# Patient Record
Sex: FEMALE | Race: BLACK OR AFRICAN AMERICAN | Employment: OTHER | URBAN - METROPOLITAN AREA
[De-identification: names, ages, dates, MRNs, and addresses within clinical notes are randomized per-mention and may not be internally consistent; named-entity substitution may affect disease eponyms.]

---

## 2016-12-06 LAB
A/G RATIO: 1.4
ALBUMIN SERPL-MCNC: 4.2 G/DL
ALP BLD-CCNC: 61 U/L
ALT SERPL-CCNC: 22 U/L
AST SERPL-CCNC: 19 U/L
BILIRUB SERPL-MCNC: 0.6 MG/DL (ref 0.1–1.4)
BILIRUBIN DIRECT: 0.1 MG/DL
BILIRUBIN, INDIRECT: 0.5
GLOBULIN: 3.1
PROTEIN TOTAL: 7.3 G/DL

## 2017-01-10 ENCOUNTER — OFFICE VISIT (OUTPATIENT)
Dept: INTERNAL MEDICINE | Age: 67
End: 2017-01-10

## 2017-01-10 VITALS
RESPIRATION RATE: 16 BRPM | SYSTOLIC BLOOD PRESSURE: 140 MMHG | HEART RATE: 84 BPM | WEIGHT: 211 LBS | DIASTOLIC BLOOD PRESSURE: 84 MMHG | BODY MASS INDEX: 36.22 KG/M2

## 2017-01-10 DIAGNOSIS — E78.00 PURE HYPERCHOLESTEROLEMIA: ICD-10-CM

## 2017-01-10 DIAGNOSIS — K75.4 AUTOIMMUNE HEPATITIS (HCC): ICD-10-CM

## 2017-01-10 DIAGNOSIS — I10 ESSENTIAL HYPERTENSION, BENIGN: ICD-10-CM

## 2017-01-10 DIAGNOSIS — J45.20 MILD INTERMITTENT ASTHMA WITHOUT COMPLICATION: Primary | ICD-10-CM

## 2017-01-10 PROCEDURE — 99214 OFFICE O/P EST MOD 30 MIN: CPT | Performed by: INTERNAL MEDICINE

## 2017-01-10 RX ORDER — MONTELUKAST SODIUM 10 MG/1
10 TABLET ORAL NIGHTLY
Qty: 90 TABLET | Refills: 3 | Status: SHIPPED | OUTPATIENT
Start: 2017-01-10 | End: 2019-01-07 | Stop reason: SDUPTHER

## 2017-01-10 ASSESSMENT — ENCOUNTER SYMPTOMS
SORE THROAT: 0
SHORTNESS OF BREATH: 1
GASTROINTESTINAL NEGATIVE: 1
COUGH: 1
ALLERGIC/IMMUNOLOGIC NEGATIVE: 1

## 2017-01-10 ASSESSMENT — PATIENT HEALTH QUESTIONNAIRE - PHQ9
SUM OF ALL RESPONSES TO PHQ9 QUESTIONS 1 & 2: 0
SUM OF ALL RESPONSES TO PHQ QUESTIONS 1-9: 0
2. FEELING DOWN, DEPRESSED OR HOPELESS: 0
1. LITTLE INTEREST OR PLEASURE IN DOING THINGS: 0

## 2017-02-09 ENCOUNTER — TELEPHONE (OUTPATIENT)
Dept: INTERNAL MEDICINE | Age: 67
End: 2017-02-09

## 2017-02-09 RX ORDER — AMOXICILLIN 500 MG/1
500 TABLET, FILM COATED ORAL 3 TIMES DAILY
Qty: 30 TABLET | Refills: 0 | Status: SHIPPED | OUTPATIENT
Start: 2017-02-09 | End: 2017-02-19

## 2017-02-27 RX ORDER — BUDESONIDE AND FORMOTEROL FUMARATE DIHYDRATE 160; 4.5 UG/1; UG/1
AEROSOL RESPIRATORY (INHALATION)
Qty: 3 INHALER | Refills: 3 | Status: SHIPPED | OUTPATIENT
Start: 2017-02-27 | End: 2020-10-27 | Stop reason: SDUPTHER

## 2017-04-27 ENCOUNTER — OFFICE VISIT (OUTPATIENT)
Dept: INTERNAL MEDICINE | Age: 67
End: 2017-04-27

## 2017-04-27 VITALS
SYSTOLIC BLOOD PRESSURE: 158 MMHG | RESPIRATION RATE: 16 BRPM | BODY MASS INDEX: 36.9 KG/M2 | WEIGHT: 215 LBS | HEART RATE: 68 BPM | DIASTOLIC BLOOD PRESSURE: 88 MMHG

## 2017-04-27 DIAGNOSIS — I10 ESSENTIAL HYPERTENSION, BENIGN: Primary | ICD-10-CM

## 2017-04-27 DIAGNOSIS — J45.20 MILD INTERMITTENT ASTHMA WITHOUT COMPLICATION: ICD-10-CM

## 2017-04-27 DIAGNOSIS — K75.4 AUTOIMMUNE HEPATITIS (HCC): ICD-10-CM

## 2017-04-27 PROCEDURE — 99214 OFFICE O/P EST MOD 30 MIN: CPT | Performed by: INTERNAL MEDICINE

## 2017-04-27 RX ORDER — BENZONATATE 100 MG/1
CAPSULE ORAL
Qty: 270 CAPSULE | Refills: 0 | Status: SHIPPED | OUTPATIENT
Start: 2017-04-27 | End: 2018-12-03 | Stop reason: SDUPTHER

## 2017-04-27 RX ORDER — AMLODIPINE BESYLATE 5 MG/1
5 TABLET ORAL DAILY
Qty: 30 TABLET | Refills: 3 | Status: SHIPPED | OUTPATIENT
Start: 2017-04-27 | End: 2017-12-12 | Stop reason: SDUPTHER

## 2017-04-27 RX ORDER — OLMESARTAN MEDOXOMIL 20 MG/1
60 TABLET ORAL DAILY
Qty: 270 TABLET | Refills: 3 | Status: SHIPPED | OUTPATIENT
Start: 2017-04-27 | End: 2018-11-28 | Stop reason: SDUPTHER

## 2017-04-27 ASSESSMENT — ENCOUNTER SYMPTOMS
SHORTNESS OF BREATH: 1
GASTROINTESTINAL NEGATIVE: 1
SORE THROAT: 0
ALLERGIC/IMMUNOLOGIC NEGATIVE: 1
COUGH: 1

## 2017-06-21 ENCOUNTER — HOSPITAL ENCOUNTER (OUTPATIENT)
Dept: ULTRASOUND IMAGING | Age: 67
Discharge: HOME | End: 2017-06-21
Attending: INTERNAL MEDICINE | Admitting: INTERNAL MEDICINE

## 2017-06-21 DIAGNOSIS — K75.4 AUTOIMMUNE HEPATITIS (HCC): ICD-10-CM

## 2017-06-23 ENCOUNTER — TELEPHONE (OUTPATIENT)
Dept: INTERNAL MEDICINE | Age: 67
End: 2017-06-23

## 2017-06-23 VITALS — HEART RATE: 68 BPM | DIASTOLIC BLOOD PRESSURE: 82 MMHG | SYSTOLIC BLOOD PRESSURE: 132 MMHG | RESPIRATION RATE: 16 BRPM

## 2017-07-25 ENCOUNTER — OFFICE VISIT (OUTPATIENT)
Dept: INTERNAL MEDICINE | Age: 67
End: 2017-07-25

## 2017-07-25 VITALS
DIASTOLIC BLOOD PRESSURE: 72 MMHG | WEIGHT: 217 LBS | RESPIRATION RATE: 16 BRPM | SYSTOLIC BLOOD PRESSURE: 124 MMHG | HEART RATE: 68 BPM | BODY MASS INDEX: 37.25 KG/M2

## 2017-07-25 DIAGNOSIS — I10 ESSENTIAL HYPERTENSION, BENIGN: Primary | ICD-10-CM

## 2017-07-25 DIAGNOSIS — K21.9 GASTROESOPHAGEAL REFLUX DISEASE, ESOPHAGITIS PRESENCE NOT SPECIFIED: ICD-10-CM

## 2017-07-25 PROCEDURE — 99213 OFFICE O/P EST LOW 20 MIN: CPT | Performed by: INTERNAL MEDICINE

## 2017-07-25 ASSESSMENT — ENCOUNTER SYMPTOMS
GASTROINTESTINAL NEGATIVE: 1
SHORTNESS OF BREATH: 1
SORE THROAT: 0
ALLERGIC/IMMUNOLOGIC NEGATIVE: 1
COUGH: 1

## 2017-10-09 ENCOUNTER — OFFICE VISIT (OUTPATIENT)
Dept: INTERNAL MEDICINE | Age: 67
End: 2017-10-09

## 2017-10-09 VITALS
BODY MASS INDEX: 37.39 KG/M2 | HEIGHT: 64 IN | DIASTOLIC BLOOD PRESSURE: 90 MMHG | SYSTOLIC BLOOD PRESSURE: 150 MMHG | WEIGHT: 219 LBS | OXYGEN SATURATION: 98 % | HEART RATE: 83 BPM

## 2017-10-09 DIAGNOSIS — R73.01 IMPAIRED FASTING GLUCOSE: ICD-10-CM

## 2017-10-09 DIAGNOSIS — Z12.4 SCREENING FOR CERVICAL CANCER: ICD-10-CM

## 2017-10-09 DIAGNOSIS — K75.4 AUTOIMMUNE HEPATITIS (HCC): ICD-10-CM

## 2017-10-09 DIAGNOSIS — I10 ESSENTIAL HYPERTENSION, BENIGN: Chronic | ICD-10-CM

## 2017-10-09 DIAGNOSIS — Z23 NEED FOR PNEUMOCOCCAL VACCINATION: ICD-10-CM

## 2017-10-09 DIAGNOSIS — Z12.39 SCREENING FOR BREAST CANCER: ICD-10-CM

## 2017-10-09 DIAGNOSIS — Z23 NEED FOR INFLUENZA VACCINATION: ICD-10-CM

## 2017-10-09 DIAGNOSIS — E78.00 PURE HYPERCHOLESTEROLEMIA: ICD-10-CM

## 2017-10-09 DIAGNOSIS — Z00.00 MEDICARE ANNUAL WELLNESS VISIT, INITIAL: ICD-10-CM

## 2017-10-09 DIAGNOSIS — Z00.00 MEDICARE ANNUAL WELLNESS VISIT, INITIAL: Primary | ICD-10-CM

## 2017-10-09 DIAGNOSIS — J45.20 MILD INTERMITTENT ASTHMA WITHOUT COMPLICATION: ICD-10-CM

## 2017-10-09 PROCEDURE — 90662 IIV NO PRSV INCREASED AG IM: CPT | Performed by: INTERNAL MEDICINE

## 2017-10-09 PROCEDURE — G0009 ADMIN PNEUMOCOCCAL VACCINE: HCPCS | Performed by: INTERNAL MEDICINE

## 2017-10-09 PROCEDURE — G0101 CA SCREEN;PELVIC/BREAST EXAM: HCPCS | Performed by: INTERNAL MEDICINE

## 2017-10-09 PROCEDURE — G0008 ADMIN INFLUENZA VIRUS VAC: HCPCS | Performed by: INTERNAL MEDICINE

## 2017-10-09 PROCEDURE — G0402 INITIAL PREVENTIVE EXAM: HCPCS | Performed by: INTERNAL MEDICINE

## 2017-10-09 PROCEDURE — 90732 PPSV23 VACC 2 YRS+ SUBQ/IM: CPT | Performed by: INTERNAL MEDICINE

## 2017-10-09 RX ORDER — PREDNISONE 10 MG/1
TABLET ORAL
Qty: 90 TABLET | Refills: 3 | Status: SHIPPED | OUTPATIENT
Start: 2017-10-09 | End: 2018-11-28 | Stop reason: SDUPTHER

## 2017-10-09 RX ORDER — FUROSEMIDE 20 MG/1
TABLET ORAL
Qty: 90 TABLET | Refills: 3 | Status: SHIPPED | OUTPATIENT
Start: 2017-10-09 | End: 2018-09-28 | Stop reason: SDUPTHER

## 2017-10-09 RX ORDER — DESLORATADINE 5 MG/1
TABLET ORAL
Qty: 90 TABLET | Refills: 3 | Status: SHIPPED | OUTPATIENT
Start: 2017-10-09 | End: 2018-09-28 | Stop reason: SDUPTHER

## 2017-10-10 LAB
A/G RATIO: 1.3 (ref 1.1–2.2)
ALBUMIN SERPL-MCNC: 4.2 G/DL (ref 3.4–5)
ALP BLD-CCNC: 57 U/L (ref 40–129)
ALT SERPL-CCNC: 24 U/L (ref 10–40)
ANION GAP SERPL CALCULATED.3IONS-SCNC: 15 MMOL/L (ref 3–16)
AST SERPL-CCNC: 19 U/L (ref 15–37)
BASOPHILS ABSOLUTE: 0.1 K/UL (ref 0–0.2)
BASOPHILS RELATIVE PERCENT: 1 %
BILIRUB SERPL-MCNC: 0.8 MG/DL (ref 0–1)
BUN BLDV-MCNC: 16 MG/DL (ref 7–20)
CALCIUM SERPL-MCNC: 9.8 MG/DL (ref 8.3–10.6)
CHLORIDE BLD-SCNC: 101 MMOL/L (ref 99–110)
CHOLESTEROL, TOTAL: 252 MG/DL (ref 0–199)
CO2: 25 MMOL/L (ref 21–32)
CREAT SERPL-MCNC: 1 MG/DL (ref 0.6–1.2)
EOSINOPHILS ABSOLUTE: 0 K/UL (ref 0–0.6)
EOSINOPHILS RELATIVE PERCENT: 0 %
GFR AFRICAN AMERICAN: >60
GFR NON-AFRICAN AMERICAN: 56
GLOBULIN: 3.2 G/DL
GLUCOSE BLD-MCNC: 104 MG/DL (ref 70–99)
HCT VFR BLD CALC: 39.1 % (ref 36–48)
HDLC SERPL-MCNC: 84 MG/DL (ref 40–60)
HEMOGLOBIN: 12.8 G/DL (ref 12–16)
LDL CHOLESTEROL CALCULATED: 148 MG/DL
LYMPHOCYTES ABSOLUTE: 2.2 K/UL (ref 1–5.1)
LYMPHOCYTES RELATIVE PERCENT: 15 %
MCH RBC QN AUTO: 30.5 PG (ref 26–34)
MCHC RBC AUTO-ENTMCNC: 32.7 G/DL (ref 31–36)
MCV RBC AUTO: 93.5 FL (ref 80–100)
MONOCYTES ABSOLUTE: 0.9 K/UL (ref 0–1.3)
MONOCYTES RELATIVE PERCENT: 6 %
NEUTROPHILS ABSOLUTE: 11.2 K/UL (ref 1.7–7.7)
NEUTROPHILS RELATIVE PERCENT: 78 %
PDW BLD-RTO: 14 % (ref 12.4–15.4)
PLATELET # BLD: 354 K/UL (ref 135–450)
PLATELET SLIDE REVIEW: ADEQUATE
PMV BLD AUTO: 8.2 FL (ref 5–10.5)
POTASSIUM SERPL-SCNC: 4.3 MMOL/L (ref 3.5–5.1)
RBC # BLD: 4.18 M/UL (ref 4–5.2)
RBC # BLD: NORMAL 10*6/UL
SLIDE REVIEW: ABNORMAL
SODIUM BLD-SCNC: 141 MMOL/L (ref 136–145)
TOTAL PROTEIN: 7.4 G/DL (ref 6.4–8.2)
TRIGL SERPL-MCNC: 101 MG/DL (ref 0–150)
TSH REFLEX FT4: 1.41 UIU/ML (ref 0.27–4.2)
VLDLC SERPL CALC-MCNC: 20 MG/DL
WBC # BLD: 14.4 K/UL (ref 4–11)

## 2017-10-11 LAB
ESTIMATED AVERAGE GLUCOSE: 154.2 MG/DL
HBA1C MFR BLD: 7 %

## 2017-11-13 ENCOUNTER — OFFICE VISIT (OUTPATIENT)
Dept: GYNECOLOGY | Age: 67
End: 2017-11-13

## 2017-11-13 VITALS
SYSTOLIC BLOOD PRESSURE: 141 MMHG | DIASTOLIC BLOOD PRESSURE: 84 MMHG | RESPIRATION RATE: 16 BRPM | HEIGHT: 64 IN | BODY MASS INDEX: 37.56 KG/M2 | TEMPERATURE: 97.1 F | WEIGHT: 220 LBS | OXYGEN SATURATION: 97 % | HEART RATE: 68 BPM

## 2017-11-13 DIAGNOSIS — Z01.419 WELL WOMAN EXAM WITH ROUTINE GYNECOLOGICAL EXAM: Primary | ICD-10-CM

## 2017-11-13 PROCEDURE — G8427 DOCREV CUR MEDS BY ELIG CLIN: HCPCS | Performed by: OBSTETRICS & GYNECOLOGY

## 2017-11-13 PROCEDURE — G0101 CA SCREEN;PELVIC/BREAST EXAM: HCPCS | Performed by: OBSTETRICS & GYNECOLOGY

## 2017-11-13 PROCEDURE — G8417 CALC BMI ABV UP PARAM F/U: HCPCS | Performed by: OBSTETRICS & GYNECOLOGY

## 2017-11-13 PROCEDURE — 99999 PR OFFICE/OUTPT VISIT,PROCEDURE ONLY: CPT | Performed by: OBSTETRICS & GYNECOLOGY

## 2017-11-13 NOTE — PROGRESS NOTES
Subjective:      Patient ID: Janny Salazar is a 74 y.o. female. HPI  pts here for annual gyn exam.  She denies complaints. Scheduled/up to date for mammogram and colonoscopy. Denies vag bleeding. Review of Systems Pertinent review of systems items discussed above. All others systems items not discussed above were negative. Objective:   Physical Exam   Constitutional: She is oriented to person, place, and time. She appears well-developed and well-nourished. HENT:   Head: Normocephalic and atraumatic. Neck: No tracheal deviation present. No thyromegaly present. Cardiovascular: Normal rate, regular rhythm and normal heart sounds. No murmur heard. Pulmonary/Chest: Effort normal and breath sounds normal. No respiratory distress. She has no wheezes. She has no rales. Right breast exhibits no mass, no nipple discharge and no skin change. Left breast exhibits no mass, no nipple discharge and no skin change. Abdominal: Soft. She exhibits no distension and no mass. There is no tenderness. There is no rebound. Genitourinary: Rectum normal, vagina normal and uterus normal. Rectal exam shows no external hemorrhoid, no mass and guaiac negative stool. No breast tenderness (no masses), discharge or bleeding. There is no lesion on the right labia. There is no lesion on the left labia. Uterus is not deviated, not enlarged, not fixed and not tender. Cervix exhibits no motion tenderness, no discharge and no friability. Right adnexum displays no mass and no tenderness. Left adnexum displays no mass and no tenderness. No foreign body in the vagina. No vaginal discharge found. Genitourinary Comments: Pap performed. Musculoskeletal: Normal range of motion. Lymphadenopathy:     She has no cervical adenopathy. Neurological: She is alert and oriented to person, place, and time.        Assessment:      Normal gyn exam      Plan:      F/u annual gyn exam.

## 2017-11-15 LAB
HPV COMMENT: NORMAL
HPV TYPE 16: NOT DETECTED
HPV TYPE 18: NOT DETECTED
HPVOH (OTHER TYPES): NOT DETECTED

## 2017-12-13 RX ORDER — AMLODIPINE BESYLATE 5 MG/1
TABLET ORAL
Qty: 90 TABLET | Refills: 3 | Status: SHIPPED | OUTPATIENT
Start: 2017-12-13 | End: 2018-07-18 | Stop reason: SDUPTHER

## 2018-01-08 ENCOUNTER — TELEPHONE (OUTPATIENT)
Dept: INTERNAL MEDICINE | Age: 68
End: 2018-01-08

## 2018-01-08 RX ORDER — AZITHROMYCIN 250 MG/1
TABLET, FILM COATED ORAL
Qty: 1 PACKET | Refills: 0 | Status: SHIPPED | OUTPATIENT
Start: 2018-01-08 | End: 2018-01-18

## 2018-01-08 NOTE — TELEPHONE ENCOUNTER
Pt stated has a bad cough, no fever, chills, runny nose, very Tired for 1 week. Pt stated taking OTC medication Advil Sinus cold medication not working. Pt stated that needs something for these symptoms.   Pharmacy on file verified

## 2018-01-16 ENCOUNTER — OFFICE VISIT (OUTPATIENT)
Dept: INTERNAL MEDICINE | Age: 68
End: 2018-01-16

## 2018-01-16 VITALS
WEIGHT: 215 LBS | DIASTOLIC BLOOD PRESSURE: 90 MMHG | HEART RATE: 78 BPM | BODY MASS INDEX: 36.9 KG/M2 | SYSTOLIC BLOOD PRESSURE: 148 MMHG

## 2018-01-16 DIAGNOSIS — I10 ESSENTIAL HYPERTENSION, BENIGN: Chronic | ICD-10-CM

## 2018-01-16 DIAGNOSIS — E78.00 PURE HYPERCHOLESTEROLEMIA: ICD-10-CM

## 2018-01-16 DIAGNOSIS — I10 ESSENTIAL HYPERTENSION, BENIGN: Primary | Chronic | ICD-10-CM

## 2018-01-16 LAB
A/G RATIO: 1.6 (ref 1.1–2.2)
ALBUMIN SERPL-MCNC: 4.6 G/DL (ref 3.4–5)
ALP BLD-CCNC: 57 U/L (ref 40–129)
ALT SERPL-CCNC: 22 U/L (ref 10–40)
ANION GAP SERPL CALCULATED.3IONS-SCNC: 14 MMOL/L (ref 3–16)
AST SERPL-CCNC: 18 U/L (ref 15–37)
BASOPHILS ABSOLUTE: 0 K/UL (ref 0–0.2)
BASOPHILS RELATIVE PERCENT: 0.2 %
BILIRUB SERPL-MCNC: 0.6 MG/DL (ref 0–1)
BUN BLDV-MCNC: 16 MG/DL (ref 7–20)
CALCIUM SERPL-MCNC: 9.8 MG/DL (ref 8.3–10.6)
CHLORIDE BLD-SCNC: 101 MMOL/L (ref 99–110)
CHOLESTEROL, TOTAL: 253 MG/DL (ref 0–199)
CO2: 27 MMOL/L (ref 21–32)
CREAT SERPL-MCNC: 1.1 MG/DL (ref 0.6–1.2)
EOSINOPHILS ABSOLUTE: 0 K/UL (ref 0–0.6)
EOSINOPHILS RELATIVE PERCENT: 0.1 %
GFR AFRICAN AMERICAN: >60
GFR NON-AFRICAN AMERICAN: 50
GLOBULIN: 2.8 G/DL
GLUCOSE BLD-MCNC: 120 MG/DL (ref 70–99)
HCT VFR BLD CALC: 40.7 % (ref 36–48)
HDLC SERPL-MCNC: 76 MG/DL (ref 40–60)
HEMOGLOBIN: 13.1 G/DL (ref 12–16)
LDL CHOLESTEROL CALCULATED: 152 MG/DL
LYMPHOCYTES ABSOLUTE: 1.7 K/UL (ref 1–5.1)
LYMPHOCYTES RELATIVE PERCENT: 10.5 %
MCH RBC QN AUTO: 29.7 PG (ref 26–34)
MCHC RBC AUTO-ENTMCNC: 32.2 G/DL (ref 31–36)
MCV RBC AUTO: 92.4 FL (ref 80–100)
MONOCYTES ABSOLUTE: 0.6 K/UL (ref 0–1.3)
MONOCYTES RELATIVE PERCENT: 3.4 %
NEUTROPHILS ABSOLUTE: 14.2 K/UL (ref 1.7–7.7)
NEUTROPHILS RELATIVE PERCENT: 85.8 %
PDW BLD-RTO: 14.1 % (ref 12.4–15.4)
PLATELET # BLD: 391 K/UL (ref 135–450)
PMV BLD AUTO: 7.7 FL (ref 5–10.5)
POTASSIUM SERPL-SCNC: 4.2 MMOL/L (ref 3.5–5.1)
RBC # BLD: 4.41 M/UL (ref 4–5.2)
SODIUM BLD-SCNC: 142 MMOL/L (ref 136–145)
TOTAL PROTEIN: 7.4 G/DL (ref 6.4–8.2)
TRIGL SERPL-MCNC: 126 MG/DL (ref 0–150)
VLDLC SERPL CALC-MCNC: 25 MG/DL
WBC # BLD: 16.6 K/UL (ref 4–11)

## 2018-01-16 PROCEDURE — 3014F SCREEN MAMMO DOC REV: CPT | Performed by: INTERNAL MEDICINE

## 2018-01-16 PROCEDURE — G8427 DOCREV CUR MEDS BY ELIG CLIN: HCPCS | Performed by: INTERNAL MEDICINE

## 2018-01-16 PROCEDURE — G8417 CALC BMI ABV UP PARAM F/U: HCPCS | Performed by: INTERNAL MEDICINE

## 2018-01-16 PROCEDURE — 99213 OFFICE O/P EST LOW 20 MIN: CPT | Performed by: INTERNAL MEDICINE

## 2018-01-16 PROCEDURE — G8399 PT W/DXA RESULTS DOCUMENT: HCPCS | Performed by: INTERNAL MEDICINE

## 2018-01-16 PROCEDURE — 1036F TOBACCO NON-USER: CPT | Performed by: INTERNAL MEDICINE

## 2018-01-16 PROCEDURE — 1090F PRES/ABSN URINE INCON ASSESS: CPT | Performed by: INTERNAL MEDICINE

## 2018-01-16 PROCEDURE — G8484 FLU IMMUNIZE NO ADMIN: HCPCS | Performed by: INTERNAL MEDICINE

## 2018-01-16 PROCEDURE — 4040F PNEUMOC VAC/ADMIN/RCVD: CPT | Performed by: INTERNAL MEDICINE

## 2018-01-16 PROCEDURE — 3017F COLORECTAL CA SCREEN DOC REV: CPT | Performed by: INTERNAL MEDICINE

## 2018-01-16 PROCEDURE — 1123F ACP DISCUSS/DSCN MKR DOCD: CPT | Performed by: INTERNAL MEDICINE

## 2018-01-16 ASSESSMENT — ENCOUNTER SYMPTOMS
ABDOMINAL PAIN: 0
VOMITING: 0
SHORTNESS OF BREATH: 0
CHEST TIGHTNESS: 0
NAUSEA: 0

## 2018-01-16 ASSESSMENT — PATIENT HEALTH QUESTIONNAIRE - PHQ9
SUM OF ALL RESPONSES TO PHQ QUESTIONS 1-9: 0
SUM OF ALL RESPONSES TO PHQ9 QUESTIONS 1 & 2: 0
2. FEELING DOWN, DEPRESSED OR HOPELESS: 0
1. LITTLE INTEREST OR PLEASURE IN DOING THINGS: 0

## 2018-01-16 NOTE — PROGRESS NOTES
Stuart Méndez MD   mometasone (NASONEX) 50 MCG/ACT nasal spray 2 sprays by Nasal route daily as needed (as needed for allergies) 9/6/16  Yes Stuart Méndez MD   PROVENTIL  (90 BASE) MCG/ACT inhaler INHALE 2 PUFFS INTO THE LUNGS EVERY 6 HOURS AS NEEDED. 9/5/14  Yes Stuart Méndez MD   calcium carbonate (OSCAL) 500 MG TABS tablet Take 500 mg by mouth daily. Yes Historical Provider, MD   Spacer/Aero Chamber Mouthpiece MISC by Does not apply route. 1/24/12  Yes Stuart Méndez MD   Multiple Vitamin (MULTIVITAMIN PO) Take  by mouth daily. 8/6/10  Yes Historical Provider, MD   zoledronic acid (RECLAST) 5 MG/100ML SOLN Infuse 100 mLs intravenously once for 1 dose. 8/8/14 10/9/17  Stuart Méndez MD       REVIEW OF SYSTEMS:  Review of Systems   Constitutional: Negative for chills and fever. Eyes: Negative for visual disturbance. Respiratory: Negative for chest tightness and shortness of breath. Cardiovascular: Negative for chest pain. Gastrointestinal: Negative for abdominal pain, nausea and vomiting. Neurological: Negative for weakness, numbness and headaches. Physical Exam:      Vitals: BP (!) 148/90   Pulse 78   Wt 215 lb (97.5 kg)   LMP 12/07/2005   BMI 36.90 kg/m²     Body mass index is 36.9 kg/m². Wt Readings from Last 3 Encounters:   01/16/18 215 lb (97.5 kg)   11/13/17 220 lb (99.8 kg)   10/09/17 219 lb (99.3 kg)     Physical Exam   Constitutional: She is oriented to person, place, and time. She appears well-developed and well-nourished. No distress. HENT:   Head: Normocephalic and atraumatic. Right Ear: External ear normal.   Left Ear: External ear normal.   Mouth/Throat: Oropharynx is clear and moist. No oropharyngeal exudate. Eyes: Conjunctivae and EOM are normal. Pupils are equal, round, and reactive to light. Right eye exhibits no discharge. Left eye exhibits no discharge. No scleral icterus. Right eye exhibits normal extraocular motion and no nystagmus.  Left eye exhibits is the diastolic pressure. Why is this test done? You may do this test at home to:  · Find out if you have high blood pressure. · Track your blood pressure if you have high blood pressure. · Track how well medicine is working to reduce high blood pressure. · Check how lifestyle changes, such as weight loss and exercise, are affecting blood pressure. How can you prepare for the test?  · Do not use caffeine, tobacco, or medicines known to raise blood pressure (such as nasal decongestant sprays) for at least 30 minutes before taking your blood pressure. · Do not exercise for at least 30 minutes before taking your blood pressure. What happens before the test?  Take your blood pressure while you feel comfortable and relaxed. Sit quietly with both feet on the floor for at least 5 minutes before the test.  What happens during the test?  · Sit with your arm slightly bent and resting on a table so that your upper arm is at the same level as your heart. · Roll up your sleeve or take off your shirt to expose your upper arm. · Wrap the blood pressure cuff around your upper arm so that the lower edge of the cuff is about 1 inch above the bend of your elbow. Proceed with the following steps depending on if you are using an automatic or manual pressure monitor. Automatic blood pressure monitors  · Press the on/off button on the automatic monitor and wait until the ready-to-measure \"heart\" symbol appears next to zero in the display window. · Press the start button. The cuff will inflate and deflate by itself. · Your blood pressure numbers will appear on the screen. · Write your numbers in your log book, along with the date and time. Manual blood pressure monitors  · Place the earpieces of a stethoscope in your ears, and place the bell of the stethoscope over the artery, just below the cuff. · Close the valve on the rubber inflating bulb.   · Squeeze the bulb rapidly with your opposite hand to inflate the cuff until

## 2018-01-16 NOTE — PATIENT INSTRUCTIONS
Patient Education        Home Blood Pressure Test: About This Test  What is it? A home blood pressure test allows you to keep track of your blood pressure at home. Blood pressure is a measure of the force of blood against the walls of your arteries. Blood pressure readings include two numbers, such as 130/80 (say \"130 over 80\"). The first number is the systolic pressure. The second number is the diastolic pressure. Why is this test done? You may do this test at home to:  · Find out if you have high blood pressure. · Track your blood pressure if you have high blood pressure. · Track how well medicine is working to reduce high blood pressure. · Check how lifestyle changes, such as weight loss and exercise, are affecting blood pressure. How can you prepare for the test?  · Do not use caffeine, tobacco, or medicines known to raise blood pressure (such as nasal decongestant sprays) for at least 30 minutes before taking your blood pressure. · Do not exercise for at least 30 minutes before taking your blood pressure. What happens before the test?  Take your blood pressure while you feel comfortable and relaxed. Sit quietly with both feet on the floor for at least 5 minutes before the test.  What happens during the test?  · Sit with your arm slightly bent and resting on a table so that your upper arm is at the same level as your heart. · Roll up your sleeve or take off your shirt to expose your upper arm. · Wrap the blood pressure cuff around your upper arm so that the lower edge of the cuff is about 1 inch above the bend of your elbow. Proceed with the following steps depending on if you are using an automatic or manual pressure monitor. Automatic blood pressure monitors  · Press the on/off button on the automatic monitor and wait until the ready-to-measure \"heart\" symbol appears next to zero in the display window. · Press the start button. The cuff will inflate and deflate by itself.   · Your blood pressure numbers will appear on the screen. · Write your numbers in your log book, along with the date and time. Manual blood pressure monitors  · Place the earpieces of a stethoscope in your ears, and place the bell of the stethoscope over the artery, just below the cuff. · Close the valve on the rubber inflating bulb. · Squeeze the bulb rapidly with your opposite hand to inflate the cuff until the dial or column of mercury reads about 30 mm Hg higher than your usual systolic pressure. If you do not know your usual pressure, inflate the cuff to 210 mm Hg or until the pulse at your wrist disappears. · Open the pressure valve just slightly by twisting or pressing the valve on the bulb. · As you watch the pressure slowly fall, note the level on the dial at which you first start to hear a pulsing or tapping sound through the stethoscope. This is your systolic blood pressure. · Continue letting the air out slowly. The sounds will become muffled and will finally disappear. Note the pressure when the sounds completely disappear. This is your diastolic blood pressure. Let out all the remaining air. · Write your numbers in your log book, along with the date and time. What else should you know about the test?  Results for adults ages 25 and older (mm Hg):  · Normal (ideal): Systolic 645 or below. Diastolic 79 or below. · Prehypertension: Systolic 648 to 667. Diastolic 80 to 89. · Hypertension: Systolic 384 or above. Diastolic 90 or above. Follow-up care is a key part of your treatment and safety. Be sure to make and go to all appointments, and call your doctor if you are having problems. It's also a good idea to keep a list of the medicines you take. Where can you learn more? Go to [email or web address] org and sign in to your [employer] account.  Enter C427 in the [employer] box to learn more about \"Home Blood Pressure Test: About This Test.\"     If you do not have an account, please

## 2018-01-21 DIAGNOSIS — E78.00 PURE HYPERCHOLESTEROLEMIA: ICD-10-CM

## 2018-01-21 DIAGNOSIS — D72.829 LEUKOCYTOSIS, UNSPECIFIED TYPE: Primary | ICD-10-CM

## 2018-01-21 RX ORDER — ATORVASTATIN CALCIUM 20 MG/1
20 TABLET, FILM COATED ORAL DAILY
Qty: 30 TABLET | Refills: 3 | Status: SHIPPED | OUTPATIENT
Start: 2018-01-21 | End: 2018-04-02 | Stop reason: SDUPTHER

## 2018-04-02 ENCOUNTER — TELEPHONE (OUTPATIENT)
Dept: INTERNAL MEDICINE | Age: 68
End: 2018-04-02

## 2018-04-02 DIAGNOSIS — E78.00 PURE HYPERCHOLESTEROLEMIA: ICD-10-CM

## 2018-04-02 RX ORDER — ATORVASTATIN CALCIUM 20 MG/1
20 TABLET, FILM COATED ORAL DAILY
Qty: 30 TABLET | Refills: 3 | Status: SHIPPED | OUTPATIENT
Start: 2018-04-02 | End: 2018-07-17 | Stop reason: SDUPTHER

## 2018-06-27 ENCOUNTER — HOSPITAL ENCOUNTER (OUTPATIENT)
Dept: MAMMOGRAPHY | Age: 68
Discharge: HOME | End: 2018-06-27
Attending: INTERNAL MEDICINE | Admitting: INTERNAL MEDICINE

## 2018-06-27 DIAGNOSIS — Z12.31 VISIT FOR SCREENING MAMMOGRAM: ICD-10-CM

## 2018-07-09 ENCOUNTER — HOSPITAL ENCOUNTER (OUTPATIENT)
Dept: ULTRASOUND IMAGING | Age: 68
Discharge: HOME | End: 2018-07-09
Attending: INTERNAL MEDICINE | Admitting: INTERNAL MEDICINE

## 2018-07-09 DIAGNOSIS — K75.4 AUTOIMMUNE HEPATITIS (HCC): ICD-10-CM

## 2018-07-17 ENCOUNTER — OFFICE VISIT (OUTPATIENT)
Dept: INTERNAL MEDICINE | Age: 68
End: 2018-07-17

## 2018-07-17 VITALS
DIASTOLIC BLOOD PRESSURE: 82 MMHG | HEART RATE: 79 BPM | BODY MASS INDEX: 34.33 KG/M2 | WEIGHT: 200 LBS | OXYGEN SATURATION: 98 % | SYSTOLIC BLOOD PRESSURE: 136 MMHG

## 2018-07-17 DIAGNOSIS — K75.4 AUTOIMMUNE HEPATITIS (HCC): ICD-10-CM

## 2018-07-17 DIAGNOSIS — N18.30 TYPE 2 DIABETES MELLITUS WITH STAGE 3 CHRONIC KIDNEY DISEASE, WITHOUT LONG-TERM CURRENT USE OF INSULIN (HCC): ICD-10-CM

## 2018-07-17 DIAGNOSIS — I10 ESSENTIAL HYPERTENSION, BENIGN: Primary | Chronic | ICD-10-CM

## 2018-07-17 DIAGNOSIS — E78.00 PURE HYPERCHOLESTEROLEMIA: ICD-10-CM

## 2018-07-17 DIAGNOSIS — J45.20 MILD INTERMITTENT ASTHMA WITHOUT COMPLICATION: ICD-10-CM

## 2018-07-17 DIAGNOSIS — I10 ESSENTIAL HYPERTENSION, BENIGN: Chronic | ICD-10-CM

## 2018-07-17 DIAGNOSIS — E11.22 TYPE 2 DIABETES MELLITUS WITH STAGE 3 CHRONIC KIDNEY DISEASE, WITHOUT LONG-TERM CURRENT USE OF INSULIN (HCC): ICD-10-CM

## 2018-07-17 LAB
A/G RATIO: 1.7 (ref 1.1–2.2)
ALBUMIN SERPL-MCNC: 4.5 G/DL (ref 3.4–5)
ALP BLD-CCNC: 63 U/L (ref 40–129)
ALT SERPL-CCNC: 17 U/L (ref 10–40)
ANION GAP SERPL CALCULATED.3IONS-SCNC: 14 MMOL/L (ref 3–16)
AST SERPL-CCNC: 13 U/L (ref 15–37)
BASOPHILS ABSOLUTE: 0.1 K/UL (ref 0–0.2)
BASOPHILS RELATIVE PERCENT: 0.8 %
BILIRUB SERPL-MCNC: 0.8 MG/DL (ref 0–1)
BUN BLDV-MCNC: 18 MG/DL (ref 7–20)
CALCIUM SERPL-MCNC: 9.9 MG/DL (ref 8.3–10.6)
CHLORIDE BLD-SCNC: 101 MMOL/L (ref 99–110)
CHOLESTEROL, TOTAL: 178 MG/DL (ref 0–199)
CO2: 28 MMOL/L (ref 21–32)
CREAT SERPL-MCNC: 1.2 MG/DL (ref 0.6–1.2)
EOSINOPHILS ABSOLUTE: 0.3 K/UL (ref 0–0.6)
EOSINOPHILS RELATIVE PERCENT: 2.5 %
GFR AFRICAN AMERICAN: 54
GFR NON-AFRICAN AMERICAN: 45
GLOBULIN: 2.7 G/DL
GLUCOSE BLD-MCNC: 91 MG/DL (ref 70–99)
HCT VFR BLD CALC: 39.5 % (ref 36–48)
HDLC SERPL-MCNC: 74 MG/DL (ref 40–60)
HEMOGLOBIN: 13.2 G/DL (ref 12–16)
LDL CHOLESTEROL CALCULATED: 74 MG/DL
LYMPHOCYTES ABSOLUTE: 2.8 K/UL (ref 1–5.1)
LYMPHOCYTES RELATIVE PERCENT: 21.1 %
MCH RBC QN AUTO: 30.7 PG (ref 26–34)
MCHC RBC AUTO-ENTMCNC: 33.4 G/DL (ref 31–36)
MCV RBC AUTO: 91.9 FL (ref 80–100)
MONOCYTES ABSOLUTE: 1.2 K/UL (ref 0–1.3)
MONOCYTES RELATIVE PERCENT: 9 %
NEUTROPHILS ABSOLUTE: 9 K/UL (ref 1.7–7.7)
NEUTROPHILS RELATIVE PERCENT: 66.6 %
PDW BLD-RTO: 13.9 % (ref 12.4–15.4)
PLATELET # BLD: 376 K/UL (ref 135–450)
PMV BLD AUTO: 8.1 FL (ref 5–10.5)
POTASSIUM SERPL-SCNC: 3.8 MMOL/L (ref 3.5–5.1)
RBC # BLD: 4.3 M/UL (ref 4–5.2)
SODIUM BLD-SCNC: 143 MMOL/L (ref 136–145)
TOTAL PROTEIN: 7.2 G/DL (ref 6.4–8.2)
TRIGL SERPL-MCNC: 150 MG/DL (ref 0–150)
VLDLC SERPL CALC-MCNC: 30 MG/DL
WBC # BLD: 13.5 K/UL (ref 4–11)

## 2018-07-17 PROCEDURE — G8427 DOCREV CUR MEDS BY ELIG CLIN: HCPCS | Performed by: INTERNAL MEDICINE

## 2018-07-17 PROCEDURE — G8417 CALC BMI ABV UP PARAM F/U: HCPCS | Performed by: INTERNAL MEDICINE

## 2018-07-17 PROCEDURE — 1101F PT FALLS ASSESS-DOCD LE1/YR: CPT | Performed by: INTERNAL MEDICINE

## 2018-07-17 PROCEDURE — 1090F PRES/ABSN URINE INCON ASSESS: CPT | Performed by: INTERNAL MEDICINE

## 2018-07-17 PROCEDURE — 99214 OFFICE O/P EST MOD 30 MIN: CPT | Performed by: INTERNAL MEDICINE

## 2018-07-17 PROCEDURE — 3017F COLORECTAL CA SCREEN DOC REV: CPT | Performed by: INTERNAL MEDICINE

## 2018-07-17 PROCEDURE — 2022F DILAT RTA XM EVC RTNOPTHY: CPT | Performed by: INTERNAL MEDICINE

## 2018-07-17 PROCEDURE — 3046F HEMOGLOBIN A1C LEVEL >9.0%: CPT | Performed by: INTERNAL MEDICINE

## 2018-07-17 PROCEDURE — G8399 PT W/DXA RESULTS DOCUMENT: HCPCS | Performed by: INTERNAL MEDICINE

## 2018-07-17 PROCEDURE — 1123F ACP DISCUSS/DSCN MKR DOCD: CPT | Performed by: INTERNAL MEDICINE

## 2018-07-17 PROCEDURE — 4040F PNEUMOC VAC/ADMIN/RCVD: CPT | Performed by: INTERNAL MEDICINE

## 2018-07-17 PROCEDURE — 1036F TOBACCO NON-USER: CPT | Performed by: INTERNAL MEDICINE

## 2018-07-17 RX ORDER — ATORVASTATIN CALCIUM 40 MG/1
40 TABLET, FILM COATED ORAL DAILY
Qty: 90 TABLET | Refills: 1 | Status: SHIPPED | OUTPATIENT
Start: 2018-07-17 | End: 2019-09-27 | Stop reason: SDUPTHER

## 2018-07-17 ASSESSMENT — ENCOUNTER SYMPTOMS
ABDOMINAL PAIN: 0
NAUSEA: 0
VOMITING: 0
CHEST TIGHTNESS: 0
SHORTNESS OF BREATH: 0

## 2018-07-17 NOTE — PROGRESS NOTES
Outpatient Note for established Patient - regular Visit    History Obtained From:  patient, electronic medical record  Is the patient new to me ? - No    HISTORY OF PRESENT ILLNESS:   The patient is a 74 y.o. female who is here today for :  1) Hypertension:today 136/82   BP readings at home: mornins/80s. evening 120s/70s   Pt is compliant with medications- Yes:Amlodipine, Furosemide   Medications side effects? No   Associated Sx (headache/ vision changes etc)- No   Is blood pressure controlled so far? No    2) Hypercholesterolemia  No components found for: CHLPL  Lab Results   Component Value Date    TRIG 126 2018     Lab Results   Component Value Date    HDL 76 (H) 2018     Lab Results   Component Value Date    LDLCALC 152 (H) 2018     Lab Results   Component Value Date    LABVLDL 25 2018     Patient is on lipid lowering medications? Yes  Patient is complaint with his medications? No  Medications side effects? No    3) Asthma   She uses her rescue inhaler infrequntly (less 1/week)  Mainly in the outside work    4) Autoimmune hepatitis  She is on Prednisone  US liver:  Redemonstrated coarsened liver architecture, compatible with   hepatocellular disease. Unchanged region of increased   echogenicity, suggesting fibrosis. Per pt Dr Lena Flores is following and did not want to do changes in management       Past Medical History:        Diagnosis Date    Allergic rhinitis     Asthma     COPD (chronic obstructive pulmonary disease) ([facility] [address]     Hepatitis     Hyperlipidemia     Hypertension     Influenza A 1/10/2015    Osteoporosis        Social History:   TOBACCO:   reports that she has never smoked. She has never used smokeless tobacco.  ETOH:   reports that she does not drink alcohol. Current Medications:    Prior to Admission medications    Medication Sig Start Date End Date Taking?  Authorizing Provider   atorvastatin (LIPITOR) 20 MG tablet Take 1 tablet by mouth daily 18 Yes Екатерина Vance MD   amLODIPine (NORVASC) 5 MG tablet TAKE 1 TABLET EVERY DAY 12/13/17  Yes Екатерина Vance MD   furosemide (LASIX) 20 MG tablet TAKE 1 TABLET BY MOUTH EVERY DAY 10/9/17  Yes Екатерина Vance MD   predniSONE (DELTASONE) 10 MG tablet TAKE 1 TABLET EVERY DAY 10/9/17  Yes Екатерина Vance MD   desloratadine (CLARINEX) 5 MG tablet TAKE 1 TABLET BY MOUTH EVERY DAY 10/9/17  Yes Екатерина Vance MD   olmesartan (BENICAR) 20 MG tablet Take 3 tablets by mouth daily 4/27/17  Yes Severo Stack, MD   benzonatate (TESSALON) 100 MG capsule TAKE 1 CAPSULE BY MOUTH 3 TIMES DAILY AS NEEDED. EVERY 6 HRS AS NEEDED FOR COUGH 4/27/17  Yes Severo Stack, MD   SYMBICORT 160-4.5 MCG/ACT AERO INHALE 2 PUFFS TWICE A DAY 2/27/17  Yes Severo Stack, MD   montelukast (SINGULAIR) 10 MG tablet Take 1 tablet by mouth nightly 1/10/17  Yes Severo Stack, MD   mometasone (NASONEX) 50 MCG/ACT nasal spray 2 sprays by Nasal route daily as needed (as needed for allergies) 9/6/16  Yes Severo Stack, MD   PROVENTIL  (90 BASE) MCG/ACT inhaler INHALE 2 PUFFS INTO THE LUNGS EVERY 6 HOURS AS NEEDED. 9/5/14  Yes Severo Stack, MD   calcium carbonate (OSCAL) 500 MG TABS tablet Take 500 mg by mouth daily. Yes Historical Provider, MD   Spacer/Aero Chamber Mouthpiece MISC by Does not apply route. 1/24/12  Yes Severo Stack, MD   Multiple Vitamin (MULTIVITAMIN PO) Take  by mouth daily. 8/6/10  Yes Historical Provider, MD   zoledronic acid (RECLAST) 5 MG/100ML SOLN Infuse 100 mLs intravenously once for 1 dose. 8/8/14 10/9/17  Severo Stack, MD       REVIEW OF SYSTEMS:  Review of Systems   Constitutional: Negative for activity change, appetite change, chills, fever and unexpected weight change. HENT: Negative for hearing loss. Eyes: Negative for visual disturbance. Respiratory: Negative for chest tightness and shortness of breath. Cardiovascular: Negative for chest pain.    Gastrointestinal: Negative for abdominal pain, nausea and vomiting. Genitourinary: Negative for hematuria. Skin: Negative for rash. Allergic/Immunologic: Negative for immunocompromised state. Neurological: Negative for dizziness and headaches. Psychiatric/Behavioral: Negative for dysphoric mood and suicidal ideas. Physical Exam:      Vitals: /82 (Site: Right Arm)   Pulse 79   Wt 200 lb (90.7 kg)   LMP 12/07/2005   SpO2 98%   BMI 34.33 kg/m²     Body mass index is 34.33 kg/m². Wt Readings from Last 3 Encounters:   07/17/18 200 lb (90.7 kg)   01/16/18 215 lb (97.5 kg)   11/13/17 220 lb (99.8 kg)     Physical Exam   Constitutional: She is oriented to person, place, and time. She appears well-developed and well-nourished. No distress. HENT:   Head: Normocephalic and atraumatic. Mouth/Throat: Oropharynx is clear and moist. No oropharyngeal exudate. Eyes: Conjunctivae and EOM are normal. Right eye exhibits no discharge. Left eye exhibits no discharge. No scleral icterus. Neck: Normal range of motion. Neck supple. Cardiovascular: Normal rate and normal heart sounds. No murmur heard. Pulmonary/Chest: Effort normal and breath sounds normal. No respiratory distress. She has no wheezes. She has no rales. Abdominal: Soft. She exhibits no distension. There is no tenderness. There is no rebound. Musculoskeletal: She exhibits no deformity. Lymphadenopathy:        Head (right side): No submental and no submandibular adenopathy present. Head (left side): No submental and no submandibular adenopathy present. She has no cervical adenopathy. Right cervical: No superficial cervical and no deep cervical adenopathy present. Left cervical: No superficial cervical and no deep cervical adenopathy present. Neurological: She is alert and oriented to person, place, and time. She has normal reflexes. She exhibits normal muscle tone. GCS eye subscore is 4. GCS verbal subscore is 5.  GCS motor subscore instructions of proper blood pressure measurement. Patient Education        Home Blood Pressure Test: About This Test  What is it? A home blood pressure test allows you to keep track of your blood pressure at home. Blood pressure is a measure of the force of blood against the walls of your arteries. Blood pressure readings include two numbers, such as 130/80 (say \"130 over 80\"). The first number is the systolic pressure. The second number is the diastolic pressure. Why is this test done? You may do this test at home to:  · Find out if you have high blood pressure. · Track your blood pressure if you have high blood pressure. · Track how well medicine is working to reduce high blood pressure. · Check how lifestyle changes, such as weight loss and exercise, are affecting blood pressure. How can you prepare for the test?  · Do not use caffeine, tobacco, or medicines known to raise blood pressure (such as nasal decongestant sprays) for at least 30 minutes before taking your blood pressure. · Do not exercise for at least 30 minutes before taking your blood pressure. What happens before the test?  Take your blood pressure while you feel comfortable and relaxed. Sit quietly with both feet on the floor for at least 5 minutes before the test.  What happens during the test?  · Sit with your arm slightly bent and resting on a table so that your upper arm is at the same level as your heart. · Roll up your sleeve or take off your shirt to expose your upper arm. · Wrap the blood pressure cuff around your upper arm so that the lower edge of the cuff is about 1 inch above the bend of your elbow. Proceed with the following steps depending on if you are using an automatic or manual pressure monitor. Automatic blood pressure monitors  · Press the on/off button on the automatic monitor and wait until the ready-to-measure \"heart\" symbol appears next to zero in the display window. · Press the start button.  The cuff will inflate and deflate by itself. · Your blood pressure numbers will appear on the screen. · Write your numbers in your log book, along with the date and time. Manual blood pressure monitors  · Place the earpieces of a stethoscope in your ears, and place the bell of the stethoscope over the artery, just below the cuff. · Close the valve on the rubber inflating bulb. · Squeeze the bulb rapidly with your opposite hand to inflate the cuff until the dial or column of mercury reads about 30 mm Hg higher than your usual systolic pressure. If you do not know your usual pressure, inflate the cuff to 210 mm Hg or until the pulse at your wrist disappears. · Open the pressure valve just slightly by twisting or pressing the valve on the bulb. · As you watch the pressure slowly fall, note the level on the dial at which you first start to hear a pulsing or tapping sound through the stethoscope. This is your systolic blood pressure. · Continue letting the air out slowly. The sounds will become muffled and will finally disappear. Note the pressure when the sounds completely disappear. This is your diastolic blood pressure. Let out all the remaining air. · Write your numbers in your log book, along with the date and time. What else should you know about the test?  Here are the categories of blood pressure for adults:  Ideal blood pressure. Systolic is less than 934, and diastolic is less than 80. Elevated blood pressure. Systolic is 566 to 626, and diastolic is less than 80. High blood pressure (hypertension). Systolic is 412 or above. Diastolic is 80 or above. One or both numbers may be high. It is more accurate to take the average of several readings made throughout the day than to rely on a single reading. Follow-up care is a key part of your treatment and safety. Be sure to make and go to all appointments, and call your doctor if you are having problems.  It's also a good idea to keep a list of the medicines

## 2018-07-18 ENCOUNTER — TELEPHONE (OUTPATIENT)
Dept: INTERNAL MEDICINE | Age: 68
End: 2018-07-18

## 2018-07-18 DIAGNOSIS — I10 ESSENTIAL HYPERTENSION, BENIGN: Primary | Chronic | ICD-10-CM

## 2018-07-18 LAB
ESTIMATED AVERAGE GLUCOSE: 142.7 MG/DL
HBA1C MFR BLD: 6.6 %

## 2018-07-18 RX ORDER — AMLODIPINE BESYLATE 5 MG/1
5 TABLET ORAL DAILY
Qty: 90 TABLET | Refills: 3 | Status: SHIPPED | OUTPATIENT
Start: 2018-07-18 | End: 2019-07-14 | Stop reason: SDUPTHER

## 2018-10-02 RX ORDER — FUROSEMIDE 20 MG/1
TABLET ORAL
Qty: 90 TABLET | Refills: 3 | Status: SHIPPED | OUTPATIENT
Start: 2018-10-02 | End: 2019-09-22 | Stop reason: SDUPTHER

## 2018-10-02 RX ORDER — DESLORATADINE 5 MG/1
TABLET ORAL
Qty: 90 TABLET | Refills: 3 | Status: SHIPPED | OUTPATIENT
Start: 2018-10-02 | End: 2019-09-25 | Stop reason: SDUPTHER

## 2018-11-28 DIAGNOSIS — I10 ESSENTIAL HYPERTENSION, BENIGN: Primary | Chronic | ICD-10-CM

## 2018-11-28 RX ORDER — PREDNISONE 10 MG/1
TABLET ORAL
Qty: 90 TABLET | Refills: 3 | Status: SHIPPED | OUTPATIENT
Start: 2018-11-28 | End: 2019-11-27 | Stop reason: SDUPTHER

## 2018-11-28 RX ORDER — OLMESARTAN MEDOXOMIL 20 MG/1
60 TABLET ORAL DAILY
Qty: 270 TABLET | Refills: 3 | Status: SHIPPED | OUTPATIENT
Start: 2018-11-28 | End: 2019-12-05 | Stop reason: SDUPTHER

## 2018-12-03 DIAGNOSIS — R05.9 COUGH: Primary | ICD-10-CM

## 2018-12-03 RX ORDER — BENZONATATE 100 MG/1
CAPSULE ORAL
Qty: 270 CAPSULE | Refills: 0 | Status: SHIPPED | OUTPATIENT
Start: 2018-12-03 | End: 2019-04-08 | Stop reason: SDUPTHER

## 2019-01-03 ENCOUNTER — HOSPITAL ENCOUNTER (EMERGENCY)
Age: 69
Discharge: HOME | End: 2019-01-03
Payer: MEDICARE

## 2019-01-03 ENCOUNTER — APPOINTMENT (OUTPATIENT)
Dept: GENERAL RADIOLOGY | Age: 69
End: 2019-01-03
Payer: MEDICARE

## 2019-01-03 VITALS
WEIGHT: 222.44 LBS | RESPIRATION RATE: 18 BRPM | TEMPERATURE: 98 F | SYSTOLIC BLOOD PRESSURE: 126 MMHG | BODY MASS INDEX: 37.98 KG/M2 | DIASTOLIC BLOOD PRESSURE: 88 MMHG | HEART RATE: 80 BPM | HEIGHT: 64 IN | OXYGEN SATURATION: 100 %

## 2019-01-03 DIAGNOSIS — J44.1 COPD EXACERBATION (HCC): Primary | ICD-10-CM

## 2019-01-03 PROCEDURE — 71046 X-RAY EXAM CHEST 2 VIEWS: CPT

## 2019-01-03 PROCEDURE — 99285 EMERGENCY DEPT VISIT HI MDM: CPT

## 2019-01-03 PROCEDURE — 6370000000 HC RX 637 (ALT 250 FOR IP): Performed by: PHYSICIAN ASSISTANT

## 2019-01-03 PROCEDURE — 6360000002 HC RX W HCPCS: Performed by: PHYSICIAN ASSISTANT

## 2019-01-03 PROCEDURE — 94640 AIRWAY INHALATION TREATMENT: CPT

## 2019-01-03 RX ORDER — PREDNISONE 20 MG/1
60 TABLET ORAL ONCE
Status: COMPLETED | OUTPATIENT
Start: 2019-01-03 | End: 2019-01-03

## 2019-01-03 RX ORDER — DOXYCYCLINE HYCLATE 100 MG
100 TABLET ORAL 2 TIMES DAILY
Qty: 14 TABLET | Refills: 0 | Status: SHIPPED | OUTPATIENT
Start: 2019-01-03 | End: 2019-01-10

## 2019-01-03 RX ORDER — IPRATROPIUM BROMIDE AND ALBUTEROL SULFATE 2.5; .5 MG/3ML; MG/3ML
1 SOLUTION RESPIRATORY (INHALATION) ONCE
Status: COMPLETED | OUTPATIENT
Start: 2019-01-03 | End: 2019-01-03

## 2019-01-03 RX ORDER — ALBUTEROL SULFATE 2.5 MG/3ML
2.5 SOLUTION RESPIRATORY (INHALATION) ONCE
Status: COMPLETED | OUTPATIENT
Start: 2019-01-03 | End: 2019-01-03

## 2019-01-03 RX ORDER — PREDNISONE 10 MG/1
40 TABLET ORAL DAILY
Qty: 20 TABLET | Refills: 0 | Status: SHIPPED | OUTPATIENT
Start: 2019-01-03 | End: 2019-01-08

## 2019-01-03 RX ORDER — OXYMETAZOLINE HYDROCHLORIDE 0.05 G/100ML
2 SPRAY NASAL 2 TIMES DAILY
Qty: 1 BOTTLE | Refills: 0 | Status: SHIPPED | OUTPATIENT
Start: 2019-01-03 | End: 2019-01-06

## 2019-01-03 RX ADMIN — IPRATROPIUM BROMIDE AND ALBUTEROL SULFATE 1 AMPULE: .5; 3 SOLUTION RESPIRATORY (INHALATION) at 06:31

## 2019-01-03 RX ADMIN — ALBUTEROL SULFATE 2.5 MG: 2.5 SOLUTION RESPIRATORY (INHALATION) at 06:31

## 2019-01-03 RX ADMIN — PREDNISONE 60 MG: 20 TABLET ORAL at 06:18

## 2019-01-03 ASSESSMENT — ENCOUNTER SYMPTOMS
SORE THROAT: 0
BACK PAIN: 0
COUGH: 1
SHORTNESS OF BREATH: 1
ABDOMINAL PAIN: 0
NAUSEA: 0
WHEEZING: 1
VOMITING: 0

## 2019-01-07 ENCOUNTER — OFFICE VISIT (OUTPATIENT)
Dept: INTERNAL MEDICINE CLINIC | Age: 69
End: 2019-01-07
Payer: MEDICARE

## 2019-01-07 VITALS
WEIGHT: 212 LBS | HEART RATE: 79 BPM | SYSTOLIC BLOOD PRESSURE: 144 MMHG | OXYGEN SATURATION: 97 % | BODY MASS INDEX: 36.39 KG/M2 | DIASTOLIC BLOOD PRESSURE: 98 MMHG

## 2019-01-07 DIAGNOSIS — K75.4 AUTOIMMUNE HEPATITIS (HCC): ICD-10-CM

## 2019-01-07 DIAGNOSIS — I10 ESSENTIAL HYPERTENSION, BENIGN: Chronic | ICD-10-CM

## 2019-01-07 DIAGNOSIS — E78.00 PURE HYPERCHOLESTEROLEMIA: ICD-10-CM

## 2019-01-07 DIAGNOSIS — J45.20 MILD INTERMITTENT ASTHMA WITHOUT COMPLICATION: Primary | ICD-10-CM

## 2019-01-07 DIAGNOSIS — R68.89 FLU-LIKE SYMPTOMS: ICD-10-CM

## 2019-01-07 LAB
A/G RATIO: 1.5 (ref 1.1–2.2)
ALBUMIN SERPL-MCNC: 4.5 G/DL (ref 3.4–5)
ALP BLD-CCNC: 78 U/L (ref 40–129)
ALT SERPL-CCNC: 67 U/L (ref 10–40)
ANION GAP SERPL CALCULATED.3IONS-SCNC: 16 MMOL/L (ref 3–16)
AST SERPL-CCNC: 27 U/L (ref 15–37)
BASOPHILS ABSOLUTE: 0 K/UL (ref 0–0.2)
BASOPHILS RELATIVE PERCENT: 0.2 %
BILIRUB SERPL-MCNC: 0.9 MG/DL (ref 0–1)
BUN BLDV-MCNC: 19 MG/DL (ref 7–20)
CALCIUM SERPL-MCNC: 9.8 MG/DL (ref 8.3–10.6)
CHLORIDE BLD-SCNC: 103 MMOL/L (ref 99–110)
CHOLESTEROL, TOTAL: 196 MG/DL (ref 0–199)
CO2: 25 MMOL/L (ref 21–32)
CREAT SERPL-MCNC: 1.2 MG/DL (ref 0.6–1.2)
EOSINOPHILS ABSOLUTE: 0 K/UL (ref 0–0.6)
EOSINOPHILS RELATIVE PERCENT: 0.2 %
GFR AFRICAN AMERICAN: 54
GFR NON-AFRICAN AMERICAN: 45
GLOBULIN: 3.1 G/DL
GLUCOSE BLD-MCNC: 148 MG/DL (ref 70–99)
HCT VFR BLD CALC: 41.8 % (ref 36–48)
HDLC SERPL-MCNC: 72 MG/DL (ref 40–60)
HEMOGLOBIN: 13.5 G/DL (ref 12–16)
LDL CHOLESTEROL CALCULATED: 101 MG/DL
LYMPHOCYTES ABSOLUTE: 2.5 K/UL (ref 1–5.1)
LYMPHOCYTES RELATIVE PERCENT: 15.4 %
MCH RBC QN AUTO: 29.9 PG (ref 26–34)
MCHC RBC AUTO-ENTMCNC: 32.4 G/DL (ref 31–36)
MCV RBC AUTO: 92.3 FL (ref 80–100)
MONOCYTES ABSOLUTE: 0.8 K/UL (ref 0–1.3)
MONOCYTES RELATIVE PERCENT: 4.7 %
NEUTROPHILS ABSOLUTE: 13 K/UL (ref 1.7–7.7)
NEUTROPHILS RELATIVE PERCENT: 79.5 %
PDW BLD-RTO: 13.9 % (ref 12.4–15.4)
PLATELET # BLD: 352 K/UL (ref 135–450)
PMV BLD AUTO: 7.7 FL (ref 5–10.5)
POTASSIUM SERPL-SCNC: 3.7 MMOL/L (ref 3.5–5.1)
RBC # BLD: 4.53 M/UL (ref 4–5.2)
SODIUM BLD-SCNC: 144 MMOL/L (ref 136–145)
TOTAL PROTEIN: 7.6 G/DL (ref 6.4–8.2)
TRIGL SERPL-MCNC: 117 MG/DL (ref 0–150)
VLDLC SERPL CALC-MCNC: 23 MG/DL
WBC # BLD: 16.3 K/UL (ref 4–11)

## 2019-01-07 PROCEDURE — G8482 FLU IMMUNIZE ORDER/ADMIN: HCPCS | Performed by: INTERNAL MEDICINE

## 2019-01-07 PROCEDURE — 87804 INFLUENZA ASSAY W/OPTIC: CPT | Performed by: INTERNAL MEDICINE

## 2019-01-07 PROCEDURE — 1101F PT FALLS ASSESS-DOCD LE1/YR: CPT | Performed by: INTERNAL MEDICINE

## 2019-01-07 PROCEDURE — G8427 DOCREV CUR MEDS BY ELIG CLIN: HCPCS | Performed by: INTERNAL MEDICINE

## 2019-01-07 PROCEDURE — 99214 OFFICE O/P EST MOD 30 MIN: CPT | Performed by: INTERNAL MEDICINE

## 2019-01-07 PROCEDURE — 1036F TOBACCO NON-USER: CPT | Performed by: INTERNAL MEDICINE

## 2019-01-07 PROCEDURE — 1090F PRES/ABSN URINE INCON ASSESS: CPT | Performed by: INTERNAL MEDICINE

## 2019-01-07 PROCEDURE — 4040F PNEUMOC VAC/ADMIN/RCVD: CPT | Performed by: INTERNAL MEDICINE

## 2019-01-07 PROCEDURE — G8399 PT W/DXA RESULTS DOCUMENT: HCPCS | Performed by: INTERNAL MEDICINE

## 2019-01-07 PROCEDURE — G8417 CALC BMI ABV UP PARAM F/U: HCPCS | Performed by: INTERNAL MEDICINE

## 2019-01-07 PROCEDURE — 1123F ACP DISCUSS/DSCN MKR DOCD: CPT | Performed by: INTERNAL MEDICINE

## 2019-01-07 PROCEDURE — 3017F COLORECTAL CA SCREEN DOC REV: CPT | Performed by: INTERNAL MEDICINE

## 2019-01-07 RX ORDER — PREDNISONE 10 MG/1
TABLET ORAL
Qty: 32 TABLET | Refills: 0 | Status: SHIPPED | OUTPATIENT
Start: 2019-01-07 | End: 2019-05-09 | Stop reason: ALTCHOICE

## 2019-01-07 RX ORDER — IPRATROPIUM BROMIDE AND ALBUTEROL SULFATE 2.5; .5 MG/3ML; MG/3ML
1 SOLUTION RESPIRATORY (INHALATION) ONCE
Status: DISCONTINUED | OUTPATIENT
Start: 2019-01-07 | End: 2021-06-21

## 2019-01-07 RX ORDER — MONTELUKAST SODIUM 10 MG/1
10 TABLET ORAL NIGHTLY
Qty: 90 TABLET | Refills: 3 | Status: SHIPPED | OUTPATIENT
Start: 2019-01-07 | End: 2019-12-20

## 2019-01-07 ASSESSMENT — PATIENT HEALTH QUESTIONNAIRE - PHQ9
SUM OF ALL RESPONSES TO PHQ QUESTIONS 1-9: 0
1. LITTLE INTEREST OR PLEASURE IN DOING THINGS: 0
SUM OF ALL RESPONSES TO PHQ9 QUESTIONS 1 & 2: 0
SUM OF ALL RESPONSES TO PHQ QUESTIONS 1-9: 0
2. FEELING DOWN, DEPRESSED OR HOPELESS: 0

## 2019-01-07 ASSESSMENT — ENCOUNTER SYMPTOMS
ABDOMINAL PAIN: 0
CHEST TIGHTNESS: 0
SHORTNESS OF BREATH: 1
NAUSEA: 0
COUGH: 1
VOMITING: 0

## 2019-01-13 DIAGNOSIS — K75.4 AUTOIMMUNE HEPATITIS (HCC): Primary | ICD-10-CM

## 2019-04-08 ENCOUNTER — OFFICE VISIT (OUTPATIENT)
Dept: INTERNAL MEDICINE CLINIC | Age: 69
End: 2019-04-08
Payer: MEDICARE

## 2019-04-08 VITALS
OXYGEN SATURATION: 95 % | SYSTOLIC BLOOD PRESSURE: 128 MMHG | HEART RATE: 82 BPM | BODY MASS INDEX: 36.56 KG/M2 | WEIGHT: 213 LBS | DIASTOLIC BLOOD PRESSURE: 84 MMHG

## 2019-04-08 DIAGNOSIS — K75.4 AUTOIMMUNE HEPATITIS (HCC): ICD-10-CM

## 2019-04-08 DIAGNOSIS — I10 ESSENTIAL HYPERTENSION, BENIGN: Chronic | ICD-10-CM

## 2019-04-08 DIAGNOSIS — M85.9 DISORDER OF BONE DENSITY AND STRUCTURE, UNSPECIFIED: ICD-10-CM

## 2019-04-08 DIAGNOSIS — Z13.820 SCREENING FOR OSTEOPOROSIS: ICD-10-CM

## 2019-04-08 DIAGNOSIS — E11.9 TYPE 2 DIABETES MELLITUS WITHOUT COMPLICATION, WITHOUT LONG-TERM CURRENT USE OF INSULIN (HCC): ICD-10-CM

## 2019-04-08 DIAGNOSIS — J45.20 MILD INTERMITTENT ASTHMA WITHOUT COMPLICATION: Primary | ICD-10-CM

## 2019-04-08 DIAGNOSIS — E78.00 PURE HYPERCHOLESTEROLEMIA: ICD-10-CM

## 2019-04-08 LAB
A/G RATIO: 1.4 (ref 1.1–2.2)
ALBUMIN SERPL-MCNC: 4.2 G/DL (ref 3.4–5)
ALP BLD-CCNC: 62 U/L (ref 40–129)
ALT SERPL-CCNC: 19 U/L (ref 10–40)
ANION GAP SERPL CALCULATED.3IONS-SCNC: 14 MMOL/L (ref 3–16)
AST SERPL-CCNC: 19 U/L (ref 15–37)
BASOPHILS ABSOLUTE: 0.1 K/UL (ref 0–0.2)
BASOPHILS RELATIVE PERCENT: 0.6 %
BILIRUB SERPL-MCNC: 0.7 MG/DL (ref 0–1)
BUN BLDV-MCNC: 26 MG/DL (ref 7–20)
CALCIUM SERPL-MCNC: 9.9 MG/DL (ref 8.3–10.6)
CHLORIDE BLD-SCNC: 103 MMOL/L (ref 99–110)
CO2: 26 MMOL/L (ref 21–32)
CREAT SERPL-MCNC: 1.3 MG/DL (ref 0.6–1.2)
EOSINOPHILS ABSOLUTE: 0.2 K/UL (ref 0–0.6)
EOSINOPHILS RELATIVE PERCENT: 1.4 %
GFR AFRICAN AMERICAN: 50
GFR NON-AFRICAN AMERICAN: 41
GLOBULIN: 3.1 G/DL
GLUCOSE BLD-MCNC: 122 MG/DL (ref 70–99)
HCT VFR BLD CALC: 41.9 % (ref 36–48)
HEMOGLOBIN: 13.7 G/DL (ref 12–16)
LYMPHOCYTES ABSOLUTE: 2.5 K/UL (ref 1–5.1)
LYMPHOCYTES RELATIVE PERCENT: 18.4 %
MCH RBC QN AUTO: 30.5 PG (ref 26–34)
MCHC RBC AUTO-ENTMCNC: 32.8 G/DL (ref 31–36)
MCV RBC AUTO: 93.2 FL (ref 80–100)
MONOCYTES ABSOLUTE: 1.2 K/UL (ref 0–1.3)
MONOCYTES RELATIVE PERCENT: 8.9 %
NEUTROPHILS ABSOLUTE: 9.8 K/UL (ref 1.7–7.7)
NEUTROPHILS RELATIVE PERCENT: 70.7 %
PDW BLD-RTO: 14.2 % (ref 12.4–15.4)
PLATELET # BLD: 372 K/UL (ref 135–450)
PMV BLD AUTO: 7.9 FL (ref 5–10.5)
POTASSIUM SERPL-SCNC: 3.9 MMOL/L (ref 3.5–5.1)
RBC # BLD: 4.5 M/UL (ref 4–5.2)
SODIUM BLD-SCNC: 143 MMOL/L (ref 136–145)
TOTAL PROTEIN: 7.3 G/DL (ref 6.4–8.2)
WBC # BLD: 13.8 K/UL (ref 4–11)

## 2019-04-08 PROCEDURE — 3046F HEMOGLOBIN A1C LEVEL >9.0%: CPT | Performed by: INTERNAL MEDICINE

## 2019-04-08 PROCEDURE — G8417 CALC BMI ABV UP PARAM F/U: HCPCS | Performed by: INTERNAL MEDICINE

## 2019-04-08 PROCEDURE — G8427 DOCREV CUR MEDS BY ELIG CLIN: HCPCS | Performed by: INTERNAL MEDICINE

## 2019-04-08 PROCEDURE — 1123F ACP DISCUSS/DSCN MKR DOCD: CPT | Performed by: INTERNAL MEDICINE

## 2019-04-08 PROCEDURE — G8399 PT W/DXA RESULTS DOCUMENT: HCPCS | Performed by: INTERNAL MEDICINE

## 2019-04-08 PROCEDURE — 1090F PRES/ABSN URINE INCON ASSESS: CPT | Performed by: INTERNAL MEDICINE

## 2019-04-08 PROCEDURE — 2022F DILAT RTA XM EVC RTNOPTHY: CPT | Performed by: INTERNAL MEDICINE

## 2019-04-08 PROCEDURE — 3017F COLORECTAL CA SCREEN DOC REV: CPT | Performed by: INTERNAL MEDICINE

## 2019-04-08 PROCEDURE — 1036F TOBACCO NON-USER: CPT | Performed by: INTERNAL MEDICINE

## 2019-04-08 PROCEDURE — 99214 OFFICE O/P EST MOD 30 MIN: CPT | Performed by: INTERNAL MEDICINE

## 2019-04-08 PROCEDURE — 4040F PNEUMOC VAC/ADMIN/RCVD: CPT | Performed by: INTERNAL MEDICINE

## 2019-04-08 RX ORDER — BENZONATATE 100 MG/1
CAPSULE ORAL
Qty: 270 CAPSULE | Refills: 0 | Status: SHIPPED | OUTPATIENT
Start: 2019-04-08 | End: 2019-09-27 | Stop reason: SDUPTHER

## 2019-04-08 ASSESSMENT — ENCOUNTER SYMPTOMS
SHORTNESS OF BREATH: 0
CHEST TIGHTNESS: 0
BLOOD IN STOOL: 0
VOMITING: 0
NAUSEA: 0
ABDOMINAL PAIN: 0

## 2019-04-08 NOTE — PROGRESS NOTES
Outpatient Note for established Patient - regular Visit    History Obtained From:  patient, electronic medical record  Is the patient new to me ? - No    HISTORY OF PRESENT ILLNESS:   The patient is a 79 y.o. female who is here today for :  Vini Mills was seen today for asthma and hypertension. Diagnoses and all orders for this visit:    Mild intermittent asthma without complication  She had prolonged course of SOB that lasted 2 months   She is was treated with Symbicort, Spiriva (until she finished the sample)   She is on Prednisone (for her autoimmune hepatitis) , Sigulair  She now feels better. She denies cough. No phlegm  She does have SOB with exertion. No fever/ chills. She used her albuterol once a week     Essential hypertension, benign  BP is controlled. Pt denies CP/palpitations/abdominal pain/N/V.    She is on Benicar, Amlodipine, Furosemide     Pure hypercholesterolemia  Lab Results   Component Value Date    CHOL 196 01/07/2019    CHOL 178 07/17/2018    CHOL 253 (H) 01/16/2018     Lab Results   Component Value Date    TRIG 117 01/07/2019    TRIG 150 07/17/2018    TRIG 126 01/16/2018     Lab Results   Component Value Date    HDL 72 (H) 01/07/2019    HDL 74 (H) 07/17/2018    HDL 76 (H) 01/16/2018     Lab Results   Component Value Date    LDLCALC 101 (H) 01/07/2019    LDLCALC 74 07/17/2018    LDLCALC 152 (H) 01/16/2018     Lab Results   Component Value Date    LABVLDL 23 01/07/2019    LABVLDL 30 07/17/2018    LABVLDL 25 01/16/2018     Lab Results   Component Value Date    CHOLHDLRATIO 3.0 05/08/2015   she is on Statin   No reported side effects     She will see her GI at June for her autoimmune disease  No abdominal pain       Past Medical History:        Diagnosis Date    Allergic rhinitis     Asthma     COPD [facility]chronic obstructive pulmonary disease) ([facility] [address]     Hepatitis     Hyperlipidemia     Hypertension     Influenza A 1/10/2015    Osteoporosis        Social History:   TOBACCO:   reports that she has never smoked. She has never used smokeless tobacco.    ETOH:   reports that she does not drink alcohol. Current Medications:    Prior to Admission medications    Medication Sig Start Date End Date Taking? Authorizing Provider   benzonatate (TESSALON) 100 MG capsule TAKE 1 CAPSULE BY MOUTH 3 TIMES DAILY AS NEEDED.  EVERY 6 HRS AS NEEDED FOR COUGH 4/8/19  Yes Consuelo Muniz MD   tiotropium (SPIRIVA RESPIMAT) 2.5 MCG/ACT AERS inhaler Inhale 2 puffs into the lungs daily 4/8/19  Yes Consuelo Muniz MD   montelukast (SINGULAIR) 10 MG tablet Take 1 tablet by mouth nightly 1/7/19   Consuelo Muniz MD   predniSONE (DELTASONE) 10 MG tablet Take 4 tablets (40 mg) for 4 days then take 3 tablets (30 mg) for 3 days, then take 2 tablets (20 mg) for 3 days then resume 10 mg days 1/7/19   Consuelo Muniz MD   tiotropium (SPIRIVA RESPIMAT) 2.5 MCG/ACT AERS inhaler Inhale 2 puffs into the lungs daily 1/7/19   Consuelo Muniz MD   predniSONE (DELTASONE) 10 MG tablet TAKE 1 TABLET EVERY DAY 11/28/18   Consuelo Muniz MD   olmesartan (BENICAR) 20 MG tablet Take 3 tablets by mouth daily 11/28/18   Consuelo Muniz MD   desloratadine (CLARINEX) 5 MG tablet TAKE 1 TABLET BY MOUTH EVERY DAY 10/2/18   Consuelo Muniz MD   furosemide (LASIX) 20 MG tablet TAKE 1 TABLET BY MOUTH EVERY DAY 10/2/18   Consuelo Muniz MD   amLODIPine (NORVASC) 5 MG tablet Take 1 tablet by mouth daily 7/18/18   Consuelo Muniz MD   atorvastatin (LIPITOR) 40 MG tablet Take 1 tablet by mouth daily 7/17/18   Consuelo Muniz MD   SYMBICORT 160-4.5 MCG/ACT AERO INHALE 2 PUFFS TWICE A DAY 2/27/17   Vanesa Verdin MD   mometasone (NASONEX) 50 MCG/ACT nasal spray 2 sprays by Nasal route daily as needed (as needed for allergies) 9/6/16   Vanesa Verdin MD   PROVENTIL  (90 BASE) MCG/ACT inhaler INHALE 2 PUFFS INTO THE LUNGS EVERY 6 HOURS AS NEEDED. 9/5/14   Vanesa Verdin MD   zoledronic acid normal. No respiratory distress. She has no wheezes. She has no rales. prolonged expirium    Abdominal: Soft. She exhibits no distension. There is no tenderness. There is no guarding. Musculoskeletal: She exhibits no deformity. Lymphadenopathy:        Head (right side): No submental and no submandibular adenopathy present. Head (left side): No submental and no submandibular adenopathy present. She has no cervical adenopathy. Right cervical: No superficial cervical and no deep cervical adenopathy present. Left cervical: No superficial cervical and no deep cervical adenopathy present. Neurological: She is alert and oriented to person, place, and time. She has normal reflexes. She exhibits normal muscle tone. GCS eye subscore is 4. GCS verbal subscore is 5. GCS motor subscore is 6. Skin: No rash noted. She is not diaphoretic. Psychiatric: She has a normal mood and affect. Her behavior is normal. Thought content normal.      Assessment/Plan:   Ashley Ramirez was seen today for asthma and hypertension. Diagnoses and all orders for this visit:    Mild intermittent asthma without complication  Asthma is better controlled now  I will reniew Spiriva (smaples ordered)  She will call for any worsenign  -     tiotropium (SPIRIVA RESPIMAT) 2.5 MCG/ACT AERS inhaler; Inhale 2 puffs into the lungs daily    Essential hypertension, benign  Well controlled- no changes in medication today. Monitor BP    -     Comprehensive Metabolic Panel; Future  -     CBC Auto Differential; Future    Pure hypercholesterolemia  Well controlled- no changes in medication today. Monitor lipids    Autoimmune hepatitis ([facility] [address]  Well controlled- no changes in medication today. Keep f/u with GI  Keep steroids which are helping asthma   -     Comprehensive Metabolic Panel; Future    Screening for osteoporosis  -     DEXA BONE DENSITY 2 SITES;  Future    Type 2 diabetes mellitus without complication, without long-term current

## 2019-04-09 LAB
ESTIMATED AVERAGE GLUCOSE: 223.1 MG/DL
HBA1C MFR BLD: 9.4 %

## 2019-05-06 DIAGNOSIS — E11.22 TYPE 2 DIABETES MELLITUS WITH STAGE 3 CHRONIC KIDNEY DISEASE, WITHOUT LONG-TERM CURRENT USE OF INSULIN (HCC): Primary | ICD-10-CM

## 2019-05-06 DIAGNOSIS — N18.30 TYPE 2 DIABETES MELLITUS WITH STAGE 3 CHRONIC KIDNEY DISEASE, WITHOUT LONG-TERM CURRENT USE OF INSULIN (HCC): Primary | ICD-10-CM

## 2019-05-09 ENCOUNTER — OFFICE VISIT (OUTPATIENT)
Dept: FAMILY MEDICINE CLINIC | Age: 69
End: 2019-05-09
Payer: MEDICARE

## 2019-05-09 ENCOUNTER — OFFICE VISIT (OUTPATIENT)
Dept: INTERNAL MEDICINE CLINIC | Age: 69
End: 2019-05-09

## 2019-05-09 VITALS
WEIGHT: 211 LBS | OXYGEN SATURATION: 98 % | HEART RATE: 91 BPM | BODY MASS INDEX: 36.22 KG/M2 | DIASTOLIC BLOOD PRESSURE: 88 MMHG | SYSTOLIC BLOOD PRESSURE: 130 MMHG

## 2019-05-09 DIAGNOSIS — R73.01 IMPAIRED FASTING GLUCOSE: Primary | ICD-10-CM

## 2019-05-09 DIAGNOSIS — R55 SYNCOPE, UNSPECIFIED SYNCOPE TYPE: Primary | ICD-10-CM

## 2019-05-09 PROCEDURE — 1036F TOBACCO NON-USER: CPT | Performed by: NURSE PRACTITIONER

## 2019-05-09 PROCEDURE — G8417 CALC BMI ABV UP PARAM F/U: HCPCS | Performed by: NURSE PRACTITIONER

## 2019-05-09 PROCEDURE — 1090F PRES/ABSN URINE INCON ASSESS: CPT | Performed by: NURSE PRACTITIONER

## 2019-05-09 PROCEDURE — 99999 PR OFFICE/OUTPT VISIT,PROCEDURE ONLY: CPT | Performed by: DIETITIAN, REGISTERED

## 2019-05-09 PROCEDURE — 1123F ACP DISCUSS/DSCN MKR DOCD: CPT | Performed by: NURSE PRACTITIONER

## 2019-05-09 PROCEDURE — G8399 PT W/DXA RESULTS DOCUMENT: HCPCS | Performed by: NURSE PRACTITIONER

## 2019-05-09 PROCEDURE — G8427 DOCREV CUR MEDS BY ELIG CLIN: HCPCS | Performed by: NURSE PRACTITIONER

## 2019-05-09 PROCEDURE — 93000 ELECTROCARDIOGRAM COMPLETE: CPT | Performed by: NURSE PRACTITIONER

## 2019-05-09 PROCEDURE — 99214 OFFICE O/P EST MOD 30 MIN: CPT | Performed by: NURSE PRACTITIONER

## 2019-05-09 PROCEDURE — 4040F PNEUMOC VAC/ADMIN/RCVD: CPT | Performed by: NURSE PRACTITIONER

## 2019-05-09 PROCEDURE — 3017F COLORECTAL CA SCREEN DOC REV: CPT | Performed by: NURSE PRACTITIONER

## 2019-05-09 ASSESSMENT — ENCOUNTER SYMPTOMS
EYES NEGATIVE: 1
SINUS PRESSURE: 1
GASTROINTESTINAL NEGATIVE: 1
SINUS PAIN: 1
RESPIRATORY NEGATIVE: 1
RHINORRHEA: 1

## 2019-05-09 NOTE — PATIENT INSTRUCTIONS
BEHAVIOR GOALS FOR DIABETES CARE    When to eat: Every 5 hours while awake      What and how much to eat:    --Plan meals to follow the Plate Guide   --Aim for 2 servings Carbohydrate (30 g carb) per meal; 1 carb serving (15 g) per snack      Physical Activity: Mall Walking 5 days per week      Keeping Records: Track exercise and healthy eating    Other: try to completely stop the regular coke

## 2019-05-09 NOTE — PROGRESS NOTES
Medical Nutrition Therapy for Diabetes    Fabio Nunez  May 9, 2019      Patient Care Team:  Silvia Kramer MD as PCP - Deanna Garza MD as PCP - [facility] Attributed Provider  Fer Boone MD (Obstetrics & Gynecology)    Reason for visit: \"Dr. Chely Nesbitt wants me to improve my diet\"    ASSESSMENT/PLAN:   NUTRITION DIAGNOSIS    #1 Problem: Altered Nutrition-Related Laboratory Values (NC-2.2)  Related to: Endocrine/Diabetes   As Evidenced by: Elevated Plasma glucose and/or HgbA1c levels         #2 Problem: Overweight/Obesity (NC-3.3)  Related to: Excessive energy intake or physical inactivity  As Evidenced by: BMI more than normative standard for age and sex (BMI=36.56)      NUTRITION INTERVENTION  Nutrition Prescription: 30 g carb per meal      Diabetes Education/Counseling included:  Carbohydrate Control    Interventions:  Control Carbohydrate Intake using Plate Guide      NUTRITION MONITORING AND EVALUATION  Indicator/Goal   #1  Meal frequency    #2  Fruit/veg/whole grain intake   #3  Physical activity     Patient Instructions   BEHAVIOR GOALS FOR DIABETES CARE    When to eat: Every 5 hours while awake      What and how much to eat:    --Plan meals to follow the Plate Guide   --Aim for 2 servings Carbohydrate (30 g carb) per meal; 1 carb serving (15 g) per snack      Physical Activity: Mall Walking 5 days per week      Keeping Records: Track exercise and healthy eating    Other: try to completely stop the regular coke                     Patient Active Problem List   Diagnosis    Asthma    Shortness of breath    Autoimmune hepatitis ([facility] [address]    Essential hypertension, benign    Pure hypercholesterolemia    Impaired fasting glucose    Cataract    Murmur, cardiac       Current Outpatient Medications   Medication Sig Dispense Refill    benzonatate (TESSALON) 100 MG capsule TAKE 1 CAPSULE BY MOUTH 3 TIMES DAILY AS NEEDED.  EVERY 6 HRS AS NEEDED FOR COUGH 270 capsule 0    tiotropium (SPIRIVA RESPIMAT) 2.5 MCG/ACT AERS inhaler Inhale 2 puffs into the lungs daily 4 Inhaler 0    montelukast (SINGULAIR) 10 MG tablet Take 1 tablet by mouth nightly 90 tablet 3    predniSONE (DELTASONE) 10 MG tablet Take 4 tablets (40 mg) for 4 days then take 3 tablets (30 mg) for 3 days, then take 2 tablets (20 mg) for 3 days then resume 10 mg days 32 tablet 0    tiotropium (SPIRIVA RESPIMAT) 2.5 MCG/ACT AERS inhaler Inhale 2 puffs into the lungs daily 1 Inhaler 0    predniSONE (DELTASONE) 10 MG tablet TAKE 1 TABLET EVERY DAY 90 tablet 3    olmesartan (BENICAR) 20 MG tablet Take 3 tablets by mouth daily 270 tablet 3    desloratadine (CLARINEX) 5 MG tablet TAKE 1 TABLET BY MOUTH EVERY DAY 90 tablet 3    furosemide (LASIX) 20 MG tablet TAKE 1 TABLET BY MOUTH EVERY DAY 90 tablet 3    amLODIPine (NORVASC) 5 MG tablet Take 1 tablet by mouth daily 90 tablet 3    atorvastatin (LIPITOR) 40 MG tablet Take 1 tablet by mouth daily 90 tablet 1    SYMBICORT 160-4.5 MCG/ACT AERO INHALE 2 PUFFS TWICE A DAY 3 Inhaler 3    mometasone (NASONEX) 50 MCG/ACT nasal spray 2 sprays by Nasal route daily as needed (as needed for allergies) 3 Inhaler 3    PROVENTIL  (90 BASE) MCG/ACT inhaler INHALE 2 PUFFS INTO THE LUNGS EVERY 6 HOURS AS NEEDED. 1 Inhaler 3    zoledronic acid (RECLAST) 5 MG/100ML SOLN Infuse 100 mLs intravenously once for 1 dose. 100 mL 0    calcium carbonate (OSCAL) 500 MG TABS tablet Take 500 mg by mouth daily.  Spacer/Aero Chamber Mouthpiece MISC by Does not apply route. 1 each 1    Multiple Vitamin (MULTIVITAMIN PO) Take  by mouth daily.        Current Facility-Administered Medications   Medication Dose Route Frequency Provider Last Rate Last Dose    ipratropium-albuterol (DUONEB) nebulizer solution 1 ampule  1 ampule Inhalation Once Bailee Odom MD             NUTRITION ASSESSMENT    Biochemical Data:    Lab Results   Component Value Date    LABA1C 9.4 04/08/2019     Lab Results   Component Value Date    .1 04/08/2019       Lab Results   Component Value Date    CHOL 196 01/07/2019    CHOL 178 07/17/2018    CHOL 253 (H) 01/16/2018     Lab Results   Component Value Date    TRIG 117 01/07/2019    TRIG 150 07/17/2018    TRIG 126 01/16/2018     Lab Results   Component Value Date    HDL 72 (H) 01/07/2019    HDL 74 (H) 07/17/2018    HDL 76 (H) 01/16/2018     Lab Results   Component Value Date    LDLCALC 101 (H) 01/07/2019    LDLCALC 74 07/17/2018    LDLCALC 152 (H) 01/16/2018     Lab Results   Component Value Date    LABVLDL 23 01/07/2019    LABVLDL 30 07/17/2018    LABVLDL 25 01/16/2018     Lab Results   Component Value Date    CHOLHDLRATIO 3.0 05/08/2015       Lab Results   Component Value Date    WBC 13.8 (H) 04/08/2019    HGB 13.7 04/08/2019    HCT 41.9 04/08/2019    MCV 93.2 04/08/2019     04/08/2019       Lab Results   Component Value Date    CREATININE 1.3 (H) 04/08/2019    BUN 26 (H) 04/08/2019     04/08/2019    K 3.9 04/08/2019     04/08/2019    CO2 26 04/08/2019         Anthropometric Measurements:   Wt:   Wt Readings from Last 3 Encounters:   04/08/19 213 lb (96.6 kg)   01/07/19 212 lb (96.2 kg)   01/03/19 222 lb 7.1 oz (100.9 kg)      BMI:   BMI Readings from Last 3 Encounters:   04/08/19 36.56 kg/m²   01/07/19 36.39 kg/m²   01/03/19 38.18 kg/m²     Usual weight 215 lb  Patient's stated goal weight: under 200 lb  7% Weight loss goal weight: 198 lb    Nutrition-Focused Physical Findings:  General Appearance: Healthy, well-nourished  Affect: Pleasant, engaged      Food and Nutrition History:   Nutrition Awareness/Previous DSMES: none  Beverage consumption: water/coffee/tea; Regular coke 1 can daily  Frequency of Meals Eaten away from home: 1/week  Alcohol consumption: wine rarely  Food Availability Problems: not a problem  Usual Food consumption: FOOD RECALL    Walks at 8 am--maybe eats egg and applesauce before walking    First meal--Usual time: 10am  Today: skipped  Different Day: egg and apple slices; occasionally toast    Snack  Regular coke/chips    Second meal--Usual time: 2pm  Recent: nothing  Different Day: salad with turkey OR sandwich OR leftover    Snack  Occasionally snack    Third meal--Usual time: 5pm - 6pm  Recent: Carry out: salad with taco meat  Different Day: grilled chicken or fish/vegetables/rice or baked potato occasionally    Snack  Occasionally snack on sweets       Physical Activity History:   Physical activity: mall walking  Frequency of activity: hasn't done in past 2 weeks  Duration of activity: 30 minutes  Obstacles to activity: out of town for     Diabetes Medications:   Comments: not on any medications    Monitoring:   Has BG meter: No    Barriers:   -none noted          Follow Up Plan: 2 weeks    Referring Provider: Sriram Wallace MD    Time spent with patient: 60 minutes

## 2019-05-09 NOTE — PROGRESS NOTES
Subjective:      Patient ID: Darien Ramirez is a 79 y.o. female who presents for:    Chief Complaint   Patient presents with    Dizziness     dizzy, blacked out on 5/3/19 (at a game), tunnel vision. today feels ok, mouth feels dry. 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 She recalls leaning against a gate to steady herself and next recalls sitting in a chair. She was told she had passed out and was assisted to a chair. She is unsure of time of LOC./ She was taken to an air conditioned space and given PO fluids. Although she felt improved within 30 minutes, she remained in the first aid area until the end of her shift and was driven home. Has felt fine since that time. She admits to feeling poorly with sinuses on Saturday and had taken a dose of antihistamines, eaten only breakfast but no lunch or dinner prior to the game. She recalls a similar event happening about a month ago while in a parking lot and laid down and was given water before she felt improvement. It was a hot day as well. At no time does she recall headache, weakness, CP, SOB, palpitations. No falls or injury with the syncope, no incont. Loss of Consciousness   This is a new problem. The current episode started in the past 7 days. The problem has been resolved. She lost consciousness for a period of less than 1 minute. The symptoms are aggravated by exertion and standing. Associated symptoms include dizziness. She has tried nothing for the symptoms. Her past medical history is significant for HTN. Review of Systems   Constitutional: Negative. HENT: Positive for postnasal drip, rhinorrhea, sinus pressure, sinus pain and sneezing. Eyes: Negative. Respiratory: Negative. Cardiovascular: Positive for syncope. Gastrointestinal: Negative. Endocrine: Negative. Genitourinary: Negative. Musculoskeletal: Negative. Skin: Negative.     Neurological: Positive for dizziness and syncope. Hematological: Negative. Psychiatric/Behavioral: Negative. Past Medical History:   Diagnosis Date    Allergic rhinitis     Asthma     COPD (chronic obstructive pulmonary disease) ([facility] [address]     Hepatitis     Hyperlipidemia     Hypertension     Influenza A 1/10/2015    Osteoporosis        Past Surgical History:   Procedure Laterality Date    BREAST BIOPSY  10/2005    Dr. Leyva Heart - right , benign    [facility]      Dr. Mark Leal 10 yrs     COLONOSCOPY  07/08/13    Dr. Jay Bedoya- internal hemorrhoids, adenomatous and hyperplastic polyps. Repeat in 3 yrs    COLONOSCOPY  07/17/2016    Dr. Jay Bedoya, No evidence of any colorectal neoplasia ~5 years    LIVER BIOPSY  8/2006     autoimmune hepatitis        Family History   Problem Relation Age of Onset    Heart Disease Mother     Cancer Mother         breast, ovarian     Cancer Father         prostate     Kidney Disease Brother        Allergies   Allergen Reactions    Iodine Shortness Of Breath    Other      Steroid (injection) taken for hepatitis elevated liver enzymes, pt. Uncertain of name    Shellfish-Derived Products Rash     Shortness of breath       Current Outpatient Medications   Medication Sig Dispense Refill    benzonatate (TESSALON) 100 MG capsule TAKE 1 CAPSULE BY MOUTH 3 TIMES DAILY AS NEEDED.  EVERY 6 HRS AS NEEDED FOR COUGH 270 capsule 0    tiotropium (SPIRIVA RESPIMAT) 2.5 MCG/ACT AERS inhaler Inhale 2 puffs into the lungs daily 4 Inhaler 0    montelukast (SINGULAIR) 10 MG tablet Take 1 tablet by mouth nightly 90 tablet 3    tiotropium (SPIRIVA RESPIMAT) 2.5 MCG/ACT AERS inhaler Inhale 2 puffs into the lungs daily 1 Inhaler 0    predniSONE (DELTASONE) 10 MG tablet TAKE 1 TABLET EVERY DAY 90 tablet 3    olmesartan (BENICAR) 20 MG tablet Take 3 tablets by mouth daily 270 tablet 3    desloratadine (CLARINEX) 5 MG tablet TAKE 1 TABLET BY MOUTH EVERY DAY 90 tablet 3    furosemide (LASIX) 20 MG tablet TAKE 1 TABLET BY MOUTH EVERY DAY 90 tablet 3    amLODIPine (NORVASC) 5 MG tablet Take 1 tablet by mouth daily 90 tablet 3    atorvastatin (LIPITOR) 40 MG tablet Take 1 tablet by mouth daily 90 tablet 1    SYMBICORT 160-4.5 MCG/ACT AERO INHALE 2 PUFFS TWICE A DAY 3 Inhaler 3    mometasone (NASONEX) 50 MCG/ACT nasal spray 2 sprays by Nasal route daily as needed (as needed for allergies) 3 Inhaler 3    PROVENTIL  (90 BASE) MCG/ACT inhaler INHALE 2 PUFFS INTO THE LUNGS EVERY 6 HOURS AS NEEDED. 1 Inhaler 3    calcium carbonate (OSCAL) 500 MG TABS tablet Take 500 mg by mouth daily.  Spacer/Aero Chamber Mouthpiece MISC by Does not apply route. 1 each 1    Multiple Vitamin (MULTIVITAMIN PO) Take  by mouth daily.  zoledronic acid (RECLAST) 5 MG/100ML SOLN Infuse 100 mLs intravenously once for 1 dose. 100 mL 0     Current Facility-Administered Medications   Medication Dose Route Frequency Provider Last Rate Last Dose    ipratropium-albuterol (DUONEB) nebulizer solution 1 ampule  1 ampule Inhalation Once Lyn Cho MD           Objective:     Vitals:    05/09/19 1452   BP: 130/88   Pulse: 91   SpO2: 98%       Physical Exam   Constitutional: She is oriented to person, place, and time. She appears well-developed and well-nourished. HENT:   Head: Normocephalic. Eyes: Pupils are equal, round, and reactive to light. Neck: Normal range of motion. Cardiovascular: Normal rate, regular rhythm and normal heart sounds. Pulmonary/Chest: Effort normal and breath sounds normal.   Musculoskeletal: Normal range of motion. Neurological: She is alert and oriented to person, place, and time. Skin: Skin is warm and dry. Psychiatric: She has a normal mood and affect. Assessment & Plan:      Diagnosis Orders   1.  Syncope, unspecified syncope type  Holter Monitor 48 Hour    EKG 12 lead    EKG 12 lead     EKG- SR, no acute changes  Labs 30 days ago wnl except increased A1C likely 2/2 prednisone usage. Although I suspect this syncope, as with her last month near syncope to be 2/2 heat, dehydration, hypoglycemia, and antihistamine- I can not rule out cardiac cause. Will order Holter monitor and have her f/u with Dr. Shaan Franco for further evaluation.   Discouraged use of any further antihistamines,  Increase fluids and eat 3-4x daily smaller meals  Until pollen counts decrease limit time outdoors  We discussed dietary management of the elevated sugars- regardless of root cause, the end organ effect of elevated glucose remains the same and she will need closer dietary management  To go to ED for any further syncope      Call office for for follow up for any worsening of condition or failure to improve    Health Maintenance   Topic Date Due    Hepatitis C screen  1950    Diabetic foot exam  09/18/1962    Diabetic retinal exam  09/18/1962    Diabetic microalbuminuria test  09/18/1970    Shingles Vaccine (1 of 2) 09/18/2002    A1C test (Diabetic or Prediabetic)  07/08/2019    Lipid screen  01/07/2020    Potassium monitoring  04/08/2020    Creatinine monitoring  04/08/2020    Breast cancer screen  06/27/2020    DTaP/Tdap/Td vaccine (2 - Td) 01/24/2022    Colon cancer screen colonoscopy  07/17/2026    DEXA (modify frequency per FRAX score)  Completed    Flu vaccine  Completed    Pneumococcal 65+ years Vaccine  Completed       ROSINA Bradley - CNP

## 2019-05-14 ENCOUNTER — HOSPITAL ENCOUNTER (OUTPATIENT)
Dept: NON INVASIVE DIAGNOSTICS | Age: 69
Discharge: HOME | End: 2019-05-14
Payer: MEDICARE

## 2019-05-14 PROCEDURE — 93226 XTRNL ECG REC<48 HR SCAN A/R: CPT

## 2019-05-14 PROCEDURE — 93225 XTRNL ECG REC<48 HRS REC: CPT

## 2019-05-19 DIAGNOSIS — E11.9 TYPE 2 DIABETES MELLITUS WITHOUT COMPLICATION, WITHOUT LONG-TERM CURRENT USE OF INSULIN (HCC): Primary | ICD-10-CM

## 2019-05-28 ENCOUNTER — OFFICE VISIT (OUTPATIENT)
Dept: INTERNAL MEDICINE CLINIC | Age: 69
End: 2019-05-28

## 2019-05-28 DIAGNOSIS — R73.01 IMPAIRED FASTING GLUCOSE: Primary | ICD-10-CM

## 2019-05-28 NOTE — PROGRESS NOTES
Medical Nutrition Therapy for Diabetes Follow Up    Jo Solorio  May 28, 2019      Patient Care Team:  Emelyn Shaver MD as PCP - Carrie Gonzalez MD as PCP - [facility] Attributed Provider  Daily Pulido MD (Obstetrics & Gynecology)    Reason for visit: follow up for BG control  Patient self-assessment of Progress: didn't go as well as planned    ASSESSMENT/PLAN:   NUTRITION DIAGNOSIS    #1 Problem: Altered Nutrition-Related Laboratory Values (NC-2.2)  Related to: Endocrine/Diabetes   As Evidenced by: Elevated Plasma glucose and/or HgbA1c levels           #2 Problem: Overweight/Obesity (NC-3.3)  Related to: Excessive energy intake or physical inactivity  As Evidenced by: BMI more than normative standard for age and sex (BMI=36.22)      NUTRITION INTERVENTION  Nutrition Prescription: Plan meals to follow Plate Guide, aiming for three servings of vegetables, fruits, and whole grains daily. Diabetes Education/Counseling included:  Carbohydrate Control    Interventions:  Control Carbohydrate Intake using Carb Counting      NUTRITION MONITORING AND EVALUATION  Indicator/Goal Progress Rating   #1  Meal Frequency  #1 3/5   #2  Fruit/Veg/Whole Grain intake #2 3/5   #3  Physical Activity #3 unable to do--company in town          Patient Active Problem List   Diagnosis    Asthma    Shortness of breath    Autoimmune hepatitis ([facility] [address]    Essential hypertension, benign    Pure hypercholesterolemia    Impaired fasting glucose    Cataract    Murmur, cardiac       Current Outpatient Medications   Medication Sig Dispense Refill    metFORMIN (GLUCOPHAGE) 500 MG tablet Take 1 tablet by mouth 2 times daily (with meals) 180 tablet 1    benzonatate (TESSALON) 100 MG capsule TAKE 1 CAPSULE BY MOUTH 3 TIMES DAILY AS NEEDED.  EVERY 6 HRS AS NEEDED FOR COUGH 270 capsule 0    tiotropium (SPIRIVA RESPIMAT) 2.5 MCG/ACT AERS inhaler Inhale 2 puffs into the lungs daily 4 Inhaler 0    montelukast (SINGULAIR) 10 MG tablet Take 1 tablet by mouth nightly 90 tablet 3    tiotropium (SPIRIVA RESPIMAT) 2.5 MCG/ACT AERS inhaler Inhale 2 puffs into the lungs daily 1 Inhaler 0    predniSONE (DELTASONE) 10 MG tablet TAKE 1 TABLET EVERY DAY 90 tablet 3    olmesartan (BENICAR) 20 MG tablet Take 3 tablets by mouth daily 270 tablet 3    desloratadine (CLARINEX) 5 MG tablet TAKE 1 TABLET BY MOUTH EVERY DAY 90 tablet 3    furosemide (LASIX) 20 MG tablet TAKE 1 TABLET BY MOUTH EVERY DAY 90 tablet 3    amLODIPine (NORVASC) 5 MG tablet Take 1 tablet by mouth daily 90 tablet 3    atorvastatin (LIPITOR) 40 MG tablet Take 1 tablet by mouth daily 90 tablet 1    SYMBICORT 160-4.5 MCG/ACT AERO INHALE 2 PUFFS TWICE A DAY 3 Inhaler 3    mometasone (NASONEX) 50 MCG/ACT nasal spray 2 sprays by Nasal route daily as needed (as needed for allergies) 3 Inhaler 3    PROVENTIL  (90 BASE) MCG/ACT inhaler INHALE 2 PUFFS INTO THE LUNGS EVERY 6 HOURS AS NEEDED. 1 Inhaler 3    zoledronic acid (RECLAST) 5 MG/100ML SOLN Infuse 100 mLs intravenously once for 1 dose. 100 mL 0    calcium carbonate (OSCAL) 500 MG TABS tablet Take 500 mg by mouth daily.  Spacer/Aero Chamber Mouthpiece MISC by Does not apply route. 1 each 1    Multiple Vitamin (MULTIVITAMIN PO) Take  by mouth daily.        Current Facility-Administered Medications   Medication Dose Route Frequency Provider Last Rate Last Dose    ipratropium-albuterol (DUONEB) nebulizer solution 1 ampule  1 ampule Inhalation Once Suzanne Ortiz MD             NUTRITION ASSESSMENT    Biochemical Data:    Lab Results   Component Value Date    LABA1C 9.4 04/08/2019     Lab Results   Component Value Date    .1 04/08/2019       Lab Results   Component Value Date    CHOL 196 01/07/2019    CHOL 178 07/17/2018    CHOL 253 (H) 01/16/2018     Lab Results   Component Value Date    TRIG 117 01/07/2019    TRIG 150 07/17/2018    TRIG 126 01/16/2018     Lab Results   Component Value Date    HDL 72 (H) 01/07/2019    HDL 74 (H) 07/17/2018    HDL 76 (H) 01/16/2018     Lab Results   Component Value Date    LDLCALC 101 (H) 01/07/2019    LDLCALC 74 07/17/2018    LDLCALC 152 (H) 01/16/2018     Lab Results   Component Value Date    LABVLDL 23 01/07/2019    LABVLDL 30 07/17/2018    LABVLDL 25 01/16/2018     Lab Results   Component Value Date    CHOLHDLRATIO 3.0 05/08/2015       Lab Results   Component Value Date    WBC 13.8 (H) 04/08/2019    HGB 13.7 04/08/2019    HCT 41.9 04/08/2019    MCV 93.2 04/08/2019     04/08/2019       Lab Results   Component Value Date    CREATININE 1.3 (H) 04/08/2019    BUN 26 (H) 04/08/2019     04/08/2019    K 3.9 04/08/2019     04/08/2019    CO2 26 04/08/2019         Anthropometric Measurements: Wt Change since last visit: none noted   Comments: 212.8 lbs    Food and Nutrition Changes:   Beverage consumption:changes: Yes--omitted regula soda  Patient reported changes:  Yes   breakfast: 1/2 cup cottage cheese;mandarin breakfast  lunch    Physical Activity Changes:   Increased frequency, intensity or length of time? No  Obstacles to activity: has had family visiting from out of town. Diabetes Medications:   Recent change in medication type/dosage: Yes  Comments: started on Metformin    Monitoring:   Changes to testing regimen?  No  Recent results: not testing    Barriers:   -family celebrations        Follow Up Plan: one month    Referring Provider: Daphne Ahumada, MD    Time spent with patient: 30 minutes

## 2019-05-28 NOTE — PATIENT INSTRUCTIONS
BEHAVIOR GOALS     When to eat: Every 5 hours while awake    What and how much to eat: Plan meals to follow Plate Guide, aiming for three servings of vegetables, fruits, and whole grains daily. Physical Activity:  To something every day (working games or mall-walking)       Keeping Records: Keep log boods    Other: Pay attention to Hunger/fullness

## 2019-06-25 ENCOUNTER — OFFICE VISIT (OUTPATIENT)
Dept: INTERNAL MEDICINE CLINIC | Age: 69
End: 2019-06-25

## 2019-06-25 DIAGNOSIS — E11.9 TYPE 2 DIABETES MELLITUS WITHOUT COMPLICATION, WITHOUT LONG-TERM CURRENT USE OF INSULIN (HCC): Primary | ICD-10-CM

## 2019-06-25 DIAGNOSIS — E11.9 TYPE 2 DIABETES MELLITUS WITHOUT COMPLICATION, WITHOUT LONG-TERM CURRENT USE OF INSULIN (HCC): ICD-10-CM

## 2019-06-25 LAB — LIPASE: 32 U/L (ref 13–60)

## 2019-07-08 ENCOUNTER — HOSPITAL ENCOUNTER (OUTPATIENT)
Dept: NON INVASIVE DIAGNOSTICS | Age: 69
Discharge: HOME | End: 2019-07-08
Payer: MEDICARE

## 2019-07-08 PROCEDURE — 93226 XTRNL ECG REC<48 HR SCAN A/R: CPT

## 2019-07-08 PROCEDURE — 93225 XTRNL ECG REC<48 HRS REC: CPT

## 2019-07-11 LAB
ACQUISITION DURATION: NORMAL S
AVERAGE HEART RATE: 88 BPM
EKG DIAGNOSIS: NORMAL
HOLTER MAX HEART RATE: 132 BPM
HOOKUP DATE: NORMAL
HOOKUP TIME: NORMAL
MAX HEART RATE TIME/DATE: NORMAL
MIN HEART RATE TIME/DATE: NORMAL
MIN HEART RATE: 62 BPM
NUMBER OF QRS COMPLEXES: NORMAL
NUMBER OF SUPRAVENTRICULAR BEATS IN RUNS: 0
NUMBER OF SUPRAVENTRICULAR COUPLETS: 0
NUMBER OF SUPRAVENTRICULAR ECTOPICS: 711
NUMBER OF SUPRAVENTRICULAR ISOLATED BEATS: 711
NUMBER OF SUPRAVENTRICULAR RUNS: 0
NUMBER OF VENTRICULAR BEATS IN RUNS: 0
NUMBER OF VENTRICULAR BIGEMINAL CYCLES: 0
NUMBER OF VENTRICULAR COUPLETS: 1
NUMBER OF VENTRICULAR ECTOPICS: 197
NUMBER OF VENTRICULAR ISOLATED BEATS: 193
NUMBER OF VENTRICULAR RUNS: 0

## 2019-07-14 DIAGNOSIS — I10 ESSENTIAL HYPERTENSION, BENIGN: Chronic | ICD-10-CM

## 2019-07-15 RX ORDER — AMLODIPINE BESYLATE 5 MG/1
TABLET ORAL
Qty: 90 TABLET | Refills: 3 | Status: SHIPPED | OUTPATIENT
Start: 2019-07-15 | End: 2020-03-03 | Stop reason: SDUPTHER

## 2019-08-27 ENCOUNTER — OFFICE VISIT (OUTPATIENT)
Dept: INTERNAL MEDICINE CLINIC | Age: 69
End: 2019-08-27

## 2019-08-27 VITALS — BODY MASS INDEX: 35.39 KG/M2 | WEIGHT: 206.2 LBS

## 2019-08-27 DIAGNOSIS — E11.9 TYPE 2 DIABETES MELLITUS WITHOUT COMPLICATION, WITHOUT LONG-TERM CURRENT USE OF INSULIN (HCC): Primary | ICD-10-CM

## 2019-08-27 NOTE — PROGRESS NOTES
07/17/2018    HDL 76 (H) 01/16/2018     Lab Results   Component Value Date    LDLCALC 101 (H) 01/07/2019    LDLCALC 74 07/17/2018    LDLCALC 152 (H) 01/16/2018     Lab Results   Component Value Date    LABVLDL 23 01/07/2019    LABVLDL 30 07/17/2018    LABVLDL 25 01/16/2018     Lab Results   Component Value Date    CHOLHDLRATIO 3.0 05/08/2015       Lab Results   Component Value Date    WBC 13.8 (H) 04/08/2019    HGB 13.7 04/08/2019    HCT 41.9 04/08/2019    MCV 93.2 04/08/2019     04/08/2019       Lab Results   Component Value Date    CREATININE 1.3 (H) 04/08/2019    BUN 26 (H) 04/08/2019     04/08/2019    K 3.9 04/08/2019     04/08/2019    CO2 26 04/08/2019       Anthropometric Measurements: Wt Change since last visit:  Yes  Comments: 206.2 lb     Food and Nutrition Changes:   Beverage consumption:changes: Yes  Patient reported changes:  Yes--regular soda just once or twice     Physical Activity Changes:   Increased frequency, intensity or length of time? Yes--mall walking Monday-friday  Obstacles to activity: none noted    Diabetes Medications:   Recent change in medication type/dosage: No      Monitoring:   Changes to testing regimen?  No  Recent results: not willing to stick herself    Barriers:   -needs to get back in routine after returning from cruise last week        Follow Up Plan: 3 months    Referring Provider: Eulalia Whitfield MD    Time spent with patient: 30 minutes

## 2019-09-04 ENCOUNTER — TELEPHONE (OUTPATIENT)
Dept: INTERNAL MEDICINE CLINIC | Age: 69
End: 2019-09-04

## 2019-09-04 DIAGNOSIS — J32.9 SINUSITIS, UNSPECIFIED CHRONICITY, UNSPECIFIED LOCATION: Primary | ICD-10-CM

## 2019-09-04 RX ORDER — LORATADINE 10 MG/1
10 TABLET ORAL DAILY
Qty: 30 TABLET | Refills: 0 | COMMUNITY
Start: 2019-09-04 | End: 2019-10-04

## 2019-09-04 RX ORDER — FLUTICASONE PROPIONATE 50 MCG
1 SPRAY, SUSPENSION (ML) NASAL DAILY
Qty: 1 BOTTLE | Refills: 2 | Status: SHIPPED | OUTPATIENT
Start: 2019-09-04 | End: 2019-10-28 | Stop reason: SDUPTHER

## 2019-09-04 NOTE — TELEPHONE ENCOUNTER
Dr. Octavio Goldmann asked me to call pt to see how she is feeling. Her  is in the office now and indicated that she is not feeling well. I spoke with her, she said she thinks it is a sinus infection. She states she has no fever, body aches or chills. She is sweating a little bit. Dr. Octavio Goldmann advised she use claritin and Flonase and call us on Friday to give an update. Pt informed and understands MD orders.

## 2019-09-23 RX ORDER — FUROSEMIDE 20 MG/1
TABLET ORAL
Qty: 90 TABLET | Refills: 3 | Status: SHIPPED | OUTPATIENT
Start: 2019-09-23 | End: 2020-09-29

## 2019-09-25 RX ORDER — DESLORATADINE 5 MG/1
TABLET ORAL
Qty: 90 TABLET | Refills: 0 | Status: SHIPPED | OUTPATIENT
Start: 2019-09-25 | End: 2019-12-19

## 2019-09-27 ENCOUNTER — HOSPITAL ENCOUNTER (OUTPATIENT)
Age: 69
Discharge: HOME | End: 2019-09-27
Payer: MEDICARE

## 2019-09-27 ENCOUNTER — OFFICE VISIT (OUTPATIENT)
Dept: INTERNAL MEDICINE CLINIC | Age: 69
End: 2019-09-27
Payer: MEDICARE

## 2019-09-27 ENCOUNTER — HOSPITAL ENCOUNTER (OUTPATIENT)
Dept: GENERAL RADIOLOGY | Age: 69
Discharge: HOME | End: 2019-09-27
Payer: MEDICARE

## 2019-09-27 VITALS
BODY MASS INDEX: 34.49 KG/M2 | HEART RATE: 82 BPM | WEIGHT: 202 LBS | OXYGEN SATURATION: 97 % | HEIGHT: 64 IN | SYSTOLIC BLOOD PRESSURE: 128 MMHG | DIASTOLIC BLOOD PRESSURE: 78 MMHG

## 2019-09-27 DIAGNOSIS — N18.30 STAGE 3 CHRONIC KIDNEY DISEASE (HCC): ICD-10-CM

## 2019-09-27 DIAGNOSIS — N18.30 CHRONIC KIDNEY DISEASE, STAGE III (MODERATE) (HCC): ICD-10-CM

## 2019-09-27 DIAGNOSIS — J06.9 UPPER RESPIRATORY TRACT INFECTION, UNSPECIFIED TYPE: ICD-10-CM

## 2019-09-27 DIAGNOSIS — E78.00 PURE HYPERCHOLESTEROLEMIA: ICD-10-CM

## 2019-09-27 DIAGNOSIS — R63.4 LOSS OF WEIGHT: ICD-10-CM

## 2019-09-27 DIAGNOSIS — R05.9 COUGH: ICD-10-CM

## 2019-09-27 DIAGNOSIS — I10 ESSENTIAL HYPERTENSION, BENIGN: Chronic | ICD-10-CM

## 2019-09-27 DIAGNOSIS — E11.9 TYPE 2 DIABETES MELLITUS WITHOUT COMPLICATION, WITHOUT LONG-TERM CURRENT USE OF INSULIN (HCC): Primary | ICD-10-CM

## 2019-09-27 DIAGNOSIS — E11.9 TYPE 2 DIABETES MELLITUS WITHOUT COMPLICATION, WITHOUT LONG-TERM CURRENT USE OF INSULIN (HCC): ICD-10-CM

## 2019-09-27 DIAGNOSIS — J45.20 MILD INTERMITTENT ASTHMA WITHOUT COMPLICATION: ICD-10-CM

## 2019-09-27 LAB
A/G RATIO: 1.7 (ref 1.1–2.2)
ALBUMIN SERPL-MCNC: 4.6 G/DL (ref 3.4–5)
ALP BLD-CCNC: 72 U/L (ref 40–129)
ALT SERPL-CCNC: 25 U/L (ref 10–40)
ANION GAP SERPL CALCULATED.3IONS-SCNC: 15 MMOL/L (ref 3–16)
AST SERPL-CCNC: 24 U/L (ref 15–37)
BASOPHILS ABSOLUTE: 0 K/UL (ref 0–0.2)
BASOPHILS RELATIVE PERCENT: 0.4 %
BILIRUB SERPL-MCNC: 0.7 MG/DL (ref 0–1)
BUN BLDV-MCNC: 16 MG/DL (ref 7–20)
CALCIUM SERPL-MCNC: 9.5 MG/DL (ref 8.3–10.6)
CHLORIDE BLD-SCNC: 102 MMOL/L (ref 99–110)
CHOLESTEROL, TOTAL: 183 MG/DL (ref 0–199)
CO2: 25 MMOL/L (ref 21–32)
CREAT SERPL-MCNC: 1.3 MG/DL (ref 0.6–1.2)
EOSINOPHILS ABSOLUTE: 0 K/UL (ref 0–0.6)
EOSINOPHILS RELATIVE PERCENT: 0.3 %
GFR AFRICAN AMERICAN: 49
GFR NON-AFRICAN AMERICAN: 41
GLOBULIN: 2.7 G/DL
GLUCOSE BLD-MCNC: 140 MG/DL (ref 70–99)
HCT VFR BLD CALC: 39.1 % (ref 36–48)
HDLC SERPL-MCNC: 95 MG/DL (ref 40–60)
HEMOGLOBIN: 12.8 G/DL (ref 12–16)
LDL CHOLESTEROL CALCULATED: 69 MG/DL
LIPASE: 24 U/L (ref 13–60)
LYMPHOCYTES ABSOLUTE: 1.6 K/UL (ref 1–5.1)
LYMPHOCYTES RELATIVE PERCENT: 12.4 %
MCH RBC QN AUTO: 29.9 PG (ref 26–34)
MCHC RBC AUTO-ENTMCNC: 32.8 G/DL (ref 31–36)
MCV RBC AUTO: 91.2 FL (ref 80–100)
MONOCYTES ABSOLUTE: 0.7 K/UL (ref 0–1.3)
MONOCYTES RELATIVE PERCENT: 5.1 %
NEUTROPHILS ABSOLUTE: 10.6 K/UL (ref 1.7–7.7)
NEUTROPHILS RELATIVE PERCENT: 81.8 %
PDW BLD-RTO: 14.5 % (ref 12.4–15.4)
PLATELET # BLD: 391 K/UL (ref 135–450)
PMV BLD AUTO: 7.6 FL (ref 5–10.5)
POTASSIUM SERPL-SCNC: 3.9 MMOL/L (ref 3.5–5.1)
RBC # BLD: 4.29 M/UL (ref 4–5.2)
SODIUM BLD-SCNC: 142 MMOL/L (ref 136–145)
TOTAL PROTEIN: 7.3 G/DL (ref 6.4–8.2)
TRIGL SERPL-MCNC: 93 MG/DL (ref 0–150)
VLDLC SERPL CALC-MCNC: 19 MG/DL
WBC # BLD: 12.9 K/UL (ref 4–11)

## 2019-09-27 PROCEDURE — 3046F HEMOGLOBIN A1C LEVEL >9.0%: CPT | Performed by: INTERNAL MEDICINE

## 2019-09-27 PROCEDURE — 71046 X-RAY EXAM CHEST 2 VIEWS: CPT

## 2019-09-27 PROCEDURE — 1123F ACP DISCUSS/DSCN MKR DOCD: CPT | Performed by: INTERNAL MEDICINE

## 2019-09-27 PROCEDURE — 2022F DILAT RTA XM EVC RTNOPTHY: CPT | Performed by: INTERNAL MEDICINE

## 2019-09-27 PROCEDURE — 99214 OFFICE O/P EST MOD 30 MIN: CPT | Performed by: INTERNAL MEDICINE

## 2019-09-27 PROCEDURE — 4040F PNEUMOC VAC/ADMIN/RCVD: CPT | Performed by: INTERNAL MEDICINE

## 2019-09-27 PROCEDURE — G8399 PT W/DXA RESULTS DOCUMENT: HCPCS | Performed by: INTERNAL MEDICINE

## 2019-09-27 PROCEDURE — G8417 CALC BMI ABV UP PARAM F/U: HCPCS | Performed by: INTERNAL MEDICINE

## 2019-09-27 PROCEDURE — 1090F PRES/ABSN URINE INCON ASSESS: CPT | Performed by: INTERNAL MEDICINE

## 2019-09-27 PROCEDURE — 3017F COLORECTAL CA SCREEN DOC REV: CPT | Performed by: INTERNAL MEDICINE

## 2019-09-27 PROCEDURE — G8427 DOCREV CUR MEDS BY ELIG CLIN: HCPCS | Performed by: INTERNAL MEDICINE

## 2019-09-27 PROCEDURE — 1036F TOBACCO NON-USER: CPT | Performed by: INTERNAL MEDICINE

## 2019-09-27 RX ORDER — BENZONATATE 100 MG/1
CAPSULE ORAL
Qty: 270 CAPSULE | Refills: 0 | Status: SHIPPED | OUTPATIENT
Start: 2019-09-27 | End: 2019-12-16

## 2019-09-27 RX ORDER — ATORVASTATIN CALCIUM 40 MG/1
40 TABLET, FILM COATED ORAL DAILY
Qty: 90 TABLET | Refills: 1 | Status: SHIPPED | OUTPATIENT
Start: 2019-09-27 | End: 2020-03-26

## 2019-09-27 RX ORDER — AZITHROMYCIN 250 MG/1
250 TABLET, FILM COATED ORAL SEE ADMIN INSTRUCTIONS
Qty: 6 TABLET | Refills: 0 | Status: SHIPPED | OUTPATIENT
Start: 2019-09-27 | End: 2019-10-02

## 2019-09-27 ASSESSMENT — ENCOUNTER SYMPTOMS
CHEST TIGHTNESS: 0
CONSTIPATION: 0
VOMITING: 0
NAUSEA: 0
DIARRHEA: 0
BLOOD IN STOOL: 0
SHORTNESS OF BREATH: 0
ABDOMINAL PAIN: 0
COUGH: 1

## 2019-09-27 NOTE — PROGRESS NOTES
montelukast (SINGULAIR) 10 MG tablet Take 1 tablet by mouth nightly 1/7/19  Yes Eulalia Whitfield MD   tiotropium (SPIRIVA RESPIMAT) 2.5 MCG/ACT AERS inhaler Inhale 2 puffs into the lungs daily 1/7/19  Yes Eulalia Whitfield MD   predniSONE (DELTASONE) 10 MG tablet TAKE 1 TABLET EVERY DAY 11/28/18  Yes Eulalia Whitfield MD   olmesartan (BENICAR) 20 MG tablet Take 3 tablets by mouth daily 11/28/18  Yes Eulalia Whitfield MD   SYMBICORT 160-4.5 MCG/ACT AERO INHALE 2 PUFFS TWICE A DAY 2/27/17  Yes Radha Naylor MD   mometasone (NASONEX) 50 MCG/ACT nasal spray 2 sprays by Nasal route daily as needed (as needed for allergies) 9/6/16  Yes Radha Naylor MD   PROVENTIL  (90 BASE) MCG/ACT inhaler INHALE 2 PUFFS INTO THE LUNGS EVERY 6 HOURS AS NEEDED. 9/5/14  Yes Radha Naylor MD   calcium carbonate (OSCAL) 500 MG TABS tablet Take 500 mg by mouth daily. Yes Historical Provider, MD   Spacer/Aero Chamber Mouthpiece MISC by Does not apply route. 1/24/12  Yes Radha Naylor MD   Multiple Vitamin (MULTIVITAMIN PO) Take  by mouth daily. 8/6/10  Yes Historical Provider, MD   zoledronic acid (RECLAST) 5 MG/100ML SOLN Infuse 100 mLs intravenously once for 1 dose. 8/8/14 10/9/17  Radha Naylor MD       REVIEW OF SYSTEMS:  Review of Systems   Constitutional: Negative for activity change, appetite change, chills, fever and unexpected weight change. HENT: Negative for hearing loss. Eyes: Negative for visual disturbance. Respiratory: Positive for cough. Negative for chest tightness and shortness of breath. Cardiovascular: Negative for chest pain. Gastrointestinal: Negative for abdominal pain, blood in stool, constipation, diarrhea, nausea and vomiting. Genitourinary: Negative for difficulty urinating and hematuria. Skin: Negative for rash. Allergic/Immunologic: Negative for immunocompromised state. Neurological: Negative for dizziness, weakness, numbness and headaches. Psychiatric/Behavioral: Negative for dysphoric mood and suicidal ideas. The patient is not nervous/anxious. Physical Exam:      Vitals: /78   Pulse 82   Ht 5' 4\" (1.626 m)   Wt 202 lb (91.6 kg)   LMP 12/07/2005   SpO2 97%   BMI 34.67 kg/m²     Body mass index is 34.67 kg/m². Wt Readings from Last 3 Encounters:   09/27/19 202 lb (91.6 kg)   08/27/19 206 lb 3.2 oz (93.5 kg)   05/09/19 211 lb (95.7 kg)     Physical Exam   Constitutional: She is oriented to person, place, and time. She appears well-developed and well-nourished. No distress. HENT:   Head: Normocephalic and atraumatic. Mouth/Throat: Oropharynx is clear and moist. No oropharyngeal exudate. Eyes: Conjunctivae and EOM are normal. Right eye exhibits no discharge. Left eye exhibits no discharge. No scleral icterus. Neck: Normal range of motion. Neck supple. No thyromegaly present. Cardiovascular: Normal rate and normal heart sounds. No murmur heard. Pulmonary/Chest: Effort normal. No respiratory distress. She has no wheezes. She has no rales. Mild b/l prolonged expirium    Abdominal: Soft. She exhibits no distension. There is no tenderness. There is no guarding. Musculoskeletal: She exhibits no edema or deformity. Lymphadenopathy:        Head (right side): No submental and no submandibular adenopathy present. Head (left side): No submental and no submandibular adenopathy present. She has no cervical adenopathy. Right cervical: No superficial cervical and no deep cervical adenopathy present. Left cervical: No superficial cervical and no deep cervical adenopathy present. Neurological: She is alert and oriented to person, place, and time. She has normal reflexes. She exhibits normal muscle tone. GCS eye subscore is 4. GCS verbal subscore is 5. GCS motor subscore is 6. Skin: No rash noted. She is not diaphoretic. Psychiatric: She has a normal mood and affect.  Her behavior is normal. Thought

## 2019-09-28 LAB
ESTIMATED AVERAGE GLUCOSE: 148.5 MG/DL
HBA1C MFR BLD: 6.8 %

## 2019-10-22 DIAGNOSIS — E11.9 TYPE 2 DIABETES MELLITUS WITHOUT COMPLICATION, WITHOUT LONG-TERM CURRENT USE OF INSULIN (HCC): ICD-10-CM

## 2019-10-22 LAB
CREATININE URINE: 61.2 MG/DL (ref 28–259)
MICROALBUMIN UR-MCNC: <1.2 MG/DL
MICROALBUMIN/CREAT UR-RTO: NORMAL MG/G (ref 0–30)

## 2019-10-28 DIAGNOSIS — J32.9 SINUSITIS, UNSPECIFIED CHRONICITY, UNSPECIFIED LOCATION: ICD-10-CM

## 2019-10-28 RX ORDER — FLUTICASONE PROPIONATE 50 MCG
SPRAY, SUSPENSION (ML) NASAL
Qty: 1 BOTTLE | Refills: 2 | Status: SHIPPED | OUTPATIENT
Start: 2019-10-28 | End: 2020-04-24

## 2019-11-11 DIAGNOSIS — J45.20 MILD INTERMITTENT ASTHMA WITHOUT COMPLICATION: Primary | ICD-10-CM

## 2019-11-11 RX ORDER — BUDESONIDE AND FORMOTEROL FUMARATE DIHYDRATE 160; 4.5 UG/1; UG/1
2 AEROSOL RESPIRATORY (INHALATION) 2 TIMES DAILY
Qty: 3 INHALER | Refills: 1 | Status: SHIPPED | OUTPATIENT
Start: 2019-11-11 | End: 2020-05-05

## 2019-11-17 DIAGNOSIS — E11.9 TYPE 2 DIABETES MELLITUS WITHOUT COMPLICATION, WITHOUT LONG-TERM CURRENT USE OF INSULIN (HCC): ICD-10-CM

## 2019-11-26 ENCOUNTER — OFFICE VISIT (OUTPATIENT)
Dept: INTERNAL MEDICINE CLINIC | Age: 69
End: 2019-11-26

## 2019-11-26 DIAGNOSIS — E11.9 TYPE 2 DIABETES MELLITUS WITHOUT COMPLICATION, WITHOUT LONG-TERM CURRENT USE OF INSULIN (HCC): Primary | ICD-10-CM

## 2019-11-27 RX ORDER — PREDNISONE 10 MG/1
TABLET ORAL
Qty: 90 TABLET | Refills: 3 | Status: SHIPPED | OUTPATIENT
Start: 2019-11-27 | End: 2021-03-02 | Stop reason: SDUPTHER

## 2019-12-05 DIAGNOSIS — I10 ESSENTIAL HYPERTENSION, BENIGN: Chronic | ICD-10-CM

## 2019-12-07 RX ORDER — OLMESARTAN MEDOXOMIL 20 MG/1
TABLET ORAL
Qty: 270 TABLET | Refills: 3 | Status: SHIPPED | OUTPATIENT
Start: 2019-12-07 | End: 2020-03-03 | Stop reason: ALTCHOICE

## 2019-12-16 ENCOUNTER — OFFICE VISIT (OUTPATIENT)
Dept: INTERNAL MEDICINE CLINIC | Age: 69
End: 2019-12-16
Payer: MEDICARE

## 2019-12-16 VITALS
BODY MASS INDEX: 35.17 KG/M2 | WEIGHT: 206 LBS | HEART RATE: 80 BPM | SYSTOLIC BLOOD PRESSURE: 140 MMHG | HEIGHT: 64 IN | DIASTOLIC BLOOD PRESSURE: 84 MMHG

## 2019-12-16 DIAGNOSIS — I10 ESSENTIAL HYPERTENSION, BENIGN: Chronic | ICD-10-CM

## 2019-12-16 DIAGNOSIS — N18.30 STAGE 3 CHRONIC KIDNEY DISEASE (HCC): Primary | ICD-10-CM

## 2019-12-16 DIAGNOSIS — E78.00 PURE HYPERCHOLESTEROLEMIA: ICD-10-CM

## 2019-12-16 DIAGNOSIS — E11.9 TYPE 2 DIABETES MELLITUS WITHOUT COMPLICATION, WITHOUT LONG-TERM CURRENT USE OF INSULIN (HCC): ICD-10-CM

## 2019-12-16 DIAGNOSIS — Z12.39 SCREENING FOR BREAST CANCER: ICD-10-CM

## 2019-12-16 DIAGNOSIS — J45.20 MILD INTERMITTENT ASTHMA WITHOUT COMPLICATION: ICD-10-CM

## 2019-12-16 DIAGNOSIS — Z12.31 ENCOUNTER FOR SCREENING MAMMOGRAM FOR MALIGNANT NEOPLASM OF BREAST: ICD-10-CM

## 2019-12-16 PROCEDURE — 1123F ACP DISCUSS/DSCN MKR DOCD: CPT | Performed by: INTERNAL MEDICINE

## 2019-12-16 PROCEDURE — 1090F PRES/ABSN URINE INCON ASSESS: CPT | Performed by: INTERNAL MEDICINE

## 2019-12-16 PROCEDURE — 3044F HG A1C LEVEL LT 7.0%: CPT | Performed by: INTERNAL MEDICINE

## 2019-12-16 PROCEDURE — G8399 PT W/DXA RESULTS DOCUMENT: HCPCS | Performed by: INTERNAL MEDICINE

## 2019-12-16 PROCEDURE — G8427 DOCREV CUR MEDS BY ELIG CLIN: HCPCS | Performed by: INTERNAL MEDICINE

## 2019-12-16 PROCEDURE — G8484 FLU IMMUNIZE NO ADMIN: HCPCS | Performed by: INTERNAL MEDICINE

## 2019-12-16 PROCEDURE — 4040F PNEUMOC VAC/ADMIN/RCVD: CPT | Performed by: INTERNAL MEDICINE

## 2019-12-16 PROCEDURE — 99214 OFFICE O/P EST MOD 30 MIN: CPT | Performed by: INTERNAL MEDICINE

## 2019-12-16 PROCEDURE — 2022F DILAT RTA XM EVC RTNOPTHY: CPT | Performed by: INTERNAL MEDICINE

## 2019-12-16 PROCEDURE — 3017F COLORECTAL CA SCREEN DOC REV: CPT | Performed by: INTERNAL MEDICINE

## 2019-12-16 PROCEDURE — G8417 CALC BMI ABV UP PARAM F/U: HCPCS | Performed by: INTERNAL MEDICINE

## 2019-12-16 PROCEDURE — 1036F TOBACCO NON-USER: CPT | Performed by: INTERNAL MEDICINE

## 2019-12-16 ASSESSMENT — ENCOUNTER SYMPTOMS
BLOOD IN STOOL: 0
SHORTNESS OF BREATH: 0
NAUSEA: 0
DIARRHEA: 0
CHEST TIGHTNESS: 0
ABDOMINAL PAIN: 0
VOMITING: 0
CONSTIPATION: 0

## 2019-12-16 ASSESSMENT — PATIENT HEALTH QUESTIONNAIRE - PHQ9
2. FEELING DOWN, DEPRESSED OR HOPELESS: 0
1. LITTLE INTEREST OR PLEASURE IN DOING THINGS: 0
SUM OF ALL RESPONSES TO PHQ QUESTIONS 1-9: 0
SUM OF ALL RESPONSES TO PHQ9 QUESTIONS 1 & 2: 0
SUM OF ALL RESPONSES TO PHQ QUESTIONS 1-9: 0

## 2019-12-19 RX ORDER — DESLORATADINE 5 MG/1
TABLET ORAL
Qty: 90 TABLET | Refills: 0 | Status: SHIPPED | OUTPATIENT
Start: 2019-12-19 | End: 2020-03-25

## 2019-12-20 RX ORDER — MONTELUKAST SODIUM 10 MG/1
TABLET ORAL
Qty: 90 TABLET | Refills: 3 | Status: SHIPPED | OUTPATIENT
Start: 2019-12-20 | End: 2020-12-17

## 2020-02-03 ENCOUNTER — OFFICE VISIT (OUTPATIENT)
Dept: INTERNAL MEDICINE CLINIC | Age: 70
End: 2020-02-03

## 2020-02-03 NOTE — PROGRESS NOTES
Medical Nutrition Therapy for Diabetes Follow Up    Hoa Fuentes  February 3, 2020      Patient Care Team:  Torsten Denise MD as PCP - Franklyn Tony MD as PCP - [facility]an[place]  Chava Felder MD (Obstetrics & Gynecology)  Sue Mars RD, LD as Dietitian (Dietitian)    Reason for visit: f/u for DM--6th visit  Patient self-assessment of Progress: Not doing well recently-- hospitalized/ after fall at home. ASSESSMENT/PLAN:   NUTRITION DIAGNOSIS    #1 Problem: Altered Nutrition-Related Laboratory Values (NC-2.2)  Related to: Endocrine/Diabetes   As Evidenced by: Elevated Plasma glucose and/or HgbA1c levels           #2 Problem: Overweight/Obesity (NC-3.3)  Related to: Excessive energy intake or physical inactivity  As Evidenced by: BMI more than normative standard for age and sex (BMI=35.36)    NUTRITION INTERVENTION  Nutrition Prescription: Aim for 30 - 45 g Carb per meal; 15 - 20 g Carb per snack      Diabetes Education/Counseling included:  Problem-Solving    Interventions:  Menu planning    NUTRITION MONITORING AND EVALUATION  Patient Instructions   BEHAVIOR GOALS    Plan Menus in advance to make healthy eating easier    Use recumbent bike daily for even a few mintues.            Patient Active Problem List   Diagnosis    Asthma    Shortness of breath    Autoimmune hepatitis ([facility] [address]    Essential hypertension, benign    Pure hypercholesterolemia    Impaired fasting glucose    Cataract    Murmur, cardiac    Stage 3 chronic kidney disease (HCC)    Type 2 diabetes mellitus without complication, without long-term current use of insulin (HCC)       Current Outpatient Medications   Medication Sig Dispense Refill    montelukast (SINGULAIR) 10 MG tablet TAKE 1 TABLET BY MOUTH EVERY DAY AT NIGHT 90 tablet 3    desloratadine (CLARINEX) 5 MG tablet TAKE 1 TABLET BY MOUTH EVERY DAY 90 tablet 0    olmesartan (BENICAR) 20 MG tablet TAKE 3 TABLETS BY MOUTH EVERY DAY 270 tablet 3    predniSONE (DELTASONE) 10 MG tablet TAKE 1 TABLET BY MOUTH EVERY DAY 90 tablet 3    metFORMIN (GLUCOPHAGE) 500 MG tablet TAKE 1 TABLET BY MOUTH TWICE A DAY WITH MEALS 180 tablet 1    budesonide-formoterol (SYMBICORT) 160-4.5 MCG/ACT AERO Inhale 2 puffs into the lungs 2 times daily 3 Inhaler 1    fluticasone (FLONASE) 50 MCG/ACT nasal spray SPRAY 1 SPRAY INTO EACH NOSTRIL EVERY DAY 1 Bottle 2    atorvastatin (LIPITOR) 40 MG tablet Take 1 tablet by mouth daily 90 tablet 1    furosemide (LASIX) 20 MG tablet TAKE 1 TABLET BY MOUTH EVERY DAY 90 tablet 3    amLODIPine (NORVASC) 5 MG tablet TAKE 1 TABLET BY MOUTH EVERY DAY 90 tablet 3    tiotropium (SPIRIVA RESPIMAT) 2.5 MCG/ACT AERS inhaler Inhale 2 puffs into the lungs daily 4 Inhaler 0    tiotropium (SPIRIVA RESPIMAT) 2.5 MCG/ACT AERS inhaler Inhale 2 puffs into the lungs daily 1 Inhaler 0    SYMBICORT 160-4.5 MCG/ACT AERO INHALE 2 PUFFS TWICE A DAY 3 Inhaler 3    mometasone (NASONEX) 50 MCG/ACT nasal spray 2 sprays by Nasal route daily as needed (as needed for allergies) 3 Inhaler 3    PROVENTIL  (90 BASE) MCG/ACT inhaler INHALE 2 PUFFS INTO THE LUNGS EVERY 6 HOURS AS NEEDED. 1 Inhaler 3    zoledronic acid (RECLAST) 5 MG/100ML SOLN Infuse 100 mLs intravenously once for 1 dose. 100 mL 0    calcium carbonate (OSCAL) 500 MG TABS tablet Take 500 mg by mouth daily.  Spacer/Aero Chamber Mouthpiece MISC by Does not apply route. 1 each 1    Multiple Vitamin (MULTIVITAMIN PO) Take  by mouth daily.        Current Facility-Administered Medications   Medication Dose Route Frequency Provider Last Rate Last Dose    ipratropium-albuterol (DUONEB) nebulizer solution 1 ampule  1 ampule Inhalation Once Maicol Field MD             NUTRITION ASSESSMENT    Biochemical Data:    Lab Results   Component Value Date    LABA1C 6.8 09/27/2019     Lab Results   Component Value Date    .5 09/27/2019       Lab Results   Component Value Date    CHOL 183 09/27/2019    CHOL 196 01/07/2019    CHOL 178 07/17/2018     Lab Results   Component Value Date    TRIG 93 09/27/2019    TRIG 117 01/07/2019    TRIG 150 07/17/2018     Lab Results   Component Value Date    HDL 95 (H) 09/27/2019    HDL 72 (H) 01/07/2019    HDL 74 (H) 07/17/2018     Lab Results   Component Value Date    LDLCALC 69 09/27/2019    LDLCALC 101 (H) 01/07/2019    LDLCALC 74 07/17/2018     Lab Results   Component Value Date    LABVLDL 19 09/27/2019    LABVLDL 23 01/07/2019    LABVLDL 30 07/17/2018     Lab Results   Component Value Date    CHOLHDLRATIO 3.0 05/08/2015       Lab Results   Component Value Date    WBC 12.9 (H) 09/27/2019    HGB 12.8 09/27/2019    HCT 39.1 09/27/2019    MCV 91.2 09/27/2019     09/27/2019       Lab Results   Component Value Date    CREATININE 1.3 (H) 09/27/2019    BUN 16 09/27/2019     09/27/2019    K 3.9 09/27/2019     09/27/2019    CO2 25 09/27/2019       Anthropometric Measurements: Wt Change since last visit:  Yes--down 202.8 lb  Comments: Wt Readings from Last 3 Encounters:   12/16/19 206 lb (93.4 kg)   09/27/19 202 lb (91.6 kg)   08/27/19 206 lb 3.2 oz (93.5 kg)         Food and Nutrition Changes:   Beverage consumption:changes: No  Patient reported changes:  No     Physical Activity Changes:   Increased frequency, intensity or length of time? No    Diabetes Medications:   Recent change in medication type/dosage: No      Monitoring:   Changes to testing regimen?  No      Barriers:   -grieving death of spouse        Follow Up Plan: one month    Referring Provider: Arley Reyes MD    Time spent with patient: 20 minutes

## 2020-02-24 DIAGNOSIS — N18.30 TYPE 2 DIABETES MELLITUS WITH STAGE 3 CHRONIC KIDNEY DISEASE, WITHOUT LONG-TERM CURRENT USE OF INSULIN (HCC): ICD-10-CM

## 2020-02-24 DIAGNOSIS — E11.22 TYPE 2 DIABETES MELLITUS WITH STAGE 3 CHRONIC KIDNEY DISEASE, WITHOUT LONG-TERM CURRENT USE OF INSULIN (HCC): ICD-10-CM

## 2020-02-24 DIAGNOSIS — E11.9 TYPE 2 DIABETES MELLITUS WITHOUT COMPLICATION, WITHOUT LONG-TERM CURRENT USE OF INSULIN (HCC): ICD-10-CM

## 2020-02-24 DIAGNOSIS — N18.30 STAGE 3 CHRONIC KIDNEY DISEASE (HCC): ICD-10-CM

## 2020-02-24 DIAGNOSIS — E78.00 PURE HYPERCHOLESTEROLEMIA: ICD-10-CM

## 2020-02-24 DIAGNOSIS — I10 ESSENTIAL HYPERTENSION, BENIGN: Chronic | ICD-10-CM

## 2020-02-24 LAB
A/G RATIO: 1.8 (ref 1.1–2.2)
ALBUMIN SERPL-MCNC: 4.2 G/DL (ref 3.4–5)
ALP BLD-CCNC: 54 U/L (ref 40–129)
ALT SERPL-CCNC: 16 U/L (ref 10–40)
ANION GAP SERPL CALCULATED.3IONS-SCNC: 13 MMOL/L (ref 3–16)
AST SERPL-CCNC: 15 U/L (ref 15–37)
BASOPHILS ABSOLUTE: 0.1 K/UL (ref 0–0.2)
BASOPHILS RELATIVE PERCENT: 0.6 %
BILIRUB SERPL-MCNC: 0.5 MG/DL (ref 0–1)
BUN BLDV-MCNC: 23 MG/DL (ref 7–20)
CALCIUM SERPL-MCNC: 10.2 MG/DL (ref 8.3–10.6)
CHLORIDE BLD-SCNC: 103 MMOL/L (ref 99–110)
CHOLESTEROL, TOTAL: 156 MG/DL (ref 0–199)
CO2: 28 MMOL/L (ref 21–32)
CREAT SERPL-MCNC: 1.5 MG/DL (ref 0.6–1.2)
EOSINOPHILS ABSOLUTE: 0.5 K/UL (ref 0–0.6)
EOSINOPHILS RELATIVE PERCENT: 3.4 %
ESTIMATED AVERAGE GLUCOSE: 151.3 MG/DL
GFR AFRICAN AMERICAN: 42
GFR NON-AFRICAN AMERICAN: 35
GLOBULIN: 2.4 G/DL
GLUCOSE BLD-MCNC: 103 MG/DL (ref 70–99)
HBA1C MFR BLD: 6.9 %
HCT VFR BLD CALC: 39.5 % (ref 36–48)
HDLC SERPL-MCNC: 71 MG/DL (ref 40–60)
HEMOGLOBIN: 12.6 G/DL (ref 12–16)
LDL CHOLESTEROL CALCULATED: 68 MG/DL
LIPASE: 34 U/L (ref 13–60)
LYMPHOCYTES ABSOLUTE: 2.6 K/UL (ref 1–5.1)
LYMPHOCYTES RELATIVE PERCENT: 17.5 %
MCH RBC QN AUTO: 29.7 PG (ref 26–34)
MCHC RBC AUTO-ENTMCNC: 32 G/DL (ref 31–36)
MCV RBC AUTO: 92.9 FL (ref 80–100)
MONOCYTES ABSOLUTE: 1.2 K/UL (ref 0–1.3)
MONOCYTES RELATIVE PERCENT: 7.8 %
NEUTROPHILS ABSOLUTE: 10.5 K/UL (ref 1.7–7.7)
NEUTROPHILS RELATIVE PERCENT: 70.7 %
PDW BLD-RTO: 14.2 % (ref 12.4–15.4)
PLATELET # BLD: 347 K/UL (ref 135–450)
PMV BLD AUTO: 7.8 FL (ref 5–10.5)
POTASSIUM SERPL-SCNC: 4 MMOL/L (ref 3.5–5.1)
RBC # BLD: 4.25 M/UL (ref 4–5.2)
SODIUM BLD-SCNC: 144 MMOL/L (ref 136–145)
TOTAL PROTEIN: 6.6 G/DL (ref 6.4–8.2)
TRIGL SERPL-MCNC: 86 MG/DL (ref 0–150)
VLDLC SERPL CALC-MCNC: 17 MG/DL
WBC # BLD: 14.9 K/UL (ref 4–11)

## 2020-03-03 RX ORDER — AMLODIPINE BESYLATE 5 MG/1
TABLET ORAL
Qty: 90 TABLET | Refills: 3
Start: 2020-03-03 | End: 2020-07-06

## 2020-03-09 ENCOUNTER — OFFICE VISIT (OUTPATIENT)
Dept: INTERNAL MEDICINE CLINIC | Age: 70
End: 2020-03-09

## 2020-03-09 NOTE — PROGRESS NOTES
Cataract    Murmur, cardiac    Stage 3 chronic kidney disease (HCC)    Type 2 diabetes mellitus without complication, without long-term current use of insulin ([facility])       Current Outpatient Medications   Medication Sig Dispense Refill    amLODIPine (NORVASC) 5 MG tablet Take 7.5mg Daily 90 tablet 3    montelukast (SINGULAIR) 10 MG tablet TAKE 1 TABLET BY MOUTH EVERY DAY AT NIGHT 90 tablet 3    desloratadine (CLARINEX) 5 MG tablet TAKE 1 TABLET BY MOUTH EVERY DAY 90 tablet 0    predniSONE (DELTASONE) 10 MG tablet TAKE 1 TABLET BY MOUTH EVERY DAY 90 tablet 3    metFORMIN (GLUCOPHAGE) 500 MG tablet TAKE 1 TABLET BY MOUTH TWICE A DAY WITH MEALS 180 tablet 1    budesonide-formoterol (SYMBICORT) 160-4.5 MCG/ACT AERO Inhale 2 puffs into the lungs 2 times daily 3 Inhaler 1    fluticasone (FLONASE) 50 MCG/ACT nasal spray SPRAY 1 SPRAY INTO EACH NOSTRIL EVERY DAY 1 Bottle 2    atorvastatin (LIPITOR) 40 MG tablet Take 1 tablet by mouth daily 90 tablet 1    furosemide (LASIX) 20 MG tablet TAKE 1 TABLET BY MOUTH EVERY DAY 90 tablet 3    tiotropium (SPIRIVA RESPIMAT) 2.5 MCG/ACT AERS inhaler Inhale 2 puffs into the lungs daily 4 Inhaler 0    tiotropium (SPIRIVA RESPIMAT) 2.5 MCG/ACT AERS inhaler Inhale 2 puffs into the lungs daily 1 Inhaler 0    SYMBICORT 160-4.5 MCG/ACT AERO INHALE 2 PUFFS TWICE A DAY 3 Inhaler 3    mometasone (NASONEX) 50 MCG/ACT nasal spray 2 sprays by Nasal route daily as needed (as needed for allergies) 3 Inhaler 3    PROVENTIL  (90 BASE) MCG/ACT inhaler INHALE 2 PUFFS INTO THE LUNGS EVERY 6 HOURS AS NEEDED. 1 Inhaler 3    zoledronic acid (RECLAST) 5 MG/100ML SOLN Infuse 100 mLs intravenously once for 1 dose. 100 mL 0    calcium carbonate (OSCAL) 500 MG TABS tablet Take 500 mg by mouth daily.  Spacer/Aero Chamber Mouthpiece MISC by Does not apply route. 1 each 1    Multiple Vitamin (MULTIVITAMIN PO) Take  by mouth daily.        Current Facility-Administered Medications

## 2020-03-09 NOTE — PATIENT INSTRUCTIONS
BEHAVIOR GOALS    Use Recumbent bike 5 days each week, even for a few minutes. When possible, plan meals in advance.    --Plan menus for eating at home   --Explore options for restaurant meals; be ready to pick a location when asked    Listen to your body, eating only till satisfied

## 2020-03-13 DIAGNOSIS — I10 ESSENTIAL HYPERTENSION, BENIGN: Chronic | ICD-10-CM

## 2020-03-13 LAB
ANION GAP SERPL CALCULATED.3IONS-SCNC: 14 MMOL/L (ref 3–16)
BUN BLDV-MCNC: 15 MG/DL (ref 7–20)
CALCIUM SERPL-MCNC: 10 MG/DL (ref 8.3–10.6)
CHLORIDE BLD-SCNC: 103 MMOL/L (ref 99–110)
CO2: 26 MMOL/L (ref 21–32)
CREAT SERPL-MCNC: 1 MG/DL (ref 0.6–1.2)
GFR AFRICAN AMERICAN: >60
GFR NON-AFRICAN AMERICAN: 55
GLUCOSE BLD-MCNC: 139 MG/DL (ref 70–99)
POTASSIUM SERPL-SCNC: 4.9 MMOL/L (ref 3.5–5.1)
SODIUM BLD-SCNC: 143 MMOL/L (ref 136–145)

## 2020-03-26 RX ORDER — ATORVASTATIN CALCIUM 40 MG/1
TABLET, FILM COATED ORAL
Qty: 90 TABLET | Refills: 1 | Status: SHIPPED | OUTPATIENT
Start: 2020-03-26 | End: 2020-03-31 | Stop reason: SDUPTHER

## 2020-03-31 ENCOUNTER — VIRTUAL VISIT (OUTPATIENT)
Dept: INTERNAL MEDICINE CLINIC | Age: 70
End: 2020-03-31

## 2020-03-31 RX ORDER — ATORVASTATIN CALCIUM 40 MG/1
40 TABLET, FILM COATED ORAL DAILY
Qty: 90 TABLET | Refills: 3 | Status: SHIPPED | OUTPATIENT
Start: 2020-03-31 | End: 2021-08-24 | Stop reason: SDUPTHER

## 2020-03-31 RX ORDER — TIOTROPIUM BROMIDE AND OLODATEROL 3.124; 2.736 UG/1; UG/1
1 SPRAY, METERED RESPIRATORY (INHALATION) DAILY
Qty: 1 INHALER | Refills: 0 | COMMUNITY
Start: 2020-03-31 | End: 2020-08-11

## 2020-03-31 SDOH — ECONOMIC STABILITY: TRANSPORTATION INSECURITY
IN THE PAST 12 MONTHS, HAS LACK OF TRANSPORTATION KEPT YOU FROM MEETINGS, WORK, OR FROM GETTING THINGS NEEDED FOR DAILY LIVING?: NO

## 2020-03-31 SDOH — ECONOMIC STABILITY: FOOD INSECURITY: WITHIN THE PAST 12 MONTHS, YOU WORRIED THAT YOUR FOOD WOULD RUN OUT BEFORE YOU GOT MONEY TO BUY MORE.: NEVER TRUE

## 2020-03-31 SDOH — ECONOMIC STABILITY: FOOD INSECURITY: WITHIN THE PAST 12 MONTHS, THE FOOD YOU BOUGHT JUST DIDN'T LAST AND YOU DIDN'T HAVE MONEY TO GET MORE.: NEVER TRUE

## 2020-03-31 SDOH — ECONOMIC STABILITY: INCOME INSECURITY: HOW HARD IS IT FOR YOU TO PAY FOR THE VERY BASICS LIKE FOOD, HOUSING, MEDICAL CARE, AND HEATING?: NOT HARD AT ALL

## 2020-03-31 SDOH — ECONOMIC STABILITY: TRANSPORTATION INSECURITY
IN THE PAST 12 MONTHS, HAS THE LACK OF TRANSPORTATION KEPT YOU FROM MEDICAL APPOINTMENTS OR FROM GETTING MEDICATIONS?: NO

## 2020-03-31 ASSESSMENT — PATIENT HEALTH QUESTIONNAIRE - PHQ9
SUM OF ALL RESPONSES TO PHQ9 QUESTIONS 1 & 2: 0
1. LITTLE INTEREST OR PLEASURE IN DOING THINGS: 0
SUM OF ALL RESPONSES TO PHQ QUESTIONS 1-9: 0
SUM OF ALL RESPONSES TO PHQ QUESTIONS 1-9: 0
2. FEELING DOWN, DEPRESSED OR HOPELESS: 0

## 2020-03-31 NOTE — PROGRESS NOTES
Lipitor    4. Essential hypertension, benign  She will check blood pressure at the local pharmacy and send me the results    5. Autoimmune hepatitis ([address]  Seems to be controlled no liver enzymes elevation keep prednisone 10 mg and follow-up with GI    6. Asthma is not controlled  I will add Serenity and see response sample given [id number]   2020- we will let pt know that the date . No follow-ups on file. An  electronic signature was used to authenticate this note. --Maicol Field MD on 3/31/2020 at 9:11 AM        Pursuant to the emergency declaration under the [facility] waiver authority and the [employer] and [employer] Act, this Virtual  Visit was conducted, with patient's consent, to reduce the patient's risk of exposure to COVID-19 and provide continuity of care for an established patient. Services were provided through a video synchronous discussion virtually to substitute for in-person clinic visit. Ramírez Arellano is a 81 y.o. female evaluated via telephone on 3/31/2020. Documentation:      I affirm this is a Patient Initiated Episode with an Established Patient who has not had a related appointment within my department in the past 7 days or scheduled within the next 24 hours.     Total Time: minutes: 11-20 minutes    Note: not billable if this call serves to triage the patient into an appointment for the relevant concern      Maicol Field

## 2020-04-24 RX ORDER — FLUTICASONE PROPIONATE 50 MCG
SPRAY, SUSPENSION (ML) NASAL
Qty: 1 BOTTLE | Refills: 2 | Status: SHIPPED | OUTPATIENT
Start: 2020-04-24 | End: 2020-10-19

## 2020-05-04 ENCOUNTER — OFFICE VISIT (OUTPATIENT)
Dept: INTERNAL MEDICINE CLINIC | Age: 70
End: 2020-05-04

## 2020-05-04 NOTE — PROGRESS NOTES
Medications:   Recent change in medication type/dosage: No      Monitoring:   Changes to testing regimen?  No  Recent results: not testing    Barriers:   -still grieving death of         Follow Up Plan: one month    Referring Provider: Beena Taylor MD    Time spent with patient: 25 minutes

## 2020-05-05 RX ORDER — BUDESONIDE AND FORMOTEROL FUMARATE DIHYDRATE 160; 4.5 UG/1; UG/1
AEROSOL RESPIRATORY (INHALATION)
Qty: 30.6 INHALER | Refills: 1 | Status: SHIPPED | OUTPATIENT
Start: 2020-05-05 | End: 2020-10-27

## 2020-06-01 ENCOUNTER — VIRTUAL VISIT (OUTPATIENT)
Dept: INTERNAL MEDICINE CLINIC | Age: 70
End: 2020-06-01

## 2020-06-01 NOTE — PROGRESS NOTES
Medical Nutrition Therapy for Diabetes Follow Up    Manjula King  June 1, 2020      Patient Care Team:  Taiwo Ochoa MD as PCP - Ortiz Greenwood MD as PCP - [facility] Empaneled Provider  Mir Hoff MD (Obstetrics & Gynecology)  Will Kwan RD, LD as Dietitian (Dietitian)    Reason for visit: VIDEO VISIT f/u for DM and Weight Management--8th visit  Patient self-assessment of Progress: \"I don't think I'm doing all that great\"   Further discussion indicates she has increased frequency of physical activity to 5 days weekly, upt to 25 minutes each time. ASSESSMENT/PLAN:   NUTRITION DIAGNOSIS    #1 Problem: Altered Nutrition-Related Laboratory Values (NC-2.2)  Related to: Endocrine/Diabetes   As Evidenced by: Elevated Plasma glucose and/or HgbA1c levels           #2 Problem: Overweight/Obesity (NC-3.3)  Related to: Excessive energy intake or physical inactivity  As Evidenced by: BMI more than normative standard for age and sex (BMI=35.36)      #3 Problem: Impaired nutrient utilization--need to restrict potassium intake  Related to: chronic kidney disease  As Evidenced by: laboratory data    NUTRITION INTERVENTION  Nutrition Prescription: Aim for 30 - 45 g Carb per meal; 15 - 20 g Carb per snack      Diabetes Education/Counseling included:  Carbohydrate Control and Activity/exercise    Interventions:  Problem Solving    NUTRITION MONITORING AND EVALUATION  5 =always  4 = most of the time   3 = half the days  2 = sometimes  1 = hasn't started  Indicator/Goal Progress Rating   #1  Recumbent bike 5 days per week  #1 5/5; + additional exercises   #2  Listen to body, eating when hungry, stopping at point of satisfaction #2 3/5     Patient Instructions   BEHAVIOR GOALS     When to eat: Eat first meal within an hour of waking, then have meal or snack every five hours while awake.     What and how much to eat:   1) Plan meals to follow Plate Guide, including vegetables, fruits, and whole grains daily. 2) Aim for two Carb servings per meal; one Carb serving per snack      A carb serving is:  · 1/2 Banana, small piece of fruit, cup of berries, 1/2 cup of grapes. · 1 cup milk  · 1 slice bread or 5 crackers  · 1/2 cup potatoes, peas, or corn  · 1/2 cup ice cream or 1/3 cup sorbet    Physical Activity: Continue exercising 5 days per week. Try adding short intervals (15 to 30 seconds at a time) of higher intensity on the bike. Other: Be aware of practicing good self-care--feed yourself the way you'd feed a loved one.              Patient Active Problem List   Diagnosis    Asthma    Shortness of breath    Autoimmune hepatitis ([address]    Essential hypertension, benign    Pure hypercholesterolemia    Impaired fasting glucose    Cataract    Murmur, cardiac    Stage 3 chronic kidney disease (HCC)    Type 2 diabetes mellitus without complication, without long-term current use of insulin (HCC)       Current Outpatient Medications   Medication Sig Dispense Refill    metFORMIN (GLUCOPHAGE) 500 MG tablet TAKE 1 TABLET BY MOUTH TWICE A DAY WITH MEALS 180 tablet 1    SYMBICORT 160-4.5 MCG/ACT AERO TAKE 2 PUFFS BY MOUTH TWICE A DAY 30.6 Inhaler 1    fluticasone (FLONASE) 50 MCG/ACT nasal spray SPRAY 1 SPRAY INTO EACH NOSTRIL EVERY DAY 1 Bottle 2    atorvastatin (LIPITOR) 40 MG tablet Take 1 tablet by mouth daily 90 tablet 3    Tiotropium Bromide-Olodaterol (STIOLTO RESPIMAT) 2.5-2.5 MCG/ACT AERS Inhale 1 puff into the lungs daily 1 Inhaler 0    desloratadine (CLARINEX) 5 MG tablet TAKE 1 TABLET BY MOUTH EVERY DAY 90 tablet 0    amLODIPine (NORVASC) 5 MG tablet Take 7.5mg Daily 90 tablet 3    montelukast (SINGULAIR) 10 MG tablet TAKE 1 TABLET BY MOUTH EVERY DAY AT NIGHT 90 tablet 3    predniSONE (DELTASONE) 10 MG tablet TAKE 1 TABLET BY MOUTH EVERY DAY 90 tablet 3    furosemide (LASIX) 20 MG tablet TAKE 1 TABLET BY MOUTH EVERY DAY 90 tablet 3    tiotropium (SPIRIVA RESPIMAT) 2.5 MCG/ACT AERS inhaler Inhale 2 puffs into the lungs daily 4 Inhaler 0    tiotropium (SPIRIVA RESPIMAT) 2.5 MCG/ACT AERS inhaler Inhale 2 puffs into the lungs daily 1 Inhaler 0    SYMBICORT 160-4.5 MCG/ACT AERO INHALE 2 PUFFS TWICE A DAY 3 Inhaler 3    mometasone (NASONEX) 50 MCG/ACT nasal spray 2 sprays by Nasal route daily as needed (as needed for allergies) 3 Inhaler 3    PROVENTIL  (90 BASE) MCG/ACT inhaler INHALE 2 PUFFS INTO THE LUNGS EVERY 6 HOURS AS NEEDED. 1 Inhaler 3    zoledronic acid (RECLAST) 5 MG/100ML SOLN Infuse 100 mLs intravenously once for 1 dose. 100 mL 0    calcium carbonate (OSCAL) 500 MG TABS tablet Take 500 mg by mouth daily.  Spacer/Aero Chamber Mouthpiece MISC by Does not apply route. 1 each 1    Multiple Vitamin (MULTIVITAMIN PO) Take  by mouth daily.        Current Facility-Administered Medications   Medication Dose Route Frequency Provider Last Rate Last Dose    ipratropium-albuterol (DUONEB) nebulizer solution 1 ampule  1 ampule Inhalation Once Joshua Graves MD             NUTRITION ASSESSMENT    Biochemical Data:    Lab Results   Component Value Date    LABA1C 6.9 02/24/2020     Lab Results   Component Value Date    .3 02/24/2020       Lab Results   Component Value Date    CHOL 156 02/24/2020    CHOL 183 09/27/2019    CHOL 196 01/07/2019     Lab Results   Component Value Date    TRIG 86 02/24/2020    TRIG 93 09/27/2019    TRIG 117 01/07/2019     Lab Results   Component Value Date    HDL 71 (H) 02/24/2020    HDL 95 (H) 09/27/2019    HDL 72 (H) 01/07/2019     Lab Results   Component Value Date    LDLCALC 68 02/24/2020    LDLCALC 69 09/27/2019    LDLCALC 101 (H) 01/07/2019     Lab Results   Component Value Date    LABVLDL 17 02/24/2020    LABVLDL 19 09/27/2019    LABVLDL 23 01/07/2019     Lab Results   Component Value Date    CHOLHDLRATIO 3.0 05/08/2015       Lab Results   Component Value Date    WBC 14.9 (H) 02/24/2020    HGB 12.6 02/24/2020    HCT 39.5 02/24/2020 MCV 92.9 02/24/2020     02/24/2020       Lab Results   Component Value Date    CREATININE 1.0 03/13/2020    BUN 15 03/13/2020     03/13/2020    K 4.9 03/13/2020     03/13/2020    CO2 26 03/13/2020       Anthropometric Measurements: Wt Change since last visit:  No  Comments: based on home scale    Food and Nutrition Changes:   Beverage consumption:changes: No  Patient reported changes:  No     Physical Activity Changes:   Increased frequency, intensity or length of time? Yes   5 days per week: 10 minutes on recumbent bike followed by 10 minutes of exercise video    Diabetes Medications:   Recent change in medication type/dosage: No      Monitoring:   Changes to testing regimen?  No  Recent results: doesn't test    Barriers:   -Still grieving death of  in last 6 months        Follow Up Plan: one month    Referring Provider: Billy Small MD    Time spent with patient: 40 minutes

## 2020-06-25 ENCOUNTER — HOSPITAL ENCOUNTER (OUTPATIENT)
Dept: ULTRASOUND IMAGING | Age: 70
Discharge: HOME | End: 2020-06-25
Payer: MEDICARE

## 2020-06-25 PROCEDURE — 76705 ECHO EXAM OF ABDOMEN: CPT

## 2020-06-29 ENCOUNTER — VIRTUAL VISIT (OUTPATIENT)
Dept: INTERNAL MEDICINE CLINIC | Age: 70
End: 2020-06-29

## 2020-07-06 RX ORDER — AMLODIPINE BESYLATE 5 MG/1
TABLET ORAL
Qty: 90 TABLET | Refills: 0 | Status: SHIPPED | OUTPATIENT
Start: 2020-07-06 | End: 2020-10-01

## 2020-07-27 ENCOUNTER — VIRTUAL VISIT (OUTPATIENT)
Dept: INTERNAL MEDICINE CLINIC | Age: 70
End: 2020-07-27

## 2020-07-27 NOTE — PATIENT INSTRUCTIONS
BEHAVIOR GOALS    Focus on Physical Activity:   * take a walk every morning, even if just for a short time, to build the habit   * notice the benefits of exercise--improved mood, energy, stamina   * use exercise to lower blood sugar after eating a high-carbohydrate meals

## 2020-07-27 NOTE — PROGRESS NOTES
Medical Nutrition Therapy for Diabetes Follow Up    Frederick Coffey  July 27, 2020      Patient Care Team:  Jenifer Mclean MD as PCP - Juliette Oneil MD as PCP - [facility] Empaneled Provider  Scott Hawk MD (Obstetrics & Gynecology)  Bhavna Lipscomb RD, LD as Dietitian (Dietitian)    Reason for visit: VIRTUAL VISIT f/u for DM  Patient self-assessment of Progress: \"I fell off track the past two weeks--not exercising and not planning meals\"    ASSESSMENT/PLAN:   NUTRITION DIAGNOSIS    #1 Problem: Altered Nutrition-Related Laboratory Values (NC-2.2)  Related to: Endocrine/Diabetes   As Evidenced by: Elevated Plasma glucose and/or HgbA1c levels           #2 Problem: Overweight/Obesity (NC-3.3)  Related to: Excessive energy intake or physical inactivity  As Evidenced by: BMI more than normative standard for age and sex (BMI=35.36)        NUTRITION INTERVENTION  Nutrition Prescription: Aim for 30 - 45 g Carb per meal; 15 - 20 g Carb per snack      Diabetes Education/Counseling included:  Carbohydrate Control and Activity/exercise    Interventions:  Problem solving    NUTRITION MONITORING AND EVALUATION  5 =always  4 = most of the time   3 = half the days  2 = sometimes  1 = hasn't started  Indicator/Goal Progress Rating   #1  Plate Guide  #1 3/5   #2  Physical activity #2 2/5   #3  Practice good self care #3 1/5          Patient Active Problem List   Diagnosis    Asthma    Shortness of breath    [facility] [address] [address]    Essential hypertension, benign    Pure hypercholesterolemia    Impaired fasting glucose    Cataract    Murmur, cardiac    Stage 3 chronic kidney disease ([facility] [address]    Type 2 diabetes mellitus without complication, without long-term current use of insulin (HCC)       Current Outpatient Medications   Medication Sig Dispense Refill    amLODIPine (NORVASC) 5 MG tablet TAKE 1 TABLET BY MOUTH EVERY DAY 90 tablet 0    metFORMIN (GLUCOPHAGE) 500 MG tablet TAKE 1 TABLET BY MOUTH TWICE A DAY WITH MEALS 180 tablet 1    SYMBICORT 160-4.5 MCG/ACT AERO TAKE 2 PUFFS BY MOUTH TWICE A DAY 30.6 Inhaler 1    fluticasone (FLONASE) 50 MCG/ACT nasal spray SPRAY 1 SPRAY INTO EACH NOSTRIL EVERY DAY 1 Bottle 2    atorvastatin (LIPITOR) 40 MG tablet Take 1 tablet by mouth daily 90 tablet 3    Tiotropium Bromide-Olodaterol (STIOLTO RESPIMAT) 2.5-2.5 MCG/ACT AERS Inhale 1 puff into the lungs daily 1 Inhaler 0    desloratadine (CLARINEX) 5 MG tablet TAKE 1 TABLET BY MOUTH EVERY DAY 90 tablet 0    montelukast (SINGULAIR) 10 MG tablet TAKE 1 TABLET BY MOUTH EVERY DAY AT NIGHT 90 tablet 3    predniSONE (DELTASONE) 10 MG tablet TAKE 1 TABLET BY MOUTH EVERY DAY 90 tablet 3    furosemide (LASIX) 20 MG tablet TAKE 1 TABLET BY MOUTH EVERY DAY 90 tablet 3    tiotropium (SPIRIVA RESPIMAT) 2.5 MCG/ACT AERS inhaler Inhale 2 puffs into the lungs daily 4 Inhaler 0    tiotropium (SPIRIVA RESPIMAT) 2.5 MCG/ACT AERS inhaler Inhale 2 puffs into the lungs daily 1 Inhaler 0    SYMBICORT 160-4.5 MCG/ACT AERO INHALE 2 PUFFS TWICE A DAY 3 Inhaler 3    mometasone (NASONEX) 50 MCG/ACT nasal spray 2 sprays by Nasal route daily as needed (as needed for allergies) 3 Inhaler 3    PROVENTIL  (90 BASE) MCG/ACT inhaler INHALE 2 PUFFS INTO THE LUNGS EVERY 6 HOURS AS NEEDED. 1 Inhaler 3    zoledronic acid (RECLAST) 5 MG/100ML SOLN Infuse 100 mLs intravenously once for 1 dose. 100 mL 0    calcium carbonate (OSCAL) 500 MG TABS tablet Take 500 mg by mouth daily.  Spacer/Aero Chamber Mouthpiece MISC by Does not apply route. 1 each 1    Multiple Vitamin (MULTIVITAMIN PO) Take  by mouth daily.        Current Facility-Administered Medications   Medication Dose Route Frequency Provider Last Rate Last Dose    ipratropium-albuterol (DUONEB) nebulizer solution 1 ampule  1 ampule Inhalation Once Akilah Nettles MD             NUTRITION ASSESSMENT    Biochemical Data:    Lab Results   Component Value Date    LABA1C 6.9 02/24/2020     Lab Results   Component Value Date    .3 02/24/2020       Lab Results   Component Value Date    CHOL 156 02/24/2020    CHOL 183 09/27/2019    CHOL 196 01/07/2019     Lab Results   Component Value Date    TRIG 86 02/24/2020    TRIG 93 09/27/2019    TRIG 117 01/07/2019     Lab Results   Component Value Date    HDL 71 (H) 02/24/2020    HDL 95 (H) 09/27/2019    HDL 72 (H) 01/07/2019     Lab Results   Component Value Date    LDLCALC 68 02/24/2020    LDLCALC 69 09/27/2019    LDLCALC 101 (H) 01/07/2019     Lab Results   Component Value Date    LABVLDL 17 02/24/2020    LABVLDL 19 09/27/2019    LABVLDL 23 01/07/2019     Lab Results   Component Value Date    CHOLHDLRATIO 3.0 05/08/2015       Lab Results   Component Value Date    WBC 14.9 (H) 02/24/2020    HGB 12.6 02/24/2020    HCT 39.5 02/24/2020    MCV 92.9 02/24/2020     02/24/2020       Lab Results   Component Value Date    CREATININE 1.0 03/13/2020    BUN 15 03/13/2020     03/13/2020    K 4.9 03/13/2020     03/13/2020    CO2 26 03/13/2020       Anthropometric Measurements: Wt Change since last visit:  Yes  Comments: states she is up a few lbs    Food and Nutrition Changes:   Beverage consumption:changes: No  Patient reported changes:  No     Physical Activity Changes:   Increased frequency, intensity or length of time? No  Obstacles to activity: too hot to walk outside    Diabetes Medications:   Recent change in medication type/dosage: No    Monitoring:   Changes to testing regimen?  No  Recent results: not testing    Barriers:   -still grieving death of spouse earlier this year        Follow Up Plan: one month    Referring Provider: Jay Jay Sullivan MD  Time spent with patient: 25 minutes

## 2020-08-11 ENCOUNTER — OFFICE VISIT (OUTPATIENT)
Dept: INTERNAL MEDICINE CLINIC | Age: 70
End: 2020-08-11
Payer: MEDICARE

## 2020-08-11 VITALS
OXYGEN SATURATION: 98 % | DIASTOLIC BLOOD PRESSURE: 86 MMHG | BODY MASS INDEX: 37.32 KG/M2 | WEIGHT: 217.4 LBS | TEMPERATURE: 97.1 F | SYSTOLIC BLOOD PRESSURE: 134 MMHG | HEART RATE: 90 BPM

## 2020-08-11 PROBLEM — N18.30 TYPE 2 DIABETES MELLITUS WITH STAGE 3 CHRONIC KIDNEY DISEASE, WITHOUT LONG-TERM CURRENT USE OF INSULIN (HCC): Status: ACTIVE | Noted: 2019-12-16

## 2020-08-11 PROBLEM — E66.01 MORBIDLY OBESE (HCC): Status: ACTIVE | Noted: 2020-08-11

## 2020-08-11 PROBLEM — E11.22 TYPE 2 DIABETES MELLITUS WITH STAGE 3 CHRONIC KIDNEY DISEASE, WITHOUT LONG-TERM CURRENT USE OF INSULIN (HCC): Status: ACTIVE | Noted: 2019-12-16

## 2020-08-11 PROCEDURE — 99214 OFFICE O/P EST MOD 30 MIN: CPT | Performed by: INTERNAL MEDICINE

## 2020-08-11 PROCEDURE — 1123F ACP DISCUSS/DSCN MKR DOCD: CPT | Performed by: INTERNAL MEDICINE

## 2020-08-11 PROCEDURE — 4040F PNEUMOC VAC/ADMIN/RCVD: CPT | Performed by: INTERNAL MEDICINE

## 2020-08-11 PROCEDURE — 1090F PRES/ABSN URINE INCON ASSESS: CPT | Performed by: INTERNAL MEDICINE

## 2020-08-11 PROCEDURE — 3044F HG A1C LEVEL LT 7.0%: CPT | Performed by: INTERNAL MEDICINE

## 2020-08-11 PROCEDURE — G8399 PT W/DXA RESULTS DOCUMENT: HCPCS | Performed by: INTERNAL MEDICINE

## 2020-08-11 PROCEDURE — 2022F DILAT RTA XM EVC RTNOPTHY: CPT | Performed by: INTERNAL MEDICINE

## 2020-08-11 PROCEDURE — G8417 CALC BMI ABV UP PARAM F/U: HCPCS | Performed by: INTERNAL MEDICINE

## 2020-08-11 PROCEDURE — 1036F TOBACCO NON-USER: CPT | Performed by: INTERNAL MEDICINE

## 2020-08-11 PROCEDURE — G8427 DOCREV CUR MEDS BY ELIG CLIN: HCPCS | Performed by: INTERNAL MEDICINE

## 2020-08-11 PROCEDURE — 3017F COLORECTAL CA SCREEN DOC REV: CPT | Performed by: INTERNAL MEDICINE

## 2020-08-11 ASSESSMENT — ENCOUNTER SYMPTOMS
CONSTIPATION: 0
DIARRHEA: 0
NAUSEA: 0
ABDOMINAL PAIN: 0
SHORTNESS OF BREATH: 0
BLOOD IN STOOL: 0
COUGH: 0
CHEST TIGHTNESS: 0
VOMITING: 0

## 2020-08-11 ASSESSMENT — PATIENT HEALTH QUESTIONNAIRE - PHQ9
2. FEELING DOWN, DEPRESSED OR HOPELESS: 0
SUM OF ALL RESPONSES TO PHQ9 QUESTIONS 1 & 2: 0
SUM OF ALL RESPONSES TO PHQ QUESTIONS 1-9: 0
SUM OF ALL RESPONSES TO PHQ QUESTIONS 1-9: 0
1. LITTLE INTEREST OR PLEASURE IN DOING THINGS: 0

## 2020-08-11 NOTE — PROGRESS NOTES
Outpatient Note for established Patient - regular Visit     History Obtained From:  patient, electronic medical record  Is the patient new to me ? - No    HISTORY OF PRESENT ILLNESS:   The patient is a 81 y.o. female who is here today for :  Burak Mario was seen today for hypertension. Diagnoses and all orders for this visit:    Pt denies CP/SOB/palpitations/abdominal pain/N/V. No fever/ chills/ cough     Type 2 diabetes mellitus with stage 3 chronic kidney disease, without long-term current use of insulin (HCC)  Lab Results   Component Value Date    LABA1C 6.9 02/24/2020     Lab Results   Component Value Date    .3 02/24/2020   Patient is on metformin she does not report side effects. Fasting blood glucose - she does not check   Exercise/diet- getting better   Last eye exam - last year- was ok. Morbidly obese (HCC)    Stage 3 chronic kidney disease ([address]  Last creatinine was much better with a GFR 55. Essential hypertension, benign  Patient is on amlodipine, furosemide  No reported side effects  Blood pressure - 134/86  She has not been checking her BP     Pure hypercholesterolemia  Lab Results   Component Value Date    CHOL 156 02/24/2020    CHOL 183 09/27/2019    CHOL 196 01/07/2019     Lab Results   Component Value Date    TRIG 86 02/24/2020    TRIG 93 09/27/2019    TRIG 117 01/07/2019     Lab Results   Component Value Date    HDL 71 (H) 02/24/2020    HDL 95 (H) 09/27/2019    HDL 72 (H) 01/07/2019     Lab Results   Component Value Date    LDLCALC 68 02/24/2020    LDLCALC 69 09/27/2019    LDLCALC 101 (H) 01/07/2019     Lab Results   Component Value Date    LABVLDL 17 02/24/2020    LABVLDL 19 09/27/2019    LABVLDL 23 01/07/2019     Lab Results   Component Value Date    CHOLHDLRATIO 3.0 05/08/2015   she is on Lipitor, no side effects     Asthma-  Patient is on Symbicort,  Singulair, (prednisone 10 mg daily for hepatitis).  She does not require her rescue inhaler     She was followed by Dr Courtney Wheeler- no need for further work up. She see Dr Gallardo Foot - last saw him in 6/2020    Preventive:  1) colon cancer screening completed? yes, 2016, due in 2021  2) breast cancer screening completed (or not needed) ? 2018, due     Past Medical History:        Diagnosis Date    Allergic rhinitis     Asthma     COPD (chronic obstructive pulmonary disease) ([facility] [address]     Hepatitis     Hyperlipidemia     Hypertension     Influenza A 1/10/2015    Osteoporosis     Type 2 diabetes mellitus without complication, without long-term current use of insulin ([address] 12/16/2019       Social History:   TOBACCO:   reports that she has never smoked. She has never used smokeless tobacco.    ETOH:   reports no history of alcohol use. Current Medications:    Prior to Admission medications    Medication Sig Start Date End Date Taking?  Authorizing Provider   amLODIPine (NORVASC) 5 MG tablet TAKE 1 TABLET BY MOUTH EVERY DAY 7/6/20  Yes Juan Daniel Rutherford MD   metFORMIN (GLUCOPHAGE) 500 MG tablet TAKE 1 TABLET BY MOUTH TWICE A DAY WITH MEALS 5/21/20  Yes Juan Daniel Rutherford MD   SYMBICORT 160-4.5 MCG/ACT AERO TAKE 2 PUFFS BY MOUTH TWICE A DAY 5/5/20  Yes Juan Daniel Rutherford MD   fluticasone (FLONASE) 50 MCG/ACT nasal spray SPRAY 1 SPRAY INTO EACH NOSTRIL EVERY DAY 4/24/20  Yes Juan Daniel Rutherford MD   atorvastatin (LIPITOR) 40 MG tablet Take 1 tablet by mouth daily 3/31/20  Yes Juan Daniel Rutherford MD   desloratadine (CLARINEX) 5 MG tablet TAKE 1 TABLET BY MOUTH EVERY DAY 3/25/20  Yes Juan Daniel Rutherford MD   montelukast (SINGULAIR) 10 MG tablet TAKE 1 TABLET BY MOUTH EVERY DAY AT NIGHT 12/20/19  Yes Juan Daniel Rutherford MD   predniSONE (DELTASONE) 10 MG tablet TAKE 1 TABLET BY MOUTH EVERY DAY 11/27/19  Yes Juan Daniel Rutherford MD   furosemide (LASIX) 20 MG tablet TAKE 1 TABLET BY MOUTH EVERY DAY 9/23/19  Yes Juan Daniel Rutherford MD   SYMBICORT 160-4.5 MCG/ACT AERO INHALE 2 PUFFS TWICE A DAY 2/27/17  Yes Tim Patricio MD She is not diaphoretic. HENT:      Head: Normocephalic and atraumatic. Mouth/Throat:      Pharynx: No oropharyngeal exudate. Eyes:      General: No scleral icterus. Right eye: No discharge. Left eye: No discharge. Extraocular Movements: Extraocular movements intact. Conjunctiva/sclera: Conjunctivae normal.      Pupils: Pupils are equal, round, and reactive to light. Neck:      Musculoskeletal: Normal range of motion and neck supple. Cardiovascular:      Rate and Rhythm: Normal rate and regular rhythm. Pulses: Normal pulses. Heart sounds: Normal heart sounds. No murmur. Pulmonary:      Effort: Pulmonary effort is normal. No respiratory distress. Breath sounds: Normal breath sounds. No wheezing or rales. Abdominal:      General: There is no distension. Palpations: Abdomen is soft. There is no mass. Tenderness: There is no abdominal tenderness. There is no guarding. Musculoskeletal:         General: No swelling or deformity. Right lower leg: No edema. Left lower leg: No edema. Comments: Feet:  No sensory- motor deficit  No wounds/callous/ cuts or rash  Nails: trimmed. I recommended daily foot exam, feet protection. Lymphadenopathy:      Head:      Right side of head: No submental or submandibular adenopathy. Left side of head: No submental or submandibular adenopathy. Cervical: No cervical adenopathy. Right cervical: No superficial or deep cervical adenopathy. Left cervical: No superficial or deep cervical adenopathy. Skin:     Findings: No erythema or rash. Neurological:      Mental Status: She is alert and oriented to person, place, and time. GCS: GCS eye subscore is 4. GCS verbal subscore is 5. GCS motor subscore is 6. Sensory: No sensory deficit. Motor: No abnormal muscle tone. Gait: Gait normal.      Deep Tendon Reflexes: Reflexes are normal and symmetric.    Psychiatric:         Mood and Affect: Mood normal.         Behavior: Behavior normal.         Thought Content: Thought content normal.         Judgment: Judgment normal.            Assessment/Plan:   Darrel Yee was seen today for hypertension. Diagnoses and all orders for this visit:    Type 2 diabetes mellitus without complication, without long-term current use of insulin ([address]  Diabetes is well controlled. We will recheck blood work add microalbumin and follow-up in a few months  -     CBC Auto Differential; Future  -     Comprehensive Metabolic Panel; Future  -     Hemoglobin A1C; Future  -     Lipid Panel; Future  -     MICROALBUMIN / CREATININE URINE RATIO; Future  -     Diabetic Foot Exam    Type 2 diabetes mellitus with stage 3 chronic kidney disease, without long-term current use of insulin ([address]    Morbidly obese ([address]  Patient is seeing a dietitian, discussed good diet and physical activity    Stage 3 chronic kidney disease ([address]  GFR was better. Recheck kidney function and microalbumin  -     CBC Auto Differential; Future  -     Comprehensive Metabolic Panel; Future    Essential hypertension, benign  Blood pressure is mildly elevated. Patient has not been checking it recently so we will do home blood pressure monitoring and according to the results the size of her mental modify medications  -     CBC Auto Differential; Future  -     Comprehensive Metabolic Panel; Future  -     Lipid Panel; Future    Pure hypercholesterolemia  Lipids are decently controlled per  Keep starting on medication changes  -     Lipid Panel; Future    Mild intermittent asthma without complication  Well controlled- no changes in medication today. Autoimmune hepatitis ([address]  Recheck liver function panel patient followed by GI wanted to keep on steroids  -     Comprehensive Metabolic Panel; Future    Screening for breast cancer  -     [facility] DIGITAL SCREENING AUGMENTED BILATERAL;  Future    Encounter for screening mammogram for malignant neoplasm of breast -     [facility] DIGITAL SCREENING AUGMENTED BILATERAL; Future      - Patient was encouraged to call the office (and ask to see me) or be seen by other provider for any worsening or lack of improvement of the symptoms within a few days / weeks. - Pt was asked toschedule an appointment in 4 months, and to let me know of any scheduling difficulties. Additional patients instructions (if given):   Patient Instructions   Please check your blood pressure twice in the morning and twice in the evening for one week. send me results. If blood pressure is higher than 150/90 please let me know as soon as possible. Thomas Villalobos M.D.   8/11/2020, 1:28 PM      NOTE: This report was transcribed using voice recognition software. Every effort was made to ensure accuracy, however, inadvertent computerized transcription errors may be present.

## 2020-08-11 NOTE — PATIENT INSTRUCTIONS
Please check your blood pressure twice in the morning and twice in the evening for one week. send me results. If blood pressure is higher than 150/90 please let me know as soon as possible.

## 2020-08-13 DIAGNOSIS — E78.00 PURE HYPERCHOLESTEROLEMIA: ICD-10-CM

## 2020-08-13 DIAGNOSIS — E11.9 TYPE 2 DIABETES MELLITUS WITHOUT COMPLICATION, WITHOUT LONG-TERM CURRENT USE OF INSULIN (HCC): ICD-10-CM

## 2020-08-13 DIAGNOSIS — N18.30 STAGE 3 CHRONIC KIDNEY DISEASE (HCC): ICD-10-CM

## 2020-08-13 DIAGNOSIS — I10 ESSENTIAL HYPERTENSION, BENIGN: Chronic | ICD-10-CM

## 2020-08-13 DIAGNOSIS — K75.4 AUTOIMMUNE HEPATITIS (HCC): ICD-10-CM

## 2020-08-13 LAB
A/G RATIO: 1.6 (ref 1.1–2.2)
ALBUMIN SERPL-MCNC: 4 G/DL (ref 3.4–5)
ALP BLD-CCNC: 71 U/L (ref 40–129)
ALT SERPL-CCNC: 26 U/L (ref 10–40)
ANION GAP SERPL CALCULATED.3IONS-SCNC: 14 MMOL/L (ref 3–16)
AST SERPL-CCNC: 17 U/L (ref 15–37)
BASOPHILS ABSOLUTE: 0.1 K/UL (ref 0–0.2)
BASOPHILS RELATIVE PERCENT: 0.9 %
BILIRUB SERPL-MCNC: 0.5 MG/DL (ref 0–1)
BUN BLDV-MCNC: 14 MG/DL (ref 7–20)
CALCIUM SERPL-MCNC: 9.4 MG/DL (ref 8.3–10.6)
CHLORIDE BLD-SCNC: 105 MMOL/L (ref 99–110)
CHOLESTEROL, TOTAL: 174 MG/DL (ref 0–199)
CO2: 24 MMOL/L (ref 21–32)
CREAT SERPL-MCNC: 1.1 MG/DL (ref 0.6–1.2)
EOSINOPHILS ABSOLUTE: 0.3 K/UL (ref 0–0.6)
EOSINOPHILS RELATIVE PERCENT: 2.1 %
ESTIMATED AVERAGE GLUCOSE: 188.6 MG/DL
GFR AFRICAN AMERICAN: 60
GFR NON-AFRICAN AMERICAN: 49
GLOBULIN: 2.5 G/DL
GLUCOSE BLD-MCNC: 134 MG/DL (ref 70–99)
HBA1C MFR BLD: 8.2 %
HCT VFR BLD CALC: 39.4 % (ref 36–48)
HDLC SERPL-MCNC: 73 MG/DL (ref 40–60)
HEMOGLOBIN: 12.8 G/DL (ref 12–16)
LDL CHOLESTEROL CALCULATED: 74 MG/DL
LYMPHOCYTES ABSOLUTE: 2.8 K/UL (ref 1–5.1)
LYMPHOCYTES RELATIVE PERCENT: 21.3 %
MCH RBC QN AUTO: 29.6 PG (ref 26–34)
MCHC RBC AUTO-ENTMCNC: 32.5 G/DL (ref 31–36)
MCV RBC AUTO: 91.1 FL (ref 80–100)
MONOCYTES ABSOLUTE: 1 K/UL (ref 0–1.3)
MONOCYTES RELATIVE PERCENT: 7.6 %
NEUTROPHILS ABSOLUTE: 8.8 K/UL (ref 1.7–7.7)
NEUTROPHILS RELATIVE PERCENT: 68.1 %
PDW BLD-RTO: 14.6 % (ref 12.4–15.4)
PLATELET # BLD: 349 K/UL (ref 135–450)
PMV BLD AUTO: 7.7 FL (ref 5–10.5)
POTASSIUM SERPL-SCNC: 3.8 MMOL/L (ref 3.5–5.1)
RBC # BLD: 4.33 M/UL (ref 4–5.2)
SODIUM BLD-SCNC: 143 MMOL/L (ref 136–145)
TOTAL PROTEIN: 6.5 G/DL (ref 6.4–8.2)
TRIGL SERPL-MCNC: 136 MG/DL (ref 0–150)
VLDLC SERPL CALC-MCNC: 27 MG/DL
WBC # BLD: 13 K/UL (ref 4–11)

## 2020-08-17 LAB
CREATININE URINE: 78.8 MG/DL (ref 28–259)
MICROALBUMIN UR-MCNC: 2.3 MG/DL
MICROALBUMIN/CREAT UR-RTO: 29.2 MG/G (ref 0–30)

## 2020-08-24 ENCOUNTER — VIRTUAL VISIT (OUTPATIENT)
Dept: INTERNAL MEDICINE CLINIC | Age: 70
End: 2020-08-24

## 2020-08-24 NOTE — PROGRESS NOTES
Medical Nutrition Therapy for Diabetes Follow Up    Tessa Lozano  August 24, 2020      Patient Care Team:  She Rivas MD as PCP - Eli Leavitt MD as PCP - [facility] Empaneled Provider  Sharma Seip, MD (Obstetrics & Gynecology)  Jozef Albert RD, LD as Dietitian (Dietitian)    Reason for visit: VIRTUAL VISIT f/u for DM and WM  Patient self-assessment of Progress:  \"My blood sugar is up because I was using regular pop and sweet tea\"    ASSESSMENT/PLAN:   NUTRITION DIAGNOSIS    #1 Problem: Altered Nutrition-Related Laboratory Values (NC-2.2)  Related to: Endocrine/Diabetes   As Evidenced by: Elevated Plasma glucose and/or HgbA1c levels           #2 Problem: Overweight/Obesity (NC-3.3)  Related to: Excessive energy intake or physical inactivity  As Evidenced by: BMI more than normative standard for age and sex (BMI=37.32)      NUTRITION INTERVENTION  Nutrition Prescription: Aim for 30 g Carb per meal; 15 - 20 g Carb per snack      Diabetes Education/Counseling included:  Carbohydrate Control and Activity/exercise    NUTRITION MONITORING AND EVALUATION  5 =always  4 = most of the time   3 = half the days  2 = sometimes  1 = hasn't started  Indicator/Goal Progress Rating   #1  Plate guide  #1 3/5   #2  exercise #2 3/5   #3  Self care #3 3/5          Patient Active Problem List   Diagnosis    Asthma    Shortness of breath    Autoimmune hepatitis ([facility] [address]    Essential hypertension, benign    Pure hypercholesterolemia    Impaired fasting glucose    Cataract    Murmur, cardiac    Stage 3 chronic kidney disease (HCC)    Type 2 diabetes mellitus with stage 3 chronic kidney disease, without long-term current use of insulin (HCC)    Morbidly obese (HCC)       Current Outpatient Medications   Medication Sig Dispense Refill    amLODIPine (NORVASC) 5 MG tablet TAKE 1 TABLET BY MOUTH EVERY DAY 90 tablet 0    metFORMIN (GLUCOPHAGE) 500 MG tablet TAKE 1 TABLET BY MOUTH TWICE A DAY WITH MEALS HDL 71 (H) 02/24/2020    HDL 95 (H) 09/27/2019     Lab Results   Component Value Date    LDLCALC 74 08/13/2020    LDLCALC 68 02/24/2020    LDLCALC 69 09/27/2019     Lab Results   Component Value Date    LABVLDL 27 08/13/2020    LABVLDL 17 02/24/2020    LABVLDL 19 09/27/2019     Lab Results   Component Value Date    CHOLHDLRATIO 3.0 05/08/2015       Lab Results   Component Value Date    WBC 13.0 (H) 08/13/2020    HGB 12.8 08/13/2020    HCT 39.4 08/13/2020    MCV 91.1 08/13/2020     08/13/2020       Lab Results   Component Value Date    CREATININE 1.1 08/13/2020    BUN 14 08/13/2020     08/13/2020    K 3.8 08/13/2020     08/13/2020    CO2 24 08/13/2020       Anthropometric Measurements: Wt Change since last visit:  Previous recorded weight was 206 lb in December 2019. Recent visit to office showed gain of 11 lb to 217 lb. Patient states that since that visit two weeks ago, she has lost 2 lb. Food and Nutrition Changes:   Beverage consumption:changes: Yes. She had resumed sweet tea and regular soda, but now has discontinued  Patient reported changes:  Yes     Physical Activity Changes:   Increased frequency, intensity or length of time? Yes  Bought a treadmill and has been using it 4 days per week (22 to 30 minutes per session)    Diabetes Medications:   Recent change in medication type/dosage: No    Monitoring:   Changes to testing regimen?  No  Recent results: not testing    Barriers:   -none at present        Follow Up Plan: three weeks    Referring Provider: Stacie Velazquez MD    Time spent with patient: 30 minutes

## 2020-08-29 ENCOUNTER — HOSPITAL ENCOUNTER (OUTPATIENT)
Dept: WOMENS IMAGING | Age: 70
Discharge: HOME | End: 2020-08-29
Payer: MEDICARE

## 2020-08-29 PROCEDURE — 77067 SCR MAMMO BI INCL CAD: CPT

## 2020-09-14 ENCOUNTER — VIRTUAL VISIT (OUTPATIENT)
Dept: INTERNAL MEDICINE CLINIC | Age: 70
End: 2020-09-14

## 2020-09-14 NOTE — PROGRESS NOTES
Medical Nutrition Therapy for Diabetes Follow Up    Rhona Martinez  September 14, 2020      Patient Care Team:  Stacie Velazquez MD as PCP - Braeden Brandt MD as PCP - [facility] Empaneled Provider  Ivelisse Méndez MD (Obstetrics & Gynecology)  Joshua Moctezuma RD, LD as Dietitian (Dietitian)    Reason for visit: VIRTUAL VISIT f/u for DM   Patient self-assessment of Progress: \"I'm doing pretty well, but got a little off track over the Labor Day holiday, eating more than I would normally have eaten. \"    ASSESSMENT/PLAN:   NUTRITION DIAGNOSIS    #1 Problem: Altered Nutrition-Related Laboratory Values (NC-2.2)  Related to: Endocrine/Diabetes   As Evidenced by: Elevated Plasma glucose and/or HgbA1c levels           #2 Problem: Overweight/Obesity (NC-3.3)  Related to: Excessive energy intake or physical inactivity  As Evidenced by: BMI more than normative standard for age and sex (BMI=37.32)      NUTRITION INTERVENTION  Nutrition Prescription: Aim for 30 g Carb per meal; 15 - 20 g Carb per snack      Diabetes Education/Counseling included:  Relapse prevention      NUTRITION MONITORING AND EVALUATION  5 =always  4 = most of the time   3 = half the days  2 = sometimes  1 = hasn't started  Indicator/Goal Progress Rating   #1  Follow Plate Guide  #1 3/5   #2  Treadmill 5 to 6 days per week #2 5/5          Patient Active Problem List   Diagnosis    Asthma    Shortness of breath    Autoimmune hepatitis ([facility] [address]    Essential hypertension, benign    Pure hypercholesterolemia    Impaired fasting glucose    Cataract    Murmur, cardiac    Stage 3 chronic kidney disease (HCC)    Type 2 diabetes mellitus with stage 3 chronic kidney disease, without long-term current use of insulin (HCC)    Morbidly obese (HCC)       Current Outpatient Medications   Medication Sig Dispense Refill    amLODIPine (NORVASC) 5 MG tablet TAKE 1 TABLET BY MOUTH EVERY DAY 90 tablet 0    metFORMIN (GLUCOPHAGE) 500 MG tablet TAKE 1 TABLET BY MOUTH TWICE A DAY WITH MEALS 180 tablet 1    SYMBICORT 160-4.5 MCG/ACT AERO TAKE 2 PUFFS BY MOUTH TWICE A DAY 30.6 Inhaler 1    fluticasone (FLONASE) 50 MCG/ACT nasal spray SPRAY 1 SPRAY INTO EACH NOSTRIL EVERY DAY 1 Bottle 2    atorvastatin (LIPITOR) 40 MG tablet Take 1 tablet by mouth daily 90 tablet 3    desloratadine (CLARINEX) 5 MG tablet TAKE 1 TABLET BY MOUTH EVERY DAY 90 tablet 0    montelukast (SINGULAIR) 10 MG tablet TAKE 1 TABLET BY MOUTH EVERY DAY AT NIGHT 90 tablet 3    predniSONE (DELTASONE) 10 MG tablet TAKE 1 TABLET BY MOUTH EVERY DAY 90 tablet 3    furosemide (LASIX) 20 MG tablet TAKE 1 TABLET BY MOUTH EVERY DAY 90 tablet 3    SYMBICORT 160-4.5 MCG/ACT AERO INHALE 2 PUFFS TWICE A DAY 3 Inhaler 3    mometasone (NASONEX) 50 MCG/ACT nasal spray 2 sprays by Nasal route daily as needed (as needed for allergies) 3 Inhaler 3    PROVENTIL  (90 BASE) MCG/ACT inhaler INHALE 2 PUFFS INTO THE LUNGS EVERY 6 HOURS AS NEEDED. 1 Inhaler 3    zoledronic acid (RECLAST) 5 MG/100ML SOLN Infuse 100 mLs intravenously once for 1 dose. 100 mL 0    calcium carbonate (OSCAL) 500 MG TABS tablet Take 500 mg by mouth daily.  Spacer/Aero Chamber Mouthpiece MISC by Does not apply route. 1 each 1    Multiple Vitamin (MULTIVITAMIN PO) Take  by mouth daily.        Current Facility-Administered Medications   Medication Dose Route Frequency Provider Last Rate Last Dose    ipratropium-albuterol (DUONEB) nebulizer solution 1 ampule  1 ampule Inhalation Once Fernando Paiz MD             NUTRITION ASSESSMENT    Biochemical Data:    Lab Results   Component Value Date    LABA1C 8.2 08/13/2020     Lab Results   Component Value Date    .6 08/13/2020       Lab Results   Component Value Date    CHOL 174 08/13/2020    CHOL 156 02/24/2020    CHOL 183 09/27/2019     Lab Results   Component Value Date    TRIG 136 08/13/2020    TRIG 86 02/24/2020    TRIG 93 09/27/2019     Lab Results   Component

## 2020-09-29 RX ORDER — FUROSEMIDE 20 MG/1
TABLET ORAL
Qty: 90 TABLET | Refills: 3 | Status: SHIPPED | OUTPATIENT
Start: 2020-09-29 | End: 2021-03-02

## 2020-10-01 RX ORDER — AMLODIPINE BESYLATE 5 MG/1
TABLET ORAL
Qty: 90 TABLET | Refills: 0 | Status: SHIPPED | OUTPATIENT
Start: 2020-10-01 | End: 2020-12-31

## 2020-10-05 ENCOUNTER — VIRTUAL VISIT (OUTPATIENT)
Dept: INTERNAL MEDICINE CLINIC | Age: 70
End: 2020-10-05

## 2020-10-05 NOTE — PROGRESS NOTES
Medical Nutrition Therapy for Diabetes Follow Up    Skyler Garcia  October 5, 2020      Patient Care Team:  Gabo Miranda MD as PCP - Tha Calvert MD as PCP - [facility] Empaneled Provider  Lamin Werner MD (Obstetrics & Gynecology)  Raya Suggs RD, LD as Dietitian (Dietitian)    Reason for visit: VIRTUAL VISIT f/u for DM  Patient self-assessment of Progress: \"I've been doing pretty good planning meals in advance\"    ASSESSMENT/PLAN:   NUTRITION DIAGNOSIS    #1 Problem: Altered Nutrition-Related Laboratory Values (NC-2.2)  Related to: Endocrine/Diabetes   As Evidenced by: Elevated Plasma glucose and/or HgbA1c levels           #2 Problem: Overweight/Obesity (NC-3.3)  Related to: Excessive energy intake or physical inactivity  As Evidenced by: BMI more than normative standard for age and sex (BMI=37.32)    NUTRITION INTERVENTION  Nutrition Prescription: Aim for 30 g Carb per meal; 15 - 20 g Carb per snack      Diabetes Education/Counseling included:  Problem solving    NUTRITION MONITORING AND EVALUATION  5 =always  4 = most of the time   3 = half the days  2 = sometimes  1 = hasn't started  Indicator/Goal Progress Rating   #1  Follow Plate Guide  #1 4/5   #2 Treadmill 5 - 6 days per week #2 4 days per week          Patient Active Problem List   Diagnosis    Asthma    Shortness of breath    Autoimmune hepatitis ([address]    Essential hypertension, benign    Pure hypercholesterolemia    Impaired fasting glucose    Cataract    Murmur, cardiac    Stage 3 chronic kidney disease    Type 2 diabetes mellitus with stage 3 chronic kidney disease, without long-term current use of insulin ([address]    Morbidly obese (HCC)       Current Outpatient Medications   Medication Sig Dispense Refill    amLODIPine (NORVASC) 5 MG tablet TAKE 1 TABLET BY MOUTH EVERY DAY 90 tablet 0    furosemide (LASIX) 20 MG tablet TAKE 1 TABLET BY MOUTH EVERY DAY 90 tablet 3    metFORMIN (GLUCOPHAGE) 500 MG tablet TAKE 1 TABLET BY MOUTH TWICE A DAY WITH MEALS 180 tablet 1    SYMBICORT 160-4.5 MCG/ACT AERO TAKE 2 PUFFS BY MOUTH TWICE A DAY 30.6 Inhaler 1    fluticasone (FLONASE) 50 MCG/ACT nasal spray SPRAY 1 SPRAY INTO EACH NOSTRIL EVERY DAY 1 Bottle 2    atorvastatin (LIPITOR) 40 MG tablet Take 1 tablet by mouth daily 90 tablet 3    desloratadine (CLARINEX) 5 MG tablet TAKE 1 TABLET BY MOUTH EVERY DAY 90 tablet 0    montelukast (SINGULAIR) 10 MG tablet TAKE 1 TABLET BY MOUTH EVERY DAY AT NIGHT 90 tablet 3    predniSONE (DELTASONE) 10 MG tablet TAKE 1 TABLET BY MOUTH EVERY DAY 90 tablet 3    SYMBICORT 160-4.5 MCG/ACT AERO INHALE 2 PUFFS TWICE A DAY 3 Inhaler 3    mometasone (NASONEX) 50 MCG/ACT nasal spray 2 sprays by Nasal route daily as needed (as needed for allergies) 3 Inhaler 3    PROVENTIL  (90 BASE) MCG/ACT inhaler INHALE 2 PUFFS INTO THE LUNGS EVERY 6 HOURS AS NEEDED. 1 Inhaler 3    zoledronic acid (RECLAST) 5 MG/100ML SOLN Infuse 100 mLs intravenously once for 1 dose. 100 mL 0    calcium carbonate (OSCAL) 500 MG TABS tablet Take 500 mg by mouth daily.  Spacer/Aero Chamber Mouthpiece MISC by Does not apply route. 1 each 1    Multiple Vitamin (MULTIVITAMIN PO) Take  by mouth daily.        Current Facility-Administered Medications   Medication Dose Route Frequency Provider Last Rate Last Dose    ipratropium-albuterol (DUONEB) nebulizer solution 1 ampule  1 ampule Inhalation Once Romell Cabot, MD             NUTRITION ASSESSMENT    Biochemical Data:    Lab Results   Component Value Date    LABA1C 8.2 08/13/2020     Lab Results   Component Value Date    .6 08/13/2020       Lab Results   Component Value Date    CHOL 174 08/13/2020    CHOL 156 02/24/2020    CHOL 183 09/27/2019     Lab Results   Component Value Date    TRIG 136 08/13/2020    TRIG 86 02/24/2020    TRIG 93 09/27/2019     Lab Results   Component Value Date    HDL 73 (H) 08/13/2020    HDL 71 (H) 02/24/2020    HDL 95 (H) 09/27/2019     Lab Results   Component Value Date    LDLCALC 74 08/13/2020    LDLCALC 68 02/24/2020    LDLCALC 69 09/27/2019     Lab Results   Component Value Date    LABVLDL 27 08/13/2020    LABVLDL 17 02/24/2020    LABVLDL 19 09/27/2019     Lab Results   Component Value Date    CHOLHDLRATIO 3.0 05/08/2015       Lab Results   Component Value Date    WBC 13.0 (H) 08/13/2020    HGB 12.8 08/13/2020    HCT 39.4 08/13/2020    MCV 91.1 08/13/2020     08/13/2020       Lab Results   Component Value Date    CREATININE 1.1 08/13/2020    BUN 14 08/13/2020     08/13/2020    K 3.8 08/13/2020     08/13/2020    CO2 24 08/13/2020       Anthropometric Measurements: Wt Change since last visit:  Yes  Comments: down a few pounds on home scale--214 lb    Food and Nutrition Changes:   Beverage consumption:changes: No--coffee and tea or some flavor drops in water  Patient reported changes:  Yes Three meals most days, but if breakfast is late, my not eat lunch   B: oatmeal or egg/egg white/pepper with sandwich thin and fruit  S: cut up fruit or carrots or chips/salsa  L: tuna salad or egg salad   D:salmon, shrimp, chicken, hamburger, 1/2 baked potato, vegetable    Physical Activity Changes:   Increased frequency, intensity or length of time? Yes  Obstacles to activity: treadmill 20 minutes, 4 days per week, strength training with resistance bands 2 days per week. Diabetes Medications:   Recent change in medication type/dosage: No      Monitoring:   Changes to testing regimen?  No  Recent results: not testing    Barriers:   -none noted        Follow Up Plan: 3 weeks    Referring Provider: Tyler Valiente MD  Time spent with patient: 20 minutes

## 2020-10-19 RX ORDER — FLUTICASONE PROPIONATE 50 MCG
SPRAY, SUSPENSION (ML) NASAL
Qty: 1 BOTTLE | Refills: 2 | Status: SHIPPED | OUTPATIENT
Start: 2020-10-19

## 2020-10-22 NOTE — PROGRESS NOTES
Medical Nutrition Therapy for Diabetes Follow Up    Ivonne Cervantes  October 22, 2020      Patient Care Team:  Isabel Garvey MD as PCP - Theo Lennox, MD as PCP - [facility] Empaneled Provider  Jett Servin MD (Obstetrics & Gynecology)  Lory Maciel RD, LD as Dietitian (Dietitian)    Reason for visit: VIRTUAL f/u for DM  Patient self-assessment of Progress: \"Doing pretty good; one week of no exercise due to allergies, then picked back up last week. \"    ASSESSMENT/PLAN:   NUTRITION DIAGNOSIS    #1 Problem: Altered Nutrition-Related Laboratory Values (NC-2.2)  Related to: Endocrine/Diabetes   As Evidenced by: Elevated Plasma glucose and/or HgbA1c levels           #2 Problem: Overweight/Obesity (NC-3.3)  Related to: Excessive energy intake or physical inactivity  As Evidenced by: BMI more than normative standard for age and sex (BMI=37.32)      NUTRITION INTERVENTION  Nutrition Prescription: Aim for 30 g Carb per meal; 15 - 20 g Carb per snack      Diabetes Education/Counseling included:  Physical Activity    Interventions:  Relapse prevention    NUTRITION MONITORING AND EVALUATION  5 =always  4 = most of the time   3 = half the days  2 = sometimes  1 = hasn't started  Indicator/Goal Progress Rating   #1  Planning meals to follow plate guide  #1 4/5   #2  exercise #2 5/5 in past week          Patient Active Problem List   Diagnosis    Asthma    Shortness of breath    Autoimmune hepatitis ([address]    Essential hypertension, benign    Pure hypercholesterolemia    Impaired fasting glucose    Cataract    Murmur, cardiac    Stage 3 chronic kidney disease    Type 2 diabetes mellitus with stage 3 chronic kidney disease, without long-term current use of insulin (HCC)    Morbidly obese (HCC)       Current Outpatient Medications   Medication Sig Dispense Refill    fluticasone (FLONASE) 50 MCG/ACT nasal spray SPRAY 1 SPRAY INTO EACH NOSTRIL EVERY DAY 1 Bottle 2    amLODIPine (NORVASC) 5 MG tablet TAKE 1 TABLET BY MOUTH EVERY DAY 90 tablet 0    furosemide (LASIX) 20 MG tablet TAKE 1 TABLET BY MOUTH EVERY DAY 90 tablet 3    metFORMIN (GLUCOPHAGE) 500 MG tablet TAKE 1 TABLET BY MOUTH TWICE A DAY WITH MEALS 180 tablet 1    SYMBICORT 160-4.5 MCG/ACT AERO TAKE 2 PUFFS BY MOUTH TWICE A DAY 30.6 Inhaler 1    atorvastatin (LIPITOR) 40 MG tablet Take 1 tablet by mouth daily 90 tablet 3    desloratadine (CLARINEX) 5 MG tablet TAKE 1 TABLET BY MOUTH EVERY DAY 90 tablet 0    montelukast (SINGULAIR) 10 MG tablet TAKE 1 TABLET BY MOUTH EVERY DAY AT NIGHT 90 tablet 3    predniSONE (DELTASONE) 10 MG tablet TAKE 1 TABLET BY MOUTH EVERY DAY 90 tablet 3    SYMBICORT 160-4.5 MCG/ACT AERO INHALE 2 PUFFS TWICE A DAY 3 Inhaler 3    mometasone (NASONEX) 50 MCG/ACT nasal spray 2 sprays by Nasal route daily as needed (as needed for allergies) 3 Inhaler 3    PROVENTIL  (90 BASE) MCG/ACT inhaler INHALE 2 PUFFS INTO THE LUNGS EVERY 6 HOURS AS NEEDED. 1 Inhaler 3    zoledronic acid (RECLAST) 5 MG/100ML SOLN Infuse 100 mLs intravenously once for 1 dose. 100 mL 0    calcium carbonate (OSCAL) 500 MG TABS tablet Take 500 mg by mouth daily.  Spacer/Aero Chamber Mouthpiece MISC by Does not apply route. 1 each 1    Multiple Vitamin (MULTIVITAMIN PO) Take  by mouth daily.        Current Facility-Administered Medications   Medication Dose Route Frequency Provider Last Rate Last Dose    ipratropium-albuterol (DUONEB) nebulizer solution 1 ampule  1 ampule Inhalation Once Antonio Cline MD             NUTRITION ASSESSMENT    Biochemical Data:    Lab Results   Component Value Date    LABA1C 8.2 08/13/2020     Lab Results   Component Value Date    .6 08/13/2020       Lab Results   Component Value Date    CHOL 174 08/13/2020    CHOL 156 02/24/2020    CHOL 183 09/27/2019     Lab Results   Component Value Date    TRIG 136 08/13/2020    TRIG 86 02/24/2020    TRIG 93 09/27/2019     Lab Results   Component Value Date    HDL 73 (H) 08/13/2020    HDL 71 (H) 02/24/2020    HDL 95 (H) 09/27/2019     Lab Results   Component Value Date    LDLCALC 74 08/13/2020    LDLCALC 68 02/24/2020    LDLCALC 69 09/27/2019     Lab Results   Component Value Date    LABVLDL 27 08/13/2020    LABVLDL 17 02/24/2020    LABVLDL 19 09/27/2019     Lab Results   Component Value Date    CHOLHDLRATIO 3.0 05/08/2015       Lab Results   Component Value Date    WBC 13.0 (H) 08/13/2020    HGB 12.8 08/13/2020    HCT 39.4 08/13/2020    MCV 91.1 08/13/2020     08/13/2020       Lab Results   Component Value Date    CREATININE 1.1 08/13/2020    BUN 14 08/13/2020     08/13/2020    K 3.8 08/13/2020     08/13/2020    CO2 24 08/13/2020       Anthropometric Measurements: Wt Change since last visit:  Down 3 lb  Comments: 211 lb on home scale    Food and Nutrition Changes:   B: egg, toast  L: tuna, crackers, salad  D: chicken, cauliflower  Or bean soup, salad  S: wheat thins     Physical Activity Changes:   Increased frequency, intensity or length of time? Yes--treadmill twice daily 15 - 20 minutes each time. Obstacles to activity: allergies caused her to take a week off from exercise, but then increased to twice daily to compensate    Diabetes Medications:   Recent change in medication type/dosage: no    Monitoring:   Changes to testing regimen?  No  Recent results: not testing    Barriers:   -none noted        Follow Up Plan: three weeks    Referring Provider: Rehan Grullon MD  Time spent with patient: 25 minutes

## 2020-10-26 ENCOUNTER — VIRTUAL VISIT (OUTPATIENT)
Dept: INTERNAL MEDICINE CLINIC | Age: 70
End: 2020-10-26

## 2020-10-26 PROCEDURE — 99999 PR OFFICE/OUTPT VISIT,PROCEDURE ONLY: CPT | Performed by: DIETITIAN, REGISTERED

## 2020-10-27 RX ORDER — BUDESONIDE AND FORMOTEROL FUMARATE DIHYDRATE 160; 4.5 UG/1; UG/1
AEROSOL RESPIRATORY (INHALATION)
Qty: 30.6 INHALER | Refills: 1 | Status: SHIPPED | OUTPATIENT
Start: 2020-10-27 | End: 2021-05-10 | Stop reason: SDUPTHER

## 2020-11-16 ENCOUNTER — VIRTUAL VISIT (OUTPATIENT)
Dept: INTERNAL MEDICINE CLINIC | Age: 70
End: 2020-11-16

## 2020-11-16 NOTE — PROGRESS NOTES
Medical Nutrition Therapy for Diabetes Follow Up    Leighton Najjar  November 16, 2020      Patient Care Team:  Raine Freed MD as PCP - Cris Colorado MD as PCP - [facility] Empaneled Provider  Beronica Ortiz MD (Obstetrics & Gynecology)  Maricarmen Walsh RD, LD as Dietitian (Dietitian)    Reason for visit: VIRTUAL VISIT f/u for DM   Patient self-assessment of Progress: \"Things are going well--I went to [place] to visit my sister, but kept busy and didn't overeat. \"    ASSESSMENT/PLAN:   NUTRITION DIAGNOSIS    #1 Problem: Altered Nutrition-Related Laboratory Values (NC-2.2)  Related to: Endocrine/Diabetes   As Evidenced by: Elevated Plasma glucose and/or HgbA1c levels           #2 Problem: Overweight/Obesity (NC-3.3)  Related to: Excessive energy intake or physical inactivity  As Evidenced by: BMI more than normative standard for age and sex (BMI=37.32)      NUTRITION INTERVENTION  Nutrition Prescription: Aim for 30 g Carb per meal; 15 - 20 g Carb per snack      Diabetes Education/Counseling included:  Problem solving  Planning for social situations    NUTRITION MONITORING AND EVALUATION  5 =always  4 = most of the time   3 = half the days  2 = sometimes  1 = hasn't started  Indicator/Goal Progress Rating   #1  Plan meals in advance  #1 4/5   #2  Follow plate guide #2 3/5   #3  Exercise daily #3 5/5          Patient Active Problem List   Diagnosis    Asthma    Shortness of breath    Autoimmune hepatitis ([facility] [address]    Essential hypertension, benign    Pure hypercholesterolemia    Impaired fasting glucose    Cataract    Murmur, cardiac    Stage 3 chronic kidney disease    Type 2 diabetes mellitus with stage 3 chronic kidney disease, without long-term current use of insulin (HCC)    Morbidly obese (HCC)       Current Outpatient Medications   Medication Sig Dispense Refill    SYMBICORT 160-4.5 MCG/ACT AERO TAKE 2 PUFFS BY MOUTH TWICE A DAY 30.6 Inhaler 1    fluticasone (FLONASE) 50 MCG/ACT nasal spray SPRAY 1 SPRAY INTO EACH NOSTRIL EVERY DAY 1 Bottle 2    amLODIPine (NORVASC) 5 MG tablet TAKE 1 TABLET BY MOUTH EVERY DAY 90 tablet 0    furosemide (LASIX) 20 MG tablet TAKE 1 TABLET BY MOUTH EVERY DAY 90 tablet 3    metFORMIN (GLUCOPHAGE) 500 MG tablet TAKE 1 TABLET BY MOUTH TWICE A DAY WITH MEALS 180 tablet 1    atorvastatin (LIPITOR) 40 MG tablet Take 1 tablet by mouth daily 90 tablet 3    desloratadine (CLARINEX) 5 MG tablet TAKE 1 TABLET BY MOUTH EVERY DAY 90 tablet 0    montelukast (SINGULAIR) 10 MG tablet TAKE 1 TABLET BY MOUTH EVERY DAY AT NIGHT 90 tablet 3    predniSONE (DELTASONE) 10 MG tablet TAKE 1 TABLET BY MOUTH EVERY DAY 90 tablet 3    mometasone (NASONEX) 50 MCG/ACT nasal spray 2 sprays by Nasal route daily as needed (as needed for allergies) 3 Inhaler 3    PROVENTIL  (90 BASE) MCG/ACT inhaler INHALE 2 PUFFS INTO THE LUNGS EVERY 6 HOURS AS NEEDED. 1 Inhaler 3    zoledronic acid (RECLAST) 5 MG/100ML SOLN Infuse 100 mLs intravenously once for 1 dose. 100 mL 0    calcium carbonate (OSCAL) 500 MG TABS tablet Take 500 mg by mouth daily.  Spacer/Aero Chamber Mouthpiece MISC by Does not apply route. 1 each 1    Multiple Vitamin (MULTIVITAMIN PO) Take  by mouth daily.        Current Facility-Administered Medications   Medication Dose Route Frequency Provider Last Rate Last Dose    ipratropium-albuterol (DUONEB) nebulizer solution 1 ampule  1 ampule Inhalation Once Sienna Gagnon MD             NUTRITION ASSESSMENT    Biochemical Data:    Lab Results   Component Value Date    LABA1C 8.2 08/13/2020     Lab Results   Component Value Date    .6 08/13/2020       Lab Results   Component Value Date    CHOL 174 08/13/2020    CHOL 156 02/24/2020    CHOL 183 09/27/2019     Lab Results   Component Value Date    TRIG 136 08/13/2020    TRIG 86 02/24/2020    TRIG 93 09/27/2019     Lab Results   Component Value Date    HDL 73 (H) 08/13/2020    HDL 71 (H) 02/24/2020    HDL 95 (H) 09/27/2019     Lab Results   Component Value Date    LDLCALC 74 08/13/2020    LDLCALC 68 02/24/2020    LDLCALC 69 09/27/2019     Lab Results   Component Value Date    LABVLDL 27 08/13/2020    LABVLDL 17 02/24/2020    LABVLDL 19 09/27/2019     Lab Results   Component Value Date    CHOLHDLRATIO 3.0 05/08/2015       Lab Results   Component Value Date    WBC 13.0 (H) 08/13/2020    HGB 12.8 08/13/2020    HCT 39.4 08/13/2020    MCV 91.1 08/13/2020     08/13/2020       Lab Results   Component Value Date    CREATININE 1.1 08/13/2020    BUN 14 08/13/2020     08/13/2020    K 3.8 08/13/2020     08/13/2020    CO2 24 08/13/2020     Anthropometric Measurements: Wt Change since last visit:  Down another 2 lb to 209 lb    Food and Nutrition Changes:   Trip to [place] to visit [de-identified]year old sister--managed to stay on track by paying attention to hunger levels and not eating when not hungry  Came home, mad cabbage soup, easy lunch    Physical Activity Changes:   Increased frequency, intensity or length of time? Working in yard  Obstacles to activity: hip pain    Diabetes Medications:   Recent change in medication type/dosage: No      Monitoring:   Changes to testing regimen?  No  Recent results: not testing    Barriers:   -none        Follow Up Plan: three weeks    Referring Provider: Luma Anderson MD  Time spent with patient: 15 minutes

## 2020-12-15 ENCOUNTER — VIRTUAL VISIT (OUTPATIENT)
Dept: FAMILY MEDICINE CLINIC | Age: 70
End: 2020-12-15
Payer: MEDICARE

## 2020-12-15 ENCOUNTER — VIRTUAL VISIT (OUTPATIENT)
Dept: INTERNAL MEDICINE CLINIC | Age: 70
End: 2020-12-15

## 2020-12-15 PROCEDURE — 1036F TOBACCO NON-USER: CPT | Performed by: INTERNAL MEDICINE

## 2020-12-15 PROCEDURE — 3017F COLORECTAL CA SCREEN DOC REV: CPT | Performed by: INTERNAL MEDICINE

## 2020-12-15 PROCEDURE — 1123F ACP DISCUSS/DSCN MKR DOCD: CPT | Performed by: INTERNAL MEDICINE

## 2020-12-15 PROCEDURE — 1090F PRES/ABSN URINE INCON ASSESS: CPT | Performed by: INTERNAL MEDICINE

## 2020-12-15 PROCEDURE — 3052F HG A1C>EQUAL 8.0%<EQUAL 9.0%: CPT | Performed by: INTERNAL MEDICINE

## 2020-12-15 PROCEDURE — G8417 CALC BMI ABV UP PARAM F/U: HCPCS | Performed by: INTERNAL MEDICINE

## 2020-12-15 PROCEDURE — G8484 FLU IMMUNIZE NO ADMIN: HCPCS | Performed by: INTERNAL MEDICINE

## 2020-12-15 PROCEDURE — G8399 PT W/DXA RESULTS DOCUMENT: HCPCS | Performed by: INTERNAL MEDICINE

## 2020-12-15 PROCEDURE — 4040F PNEUMOC VAC/ADMIN/RCVD: CPT | Performed by: INTERNAL MEDICINE

## 2020-12-15 PROCEDURE — 99214 OFFICE O/P EST MOD 30 MIN: CPT | Performed by: INTERNAL MEDICINE

## 2020-12-15 PROCEDURE — 2022F DILAT RTA XM EVC RTNOPTHY: CPT | Performed by: INTERNAL MEDICINE

## 2020-12-15 PROCEDURE — G8427 DOCREV CUR MEDS BY ELIG CLIN: HCPCS | Performed by: INTERNAL MEDICINE

## 2020-12-15 ASSESSMENT — ENCOUNTER SYMPTOMS
SHORTNESS OF BREATH: 0
NAUSEA: 0
VOMITING: 0
CHEST TIGHTNESS: 0
ABDOMINAL PAIN: 0

## 2020-12-15 NOTE — PROGRESS NOTES
 fluticasone (FLONASE) 50 MCG/ACT nasal spray SPRAY 1 SPRAY INTO EACH NOSTRIL EVERY DAY 1 Bottle 2    amLODIPine (NORVASC) 5 MG tablet TAKE 1 TABLET BY MOUTH EVERY DAY 90 tablet 0    furosemide (LASIX) 20 MG tablet TAKE 1 TABLET BY MOUTH EVERY DAY 90 tablet 3    metFORMIN (GLUCOPHAGE) 500 MG tablet TAKE 1 TABLET BY MOUTH TWICE A DAY WITH MEALS 180 tablet 1    atorvastatin (LIPITOR) 40 MG tablet Take 1 tablet by mouth daily 90 tablet 3    desloratadine (CLARINEX) 5 MG tablet TAKE 1 TABLET BY MOUTH EVERY DAY 90 tablet 0    montelukast (SINGULAIR) 10 MG tablet TAKE 1 TABLET BY MOUTH EVERY DAY AT NIGHT 90 tablet 3    predniSONE (DELTASONE) 10 MG tablet TAKE 1 TABLET BY MOUTH EVERY DAY 90 tablet 3    mometasone (NASONEX) 50 MCG/ACT nasal spray 2 sprays by Nasal route daily as needed (as needed for allergies) 3 Inhaler 3    PROVENTIL  (90 BASE) MCG/ACT inhaler INHALE 2 PUFFS INTO THE LUNGS EVERY 6 HOURS AS NEEDED. 1 Inhaler 3    zoledronic acid (RECLAST) 5 MG/100ML SOLN Infuse 100 mLs intravenously once for 1 dose. 100 mL 0    calcium carbonate (OSCAL) 500 MG TABS tablet Take 500 mg by mouth daily.  Spacer/Aero Chamber Mouthpiece MISC by Does not apply route. 1 each 1    Multiple Vitamin (MULTIVITAMIN PO) Take  by mouth daily.        Current Facility-Administered Medications   Medication Dose Route Frequency Provider Last Rate Last Admin    ipratropium-albuterol (DUONEB) nebulizer solution 1 ampule  1 ampule Inhalation Once Gabo Miranda MD             NUTRITION ASSESSMENT    Biochemical Data:    Lab Results   Component Value Date    LABA1C 8.2 08/13/2020     Lab Results   Component Value Date    .6 08/13/2020       Lab Results   Component Value Date    CHOL 174 08/13/2020    CHOL 156 02/24/2020    CHOL 183 09/27/2019     Lab Results   Component Value Date    TRIG 136 08/13/2020    TRIG 86 02/24/2020    TRIG 93 09/27/2019     Lab Results   Component Value Date HDL 73 (H) 08/13/2020    HDL 71 (H) 02/24/2020    HDL 95 (H) 09/27/2019     Lab Results   Component Value Date    LDLCALC 74 08/13/2020    LDLCALC 68 02/24/2020    LDLCALC 69 09/27/2019     Lab Results   Component Value Date    LABVLDL 27 08/13/2020    LABVLDL 17 02/24/2020    LABVLDL 19 09/27/2019     Lab Results   Component Value Date    CHOLHDLRATIO 3.0 05/08/2015       Lab Results   Component Value Date    WBC 13.0 (H) 08/13/2020    HGB 12.8 08/13/2020    HCT 39.4 08/13/2020    MCV 91.1 08/13/2020     08/13/2020       Lab Results   Component Value Date    CREATININE 1.1 08/13/2020    BUN 14 08/13/2020     08/13/2020    K 3.8 08/13/2020     08/13/2020    CO2 24 08/13/2020       Anthropometric Measurements: Wt Change since last visit:  211 lb on home scale  Comments: 209 lb at home last visit    Food and Nutrition Changes:   B late: egg, one slice [place], 1/2 [place] Thin, apple  L: sometimes skips, or handful of grapes  D @ 3:30: 1/2 baked potato, asparagus, chicken breast  Or Taco soup and salad     Physical Activity Changes:   Increased frequency, intensity or length of time? Less intense than walking, but daily household chores--major cleaning and [place] decorating  Obstacles to activity: too cold for outdoor exercise    Diabetes Medications:   Recent change in medication type/dosage: No      Monitoring:   Changes to testing regimen?  No  Recent results: not testing    Barriers:   -Grieving anniversary of 's fall/injury/death        Follow Up Plan: one month    Referring Provider: Susan Pace MD  Time spent with patient: 20 minutes

## 2020-12-15 NOTE — PATIENT INSTRUCTIONS
Please check your blood pressure twice in the morning and twice in the evening for one week. send me results. If blood pressure is higher than 150/90 please let me know as soon as possible. Please check your blood pressure twice in the morning and twice in the evening for one week. send me results. If blood pressure is higher than 150/90 please let me know as soon as possible. Attached here are instructions of proper blood pressure measurement.        Patient Education

## 2020-12-15 NOTE — PROGRESS NOTES
12/15/2020    TELEHEALTH EVALUATION -- Audio/Visual (During [facility] public health emergency)    HPI:    Baljinder Welsh (:  1950) has requested an audio/video evaluation for the following concern(s):    Type 2 diabetes mellitus with stage 3 chronic kidney disease, without long-term current use of insulin, unspecified whether stage 3a or 3b CKD ([address]  Lab Results   Component Value Date    LABA1C 8.2 2020     Lab Results   Component Value Date    .6 2020   pt is on Metformin at home  She does not check her FBG  Pt is open to oral GLP-1   Diet and exercise- was ok   No legs neuropathy   Last eye exam- 2020    Stage 3a chronic kidney disease  Lab Results   Component Value Date     2020    K 3.8 2020     2020    CO2 24 2020    BUN 14 2020    CREATININE 1.1 2020    GLUCOSE 134 2020    CALCIUM 9.4 2020        Pure hypercholesterolemia  Lab Results   Component Value Date    CHOL 174 2020    CHOL 156 2020    CHOL 183 2019     Lab Results   Component Value Date    TRIG 136 2020    TRIG 86 2020    TRIG 93 2019     Lab Results   Component Value Date    HDL 73 (H) 2020    HDL 71 (H) 2020    HDL 95 (H) 2019     Lab Results   Component Value Date    LDLCALC 74 2020    LDLCALC 68 2020    LDLCALC 69 2019     Lab Results   Component Value Date    LABVLDL 27 2020    LABVLDL 17 2020    LABVLDL 19 2019     Lab Results   Component Value Date    CHOLHDLRATIO 3.0 2015       Essential hypertension, benign  Controlled? Mild intermittent asthma without complication  She uses Symbicrot daily   She rarely uses albuterol     Autoimmune hepatitis  Pt is on Prednisone 10 mg   She see GI , Dr Claudio Bello     Review of Systems   Constitutional: Negative for activity change, appetite change, chills, fever and unexpected weight change. Neurological:        [x] No Facial Asymmetry (Cranial nerve 7 motor function) (limited exam to video visit)          [x] No gaze palsy        [] Abnormal-         Skin:        [x] No significant exanthematous lesions or discoloration noted on facial skin         [] Abnormal-            Psychiatric:       [x] Normal Affect [] No Hallucinations        [] Abnormal-     Other pertinent observable physical exam findings-     ASSESSMENT/PLAN:  1. Type 2 diabetes mellitus with stage 3 chronic kidney disease, without long-term current use of insulin, unspecified whether stage 3a or 3b CKD ([facility]  Controled? Recheck labs  Discussed w/ pt she is open to oral GLP-1   - CBC Auto Differential; Future  - Comprehensive Metabolic Panel; Future  - Hemoglobin A1C; Future  - Lipid Panel; Future    2. Stage 3a chronic kidney disease  Monitor kidney function   - CBC Auto Differential; Future  - Comprehensive Metabolic Panel; Future    3. Pure hypercholesterolemia  recheck labs monitor   - Lipid Panel; Future    4. Essential hypertension, benign  likley controlled but do HBPM and send me results  No meds changes until results   - CBC Auto Differential; Future  - Comprehensive Metabolic Panel; Future  - Lipid Panel; Future    5. Mild intermittent asthma without complication  Well controlled- no changes in medication today. 6. Autoimmune hepatitis ([facility]  Last LFT ok   Keep prednisone through GI       No follow-ups on file. Shanthi Bell is a 78 y.o. female being evaluated by a Virtual Visit (video visit) encounter to address concerns as mentioned above. A caregiver was present when appropriate. Due to this being a TeleHealth encounter (During [facility]-45 public health emergency), evaluation of the following organ systems was limited: Vitals/Constitutional/EENT/Resp/CV/GI//MS/Neuro/Skin/Heme-Lymph-Imm. Pursuant to the emergency declaration under the [address] [place] [address] and the [employer] Act, this Virtual Visit was conducted with patient's (and/or legal guardian's) consent, to reduce the patient's risk of exposure to COVID-19 and provide necessary medical care. The patient (and/or legal guardian) has also been advised to contact this office for worsening conditions or problems, and seek emergency medical treatment and/or call 911 if deemed necessary. Patient identification was verified at the start of the visit: Yes    Total time spent on this encounter: 12:59 min    Services were provided through a video synchronous discussion virtually to substitute for in-person clinic visit. Patient and provider were located at their individual homes. --Sienna Gagnon MD on 12/15/2020 at 2:08 PM    An electronic signature was used to authenticate this note.

## 2020-12-17 RX ORDER — MONTELUKAST SODIUM 10 MG/1
TABLET ORAL
Qty: 90 TABLET | Refills: 3 | Status: SHIPPED | OUTPATIENT
Start: 2020-12-17

## 2020-12-30 DIAGNOSIS — N18.31 STAGE 3A CHRONIC KIDNEY DISEASE (HCC): ICD-10-CM

## 2020-12-30 DIAGNOSIS — E11.22 TYPE 2 DIABETES MELLITUS WITH STAGE 3 CHRONIC KIDNEY DISEASE, WITHOUT LONG-TERM CURRENT USE OF INSULIN, UNSPECIFIED WHETHER STAGE 3A OR 3B CKD (HCC): ICD-10-CM

## 2020-12-30 DIAGNOSIS — E78.00 PURE HYPERCHOLESTEROLEMIA: ICD-10-CM

## 2020-12-30 DIAGNOSIS — N18.30 TYPE 2 DIABETES MELLITUS WITH STAGE 3 CHRONIC KIDNEY DISEASE, WITHOUT LONG-TERM CURRENT USE OF INSULIN, UNSPECIFIED WHETHER STAGE 3A OR 3B CKD (HCC): ICD-10-CM

## 2020-12-30 DIAGNOSIS — E11.9 TYPE 2 DIABETES MELLITUS WITHOUT COMPLICATION, WITHOUT LONG-TERM CURRENT USE OF INSULIN (HCC): ICD-10-CM

## 2020-12-30 DIAGNOSIS — I10 ESSENTIAL HYPERTENSION, BENIGN: Chronic | ICD-10-CM

## 2020-12-30 LAB
A/G RATIO: 1.8 (ref 1.1–2.2)
ALBUMIN SERPL-MCNC: 4.2 G/DL (ref 3.4–5)
ALP BLD-CCNC: 55 U/L (ref 40–129)
ALT SERPL-CCNC: 15 U/L (ref 10–40)
ANION GAP SERPL CALCULATED.3IONS-SCNC: 12 MMOL/L (ref 3–16)
AST SERPL-CCNC: 14 U/L (ref 15–37)
BASOPHILS ABSOLUTE: 0.1 K/UL (ref 0–0.2)
BASOPHILS RELATIVE PERCENT: 0.5 %
BILIRUB SERPL-MCNC: 0.7 MG/DL (ref 0–1)
BUN BLDV-MCNC: 17 MG/DL (ref 7–20)
CALCIUM SERPL-MCNC: 10 MG/DL (ref 8.3–10.6)
CHLORIDE BLD-SCNC: 103 MMOL/L (ref 99–110)
CHOLESTEROL, TOTAL: 140 MG/DL (ref 0–199)
CO2: 28 MMOL/L (ref 21–32)
CREAT SERPL-MCNC: 0.9 MG/DL (ref 0.6–1.2)
CREATININE URINE: 213.9 MG/DL (ref 28–259)
EOSINOPHILS ABSOLUTE: 0.1 K/UL (ref 0–0.6)
EOSINOPHILS RELATIVE PERCENT: 0.6 %
GFR AFRICAN AMERICAN: >60
GFR NON-AFRICAN AMERICAN: >60
GLOBULIN: 2.3 G/DL
GLUCOSE BLD-MCNC: 89 MG/DL (ref 70–99)
HCT VFR BLD CALC: 39.3 % (ref 36–48)
HDLC SERPL-MCNC: 67 MG/DL (ref 40–60)
HEMOGLOBIN: 13 G/DL (ref 12–16)
LDL CHOLESTEROL CALCULATED: 56 MG/DL
LYMPHOCYTES ABSOLUTE: 2.8 K/UL (ref 1–5.1)
LYMPHOCYTES RELATIVE PERCENT: 16 %
MCH RBC QN AUTO: 29.6 PG (ref 26–34)
MCHC RBC AUTO-ENTMCNC: 33 G/DL (ref 31–36)
MCV RBC AUTO: 89.8 FL (ref 80–100)
MICROALBUMIN UR-MCNC: 11.3 MG/DL
MICROALBUMIN/CREAT UR-RTO: 52.8 MG/G (ref 0–30)
MONOCYTES ABSOLUTE: 1.4 K/UL (ref 0–1.3)
MONOCYTES RELATIVE PERCENT: 7.7 %
NEUTROPHILS ABSOLUTE: 13.2 K/UL (ref 1.7–7.7)
NEUTROPHILS RELATIVE PERCENT: 75.2 %
PDW BLD-RTO: 14 % (ref 12.4–15.4)
PLATELET # BLD: 366 K/UL (ref 135–450)
PMV BLD AUTO: 7.7 FL (ref 5–10.5)
POTASSIUM SERPL-SCNC: 3.4 MMOL/L (ref 3.5–5.1)
RBC # BLD: 4.37 M/UL (ref 4–5.2)
SODIUM BLD-SCNC: 143 MMOL/L (ref 136–145)
TOTAL PROTEIN: 6.5 G/DL (ref 6.4–8.2)
TRIGL SERPL-MCNC: 87 MG/DL (ref 0–150)
VLDLC SERPL CALC-MCNC: 17 MG/DL
WBC # BLD: 17.5 K/UL (ref 4–11)

## 2020-12-31 DIAGNOSIS — E11.9 TYPE 2 DIABETES MELLITUS WITHOUT COMPLICATION, WITHOUT LONG-TERM CURRENT USE OF INSULIN (HCC): ICD-10-CM

## 2020-12-31 LAB
ESTIMATED AVERAGE GLUCOSE: 165.7 MG/DL
HBA1C MFR BLD: 7.4 %

## 2020-12-31 RX ORDER — METFORMIN HYDROCHLORIDE 1000 MG/1
2000 TABLET, FILM COATED, EXTENDED RELEASE ORAL
Qty: 60 TABLET | Refills: 3 | Status: SHIPPED | OUTPATIENT
Start: 2020-12-31

## 2020-12-31 RX ORDER — AMLODIPINE BESYLATE 5 MG/1
TABLET ORAL
Qty: 90 TABLET | Refills: 0 | Status: SHIPPED | OUTPATIENT
Start: 2020-12-31 | End: 2021-02-17 | Stop reason: SDUPTHER

## 2021-01-04 ENCOUNTER — TELEPHONE (OUTPATIENT)
Dept: ORTHOPEDIC SURGERY | Age: 71
End: 2021-01-04

## 2021-01-14 ENCOUNTER — TELEPHONE (OUTPATIENT)
Dept: INTERNAL MEDICINE CLINIC | Age: 71
End: 2021-01-14

## 2021-01-14 DIAGNOSIS — R31.29 OTHER MICROSCOPIC HEMATURIA: Primary | ICD-10-CM

## 2021-01-14 NOTE — TELEPHONE ENCOUNTER
Called and spoke to patient, states she lives closer to [facility]. Orders placed, patient notified.

## 2021-01-15 LAB
BACTERIA: ABNORMAL /HPF
BILIRUBIN URINE: NEGATIVE
BLOOD, URINE: NEGATIVE
CLARITY: ABNORMAL
COLOR: YELLOW
EPITHELIAL CELLS, UA: 9 /HPF (ref 0–5)
GLUCOSE URINE: NEGATIVE MG/DL
HYALINE CASTS: 10 /LPF (ref 0–8)
KETONES, URINE: NEGATIVE MG/DL
LEUKOCYTE ESTERASE, URINE: ABNORMAL
MICROSCOPIC EXAMINATION: YES
NITRITE, URINE: NEGATIVE
PH UA: 6.5 (ref 5–8)
PROTEIN UA: 30 MG/DL
RBC UA: 2 /HPF (ref 0–4)
SPECIFIC GRAVITY UA: 1.02 (ref 1–1.03)
URINE TYPE: ABNORMAL
UROBILINOGEN, URINE: 0.2 E.U./DL
WBC UA: 158 /HPF (ref 0–5)

## 2021-01-15 PROCEDURE — 81000 URINALYSIS NONAUTO W/SCOPE: CPT | Performed by: INTERNAL MEDICINE

## 2021-01-15 RX ORDER — NITROFURANTOIN 25; 75 MG/1; MG/1
100 CAPSULE ORAL 2 TIMES DAILY
Qty: 10 CAPSULE | Refills: 0 | Status: SHIPPED | OUTPATIENT
Start: 2021-01-15 | End: 2021-01-20

## 2021-01-15 NOTE — RESULT ENCOUNTER NOTE
I called pt- she has some pressure when she urinates. No fever/ chills. No abdominal pain/N/V/flank pain. Trial of macrobid  She will let me know for any worsening.

## 2021-01-18 ENCOUNTER — VIRTUAL VISIT (OUTPATIENT)
Dept: INTERNAL MEDICINE CLINIC | Age: 71
End: 2021-01-18

## 2021-01-18 DIAGNOSIS — E11.22 TYPE 2 DIABETES MELLITUS WITH STAGE 3 CHRONIC KIDNEY DISEASE, WITHOUT LONG-TERM CURRENT USE OF INSULIN, UNSPECIFIED WHETHER STAGE 3A OR 3B CKD (HCC): Primary | ICD-10-CM

## 2021-01-18 DIAGNOSIS — N18.30 TYPE 2 DIABETES MELLITUS WITH STAGE 3 CHRONIC KIDNEY DISEASE, WITHOUT LONG-TERM CURRENT USE OF INSULIN, UNSPECIFIED WHETHER STAGE 3A OR 3B CKD (HCC): Primary | ICD-10-CM

## 2021-01-18 PROCEDURE — 99999 PR OFFICE/OUTPT VISIT,PROCEDURE ONLY: CPT | Performed by: DIETITIAN, REGISTERED

## 2021-01-18 NOTE — PROGRESS NOTES
Medical Nutrition Therapy for Diabetes Follow Up    Sandrine Pierce  January 18, 2021      Patient Care Team:  Nerissa De La Torre MD as PCP - Torrie Waterman MD as PCP - [facility] Empaneled Provider  Mookie Foster MD (Obstetrics & Gynecology)  Lisa Case RD, LD as Dietitian (Dietitian)    Reason for visit: f/u DM VIRTUAL VISIT   Patient self-assessment of Progress: \"I feel like I'm back on schedule.  December/January are hard for me due to my 's fall and death last year at this time\"    ASSESSMENT/PLAN:   NUTRITION DIAGNOSIS    #1 Problem: Altered Nutrition-Related Laboratory Values (NC-2.2)  Related to: Endocrine/Diabetes   As Evidenced by: Elevated Plasma glucose and/or HgbA1c levels           #2 Problem: Overweight/Obesity (NC-3.3)  Related to: Excessive energy intake or physical inactivity  As Evidenced by: BMI more than normative standard for age and sex (BMI=37.32)      #3 Problem: Impaired nutrient utilization--need to increase Potassium intake  Related to: chronic kidney disease  As Evidenced by: laboratory data    NUTRITION INTERVENTION  Nutrition Prescription: Aim for 30 g Carb per meal; 15 - 20 g Carb per snack  Increase intake of High Potassium foods      Diabetes Education/Counseling included:  Meal Planning    Interventions:  Potassium Content of Foods (AND Nutrition Care Manual)    NUTRITION MONITORING AND EVALUATION  5 =always  4 = most of the time   3 = half the days  2 = sometimes  1 = hasn't started  Indicator/Goal Progress Rating   #1  Plan meals to follow Plate Guide, aiming for three servings of vegetables, fruits, and whole grains daily  #1 3/5   #2  Physical activity most days #2 3/5          Patient Active Problem List   Diagnosis    Asthma    Shortness of breath    Autoimmune hepatitis ([facility]    Essential hypertension, benign    Pure hypercholesterolemia    Impaired fasting glucose    Cataract    Murmur, cardiac    Stage 3 chronic kidney disease  Type 2 diabetes mellitus with stage 3 chronic kidney disease, without long-term current use of insulin (HCC)    Morbidly obese (HCC)       Current Outpatient Medications   Medication Sig Dispense Refill    nitrofurantoin, macrocrystal-monohydrate, (MACROBID) 100 MG capsule Take 1 capsule by mouth 2 times daily for 5 days 10 capsule 0    amLODIPine (NORVASC) 5 MG tablet TAKE 1 TABLET BY MOUTH EVERY DAY 90 tablet 0    metFORMIN (GLUMETZA) 1000 MG extended release tablet Take 2 tablets by mouth daily (with breakfast) 60 tablet 3    montelukast (SINGULAIR) 10 MG tablet TAKE 1 TABLET BY MOUTH EVERY DAY EVERY NIGHT 90 tablet 3    SYMBICORT 160-4.5 MCG/ACT AERO TAKE 2 PUFFS BY MOUTH TWICE A DAY 30.6 Inhaler 1    fluticasone (FLONASE) 50 MCG/ACT nasal spray SPRAY 1 SPRAY INTO EACH NOSTRIL EVERY DAY 1 Bottle 2    furosemide (LASIX) 20 MG tablet TAKE 1 TABLET BY MOUTH EVERY DAY 90 tablet 3    atorvastatin (LIPITOR) 40 MG tablet Take 1 tablet by mouth daily 90 tablet 3    desloratadine (CLARINEX) 5 MG tablet TAKE 1 TABLET BY MOUTH EVERY DAY 90 tablet 0    predniSONE (DELTASONE) 10 MG tablet TAKE 1 TABLET BY MOUTH EVERY DAY 90 tablet 3    mometasone (NASONEX) 50 MCG/ACT nasal spray 2 sprays by Nasal route daily as needed (as needed for allergies) 3 Inhaler 3    PROVENTIL  (90 BASE) MCG/ACT inhaler INHALE 2 PUFFS INTO THE LUNGS EVERY 6 HOURS AS NEEDED. 1 Inhaler 3    zoledronic acid (RECLAST) 5 MG/100ML SOLN Infuse 100 mLs intravenously once for 1 dose. 100 mL 0    calcium carbonate (OSCAL) 500 MG TABS tablet Take 500 mg by mouth daily.  Spacer/Aero Chamber Mouthpiece MISC by Does not apply route. 1 each 1    Multiple Vitamin (MULTIVITAMIN PO) Take  by mouth daily.        Current Facility-Administered Medications   Medication Dose Route Frequency Provider Last Rate Last Admin    ipratropium-albuterol (DUONEB) nebulizer solution 1 ampule  1 ampule Inhalation Once Gracia Weathers MD NUTRITION ASSESSMENT    Biochemical Data:    Lab Results   Component Value Date    LABA1C 7.4 12/30/2020     Lab Results   Component Value Date    .7 12/30/2020       Lab Results   Component Value Date    CHOL 140 12/30/2020    CHOL 174 08/13/2020    CHOL 156 02/24/2020     Lab Results   Component Value Date    TRIG 87 12/30/2020    TRIG 136 08/13/2020    TRIG 86 02/24/2020     Lab Results   Component Value Date    HDL 67 (H) 12/30/2020    HDL 73 (H) 08/13/2020    HDL 71 (H) 02/24/2020     Lab Results   Component Value Date    LDLCALC 56 12/30/2020    LDLCALC 74 08/13/2020    LDLCALC 68 02/24/2020     Lab Results   Component Value Date    LABVLDL 17 12/30/2020    LABVLDL 27 08/13/2020    LABVLDL 17 02/24/2020     Lab Results   Component Value Date    CHOLHDLRATIO 3.0 05/08/2015       Lab Results   Component Value Date    WBC 17.5 (H) 12/30/2020    HGB 13.0 12/30/2020    HCT 39.3 12/30/2020    MCV 89.8 12/30/2020     12/30/2020       Lab Results   Component Value Date    CREATININE 0.9 12/30/2020    BUN 17 12/30/2020     12/30/2020    K 3.4 (L) 12/30/2020     12/30/2020    CO2 28 12/30/2020       Anthropometric Measurements: Wt Change since last visit:  206 lb at home  Comments: Wt Readings from Last 3 Encounters:   08/11/20 217 lb 6.4 oz (98.6 kg)   12/16/19 206 lb (93.4 kg)   09/27/19 202 lb (91.6 kg)         Food and Nutrition Changes:   Beverage consumption:changes: No  Patient reported changes:  Reports that she's \"back on schedule\" eating 2 or three meals daily  9:30a: egg/toast/banana  Lunch: tuna salad, tossed salad  OR cottage cheese and fruit with Wheat thins  Dinner: fish, green beans, baked potato    Physical Activity Changes:   Increased frequency, intensity or length of time?  No  Treadmill every other day    Diabetes Medications:   Recent change in medication type/dosage: yes, seeking insurance authorization for [place] Comments: Dr. Juanjose Dodd wants her to increase to 2000 mg daily, but that much Metformin causes her to have GI upset. Monitoring:   Changes to testing regimen?  No  Recent results: not testing    Barriers:   -none        Follow Up Plan: 3 weeks    Referring Provider: Kenny Benoit MD  Time spent with patient: 20 [id number]

## 2021-02-05 NOTE — PROGRESS NOTES
Medical Nutrition Therapy for Diabetes Follow Up    Jose M Sarkar  February 5, 2021      Patient Care Team:  Mini Lewis MD as PCP - Tootie العلي MD as PCP - [facility] Empaneled Provider  Prabhjot Rebolledo MD (Obstetrics & Gynecology)  Kleber Kapadia RD, LD as Dietitian (Dietitian)    Reason for visit: VIRTUAL VISIT  - f/u DM, weight concerns  Patient self-assessment of Progress: \"I had a set-back to my eating because all the food in my basement freezer spoiled. \"    ASSESSMENT/PLAN:   NUTRITION DIAGNOSIS    #1 Problem: Altered Nutrition-Related Laboratory Values (NC-2.2)  Related to: Endocrine/Diabetes   As Evidenced by: Elevated Plasma glucose and/or HgbA1c levels           #2 Problem: Overweight/Obesity (NC-3.3)  Related to: Excessive energy intake or physical inactivity  As Evidenced by: BMI more than normative standard for age and sex (BMI=37.32)    #3 Problem: Impaired nutrient utilization--need to increase potassium intake  Related to: chronic kidney disease  As Evidenced by: laboratory data    NUTRITION INTERVENTION  Nutrition Prescription: Aim for 30 g Carb per meal; 15 - 20 g Carb per snack      Diabetes Education/Counseling included:  Carbohydrate Control    Interventions:  Control Carbohydrate Intake using Plate Guide    NUTRITION MONITORING AND EVALUATION  5 =always  4 = most of the time   3 = half the days  2 = sometimes  1 = hasn't started  Indicator/Goal Progress Rating   #1  Plan meals in advance, follow plate guide  #1 4/5   #2  Physical activity most days #2 4/5   #3  Increase K+ intake #3 1/5          Patient Active Problem List   Diagnosis    Asthma    Shortness of breath    Autoimmune [facility] ([facility]    Essential hypertension, benign    Pure hypercholesterolemia    Impaired fasting glucose    Cataract    Murmur, cardiac    Stage 3 chronic kidney disease  Type 2 diabetes mellitus with stage 3 chronic kidney disease, without long-term current use of insulin (HCC)    Morbidly obese (HCC)       Current Outpatient Medications   Medication Sig Dispense Refill    amLODIPine (NORVASC) 5 MG tablet TAKE 1 TABLET BY MOUTH EVERY DAY 90 tablet 0    metFORMIN (GLUMETZA) 1000 MG extended release tablet Take 2 tablets by mouth daily (with breakfast) 60 tablet 3    montelukast (SINGULAIR) 10 MG tablet TAKE 1 TABLET BY MOUTH EVERY DAY EVERY NIGHT 90 tablet 3    SYMBICORT 160-4.5 MCG/ACT AERO TAKE 2 PUFFS BY MOUTH TWICE A DAY 30.6 Inhaler 1    fluticasone (FLONASE) 50 MCG/ACT nasal spray SPRAY 1 SPRAY INTO EACH NOSTRIL EVERY DAY 1 Bottle 2    furosemide (LASIX) 20 MG tablet TAKE 1 TABLET BY MOUTH EVERY DAY 90 tablet 3    atorvastatin (LIPITOR) 40 MG tablet Take 1 tablet by mouth daily 90 tablet 3    desloratadine (CLARINEX) 5 MG tablet TAKE 1 TABLET BY MOUTH EVERY DAY 90 tablet 0    predniSONE (DELTASONE) 10 MG tablet TAKE 1 TABLET BY MOUTH EVERY DAY 90 tablet 3    mometasone (NASONEX) 50 MCG/ACT nasal spray 2 sprays by Nasal route daily as needed (as needed for allergies) 3 Inhaler 3    PROVENTIL  (90 BASE) MCG/ACT inhaler INHALE 2 PUFFS INTO THE LUNGS EVERY 6 HOURS AS NEEDED. 1 Inhaler 3    zoledronic acid (RECLAST) 5 MG/100ML SOLN Infuse 100 mLs intravenously once for 1 dose. 100 mL 0    calcium carbonate (OSCAL) 500 MG TABS tablet Take 500 mg by mouth daily.  Spacer/Aero Chamber Mouthpiece MISC by Does not apply route. 1 each 1    Multiple Vitamin (MULTIVITAMIN PO) Take  by mouth daily.        Current Facility-Administered Medications   Medication Dose Route Frequency Provider Last Rate Last Admin    ipratropium-albuterol (DUONEB) nebulizer solution 1 ampule  1 ampule Inhalation Once Gurjit Estrada MD             NUTRITION ASSESSMENT    Biochemical Data:    Lab Results   Component Value Date    LABA1C 7.4 12/30/2020     Lab Results

## 2021-02-08 ENCOUNTER — VIRTUAL VISIT (OUTPATIENT)
Dept: INTERNAL MEDICINE CLINIC | Age: 71
End: 2021-02-08

## 2021-02-08 DIAGNOSIS — N18.30 TYPE 2 DIABETES MELLITUS WITH STAGE 3 CHRONIC KIDNEY DISEASE, WITHOUT LONG-TERM CURRENT USE OF INSULIN, UNSPECIFIED WHETHER STAGE 3A OR 3B CKD (HCC): Primary | ICD-10-CM

## 2021-02-08 DIAGNOSIS — E11.22 TYPE 2 DIABETES MELLITUS WITH STAGE 3 CHRONIC KIDNEY DISEASE, WITHOUT LONG-TERM CURRENT USE OF INSULIN, UNSPECIFIED WHETHER STAGE 3A OR 3B CKD (HCC): Primary | ICD-10-CM

## 2021-02-16 DIAGNOSIS — I10 ESSENTIAL HYPERTENSION, BENIGN: Chronic | ICD-10-CM

## 2021-02-17 DIAGNOSIS — I10 ESSENTIAL HYPERTENSION, BENIGN: Chronic | ICD-10-CM

## 2021-02-17 RX ORDER — AMLODIPINE BESYLATE 5 MG/1
5 TABLET ORAL DAILY
Qty: 90 TABLET | Refills: 2 | Status: SHIPPED | OUTPATIENT
Start: 2021-02-17 | End: 2021-03-02

## 2021-02-17 RX ORDER — AMLODIPINE BESYLATE 5 MG/1
TABLET ORAL
Qty: 90 TABLET | Refills: 0 | OUTPATIENT
Start: 2021-02-17

## 2021-03-01 ENCOUNTER — VIRTUAL VISIT (OUTPATIENT)
Dept: INTERNAL MEDICINE CLINIC | Age: 71
End: 2021-03-01

## 2021-03-01 DIAGNOSIS — N18.30 TYPE 2 DIABETES MELLITUS WITH STAGE 3 CHRONIC KIDNEY DISEASE, WITHOUT LONG-TERM CURRENT USE OF INSULIN, UNSPECIFIED WHETHER STAGE 3A OR 3B CKD (HCC): Primary | ICD-10-CM

## 2021-03-01 DIAGNOSIS — E11.22 TYPE 2 DIABETES MELLITUS WITH STAGE 3 CHRONIC KIDNEY DISEASE, WITHOUT LONG-TERM CURRENT USE OF INSULIN, UNSPECIFIED WHETHER STAGE 3A OR 3B CKD (HCC): Primary | ICD-10-CM

## 2021-03-01 NOTE — PROGRESS NOTES
Medical Nutrition Therapy for Diabetes Follow Up    Ijeoma Catalan  March 1, 2021      Patient Care Team:  Zayra Waller MD as PCP - Sharron Marinelli MD as PCP - [facility] Empaneled Provider  Deshaun Colon MD (Obstetrics & Gynecology)  Jena Luu RD, LD as Dietitian (Dietitian)    Reason for visit: VIRTUAL VISIT -  F/u DM and weight concerns  Patient self-assessment of Progress: \"Things are going well\"    ASSESSMENT/PLAN:   NUTRITION DIAGNOSIS    #1 Problem: Altered Nutrition-Related Laboratory Values (NC-2.2)  Related to: Endocrine/Diabetes   As Evidenced by: Elevated Plasma glucose and/or HgbA1c levels           #2 Problem: Overweight/Obesity (NC-3.3)  Related to: Excessive energy intake or physical inactivity  As Evidenced by: BMI more than normative standard for age and sex (BMI=37.32)      #3 Problem: Impaired nutrient utilization--need to increase potassium intake  Related to: chronic kidney disease  As Evidenced by: laboratory data    NUTRITION INTERVENTION  Nutrition Prescription: Aim for 30 g Carb per meal; 15 - 20 g Carb per snack      Diabetes Education/Counseling included:   Activity/exercise    Interventions:  Increase activity level by walking    NUTRITION MONITORING AND EVALUATION  5 =always  4 = most of the time   3 = half the days  2 = sometimes  1 = hasn't started  Indicator/Goal Progress Rating   #1  Plan meals in advance  #1 3/5   #2  Physical activity most days #2 3/5   #3  Increase K+ intake #3 4/5          Patient Active Problem List   Diagnosis    Asthma    Shortness of breath    Autoimmune hepatitis ([address]    Essential hypertension, benign    Pure hypercholesterolemia    Impaired fasting glucose    Cataract    Murmur, cardiac    Stage 3 chronic kidney disease    Type 2 diabetes mellitus with stage 3 chronic kidney disease, without long-term current use of insulin ([address]    Morbidly obese (HCC)       Current Outpatient Medications Medication Sig Dispense Refill    amLODIPine (NORVASC) 5 MG tablet Take 1 tablet by mouth daily 90 tablet 2    metFORMIN (GLUMETZA) 1000 MG extended release tablet Take 2 tablets by mouth daily (with breakfast) 60 tablet 3    montelukast (SINGULAIR) 10 MG tablet TAKE 1 TABLET BY MOUTH EVERY DAY EVERY NIGHT 90 tablet 3    SYMBICORT 160-4.5 MCG/ACT AERO TAKE 2 PUFFS BY MOUTH TWICE A DAY 30.6 Inhaler 1    fluticasone (FLONASE) 50 MCG/ACT nasal spray SPRAY 1 SPRAY INTO EACH NOSTRIL EVERY DAY 1 Bottle 2    furosemide (LASIX) 20 MG tablet TAKE 1 TABLET BY MOUTH EVERY DAY 90 tablet 3    atorvastatin (LIPITOR) 40 MG tablet Take 1 tablet by mouth daily 90 tablet 3    desloratadine (CLARINEX) 5 MG tablet TAKE 1 TABLET BY MOUTH EVERY DAY 90 tablet 0    predniSONE (DELTASONE) 10 MG tablet TAKE 1 TABLET BY MOUTH EVERY DAY 90 tablet 3    mometasone (NASONEX) 50 MCG/ACT nasal spray 2 sprays by Nasal route daily as needed (as needed for allergies) 3 Inhaler 3    PROVENTIL  (90 BASE) MCG/ACT inhaler INHALE 2 PUFFS INTO THE LUNGS EVERY 6 HOURS AS NEEDED. 1 Inhaler 3    zoledronic acid (RECLAST) 5 MG/100ML SOLN Infuse 100 mLs intravenously once for 1 dose. 100 mL 0    calcium carbonate (OSCAL) 500 MG TABS tablet Take 500 mg by mouth daily.  Spacer/Aero Chamber Mouthpiece MISC by Does not apply route. 1 each 1    Multiple Vitamin (MULTIVITAMIN PO) Take  by mouth daily.        Current Facility-Administered Medications   Medication Dose Route Frequency Provider Last Rate Last Admin    ipratropium-albuterol (DUONEB) nebulizer solution 1 ampule  1 ampule Inhalation Once Magaly Arguelles MD             NUTRITION ASSESSMENT    Biochemical Data:    Lab Results   Component Value Date    LABA1C 7.4 12/30/2020     Lab Results   Component Value Date    .7 12/30/2020       Lab Results   Component Value Date    CHOL 140 12/30/2020    CHOL 174 08/13/2020    CHOL 156 02/24/2020     Lab Results Component Value Date    TRIG 87 12/30/2020    TRIG 136 08/13/2020    TRIG 86 02/24/2020     Lab Results   Component Value Date    HDL 67 (H) 12/30/2020    HDL 73 (H) 08/13/2020    HDL 71 (H) 02/24/2020     Lab Results   Component Value Date    LDLCALC 56 12/30/2020    LDLCALC 74 08/13/2020    LDLCALC 68 02/24/2020     Lab Results   Component Value Date    LABVLDL 17 12/30/2020    LABVLDL 27 08/13/2020    LABVLDL 17 02/24/2020     Lab Results   Component Value Date    CHOLHDLRATIO 3.0 05/08/2015       Lab Results   Component Value Date    WBC 17.5 (H) 12/30/2020    HGB 13.0 12/30/2020    HCT 39.3 12/30/2020    MCV 89.8 12/30/2020     12/30/2020       Lab Results   Component Value Date    CREATININE 0.9 12/30/2020    BUN 17 12/30/2020     12/30/2020    K 3.4 (L) 12/30/2020     12/30/2020    CO2 28 12/30/2020       Anthropometric Measurements: Wt Change since last visit:  unknown  Comments: last visit, up 4 lb from low of 205 lb    Food and Nutrition Changes:   Beverage consumption:changes: had restarted regular pop--now has returned to diet pop and water  Patient reported changes:  Yes - doing better planning in advance   still working on adding vegetables at lunch  Physical Activity Changes:   Increased frequency, intensity or length of time? No  Three days per week: treadmill 3 days per week, M-W-F  Household chores other days    Diabetes Medications:   Recent change in medication type/dosage: No      Monitoring:   Changes to testing regimen?  No  Recent results: not testing    Barriers:   -still recovering from death of  last year        Follow Up Plan: three weeks    Referring Provider: Vignesh Higgins MD  Time spent with patient: 30 minutes

## 2021-03-02 ENCOUNTER — TELEPHONE (OUTPATIENT)
Dept: ORTHOPEDIC SURGERY | Age: 71
End: 2021-03-02

## 2021-03-02 ENCOUNTER — OFFICE VISIT (OUTPATIENT)
Dept: FAMILY MEDICINE CLINIC | Age: 71
End: 2021-03-02
Payer: MEDICARE

## 2021-03-02 VITALS
HEART RATE: 71 BPM | HEIGHT: 64 IN | DIASTOLIC BLOOD PRESSURE: 104 MMHG | TEMPERATURE: 97.3 F | SYSTOLIC BLOOD PRESSURE: 196 MMHG | BODY MASS INDEX: 35.85 KG/M2 | OXYGEN SATURATION: 98 % | WEIGHT: 210 LBS

## 2021-03-02 DIAGNOSIS — E78.00 PURE HYPERCHOLESTEROLEMIA: ICD-10-CM

## 2021-03-02 DIAGNOSIS — K75.4 AUTOIMMUNE HEPATITIS (HCC): ICD-10-CM

## 2021-03-02 DIAGNOSIS — E11.22 TYPE 2 DIABETES MELLITUS WITH STAGE 3 CHRONIC KIDNEY DISEASE, WITHOUT LONG-TERM CURRENT USE OF INSULIN, UNSPECIFIED WHETHER STAGE 3A OR 3B CKD (HCC): Primary | ICD-10-CM

## 2021-03-02 DIAGNOSIS — J45.20 MILD INTERMITTENT ASTHMA WITHOUT COMPLICATION: ICD-10-CM

## 2021-03-02 DIAGNOSIS — I10 ESSENTIAL HYPERTENSION, BENIGN: Chronic | ICD-10-CM

## 2021-03-02 DIAGNOSIS — N18.31 STAGE 3A CHRONIC KIDNEY DISEASE (HCC): ICD-10-CM

## 2021-03-02 DIAGNOSIS — Z12.11 SCREEN FOR COLON CANCER: ICD-10-CM

## 2021-03-02 DIAGNOSIS — N18.30 TYPE 2 DIABETES MELLITUS WITH STAGE 3 CHRONIC KIDNEY DISEASE, WITHOUT LONG-TERM CURRENT USE OF INSULIN, UNSPECIFIED WHETHER STAGE 3A OR 3B CKD (HCC): Primary | ICD-10-CM

## 2021-03-02 PROCEDURE — 3046F HEMOGLOBIN A1C LEVEL >9.0%: CPT | Performed by: INTERNAL MEDICINE

## 2021-03-02 PROCEDURE — 3017F COLORECTAL CA SCREEN DOC REV: CPT | Performed by: INTERNAL MEDICINE

## 2021-03-02 PROCEDURE — 99214 OFFICE O/P EST MOD 30 MIN: CPT | Performed by: INTERNAL MEDICINE

## 2021-03-02 PROCEDURE — G8417 CALC BMI ABV UP PARAM F/U: HCPCS | Performed by: INTERNAL MEDICINE

## 2021-03-02 PROCEDURE — G8399 PT W/DXA RESULTS DOCUMENT: HCPCS | Performed by: INTERNAL MEDICINE

## 2021-03-02 PROCEDURE — G8484 FLU IMMUNIZE NO ADMIN: HCPCS | Performed by: INTERNAL MEDICINE

## 2021-03-02 PROCEDURE — 2022F DILAT RTA XM EVC RTNOPTHY: CPT | Performed by: INTERNAL MEDICINE

## 2021-03-02 PROCEDURE — 1123F ACP DISCUSS/DSCN MKR DOCD: CPT | Performed by: INTERNAL MEDICINE

## 2021-03-02 PROCEDURE — 1090F PRES/ABSN URINE INCON ASSESS: CPT | Performed by: INTERNAL MEDICINE

## 2021-03-02 PROCEDURE — 1036F TOBACCO NON-USER: CPT | Performed by: INTERNAL MEDICINE

## 2021-03-02 PROCEDURE — 4040F PNEUMOC VAC/ADMIN/RCVD: CPT | Performed by: INTERNAL MEDICINE

## 2021-03-02 PROCEDURE — G8427 DOCREV CUR MEDS BY ELIG CLIN: HCPCS | Performed by: INTERNAL MEDICINE

## 2021-03-02 RX ORDER — DESLORATADINE 5 MG/1
TABLET ORAL
Qty: 90 TABLET | Refills: 0 | Status: SHIPPED | OUTPATIENT
Start: 2021-03-02 | End: 2021-05-24

## 2021-03-02 RX ORDER — AMLODIPINE BESYLATE 10 MG/1
5 TABLET ORAL DAILY
Qty: 90 TABLET | Refills: 1 | Status: SHIPPED | OUTPATIENT
Start: 2021-03-02 | End: 2021-04-13

## 2021-03-02 RX ORDER — ALBUTEROL SULFATE 90 UG/1
AEROSOL, METERED RESPIRATORY (INHALATION)
Qty: 1 INHALER | Refills: 3 | Status: SHIPPED | OUTPATIENT
Start: 2021-03-02 | End: 2021-08-24 | Stop reason: SDUPTHER

## 2021-03-02 RX ORDER — PREDNISONE 10 MG/1
TABLET ORAL
Qty: 90 TABLET | Refills: 3 | Status: SHIPPED | OUTPATIENT
Start: 2021-03-02 | End: 2022-07-25 | Stop reason: SDUPTHER

## 2021-03-02 RX ORDER — CHLORTHALIDONE 25 MG/1
25 TABLET ORAL DAILY
Qty: 30 TABLET | Refills: 0 | Status: SHIPPED | OUTPATIENT
Start: 2021-03-02 | End: 2021-03-25

## 2021-03-02 ASSESSMENT — ENCOUNTER SYMPTOMS
NAUSEA: 0
VOMITING: 0
CHEST TIGHTNESS: 0
ABDOMINAL PAIN: 0
SHORTNESS OF BREATH: 0

## 2021-03-02 ASSESSMENT — PATIENT HEALTH QUESTIONNAIRE - PHQ9
2. FEELING DOWN, DEPRESSED OR HOPELESS: 0
SUM OF ALL RESPONSES TO PHQ9 QUESTIONS 1 & 2: 0
SUM OF ALL RESPONSES TO PHQ QUESTIONS 1-9: 0

## 2021-03-02 NOTE — PROGRESS NOTES
Outpatient Note for established Patient - regular Visit     History Obtained From:  patient, electronic medical record  Is the patient new to me ? - No    HISTORY OF PRESENT ILLNESS:   The patient is a 78 y.o. female who is here today for :  Diagnoses and all orders for this visit:    Type 2 diabetes mellitus with stage 3 chronic kidney disease, without long-term current use of insulin, unspecified whether stage 3a or 3b CKD ([facility]  Lab Results   Component Value Date    LABA1C 7.4 12/30/2020     Lab Results   Component Value Date    .7 12/30/2020   pt is on Metformin   She does not check her FBG  She has decent diet, she does exercise  No feet burning/ numbness/ tingling      Stage 3a chronic kidney disease  Lab Results   Component Value Date     12/30/2020    K 3.4 12/30/2020     12/30/2020    CO2 28 12/30/2020    BUN 17 12/30/2020    CREATININE 0.9 12/30/2020    GLUCOSE 89 12/30/2020    CALCIUM 10.0 12/30/2020        Essential hypertension, benign  BP is 194/104  BP at home : 180/80s  Pt denies CP/SOB/palpitations/abdominal pain/N/V.    Pt is on Amlodipine and Lasix     Pure hypercholesterolemia  Lab Results   Component Value Date    CHOL 140 12/30/2020    CHOL 174 08/13/2020    CHOL 156 02/24/2020     Lab Results   Component Value Date    TRIG 87 12/30/2020    TRIG 136 08/13/2020    TRIG 86 02/24/2020     Lab Results   Component Value Date    HDL 67 (H) 12/30/2020    HDL 73 (H) 08/13/2020    HDL 71 (H) 02/24/2020     Lab Results   Component Value Date    LDLCALC 56 12/30/2020    LDLCALC 74 08/13/2020    LDLCALC 68 02/24/2020     Lab Results   Component Value Date    LABVLDL 17 12/30/2020    LABVLDL 27 08/13/2020    LABVLDL 17 02/24/2020     Lab Results   Component Value Date    CHOLHDLRATIO 3.0 05/08/2015   pt is on Lipitor     Autoimmune hepatitis ([facility]  Lab Results   Component Value Date    ALT 15 12/30/2020    AST 14 (L) 12/30/2020    ALKPHOS 55 12/30/2020    BILITOT 0.7 12/30/2020       Mild intermittent asthma without complication  No cough / fever/ chills  She uses inhaler albuterol as needed - usually<= 1/ week        Pt got her first dose of Covid vaccine      Preventive:  1) colon cancer screening completed? yes, she is due   2) breast cancer screening completed (or not needed) ? yes, 8/2020  Past Medical History:        Diagnosis Date    Allergic rhinitis     Asthma     COPD (chronic obstructive pulmonary disease) ([facility]     Hepatitis     Hyperlipidemia     Hypertension     Influenza A 1/10/2015    Osteoporosis     Type 2 diabetes mellitus without complication, without long-term current use of insulin ([facility] 12/16/2019       Social History:   TOBACCO:   reports that she has never smoked. She has never used smokeless tobacco.    ETOH:   reports no history of alcohol use. Current Medications:    Prior to Admission medications    Medication Sig Start Date End Date Taking? Authorizing Provider   blood glucose monitor kit and supplies Dispense sufficient amount for indicated testing frequency plus additional to accommodate PRN testing needs. Dispense all needed supplies to include: monitor, strips, lancing device, lancets, control solutions, alcohol swabs.  3/2/21  Yes Hal Cuello MD   amLODIPine (NORVASC) 5 MG tablet Take 1 tablet by mouth daily 2/17/21  Yes Hal Cuello MD   metFORMIN (GLUMETZA) 1000 MG extended release tablet Take 2 tablets by mouth daily (with breakfast) 12/31/20  Yes Hal Cuello MD   montelukast (SINGULAIR) 10 MG tablet TAKE 1 TABLET BY MOUTH EVERY DAY EVERY NIGHT 12/17/20  Yes Hal Cuello MD   SYMBICORT 160-4.5 MCG/ACT AERO TAKE 2 PUFFS BY MOUTH TWICE A DAY 10/27/20  Yes Hal Cuello MD   fluticasone (FLONASE) 50 MCG/ACT nasal spray SPRAY 1 SPRAY INTO EACH NOSTRIL EVERY DAY 10/19/20  Yes Hal Cuello MD   furosemide (LASIX) 20 MG tablet TAKE 1 TABLET BY MOUTH EVERY DAY 9/29/20  Yes Hal Cuello MD atorvastatin (LIPITOR) 40 MG tablet Take 1 tablet by mouth daily 3/31/20  Yes Akanksha Mckeon MD   desloratadine (CLARINEX) 5 MG tablet TAKE 1 TABLET BY MOUTH EVERY DAY 3/25/20  Yes Akanksha Mckeon MD   PROVENTIL  (90 BASE) MCG/ACT inhaler INHALE 2 PUFFS INTO THE LUNGS EVERY 6 HOURS AS NEEDED. 9/5/14  Yes Breana Conner MD   calcium carbonate (OSCAL) 500 MG TABS tablet Take 500 mg by mouth daily. Yes Historical Provider, MD   Spacer/Aero Chamber Mouthpiece MISC by Does not apply route. 1/24/12  Yes Breana Conner MD   Multiple Vitamin (MULTIVITAMIN PO) Take  by mouth daily. 8/6/10  Yes Historical Provider, MD   predniSONE (DELTASONE) 10 MG tablet TAKE 1 TABLET BY MOUTH EVERY DAY  Patient not taking: Reported on 3/2/2021 11/27/19   Akanksha Mckeon MD   mometasone (NASONEX) 50 MCG/ACT nasal spray 2 sprays by Nasal route daily as needed (as needed for allergies)  Patient not taking: Reported on 3/2/2021 9/6/16   Breana Conner MD   zoledronic acid (RECLAST) 5 MG/100ML SOLN Infuse 100 mLs intravenously once for 1 dose. Patient not taking: Reported on 3/2/2021 8/8/14 10/9/17  Breana Conner MD       REVIEW OF SYSTEMS:  Review of Systems   Constitutional: Negative for activity change, appetite change, chills, fever and unexpected weight change. HENT: Negative for hearing loss. Eyes: Negative for visual disturbance. Respiratory: Negative for chest tightness and shortness of breath. Cardiovascular: Negative for chest pain. Gastrointestinal: Negative for abdominal pain, nausea and vomiting. Genitourinary: Negative for hematuria. Skin: Negative for rash. Allergic/Immunologic: Negative for immunocompromised state. Neurological: Negative for dizziness and headaches. Psychiatric/Behavioral: Negative for dysphoric mood and suicidal ideas.          Physical Exam:      Vitals: BP (!) 196/104 (Site: Left Upper Arm, Position: Sitting, Cuff Size: Medium Adult)   Pulse 71   Temp 4. GCS verbal subscore is 5. GCS motor subscore is 6. Motor: No abnormal muscle tone. Deep Tendon Reflexes: Reflexes are normal and symmetric. Psychiatric:         Behavior: Behavior normal.         Thought Content: Thought content normal.            Assessment/Plan:   Diagnoses and all orders for this visit:    Type 2 diabetes mellitus with stage 3 chronic kidney disease, without long-term current use of insulin, unspecified whether stage 3a or 3b CKD ([facility]  Reasonably controlled    -     blood glucose monitor kit and supplies; Dispense sufficient amount for indicated testing frequency plus additional to accommodate PRN testing needs. Dispense all needed supplies to include: monitor, strips, lancing device, lancets, control solutions, alcohol swabs. -     CBC Auto Differential; Future  -     Comprehensive Metabolic Panel; Future  -     Hemoglobin A1C; Future  -     Lipid Panel; Future    Stage 3a chronic kidney disease  Stable  Recheck kdiney function   -     CBC Auto Differential; Future  -     Comprehensive Metabolic Panel; Future  -     Lipid Panel; Future    Essential hypertension, benign  Not controlled  Switch lasix-->thiazidel  Increase Amlodipine to 10 mg  HBPM   F/u closely   -     CBC Auto Differential; Future  -     Comprehensive Metabolic Panel; Future  -     Lipid Panel; Future    Pure hypercholesterolemia  Well controlled- no changes in medication today. -     Lipid Panel; Future    Autoimmune hepatitis ([facility]  -     Comprehensive Metabolic Panel; Future    Mild intermittent asthma without complication    Screen for colon cancer  -     AFL - Jp Ley MD, Gastroenterology, [facility]  - Patient was encouraged to call the office (and ask to see me) or be seen by other provider for any worsening or lack of improvement of the symptoms or any new symptoms.    - Pt was asked toschedule an appointment in 2 months, and to let me know of any scheduling difficulties. Additional patients instructions (if given): There are no Patient Instructions on file for this visit. Van Kam M.D.   3/2/2021, 2:34 PM      NOTE: This report was transcribed using voice recognition software. Every effort was made to ensure accuracy, however, inadvertent computerized transcription errors may be present.

## 2021-03-02 NOTE — PATIENT INSTRUCTIONS
Please check your blood pressure twice in the morning and twice in the evening for one week. send me results. If blood pressure is higher than 150/90 please let me know as soon as possible. Attached here are instructions of proper blood pressure measurement. Please call the office (and ask to see me) or be seen by other provider for any worsening or lack of improvement of the symptoms or for any new symptoms as soon as possible. Please make sure to schedule your follow appointment as discussed. Please let me know if ay further questions. Please let me know if there are any symptoms or concerns that you feel that needs to be further addressed.

## 2021-03-17 ENCOUNTER — TELEPHONE (OUTPATIENT)
Dept: FAMILY MEDICINE CLINIC | Age: 71
End: 2021-03-17

## 2021-03-17 DIAGNOSIS — E11.22 TYPE 2 DIABETES MELLITUS WITH STAGE 3 CHRONIC KIDNEY DISEASE, WITHOUT LONG-TERM CURRENT USE OF INSULIN, UNSPECIFIED WHETHER STAGE 3A OR 3B CKD (HCC): ICD-10-CM

## 2021-03-17 DIAGNOSIS — N18.30 TYPE 2 DIABETES MELLITUS WITH STAGE 3 CHRONIC KIDNEY DISEASE, WITHOUT LONG-TERM CURRENT USE OF INSULIN, UNSPECIFIED WHETHER STAGE 3A OR 3B CKD (HCC): ICD-10-CM

## 2021-03-22 ENCOUNTER — VIRTUAL VISIT (OUTPATIENT)
Dept: INTERNAL MEDICINE CLINIC | Age: 71
End: 2021-03-22

## 2021-03-22 DIAGNOSIS — N18.30 TYPE 2 DIABETES MELLITUS WITH STAGE 3 CHRONIC KIDNEY DISEASE, WITHOUT LONG-TERM CURRENT USE OF INSULIN, UNSPECIFIED WHETHER STAGE 3A OR 3B CKD (HCC): Primary | ICD-10-CM

## 2021-03-22 DIAGNOSIS — E11.22 TYPE 2 DIABETES MELLITUS WITH STAGE 3 CHRONIC KIDNEY DISEASE, WITHOUT LONG-TERM CURRENT USE OF INSULIN, UNSPECIFIED WHETHER STAGE 3A OR 3B CKD (HCC): Primary | ICD-10-CM

## 2021-03-22 NOTE — PROGRESS NOTES
Medical Nutrition Therapy for Diabetes Follow Up    Adrian Roger  March 22, 2021      Patient Care Team:  Henry Kendrick MD as PCP - Shady Moran MD as PCP - [facility] Empaneled Provider  Tonny Ledbetter MD (Obstetrics & Gynecology)  Zaki Castrejon RD, LD as Dietitian (Dietitian)    Reason for visit: VIRTUAL VISIT  - 3 week f/u for DM and weight concerns  Patient self-assessment of Progress: \"I'm doing great with exercising consistently\"    ASSESSMENT/PLAN:   NUTRITION DIAGNOSIS    #1 Problem: Altered Nutrition-Related Laboratory Values (NC-2.2)  Related to: Endocrine/Diabetes   As Evidenced by: Elevated Plasma glucose and/or HgbA1c levels           #2 Problem: Overweight/Obesity (NC-3.3)  Related to: Excessive energy intake or physical inactivity  As Evidenced by: BMI more than normative standard for age and sex (BMI=36.05)      NUTRITION INTERVENTION  Nutrition Prescription: Aim for 30 g Carb per meal; 15 - 20 g Carb per snack  Include good sources of probiotics in diet daily    Diabetes Education/Counseling included:  NUtrition    Interventions:  Role of healthy gut microbiome in good health    NUTRITION MONITORING AND EVALUATION  5 =always  4 = most of the time   3 = half the days  2 = sometimes  1 = hasn't started  Indicator/Goal Progress Rating   #1  Eating at regular intervals  #1 4/5   #2  Exercise daily #2 5/5          Patient Active Problem List   Diagnosis    Asthma    Shortness of breath    Autoimmune hepatitis ([address]    Essential hypertension, benign    Pure hypercholesterolemia    Impaired fasting glucose    Cataract    Murmur, cardiac    Stage 3 chronic kidney disease    Type 2 diabetes mellitus with stage 3 chronic kidney disease, without long-term current use of insulin ([address]    Morbidly obese ([address]       Current Outpatient Medications   Medication Sig Dispense Refill    blood glucose monitor kit and supplies Dispense sufficient amount for indicated testing frequency plus additional to accommodate PRN testing needs. Dispense all needed supplies to include: monitor, strips, lancing device, lancets, control solutions, alcohol swabs. 1 kit 0    amLODIPine (NORVASC) 10 MG tablet Take 0.5 tablets by mouth daily 90 tablet 1    chlorthalidone (HYGROTON) 25 MG tablet Take 1 tablet by mouth daily 30 tablet 0    albuterol sulfate HFA (PROVENTIL HFA) 108 (90 Base) MCG/ACT inhaler INHALE 2 PUFFS INTO THE LUNGS EVERY 6 HOURS AS NEEDED. 1 Inhaler 3    desloratadine (CLARINEX) 5 MG tablet TAKE 1 TABLET BY MOUTH EVERY DAY 90 tablet 0    predniSONE (DELTASONE) 10 MG tablet TAKE 1 TABLET BY MOUTH EVERY DAY 90 tablet 3    metFORMIN (GLUMETZA) 1000 MG extended release tablet Take 2 tablets by mouth daily (with breakfast) 60 tablet 3    montelukast (SINGULAIR) 10 MG tablet TAKE 1 TABLET BY MOUTH EVERY DAY EVERY NIGHT 90 tablet 3    SYMBICORT 160-4.5 MCG/ACT AERO TAKE 2 PUFFS BY MOUTH TWICE A DAY 30.6 Inhaler 1    fluticasone (FLONASE) 50 MCG/ACT nasal spray SPRAY 1 SPRAY INTO EACH NOSTRIL EVERY DAY 1 Bottle 2    atorvastatin (LIPITOR) 40 MG tablet Take 1 tablet by mouth daily 90 tablet 3    mometasone (NASONEX) 50 MCG/ACT nasal spray 2 sprays by Nasal route daily as needed (as needed for allergies) (Patient not taking: Reported on 3/2/2021) 3 Inhaler 3    zoledronic acid (RECLAST) 5 MG/100ML SOLN Infuse 100 mLs intravenously once for 1 dose. (Patient not taking: Reported on 3/2/2021) 100 mL 0    calcium carbonate (OSCAL) 500 MG TABS tablet Take 500 mg by mouth daily.  Spacer/Aero Chamber Mouthpiece MISC by Does not apply route. 1 each 1    Multiple Vitamin (MULTIVITAMIN PO) Take  by mouth daily.        Current Facility-Administered Medications   Medication Dose Route Frequency Provider Last Rate Last Admin    ipratropium-albuterol (DUONEB) nebulizer solution 1 ampule  1 ampule Inhalation Once Mini Lewis MD             NUTRITION ASSESSMENT    Biochemical

## 2021-03-24 DIAGNOSIS — N18.30 TYPE 2 DIABETES MELLITUS WITH STAGE 3 CHRONIC KIDNEY DISEASE, WITHOUT LONG-TERM CURRENT USE OF INSULIN, UNSPECIFIED WHETHER STAGE 3A OR 3B CKD (HCC): ICD-10-CM

## 2021-03-24 DIAGNOSIS — E11.22 TYPE 2 DIABETES MELLITUS WITH STAGE 3 CHRONIC KIDNEY DISEASE, WITHOUT LONG-TERM CURRENT USE OF INSULIN, UNSPECIFIED WHETHER STAGE 3A OR 3B CKD (HCC): ICD-10-CM

## 2021-03-25 DIAGNOSIS — I10 ESSENTIAL HYPERTENSION, BENIGN: Chronic | ICD-10-CM

## 2021-03-25 RX ORDER — CHLORTHALIDONE 25 MG/1
TABLET ORAL
Qty: 30 TABLET | Refills: 0 | Status: SHIPPED | OUTPATIENT
Start: 2021-03-25 | End: 2021-04-18

## 2021-04-09 DIAGNOSIS — E11.22 TYPE 2 DIABETES MELLITUS WITH STAGE 3 CHRONIC KIDNEY DISEASE, WITHOUT LONG-TERM CURRENT USE OF INSULIN, UNSPECIFIED WHETHER STAGE 3A OR 3B CKD (HCC): ICD-10-CM

## 2021-04-09 DIAGNOSIS — K75.4 AUTOIMMUNE HEPATITIS (HCC): ICD-10-CM

## 2021-04-09 DIAGNOSIS — N18.30 TYPE 2 DIABETES MELLITUS WITH STAGE 3 CHRONIC KIDNEY DISEASE, WITHOUT LONG-TERM CURRENT USE OF INSULIN, UNSPECIFIED WHETHER STAGE 3A OR 3B CKD (HCC): ICD-10-CM

## 2021-04-09 DIAGNOSIS — I10 ESSENTIAL HYPERTENSION, BENIGN: Chronic | ICD-10-CM

## 2021-04-09 DIAGNOSIS — N18.31 STAGE 3A CHRONIC KIDNEY DISEASE (HCC): ICD-10-CM

## 2021-04-09 DIAGNOSIS — E78.00 PURE HYPERCHOLESTEROLEMIA: ICD-10-CM

## 2021-04-09 LAB
A/G RATIO: 1.4 (ref 1.1–2.2)
ALBUMIN SERPL-MCNC: 4.2 G/DL (ref 3.4–5)
ALP BLD-CCNC: 66 U/L (ref 40–129)
ALT SERPL-CCNC: 18 U/L (ref 10–40)
ANION GAP SERPL CALCULATED.3IONS-SCNC: 13 MMOL/L (ref 3–16)
AST SERPL-CCNC: 15 U/L (ref 15–37)
BASOPHILS ABSOLUTE: 0.1 K/UL (ref 0–0.2)
BASOPHILS RELATIVE PERCENT: 0.4 %
BILIRUB SERPL-MCNC: 0.8 MG/DL (ref 0–1)
BUN BLDV-MCNC: 20 MG/DL (ref 7–20)
CALCIUM SERPL-MCNC: 9.8 MG/DL (ref 8.3–10.6)
CHLORIDE BLD-SCNC: 98 MMOL/L (ref 99–110)
CHOLESTEROL, TOTAL: 177 MG/DL (ref 0–199)
CO2: 29 MMOL/L (ref 21–32)
CREAT SERPL-MCNC: 1.3 MG/DL (ref 0.6–1.2)
EOSINOPHILS ABSOLUTE: 0.2 K/UL (ref 0–0.6)
EOSINOPHILS RELATIVE PERCENT: 1.3 %
ESTIMATED AVERAGE GLUCOSE: 194.4 MG/DL
GFR AFRICAN AMERICAN: 49
GFR NON-AFRICAN AMERICAN: 41
GLOBULIN: 3.1 G/DL
GLUCOSE BLD-MCNC: 104 MG/DL (ref 70–99)
HBA1C MFR BLD: 8.4 %
HCT VFR BLD CALC: 39 % (ref 36–48)
HDLC SERPL-MCNC: 68 MG/DL (ref 40–60)
HEMOGLOBIN: 13 G/DL (ref 12–16)
LDL CHOLESTEROL CALCULATED: 87 MG/DL
LYMPHOCYTES ABSOLUTE: 3.3 K/UL (ref 1–5.1)
LYMPHOCYTES RELATIVE PERCENT: 21.1 %
MCH RBC QN AUTO: 30 PG (ref 26–34)
MCHC RBC AUTO-ENTMCNC: 33.3 G/DL (ref 31–36)
MCV RBC AUTO: 90.1 FL (ref 80–100)
MONOCYTES ABSOLUTE: 1.2 K/UL (ref 0–1.3)
MONOCYTES RELATIVE PERCENT: 7.5 %
NEUTROPHILS ABSOLUTE: 11 K/UL (ref 1.7–7.7)
NEUTROPHILS RELATIVE PERCENT: 69.7 %
PDW BLD-RTO: 14 % (ref 12.4–15.4)
PLATELET # BLD: 378 K/UL (ref 135–450)
PMV BLD AUTO: 7.6 FL (ref 5–10.5)
POTASSIUM SERPL-SCNC: 3.5 MMOL/L (ref 3.5–5.1)
RBC # BLD: 4.33 M/UL (ref 4–5.2)
SODIUM BLD-SCNC: 140 MMOL/L (ref 136–145)
TOTAL PROTEIN: 7.3 G/DL (ref 6.4–8.2)
TRIGL SERPL-MCNC: 110 MG/DL (ref 0–150)
VLDLC SERPL CALC-MCNC: 22 MG/DL
WBC # BLD: 15.8 K/UL (ref 4–11)

## 2021-04-12 ENCOUNTER — VIRTUAL VISIT (OUTPATIENT)
Dept: INTERNAL MEDICINE CLINIC | Age: 71
End: 2021-04-12

## 2021-04-12 DIAGNOSIS — N18.30 TYPE 2 DIABETES MELLITUS WITH STAGE 3 CHRONIC KIDNEY DISEASE, WITHOUT LONG-TERM CURRENT USE OF INSULIN, UNSPECIFIED WHETHER STAGE 3A OR 3B CKD (HCC): Primary | ICD-10-CM

## 2021-04-12 DIAGNOSIS — E11.22 TYPE 2 DIABETES MELLITUS WITH STAGE 3 CHRONIC KIDNEY DISEASE, WITHOUT LONG-TERM CURRENT USE OF INSULIN, UNSPECIFIED WHETHER STAGE 3A OR 3B CKD (HCC): Primary | ICD-10-CM

## 2021-04-12 DIAGNOSIS — E66.01 MORBIDLY OBESE (HCC): ICD-10-CM

## 2021-04-12 NOTE — PROGRESS NOTES
Medical Nutrition Therapy Follow Up  Patient Care Team:  Haritha Garcia MD as PCP - Viviana Obrien MD as PCP - [facility] Empaneled Provider  Dottie Tony MD (Obstetrics & Gynecology)  Nida Soriano RD, LD as Dietitian (Dietitian)    Reason for visit: virtual visit f/u for DM and weight concerns  Patient self-assessment of progress: \"When I saw the results of my lab tests - my A1c went back up - I knew I need to get back on track with my exercise\"    Started using glucometer and recorded , random .     ASSESSMENT/PLAN:   NUTRITION DIAGNOSIS    #1 Problem: Altered Nutrition-Related Laboratory Values (NC-2.2)  Related to: Endocrine/Diabetes   As Evidenced by: Elevated Plasma glucose and/or HgbA1c levels           #2 Problem: Overweight/Obesity (NC-3.3)  Related to: Excessive energy intake or physical inactivity  As Evidenced by: BMI more than normative standard for age and sex (BMI=36.05)      NUTRITION INTERVENTION  Nutrition Prescription: Aim for 30 g Carb per meal; 15 - 20 g Carb per snack      Interventions:  Control Carbohydrate Intake using Carb Counting      NUTRITION MONITORING AND EVALUATION  5=always  4 = most of the time   3 = half the days  2 = sometimes  1 = hasn't started  Indicator/Goal Criteria   #1  Add yogurt or other cultured food daily  #1 2/5   #2  Eat at regula intervals throughout the day #2 4/5   #3  Consistent carb intake #3 4/5          Patient Active Problem List   Diagnosis    Asthma    Shortness of breath    Autoimmune hepatitis ([facility]    Essential hypertension, benign    Pure hypercholesterolemia    Impaired fasting glucose    Cataract    Murmur, cardiac    Stage 3 chronic kidney disease    Type 2 diabetes mellitus with stage 3 chronic kidney disease, without long-term current use of insulin (HCC)    Morbidly obese (HCC)       Current Outpatient Medications   Medication Sig Dispense Refill    chlorthalidone (HYGROTON) 25 MG tablet TAKE 1 TABLET BY MOUTH EVERY DAY 30 tablet 0    blood glucose monitor kit and supplies Dispense sufficient amount for indicated testing frequency plus additional to accommodate PRN testing needs. Dispense all needed supplies to include: monitor, strips, lancing device, lancets, control solutions, alcohol swabs. 1 kit 0    amLODIPine (NORVASC) 10 MG tablet Take 0.5 tablets by mouth daily 90 tablet 1    albuterol sulfate HFA (PROVENTIL HFA) 108 (90 Base) MCG/ACT inhaler INHALE 2 PUFFS INTO THE LUNGS EVERY 6 HOURS AS NEEDED. 1 Inhaler 3    desloratadine (CLARINEX) 5 MG tablet TAKE 1 TABLET BY MOUTH EVERY DAY 90 tablet 0    predniSONE (DELTASONE) 10 MG tablet TAKE 1 TABLET BY MOUTH EVERY DAY 90 tablet 3    metFORMIN (GLUMETZA) 1000 MG extended release tablet Take 2 tablets by mouth daily (with breakfast) 60 tablet 3    montelukast (SINGULAIR) 10 MG tablet TAKE 1 TABLET BY MOUTH EVERY DAY EVERY NIGHT 90 tablet 3    SYMBICORT 160-4.5 MCG/ACT AERO TAKE 2 PUFFS BY MOUTH TWICE A DAY 30.6 Inhaler 1    fluticasone (FLONASE) 50 MCG/ACT nasal spray SPRAY 1 SPRAY INTO EACH NOSTRIL EVERY DAY 1 Bottle 2    atorvastatin (LIPITOR) 40 MG tablet Take 1 tablet by mouth daily 90 tablet 3    mometasone (NASONEX) 50 MCG/ACT nasal spray 2 sprays by Nasal route daily as needed (as needed for allergies) (Patient not taking: Reported on 3/2/2021) 3 Inhaler 3    zoledronic acid (RECLAST) 5 MG/100ML SOLN Infuse 100 mLs intravenously once for 1 dose. (Patient not taking: Reported on 3/2/2021) 100 mL 0    calcium carbonate (OSCAL) 500 MG TABS tablet Take 500 mg by mouth daily.  Spacer/Aero Chamber Mouthpiece MISC by Does not apply route. 1 each 1    Multiple Vitamin (MULTIVITAMIN PO) Take  by mouth daily.        Current Facility-Administered Medications   Medication Dose Route Frequency Provider Last Rate Last Admin    ipratropium-albuterol (DUONEB) nebulizer solution 1 ampule  1 ampule Inhalation Once Willow Morales MD NUTRITION RE-ASSESSMENT    Anthropometric Measurement Changes:     Wt:   Wt Readings from Last 3 Encounters:   03/02/21 210 lb (95.3 kg)   08/11/20 217 lb 6.4 oz (98.6 kg)   12/16/19 206 lb (93.4 kg)       BMI:   BMI Readings from Last 3 Encounters:   03/02/21 36.05 kg/m²   08/11/20 37.32 kg/m²   12/16/19 35.36 kg/m²     Weight Change:unknown    Food and Nutrition Changes:   Eating Yogurt 2 - 3 times per week  States she had gotten out of the habit of watching carb intake, but after receiving results of A1c, she is trying to pay attention again      Physical Activity Changes:   Current Activity: walking on treadmill 25 - 30 minutes most days; then walking more throughout the day    Barriers:   None noted    Follow Up Plan: 3 weeks    Referring Provider: Marquita Bucio MD   Time spent with patient: 30 minutes

## 2021-04-13 ENCOUNTER — OFFICE VISIT (OUTPATIENT)
Dept: FAMILY MEDICINE CLINIC | Age: 71
End: 2021-04-13
Payer: MEDICARE

## 2021-04-13 VITALS
DIASTOLIC BLOOD PRESSURE: 90 MMHG | OXYGEN SATURATION: 98 % | HEART RATE: 91 BPM | WEIGHT: 206 LBS | BODY MASS INDEX: 35.17 KG/M2 | HEIGHT: 64 IN | SYSTOLIC BLOOD PRESSURE: 144 MMHG

## 2021-04-13 DIAGNOSIS — N18.30 TYPE 2 DIABETES MELLITUS WITH STAGE 3 CHRONIC KIDNEY DISEASE, WITHOUT LONG-TERM CURRENT USE OF INSULIN, UNSPECIFIED WHETHER STAGE 3A OR 3B CKD (HCC): ICD-10-CM

## 2021-04-13 DIAGNOSIS — E66.01 MORBIDLY OBESE (HCC): ICD-10-CM

## 2021-04-13 DIAGNOSIS — N17.9 AKI (ACUTE KIDNEY INJURY) (HCC): ICD-10-CM

## 2021-04-13 DIAGNOSIS — N18.31 STAGE 3A CHRONIC KIDNEY DISEASE (HCC): ICD-10-CM

## 2021-04-13 DIAGNOSIS — E11.22 TYPE 2 DIABETES MELLITUS WITH STAGE 3 CHRONIC KIDNEY DISEASE, WITHOUT LONG-TERM CURRENT USE OF INSULIN, UNSPECIFIED WHETHER STAGE 3A OR 3B CKD (HCC): ICD-10-CM

## 2021-04-13 DIAGNOSIS — I10 ESSENTIAL HYPERTENSION, BENIGN: Primary | Chronic | ICD-10-CM

## 2021-04-13 PROCEDURE — G8427 DOCREV CUR MEDS BY ELIG CLIN: HCPCS | Performed by: INTERNAL MEDICINE

## 2021-04-13 PROCEDURE — 2022F DILAT RTA XM EVC RTNOPTHY: CPT | Performed by: INTERNAL MEDICINE

## 2021-04-13 PROCEDURE — G8399 PT W/DXA RESULTS DOCUMENT: HCPCS | Performed by: INTERNAL MEDICINE

## 2021-04-13 PROCEDURE — 3052F HG A1C>EQUAL 8.0%<EQUAL 9.0%: CPT | Performed by: INTERNAL MEDICINE

## 2021-04-13 PROCEDURE — 3017F COLORECTAL CA SCREEN DOC REV: CPT | Performed by: INTERNAL MEDICINE

## 2021-04-13 PROCEDURE — 1036F TOBACCO NON-USER: CPT | Performed by: INTERNAL MEDICINE

## 2021-04-13 PROCEDURE — 99214 OFFICE O/P EST MOD 30 MIN: CPT | Performed by: INTERNAL MEDICINE

## 2021-04-13 PROCEDURE — 1123F ACP DISCUSS/DSCN MKR DOCD: CPT | Performed by: INTERNAL MEDICINE

## 2021-04-13 PROCEDURE — G8417 CALC BMI ABV UP PARAM F/U: HCPCS | Performed by: INTERNAL MEDICINE

## 2021-04-13 PROCEDURE — 1090F PRES/ABSN URINE INCON ASSESS: CPT | Performed by: INTERNAL MEDICINE

## 2021-04-13 PROCEDURE — 4040F PNEUMOC VAC/ADMIN/RCVD: CPT | Performed by: INTERNAL MEDICINE

## 2021-04-13 RX ORDER — AMLODIPINE BESYLATE 5 MG/1
5 TABLET ORAL 2 TIMES DAILY
Qty: 180 TABLET | Refills: 1 | Status: SHIPPED | OUTPATIENT
Start: 2021-04-13

## 2021-04-13 ASSESSMENT — ENCOUNTER SYMPTOMS
SHORTNESS OF BREATH: 0
NAUSEA: 0
ABDOMINAL PAIN: 0
VOMITING: 0
CHEST TIGHTNESS: 0

## 2021-04-13 NOTE — PATIENT INSTRUCTIONS
Please check your blood pressure twice in the morning and twice in the evening for one week. send me results. If blood pressure is higher than 150/90 please let me know as soon as possible. Check your glucose levels twice daily and send me the results in 1-2 weeks. Work on low salt diet and exercise. Patient Education        semaglutide (oral/injection)  Pronunciation:  DAVIN a GLOO tide  Brand:  Ozempic (0.25 mg or 0.5 mg dose), Ozempic (1 mg dose), Rybelsus  What is the most important information I should know about semaglutide? You should not use semaglutide if you have multiple endocrine neoplasia type 2 (tumors in your glands), a personal or family history of medullary thyroid cancer, insulin-dependent diabetes, or diabetic ketoacidosis. Call your doctor at once if you have signs of a thyroid tumor, such as swelling or a lump in your neck, trouble swallowing, a hoarse voice, or shortness of breath. What is semaglutide? Semaglutide is used together with diet and exercise to improve blood sugar control in adults with type 2 diabetes mellitus. Semaglutide is usually given after other diabetes medicines have been tried without success. Semaglutide is not for treating type 1 diabetes. Semaglutide may also be used for purposes not listed in this medication guide. What should I discuss with my healthcare provider before using semaglutide? You should not use semaglutide if you are allergic to it, or if you have:  · multiple endocrine neoplasia type 2 (tumors in your glands);  · a personal or family history of medullary thyroid carcinoma (a type of thyroid cancer); or  · diabetic ketoacidosis (call your doctor for treatment). Tell your doctor if you have ever had:  · a stomach or intestinal disorder;  · pancreatitis;  · kidney disease; or  · eye problems caused by diabetes (retinopathy). In animal studies, semaglutide caused thyroid tumors or thyroid cancer.   It is not known whether these effects would To quickly treat hypoglycemia, eat or drink a fast-acting source of sugar (fruit juice, hard candy, crackers, raisins, or non-diet soda). Your doctor may prescribe a glucagon injection kit in case you have severe hypoglycemia. Be sure your family or close friends know how to give you this injection in an emergency. Also watch for signs of high blood sugar (hyperglycemia) such as increased thirst or urination. Blood sugar levels can be affected by stress, illness, surgery, exercise, alcohol use, or skipping meals. Ask your doctor before changing your dose or medication schedule. Call your doctor if you have ongoing vomiting or diarrhea, or if you are sweating more than usual. You can easily become dehydrated while taking semaglutide. This can lead to kidney failure. Semaglutide is only part of a complete treatment program that may also include diet, exercise, weight control, regular blood sugar testing, and special medical care. Follow your doctor's instructions very closely. Store Rybelsus tablets in their original package at room temperature, away from moisture and heat. Storing unopened Ozempic injection pens:  Store in the refrigerator. Do not freeze semaglutide, and throw away the medication if it has become frozen. Do not use an unopened injection pen if the expiration date on the label has passed. Storing after your first use: You may keep an \"in-use\" injection pen in the refrigerator or at room temperature. Protect the pen from heat and sunlight. Remove the needle before storing an injection pen, and keep the cap on the pen when not in use. Throw the injection pen away 56 days after the first use. Use a disposable needle only once. Follow any state or local laws about throwing away used needles and syringes. Use a puncture-proof \"sharps\" disposal container (ask your pharmacist where to get one and how to throw it away). Keep this container out of the reach of children and pets.   What happens if I miss a dose? For Rybelsus: Skip the missed dose and use your next dose at the regular time. Do not use two doses at one time. For Ozempic: Use the medicine as soon as you can, but skip the missed dose if you are more than 5 days late for the dose. Do not use two doses within 5 days of each other. What happens if I overdose? Seek emergency medical attention or call the Poison Help line at 1-737.596.1709. What should I avoid while using semaglutide? Never share an injection pen with another person, even if the needle has been changed. Sharing this device can allow infections or disease to pass from one person to another. What are the possible side effects of semaglutide? Get emergency medical help if you have signs of an allergic reaction:  hives, itching; difficult breathing; swelling of your face, lips, tongue, or throat. Call your doctor at once if you have:  · vision changes;  · signs of a thyroid tumor --swelling or a lump in your neck, trouble swallowing, a hoarse voice, feeling short of breath;  · symptoms of pancreatitis --severe pain in your upper stomach spreading to your back, nausea with or without vomiting, fast heart rate;  · low blood sugar --headache, hunger, weakness, sweating, confusion, irritability, dizziness, fast heart rate, or feeling jittery; or  · kidney problems --little or no urination; painful or difficult urination; swelling in your feet or ankles; feeling tired or short of breath. Common side effects may include:  · nausea (especially when you start using semaglutide);  · vomiting, stomach pain, loss of appetite;  · diarrhea; or  · constipation. This is not a complete list of side effects and others may occur. Call your doctor for medical advice about side effects. You may report side effects to FDA at 7-336-SJC-4895. What other drugs will affect semaglutide? Semaglutide can slow your digestion, and it may take longer for your body to absorb any medicines you take by mouth. Tell your doctor about all your other medicines, especially:  · insulin; or  · oral diabetes medicine. This list is not complete. Other drugs may affect semaglutide, including prescription and over-the-counter medicines, vitamins, and herbal products. Not all possible drug interactions are listed here. Where can I get more information? Your pharmacist can provide more information about semaglutide. Remember, keep this and all other medicines out of the reach of children, never share your medicines with others, and use this medication only for the indication prescribed. Every effort has been made to ensure that the information provided by [address] is accurate, up-to-date, and complete, but no guarantee is made to that effect. Drug information contained herein may be time sensitive. [facility] information has been compiled for use by healthcare practitioners and consumers in the [place] and therefore [employer] does not warrant that uses outside of the [place] are appropriate, unless specifically indicated otherwise. [facility]'s drug information does not endorse drugs, diagnose patients or recommend therapy. [facility] drug information is an informational resource designed to assist licensed healthcare practitioners in caring for their patients and/or to serve consumers viewing this service as a supplement to, and not a substitute for, the expertise, skill, knowledge and judgment of healthcare practitioners. The absence of a warning for a given drug or drug combination in no way should be construed to indicate that the drug or drug combination is safe, effective or appropriate for any given patient. [email or web address] does not assume any responsibility for any aspect of healthcare administered with the aid of information [facility] provides.  The information contained herein is not intended to cover all possible uses, directions, precautions, warnings, drug interactions, allergic reactions, or adverse effects. If you have questions about the drugs you are taking, check with your doctor, nurse or pharmacist.  Copyright 6086-2698 [address]: 2.02. Revision date: 11/27/2019. Care instructions adapted under license by [facility] [place] ([facility]). If you have questions about a medical condition or this instruction, always ask your healthcare professional. [address] any warranty or liability for your use of this information. Patient Education        Low Sodium Diet (2,000 Milligram): Care Instructions  Overview     Limiting sodium can be an important part of managing some health problems. The most common source of sodium is salt. People get most of the salt in their diet from canned, prepared, and packaged foods. Fast food and restaurant meals also are very high in sodium. Your doctor will probably limit your sodium to less than 2,000 milligrams (mg) a day. This limit counts all the sodium in prepared and packaged foods and any salt you add to your food. Follow-up care is a key part of your treatment and safety. Be sure to make and go to all appointments, and call your doctor if you are having problems. It's also a good idea to know your test results and keep a list of the medicines you take. How can you care for yourself at home? Read food labels  · Read labels on cans and food packages. The labels tell you how much sodium is in each serving. Make sure that you look at the serving size. If you eat more than the serving size, you have eaten more sodium. · Food labels also tell you the Percent Daily Value for sodium. Choose products with low Percent Daily Values for sodium. · Be aware that sodium can come in forms other than salt, including monosodium glutamate (MSG), sodium citrate, and sodium bicarbonate (baking soda). MSG is often added to Asian food. When you eat out, you can sometimes ask for food without MSG or added salt.   Buy low-sodium foods  · Buy foods that are labeled \"unsalted\" (no salt added), \"sodium-free\" (less than 5 mg of sodium per serving), or \"low-sodium\" (140 mg or less of sodium per serving). Foods labeled \"reduced-sodium\" and \"light sodium\" may still have too much sodium. Be sure to read the label to see how much sodium you are getting. · Buy fresh vegetables, or frozen vegetables without added sauces. Buy low-sodium versions of canned vegetables, soups, and other canned goods. Prepare low-sodium meals  · Cut back on the amount of salt you use in cooking. This will help you adjust to the taste. Do not add salt after cooking. One teaspoon of salt has about 2,300 mg of sodium. · Take the salt shaker off the table. · Flavor your food with garlic, lemon juice, onion, vinegar, herbs, and spices. Do not use soy sauce, lite soy sauce, steak sauce, onion salt, garlic salt, celery salt, or ketchup on your food. · Use low-sodium salad dressings, sauces, and ketchup. Or make your own salad dressings and sauces without adding salt. · Use less salt (or none) when recipes call for it. You can often use half the salt a recipe calls for without losing flavor. Other foods such as rice, pasta, and grains do not need added salt. · Rinse canned vegetables, and cook them in fresh water. This removes somebut not allof the salt. · Avoid water that is naturally high in sodium or that has been treated with water softeners, which add sodium. If you buy bottled water, read the label and choose a sodium-free brand. Avoid high-sodium foods  · Avoid eating:  ? Smoked, cured, salted, and canned meat, fish, and poultry. ? Ham, bay, hot dogs, and luncheon meats. ? Regular, hard, and processed cheese and regular peanut butter. ? Crackers with salted tops, and other salted snack foods such as pretzels, chips, and salted popcorn. ? Frozen prepared meals, unless labeled low-sodium. ? Canned and dried soups, broths, and bouillon, unless labeled sodium-free or low-sodium. ?  Canned

## 2021-04-13 NOTE — PROGRESS NOTES
standing up. Past Medical History:        Diagnosis Date    Allergic rhinitis     Asthma     COPD (chronic obstructive pulmonary disease) ([facility] [address]     Hepatitis     Hyperlipidemia     Hypertension     Influenza A 1/10/2015    Osteoporosis     Type 2 diabetes mellitus without complication, without long-term current use of insulin ([facility] 12/16/2019       Social History:   TOBACCO:   reports that she has never smoked. She has never used smokeless tobacco.  ETOH:   reports no history of alcohol use. Current Medications:    Prior to Admission medications    Medication Sig Start Date End Date Taking? Authorizing Provider   Semaglutide 3 MG TABS Take 3 mg by mouth daily 4/13/21  Yes Pam Faith MD   amLODIPine (NORVASC) 5 MG tablet Take 1 tablet by mouth 2 times daily 4/13/21  Yes Pam Faith MD   chlorthalidone (HYGROTON) 25 MG tablet TAKE 1 TABLET BY MOUTH EVERY DAY 3/25/21  Yes Pam Faith MD   blood glucose monitor kit and supplies Dispense sufficient amount for indicated testing frequency plus additional to accommodate PRN testing needs. Dispense all needed supplies to include: monitor, strips, lancing device, lancets, control solutions, alcohol swabs.  3/17/21  Yes Pam Faith MD   albuterol sulfate HFA (PROVENTIL HFA) 108 (90 Base) MCG/ACT inhaler INHALE 2 PUFFS INTO THE LUNGS EVERY 6 HOURS AS NEEDED. 3/2/21  Yes Pam Faith MD   desloratadine (CLARINEX) 5 MG tablet TAKE 1 TABLET BY MOUTH EVERY DAY 3/2/21  Yes Pam Faith MD   predniSONE (DELTASONE) 10 MG tablet TAKE 1 TABLET BY MOUTH EVERY DAY 3/2/21  Yes Pam Faith MD   metFORMIN (GLUMETZA) 1000 MG extended release tablet Take 2 tablets by mouth daily (with breakfast) 12/31/20  Yes Pma Faith MD   montelukast (SINGULAIR) 10 MG tablet TAKE 1 TABLET BY MOUTH EVERY DAY EVERY NIGHT 12/17/20  Yes Pam Faith MD   SYMBICORT 160-4.5 MCG/ACT AERO TAKE 2 PUFFS BY MOUTH TWICE A DAY 10/27/20  Yes Marquita Bucio MD   fluticasone (FLONASE) 50 MCG/ACT nasal spray SPRAY 1 SPRAY INTO EACH NOSTRIL EVERY DAY 10/19/20  Yes Marquita Bucio MD   atorvastatin (LIPITOR) 40 MG tablet Take 1 tablet by mouth daily 3/31/20  Yes Marquita Bucio MD   mometasone (NASONEX) 50 MCG/ACT nasal spray 2 sprays by Nasal route daily as needed (as needed for allergies) 9/6/16  Yes Ivett Augustine MD   calcium carbonate (OSCAL) 500 MG TABS tablet Take 500 mg by mouth daily. Yes Historical Provider, MD   Spacer/Aero Chamber Mouthpiece MISC by Does not apply route. 1/24/12  Yes Ivett Augustine MD   Multiple Vitamin (MULTIVITAMIN PO) Take  by mouth daily. 8/6/10  Yes Historical Provider, MD   zoledronic acid (RECLAST) 5 MG/100ML SOLN Infuse 100 mLs intravenously once for 1 dose. Patient not taking: Reported on 3/2/2021 8/8/14 10/9/17  Ivett Augustine MD       REVIEW OF SYSTEMS:  Review of Systems   Constitutional: Negative for activity change, appetite change, chills, fever and unexpected weight change. Eyes: Negative for visual disturbance. Respiratory: Negative for chest tightness and shortness of breath. Cardiovascular: Negative for chest pain. Gastrointestinal: Negative for abdominal pain, nausea and vomiting. Genitourinary: Negative for hematuria. Skin: Negative for rash. Allergic/Immunologic: Negative for immunocompromised state. Neurological: Negative for dizziness and headaches. Psychiatric/Behavioral: Negative for dysphoric mood and suicidal ideas. Physical Exam:      Vitals: BP (!) 144/90 (Site: Left Upper Arm, Position: Sitting, Cuff Size: Medium Adult)   Pulse 91   Ht 5' 4\" (1.626 m)   Wt 206 lb (93.4 kg)   LMP 12/07/2005   SpO2 98%   BMI 35.36 kg/m²     Body mass index is 35.36 kg/m².   Wt Readings from Last 3 Encounters:   04/13/21 206 lb (93.4 kg)   03/02/21 210 lb (95.3 kg)   08/11/20 217 lb 6.4 oz (98.6 kg)     Physical Exam Constitutional:       General: She is not in acute distress. Appearance: She is well-developed. She is not diaphoretic. HENT:      Head: Normocephalic and atraumatic. Mouth/Throat:      Pharynx: No oropharyngeal exudate. Eyes:      General: No scleral icterus. Right eye: No discharge. Left eye: No discharge. Conjunctiva/sclera: Conjunctivae normal.   Neck:      Musculoskeletal: Normal range of motion and neck supple. Cardiovascular:      Rate and Rhythm: Normal rate. Pulses: Normal pulses. Heart sounds: Normal heart sounds. No murmur. Pulmonary:      Effort: Pulmonary effort is normal. No respiratory distress. Breath sounds: Normal breath sounds. No wheezing or rales. Abdominal:      General: There is no distension. Palpations: Abdomen is soft. Tenderness: There is no abdominal tenderness. There is no guarding. Musculoskeletal:         General: No deformity. Right lower leg: No edema. Left lower leg: No edema. Lymphadenopathy:      Head:      Right side of head: No submental or submandibular adenopathy. Left side of head: No submental or submandibular adenopathy. Cervical: No cervical adenopathy. Right cervical: No superficial or deep cervical adenopathy. Left cervical: No superficial or deep cervical adenopathy. Skin:     Findings: No rash. Neurological:      Mental Status: She is alert and oriented to person, place, and time. GCS: GCS eye subscore is 4. GCS verbal subscore is 5. GCS motor subscore is 6. Motor: No abnormal muscle tone. Deep Tendon Reflexes: Reflexes are normal and symmetric. Psychiatric:         Behavior: Behavior normal.         Thought Content: Thought content normal.        Assessment/Plan:   Sridevi Brambila was seen today for 1 month follow-up.     Diagnoses and all orders for this visit:    Essential hypertension, benign  not controlled in AM  Much better in PM  She does get dizzy so I want to be cautious w/ dose increase/ adding meds  I will wplit dose of rvasc  She will work on good diet, low salt  New GLP will help with weight loss  She will sned BP measurement daily   -     amLODIPine (NORVASC) 5 MG tablet; Take 1 tablet by mouth 2 times daily    VIOLA (acute kidney injury) ([facility] [address]  Possible 2/2 uncontrolled BP  Adjust meds  Better diet  Recheck BMP in 1-2 weeks before she comes in 2 weeks   -     BASIC METABOLIC PANEL; Future    Type 2 diabetes mellitus with stage 3 chronic kidney disease, without long-term current use of insulin, unspecified whether stage 3a or 3b CKD ([facility] [address]  discussed GLP  No thyroid cancer in family   Trial of Semaglutide     Semalugtide sample 3 mg [id number] 1/31/2022  Stage 3a chronic kidney disease  -     BASIC METABOLIC PANEL; Future    Morbidly obese ([facility] [address]  Better diet  Add GLP for DM which should help     Other orders  -     Semaglutide 3 MG TABS; Take 3 mg by mouth daily      - Patient was encouraged to call the office (and ask to see me) or be seen by other provider for any worsening or lack of improvement of the symptoms or any new symptoms.    - Pt was asked toschedule an appointment in 0.5 months, and to let me know of any scheduling difficulties. Additional patients instructions (if given):   Patient Instructions   Please check your blood pressure twice in the morning and twice in the evening for one week. send me results. If blood pressure is higher than 150/90 please let me know as soon as possible. Check your glucose levels twice daily and send me the results in 1-2 weeks. Work on low salt diet and exercise. Patient Education        semaglutide (oral/injection)  Pronunciation:  DAVIN a GLOO tide  Brand:  Ozempic (0.25 mg or 0.5 mg dose), Ozempic (1 mg dose), Rybelsus  What is the most important information I should know about semaglutide?   You should not use semaglutide if you have multiple endocrine neoplasia type 2 (tumors in your glands), a personal Rybelsus (oral) is taken by mouth. Take Rybelsus on an empty stomach when you first wake up, at least 30 minutes before you eat or drink anything. Take this medicine with no more than 4 ounces of water. Swallow the tablet whole and do not crush, chew, or break it. Rybelsus works best if you eat 30 to 60 minutes after taking it. Ozempic (injection) is injected under the skin. A healthcare provider may teach you how to properly use the medication by yourself. Read and carefully follow any Instructions for Use provided with Ozempic. Ask your doctor or pharmacist if you don't understand all instructions. Prepare an injection only when you are ready to give it. Do not use if the medicine looks cloudy or has particles in it. Call your pharmacist for new medicine. Minh Hughess is usually given once per week at any time of the day, with or without a meal. If you want to change your weekly injection day, wait at least 2 days after your most recent injection before giving another one. Your healthcare provider will show you where on your body to inject Ozempic. Use a different place each time you give an injection. Do not inject into the same place two times in a row. You may have low blood sugar (hypoglycemia) and feel very hungry, dizzy, irritable, confused, anxious, or shaky. To quickly treat hypoglycemia, eat or drink a fast-acting source of sugar (fruit juice, hard candy, crackers, raisins, or non-diet soda). Your doctor may prescribe a glucagon injection kit in case you have severe hypoglycemia. Be sure your family or close friends know how to give you this injection in an emergency. Also watch for signs of high blood sugar (hyperglycemia) such as increased thirst or urination. Blood sugar levels can be affected by stress, illness, surgery, exercise, alcohol use, or skipping meals. Ask your doctor before changing your dose or medication schedule.   Call your doctor if you have ongoing vomiting or diarrhea, or if you are sweating more than usual. You can easily become dehydrated while taking semaglutide. This can lead to kidney failure. Semaglutide is only part of a complete treatment program that may also include diet, exercise, weight control, regular blood sugar testing, and special medical care. Follow your doctor's instructions very closely. Store Rybelsus tablets in their original package at room temperature, away from moisture and heat. Storing unopened Ozempic injection pens:  Store in the refrigerator. Do not freeze semaglutide, and throw away the medication if it has become frozen. Do not use an unopened injection pen if the expiration date on the label has passed. Storing after your first use: You may keep an \"in-use\" injection pen in the refrigerator or at room temperature. Protect the pen from heat and sunlight. Remove the needle before storing an injection pen, and keep the cap on the pen when not in use. Throw the injection pen away 56 days after the first use. Use a disposable needle only once. Follow any state or local laws about throwing away used needles and syringes. Use a puncture-proof \"sharps\" disposal container (ask your pharmacist where to get one and how to throw it away). Keep this container out of the reach of children and pets. What happens if I miss a dose? For Rybelsus: Skip the missed dose and use your next dose at the regular time. Do not use two doses at one time. For Ozempic: Use the medicine as soon as you can, but skip the missed dose if you are more than 5 days late for the dose. Do not use two doses within 5 days of each other. What happens if I overdose? Seek emergency medical attention or call the Poison Help line at 1-166.262.9042. What should I avoid while using semaglutide? Never share an injection pen with another person, even if the needle has been changed. Sharing this device can allow infections or disease to pass from one person to another.   What are the possible side effects of semaglutide? Get emergency medical help if you have signs of an allergic reaction:  hives, itching; difficult breathing; swelling of your face, lips, tongue, or throat. Call your doctor at once if you have:  · vision changes;  · signs of a thyroid tumor --swelling or a lump in your neck, trouble swallowing, a hoarse voice, feeling short of breath;  · symptoms of pancreatitis --severe pain in your upper stomach spreading to your back, nausea with or without vomiting, fast heart rate;  · low blood sugar --headache, hunger, weakness, sweating, confusion, irritability, dizziness, fast heart rate, or feeling jittery; or  · kidney problems --little or no urination; painful or difficult urination; swelling in your feet or ankles; feeling tired or short of breath. Common side effects may include:  · nausea (especially when you start using semaglutide);  · vomiting, stomach pain, loss of appetite;  · diarrhea; or  · constipation. This is not a complete list of side effects and others may occur. Call your doctor for medical advice about side effects. You may report side effects to FDA at 2-303-FDA-4340. What other drugs will affect semaglutide? Semaglutide can slow your digestion, and it may take longer for your body to absorb any medicines you take by mouth. Tell your doctor about all your other medicines, especially:  · insulin; or  · oral diabetes medicine. This list is not complete. Other drugs may affect semaglutide, including prescription and over-the-counter medicines, vitamins, and herbal products. Not all possible drug interactions are listed here. Where can I get more information? Your pharmacist can provide more information about semaglutide. Remember, keep this and all other medicines out of the reach of children, never share your medicines with others, and use this medication only for the indication prescribed.    Every effort has been made to ensure that the information provided by [employer] salt. People get most of the salt in their diet from canned, prepared, and packaged foods. Fast food and restaurant meals also are very high in sodium. Your doctor will probably limit your sodium to less than 2,000 milligrams (mg) a day. This limit counts all the sodium in prepared and packaged foods and any salt you add to your food. Follow-up care is a key part of your treatment and safety. Be sure to make and go to all appointments, and call your doctor if you are having problems. It's also a good idea to know your test results and keep a list of the medicines you take. How can you care for yourself at home? Read food labels  · Read labels on cans and food packages. The labels tell you how much sodium is in each serving. Make sure that you look at the serving size. If you eat more than the serving size, you have eaten more sodium. · Food labels also tell you the Percent Daily Value for sodium. Choose products with low Percent Daily Values for sodium. · Be aware that sodium can come in forms other than salt, including monosodium glutamate (MSG), sodium citrate, and sodium bicarbonate (baking soda). MSG is often added to Asian food. When you eat out, you can sometimes ask for food without MSG or added salt. Buy low-sodium foods  · Buy foods that are labeled \"unsalted\" (no salt added), \"sodium-free\" (less than 5 mg of sodium per serving), or \"low-sodium\" (140 mg or less of sodium per serving). Foods labeled \"reduced-sodium\" and \"light sodium\" may still have too much sodium. Be sure to read the label to see how much sodium you are getting. · Buy fresh vegetables, or frozen vegetables without added sauces. Buy low-sodium versions of canned vegetables, soups, and other canned goods. Prepare low-sodium meals  · Cut back on the amount of salt you use in cooking. This will help you adjust to the taste. Do not add salt after cooking. One teaspoon of salt has about 2,300 mg of sodium.   · Take the salt shaker off the under license by [facility] [place] ([facility]). If you have questions about a medical condition or this instruction, always ask your healthcare professional. [place] [address] any warranty or liability for your use of this information. Inessa Jacob M.D.   4/13/2021, 9:31 AM      NOTE: This report was transcribed using voice recognition software. Every effort was made to ensure accuracy, however, inadvertent computerized transcription errors may be present.

## 2021-04-17 DIAGNOSIS — I10 ESSENTIAL HYPERTENSION, BENIGN: Chronic | ICD-10-CM

## 2021-04-18 DIAGNOSIS — I10 ESSENTIAL HYPERTENSION, BENIGN: Chronic | ICD-10-CM

## 2021-04-18 DIAGNOSIS — N17.9 AKI (ACUTE KIDNEY INJURY) (HCC): Primary | ICD-10-CM

## 2021-04-18 RX ORDER — CLONIDINE HYDROCHLORIDE 0.1 MG/1
0.1 TABLET ORAL DAILY
Qty: 90 TABLET | Refills: 1 | Status: SHIPPED | OUTPATIENT
Start: 2021-04-18 | End: 2021-05-04 | Stop reason: ALTCHOICE

## 2021-04-19 DIAGNOSIS — I10 ESSENTIAL HYPERTENSION, BENIGN: Chronic | ICD-10-CM

## 2021-04-19 RX ORDER — CHLORTHALIDONE 25 MG/1
TABLET ORAL
Qty: 30 TABLET | Refills: 0 | Status: SHIPPED | OUTPATIENT
Start: 2021-04-19 | End: 2021-04-27

## 2021-04-19 RX ORDER — CHLORTHALIDONE 25 MG/1
TABLET ORAL
Qty: 90 TABLET | Refills: 3 | OUTPATIENT
Start: 2021-04-19

## 2021-04-19 NOTE — PROGRESS NOTES
I spoke to pt: I spoke to the pt about her abs: A1C higher and Cr higher  BM in /88--> 118/74. She takes Amlodipine 5 mg   We will stop chlorthalidone, start Clonidine 0.1 mg daily   She will send BP reading next week and repeat BMP next week.    she just started [employer]

## 2021-04-21 DIAGNOSIS — I10 ESSENTIAL HYPERTENSION, BENIGN: Chronic | ICD-10-CM

## 2021-04-21 RX ORDER — CHLORTHALIDONE 25 MG/1
TABLET ORAL
Qty: 90 TABLET | Refills: 3 | OUTPATIENT
Start: 2021-04-21

## 2021-04-23 DIAGNOSIS — N17.9 AKI (ACUTE KIDNEY INJURY) (HCC): ICD-10-CM

## 2021-04-23 LAB
ANION GAP SERPL CALCULATED.3IONS-SCNC: 14 MMOL/L (ref 3–16)
BUN BLDV-MCNC: 24 MG/DL (ref 7–20)
CALCIUM SERPL-MCNC: 10.7 MG/DL (ref 8.3–10.6)
CHLORIDE BLD-SCNC: 95 MMOL/L (ref 99–110)
CO2: 28 MMOL/L (ref 21–32)
CREAT SERPL-MCNC: 1.7 MG/DL (ref 0.6–1.2)
GFR AFRICAN AMERICAN: 36
GFR NON-AFRICAN AMERICAN: 30
GLUCOSE BLD-MCNC: 83 MG/DL (ref 70–99)
POTASSIUM SERPL-SCNC: 3.6 MMOL/L (ref 3.5–5.1)
SODIUM BLD-SCNC: 137 MMOL/L (ref 136–145)

## 2021-04-26 DIAGNOSIS — N17.9 AKI (ACUTE KIDNEY INJURY) (HCC): Primary | ICD-10-CM

## 2021-04-27 ENCOUNTER — OFFICE VISIT (OUTPATIENT)
Dept: FAMILY MEDICINE CLINIC | Age: 71
End: 2021-04-27
Payer: MEDICARE

## 2021-04-27 VITALS
HEIGHT: 64 IN | HEART RATE: 71 BPM | BODY MASS INDEX: 35.17 KG/M2 | WEIGHT: 206 LBS | DIASTOLIC BLOOD PRESSURE: 74 MMHG | OXYGEN SATURATION: 97 % | SYSTOLIC BLOOD PRESSURE: 122 MMHG

## 2021-04-27 DIAGNOSIS — N17.9 AKI (ACUTE KIDNEY INJURY) (HCC): ICD-10-CM

## 2021-04-27 DIAGNOSIS — N18.30 TYPE 2 DIABETES MELLITUS WITH STAGE 3 CHRONIC KIDNEY DISEASE, WITHOUT LONG-TERM CURRENT USE OF INSULIN, UNSPECIFIED WHETHER STAGE 3A OR 3B CKD (HCC): Primary | ICD-10-CM

## 2021-04-27 DIAGNOSIS — I10 ESSENTIAL HYPERTENSION, BENIGN: Chronic | ICD-10-CM

## 2021-04-27 DIAGNOSIS — E11.22 TYPE 2 DIABETES MELLITUS WITH STAGE 3 CHRONIC KIDNEY DISEASE, WITHOUT LONG-TERM CURRENT USE OF INSULIN, UNSPECIFIED WHETHER STAGE 3A OR 3B CKD (HCC): Primary | ICD-10-CM

## 2021-04-27 PROCEDURE — 3052F HG A1C>EQUAL 8.0%<EQUAL 9.0%: CPT | Performed by: INTERNAL MEDICINE

## 2021-04-27 PROCEDURE — G8399 PT W/DXA RESULTS DOCUMENT: HCPCS | Performed by: INTERNAL MEDICINE

## 2021-04-27 PROCEDURE — 1036F TOBACCO NON-USER: CPT | Performed by: INTERNAL MEDICINE

## 2021-04-27 PROCEDURE — 1123F ACP DISCUSS/DSCN MKR DOCD: CPT | Performed by: INTERNAL MEDICINE

## 2021-04-27 PROCEDURE — 1090F PRES/ABSN URINE INCON ASSESS: CPT | Performed by: INTERNAL MEDICINE

## 2021-04-27 PROCEDURE — G8417 CALC BMI ABV UP PARAM F/U: HCPCS | Performed by: INTERNAL MEDICINE

## 2021-04-27 PROCEDURE — 3017F COLORECTAL CA SCREEN DOC REV: CPT | Performed by: INTERNAL MEDICINE

## 2021-04-27 PROCEDURE — 99214 OFFICE O/P EST MOD 30 MIN: CPT | Performed by: INTERNAL MEDICINE

## 2021-04-27 PROCEDURE — 4040F PNEUMOC VAC/ADMIN/RCVD: CPT | Performed by: INTERNAL MEDICINE

## 2021-04-27 PROCEDURE — 2022F DILAT RTA XM EVC RTNOPTHY: CPT | Performed by: INTERNAL MEDICINE

## 2021-04-27 PROCEDURE — G8427 DOCREV CUR MEDS BY ELIG CLIN: HCPCS | Performed by: INTERNAL MEDICINE

## 2021-04-27 ASSESSMENT — ENCOUNTER SYMPTOMS
VOMITING: 0
NAUSEA: 0
SHORTNESS OF BREATH: 0
ABDOMINAL PAIN: 0
CHEST TIGHTNESS: 0

## 2021-04-27 NOTE — PROGRESS NOTES
Outpatient Note for established Patient - regular Visit     History Obtained From:  patient, electronic medical record  Is the patient new to me ? - No    HISTORY OF PRESENT ILLNESS:   The patient is a 78 y.o. female who is here today for :  Diagnoses and all orders for this visit:    Type 2 diabetes mellitus with stage 3 chronic kidney disease, without long-term current use of insulin, unspecified whether stage 3a or 3b CKD (HCC)    VIOLA (acute kidney injury) ([facility] [address]  -     URINALYSIS WITH MICROSCOPIC  -     MICROALBUMIN / CREATININE URINE RATIO; Future    Essential hypertension, benign      VIOLA- recent decliens in GFR:  Cr 0.9-->1.3--->1.7  BP is well controlled 117/70  HCTZ was dc   Now on Amlodipine  5 mg bid and Clonidine   DM not controlled  She improved diet  FBG- recently in the  mg 5 w/ one 260 mg%  Improving. She is on Metformin and Semaglutide. She will see nephrologist next week  No dysuria / hematuria     Past Medical History:        Diagnosis Date    Allergic rhinitis     Asthma     COPD (chronic obstructive pulmonary disease) ([facility] [address]     Hepatitis     Hyperlipidemia     Hypertension     Influenza A 1/10/2015    Osteoporosis     Type 2 diabetes mellitus without complication, without long-term current use of insulin ([facility] [address] 12/16/2019       Social History:   TOBACCO:   reports that she has never smoked. She has never used smokeless tobacco.    ETOH:   reports no history of alcohol use. Current Medications:    Prior to Admission medications    Medication Sig Start Date End Date Taking?  Authorizing Provider   cloNIDine (CATAPRES) 0.1 MG tablet Take 1 tablet by mouth daily 4/18/21  Yes Lisandro Stone MD   Semaglutide 3 MG TABS Take 3 mg by mouth daily 4/13/21  Yes Lisandro Stone MD   amLODIPine (NORVASC) 5 MG tablet Take 1 tablet by mouth 2 times daily 4/13/21  Yes Lisandro Stone MD   blood glucose monitor kit and supplies Dispense sufficient amount for indicated testing frequency plus additional to accommodate PRN testing needs. Dispense all needed supplies to include: monitor, strips, lancing device, lancets, control solutions, alcohol swabs. 3/17/21  Yes Arnold Mckinley MD   albuterol sulfate HFA (PROVENTIL HFA) 108 (90 Base) MCG/ACT inhaler INHALE 2 PUFFS INTO THE LUNGS EVERY 6 HOURS AS NEEDED. 3/2/21  Yes Arnold Mckinley MD   desloratadine (CLARINEX) 5 MG tablet TAKE 1 TABLET BY MOUTH EVERY DAY 3/2/21  Yes Arnold Mckinley MD   predniSONE (DELTASONE) 10 MG tablet TAKE 1 TABLET BY MOUTH EVERY DAY 3/2/21  Yes Arnold Mckinley MD   metFORMIN (GLUMETZA) 1000 MG extended release tablet Take 2 tablets by mouth daily (with breakfast) 12/31/20  Yes Arnold Mckinley MD   montelukast (SINGULAIR) 10 MG tablet TAKE 1 TABLET BY MOUTH EVERY DAY EVERY NIGHT 12/17/20  Yes Arnold Mckinley MD   SYMBICORT 160-4.5 MCG/ACT AERO TAKE 2 PUFFS BY MOUTH TWICE A DAY 10/27/20  Yes Arnold Mckinley MD   fluticasone (FLONASE) 50 MCG/ACT nasal spray SPRAY 1 SPRAY INTO EACH NOSTRIL EVERY DAY 10/19/20  Yes Arnold Mckinley MD   atorvastatin (LIPITOR) 40 MG tablet Take 1 tablet by mouth daily 3/31/20  Yes Arnold Mckinley MD   mometasone (NASONEX) 50 MCG/ACT nasal spray 2 sprays by Nasal route daily as needed (as needed for allergies) 9/6/16  Yes Eliel Jones MD   calcium carbonate (OSCAL) 500 MG TABS tablet Take 500 mg by mouth daily. Yes Historical Provider, MD   Spacer/Aero Chamber Mouthpiece MISC by Does not apply route. 1/24/12  Yes Eliel Jones MD   Multiple Vitamin (MULTIVITAMIN PO) Take  by mouth daily. 8/6/10  Yes Historical Provider, MD   zoledronic acid (RECLAST) 5 MG/100ML SOLN Infuse 100 mLs intravenously once for 1 dose.   Patient not taking: Reported on 3/2/2021 8/8/14 10/9/17  Eliel Jones MD       REVIEW OF SYSTEMS:  Review of Systems   Constitutional: Negative for activity change, appetite change, chills, fever and unexpected weight change. HENT: Negative for hearing loss. Eyes: Negative for visual disturbance. Respiratory: Negative for chest tightness and shortness of breath. Cardiovascular: Negative for chest pain. Gastrointestinal: Negative for abdominal pain, nausea and vomiting. Genitourinary: Negative for hematuria. Skin: Negative for rash. Allergic/Immunologic: Negative for immunocompromised state. Neurological: Negative for dizziness and headaches. Psychiatric/Behavioral: Negative for dysphoric mood and suicidal ideas. Physical Exam:      Vitals: /74 (Site: Left Upper Arm, Position: Sitting, Cuff Size: Medium Adult)   Pulse 71   Ht 5' 4\" (1.626 m)   Wt 206 lb (93.4 kg)   LMP 12/07/2005   SpO2 97%   BMI 35.36 kg/m²     Body mass index is 35.36 kg/m². Wt Readings from Last 3 Encounters:   04/27/21 206 lb (93.4 kg)   04/13/21 206 lb (93.4 kg)   03/02/21 210 lb (95.3 kg)     Physical Exam  Constitutional:       General: She is not in acute distress. Appearance: She is well-developed. She is not diaphoretic. HENT:      Head: Normocephalic and atraumatic. Mouth/Throat:      Pharynx: No oropharyngeal exudate. Eyes:      General: No scleral icterus. Right eye: No discharge. Left eye: No discharge. Conjunctiva/sclera: Conjunctivae normal.   Neck:      Musculoskeletal: Normal range of motion and neck supple. Cardiovascular:      Rate and Rhythm: Normal rate. Heart sounds: Normal heart sounds. No murmur. Pulmonary:      Effort: Pulmonary effort is normal. No respiratory distress. Breath sounds: Normal breath sounds. No wheezing or rales. Abdominal:      General: There is no distension. Palpations: Abdomen is soft. Tenderness: There is no abdominal tenderness. Musculoskeletal:         General: No deformity. Lymphadenopathy:      Head:      Right side of head: No submental or submandibular adenopathy.       Left side of head: No submental or submandibular adenopathy. Cervical: No cervical adenopathy. Right cervical: No superficial or deep cervical adenopathy. Left cervical: No superficial or deep cervical adenopathy. Skin:     Findings: No rash. Neurological:      Mental Status: She is alert and oriented to person, place, and time. GCS: GCS eye subscore is 4. GCS verbal subscore is 5. GCS motor subscore is 6. Motor: No abnormal muscle tone. Deep Tendon Reflexes: Reflexes are normal and symmetric. Psychiatric:         Behavior: Behavior normal.         Thought Content: Thought content normal.            Assessment/Plan:   Diagnoses and all orders for this visit:    Type 2 diabetes mellitus with stage 3 chronic kidney disease, without long-term current use of insulin, unspecified whether stage 3a or 3b CKD ([facility] [address]  We will wiat for labs results and kidney function including protein levles in urine to decide about further management. If kidney function ok and no protein increase GLP  If proteinuria--> consider SGLT    VIOLA (acute kidney injury) ([facility] [address]  Recheck kidney function and albuminuria  Pt scheduled to see a nephrologist   -     URINALYSIS WITH MICROSCOPIC  -     MICROALBUMIN / CREATININE URINE RATIO; Future    Essential hypertension, benign  Condition well controlled. Pt is tolerating well the medication without side effects. I will keep current medications dose which seems to be helping. We will monitor symptoms and condition periodically. - no changes in medication today. she will see nephrologist in 1 week       - Patient was encouraged to call the office (and ask to see me) or be seen by other provider for any worsening or lack of improvement of the symptoms or any new symptoms.    - Pt was asked toschedule an appointment in 1 months, and to let me know of any scheduling difficulties.      Additional patients instructions (if given):   Patient Instructions   Please call the office (and ask to see me) or be seen by other provider for any worsening or lack of improvement of the symptoms or for any new symptoms as soon as possible. Please make sure to schedule your follow appointment as discussed. Please let me know if ay further questions. Please let me know if there are any symptoms or concerns that you feel that needs to be further addressed. Robert Jung M.D.   4/27/2021, 3:56 PM      NOTE: This report was transcribed using voice recognition software. Every effort was made to ensure accuracy, however, inadvertent computerized transcription errors may be present.

## 2021-04-29 DIAGNOSIS — N18.31 STAGE 3A CHRONIC KIDNEY DISEASE (HCC): ICD-10-CM

## 2021-04-29 DIAGNOSIS — N17.9 AKI (ACUTE KIDNEY INJURY) (HCC): ICD-10-CM

## 2021-04-29 LAB
ANION GAP SERPL CALCULATED.3IONS-SCNC: 13 MMOL/L (ref 3–16)
BILIRUBIN URINE: NEGATIVE
BLOOD, URINE: NEGATIVE
BUN BLDV-MCNC: 15 MG/DL (ref 7–20)
CALCIUM SERPL-MCNC: 9.6 MG/DL (ref 8.3–10.6)
CHLORIDE BLD-SCNC: 100 MMOL/L (ref 99–110)
CLARITY: CLEAR
CO2: 28 MMOL/L (ref 21–32)
COLOR: YELLOW
CREAT SERPL-MCNC: 1.3 MG/DL (ref 0.6–1.2)
CREATININE URINE: 90.1 MG/DL (ref 28–259)
EPITHELIAL CELLS, UA: 3 /HPF (ref 0–5)
GFR AFRICAN AMERICAN: 49
GFR NON-AFRICAN AMERICAN: 41
GLUCOSE BLD-MCNC: 84 MG/DL (ref 70–99)
GLUCOSE URINE: NEGATIVE MG/DL
HYALINE CASTS: 2 /LPF (ref 0–8)
KETONES, URINE: NEGATIVE MG/DL
LEUKOCYTE ESTERASE, URINE: ABNORMAL
MICROALBUMIN UR-MCNC: <1.2 MG/DL
MICROALBUMIN/CREAT UR-RTO: NORMAL MG/G (ref 0–30)
MICROSCOPIC EXAMINATION: YES
NITRITE, URINE: NEGATIVE
PH UA: 6 (ref 5–8)
POTASSIUM SERPL-SCNC: 3.4 MMOL/L (ref 3.5–5.1)
PROTEIN UA: NEGATIVE MG/DL
RBC UA: 0 /HPF (ref 0–4)
SODIUM BLD-SCNC: 141 MMOL/L (ref 136–145)
SPECIFIC GRAVITY UA: 1.01 (ref 1–1.03)
URINE TYPE: ABNORMAL
UROBILINOGEN, URINE: 0.2 E.U./DL
WBC UA: 2 /HPF (ref 0–5)

## 2021-05-03 ENCOUNTER — VIRTUAL VISIT (OUTPATIENT)
Dept: INTERNAL MEDICINE CLINIC | Age: 71
End: 2021-05-03

## 2021-05-03 DIAGNOSIS — E11.22 TYPE 2 DIABETES MELLITUS WITH STAGE 3 CHRONIC KIDNEY DISEASE, WITHOUT LONG-TERM CURRENT USE OF INSULIN, UNSPECIFIED WHETHER STAGE 3A OR 3B CKD (HCC): Primary | ICD-10-CM

## 2021-05-03 DIAGNOSIS — N18.30 TYPE 2 DIABETES MELLITUS WITH STAGE 3 CHRONIC KIDNEY DISEASE, WITHOUT LONG-TERM CURRENT USE OF INSULIN, UNSPECIFIED WHETHER STAGE 3A OR 3B CKD (HCC): Primary | ICD-10-CM

## 2021-05-03 NOTE — PROGRESS NOTES
Medical Nutrition Therapy for Diabetes Follow Up    Mable Book  May 3, 2021      Patient Care Team:  Haritha Garcia MD as PCP - Viviana Obrien MD as PCP - [facility] Empaneled Provider  Dottie Tony MD (Obstetrics & Gynecology)  Nida Soriano RD, LD as Dietitian (Dietitian)    Reason for visit: VIRTUAL VISIT -  F/u DM  Patient self-assessment of Progress: \"I've been doing great\"    ASSESSMENT/PLAN:   NUTRITION DIAGNOSIS    #1 Problem: Altered Nutrition-Related Laboratory Values (NC-2.2)  Related to: Endocrine/Diabetes   As Evidenced by: Elevated Plasma glucose and/or HgbA1c levels           #2 Problem: Overweight/Obesity (NC-3.3)  Related to: Excessive energy intake or physical inactivity  As Evidenced by: BMI more than normative standard for age and sex (BMI=35.36)    NUTRITION INTERVENTION  Nutrition Prescription: Aim for 30 g Carb per meal; 15 - 20 g Carb per snack      Diabetes Education/Counseling included:  Target levels  Problem solving    NUTRITION MONITORING AND EVALUATION  5 =always  4 = most of the time   3 = half the days  2 = sometimes  1 = hasn't started  Indicator/Goal Progress Rating   #1  Consistent Carb intake  #1 4/5   #2  Add probiotics (yogurt) #2 3/5   #3  Eat at regular intervals #3 4/5          Patient Active Problem List   Diagnosis    Asthma    Shortness of breath    Autoimmune hepatitis ([facility] [address]    Essential hypertension, benign    Pure hypercholesterolemia    Impaired fasting glucose    Cataract    Murmur, cardiac    Stage 3 chronic kidney disease    Type 2 diabetes mellitus with stage 3 chronic kidney disease, without long-term current use of insulin ([address]    Morbidly obese ([address]    VIOLA (acute kidney injury) ([address]       Current Outpatient Medications   Medication Sig Dispense Refill    cloNIDine (CATAPRES) 0.1 MG tablet Take 1 tablet by mouth daily 90 tablet 1    Semaglutide 3 MG TABS Take 3 mg by mouth daily 30 tablet 0    amLODIPine (NORVASC) 5 MG tablet Take 1 tablet by mouth 2 times daily 180 tablet 1    blood glucose monitor kit and supplies Dispense sufficient amount for indicated testing frequency plus additional to accommodate PRN testing needs. Dispense all needed supplies to include: monitor, strips, lancing device, lancets, control solutions, alcohol swabs. 1 kit 0    albuterol sulfate HFA (PROVENTIL HFA) 108 (90 Base) MCG/ACT inhaler INHALE 2 PUFFS INTO THE LUNGS EVERY 6 HOURS AS NEEDED. 1 Inhaler 3    desloratadine (CLARINEX) 5 MG tablet TAKE 1 TABLET BY MOUTH EVERY DAY 90 tablet 0    predniSONE (DELTASONE) 10 MG tablet TAKE 1 TABLET BY MOUTH EVERY DAY 90 tablet 3    metFORMIN (GLUMETZA) 1000 MG extended release tablet Take 2 tablets by mouth daily (with breakfast) 60 tablet 3    montelukast (SINGULAIR) 10 MG tablet TAKE 1 TABLET BY MOUTH EVERY DAY EVERY NIGHT 90 tablet 3    SYMBICORT 160-4.5 MCG/ACT AERO TAKE 2 PUFFS BY MOUTH TWICE A DAY 30.6 Inhaler 1    fluticasone (FLONASE) 50 MCG/ACT nasal spray SPRAY 1 SPRAY INTO EACH NOSTRIL EVERY DAY 1 Bottle 2    atorvastatin (LIPITOR) 40 MG tablet Take 1 tablet by mouth daily 90 tablet 3    mometasone (NASONEX) 50 MCG/ACT nasal spray 2 sprays by Nasal route daily as needed (as needed for allergies) 3 Inhaler 3    zoledronic acid (RECLAST) 5 MG/100ML SOLN Infuse 100 mLs intravenously once for 1 dose. (Patient not taking: Reported on 3/2/2021) 100 mL 0    calcium carbonate (OSCAL) 500 MG TABS tablet Take 500 mg by mouth daily.  Spacer/Aero Chamber Mouthpiece MISC by Does not apply route. 1 each 1    Multiple Vitamin (MULTIVITAMIN PO) Take  by mouth daily.        Current Facility-Administered Medications   Medication Dose Route Frequency Provider Last Rate Last Admin    ipratropium-albuterol (DUONEB) nebulizer solution 1 ampule  1 ampule Inhalation Once Sean Bass MD             NUTRITION ASSESSMENT    Biochemical Data:    Lab Results   Component Value Date    LABA1C 8.4 04/09/2021     Lab Results   Component Value Date    .4 04/09/2021       Lab Results   Component Value Date    CHOL 177 04/09/2021    CHOL 140 12/30/2020    CHOL 174 08/13/2020     Lab Results   Component Value Date    TRIG 110 04/09/2021    TRIG 87 12/30/2020    TRIG 136 08/13/2020     Lab Results   Component Value Date    HDL 68 (H) 04/09/2021    HDL 67 (H) 12/30/2020    HDL 73 (H) 08/13/2020     Lab Results   Component Value Date    LDLCALC 87 04/09/2021    LDLCALC 56 12/30/2020    LDLCALC 74 08/13/2020     Lab Results   Component Value Date    LABVLDL 22 04/09/2021    LABVLDL 17 12/30/2020    LABVLDL 27 08/13/2020     Lab Results   Component Value Date    CHOLHDLRATIO 3.0 05/08/2015       Lab Results   Component Value Date    WBC 15.8 (H) 04/09/2021    HGB 13.0 04/09/2021    HCT 39.0 04/09/2021    MCV 90.1 04/09/2021     04/09/2021       Lab Results   Component Value Date    CREATININE 1.3 (H) 04/29/2021    BUN 15 04/29/2021     04/29/2021    K 3.4 (L) 04/29/2021     04/29/2021    CO2 28 04/29/2021       Anthropometric Measurements: Wt Change since last visit:  Yes  Comments: down 4 lb to 206 lb    Food and Nutrition Changes:   B: 2 eggs with bell peppers and turkey sausage, applesauce  Or Yogurt with raisin bran  L: egg salad on lettuce or 1/2 turkey sandwich  Or yogurt (if not for breakfast)  D:chicken, salmon or other fish, or pork chop, with vegetables and sometimes rice     Physical Activity Changes:   Treadmill 30 minutes X 5 days    Diabetes Medications:   Recent change in medication type/dosage: Yes  Comments: Now on semaglutide tablets    Monitoring:   Changes to testing regimen?  Yes - now using glucometer regularly  Recent results: 88 - 91  Random 71 - 159    Barriers:   -none noted        Follow Up Plan: 5 weeks    Referring Provider: Willow Morales MD  Time spent with patient: 30 minutes

## 2021-05-06 ENCOUNTER — HOSPITAL ENCOUNTER (OUTPATIENT)
Dept: ULTRASOUND IMAGING | Age: 71
Discharge: HOME | End: 2021-05-06
Payer: MEDICARE

## 2021-05-06 DIAGNOSIS — N18.30 STAGE 3 CHRONIC KIDNEY DISEASE, UNSPECIFIED WHETHER STAGE 3A OR 3B CKD (HCC): ICD-10-CM

## 2021-05-06 LAB
ALBUMIN SERPL-MCNC: 4.5 G/DL (ref 3.4–5)
ANION GAP SERPL CALCULATED.3IONS-SCNC: 14 MMOL/L (ref 3–16)
BUN BLDV-MCNC: 18 MG/DL (ref 7–20)
CALCIUM SERPL-MCNC: 9.9 MG/DL (ref 8.3–10.6)
CHLORIDE BLD-SCNC: 101 MMOL/L (ref 99–110)
CO2: 28 MMOL/L (ref 21–32)
CREAT SERPL-MCNC: 1.3 MG/DL (ref 0.6–1.2)
GFR AFRICAN AMERICAN: 49
GFR NON-AFRICAN AMERICAN: 41
GLUCOSE BLD-MCNC: 61 MG/DL (ref 70–99)
PHOSPHORUS: 3.4 MG/DL (ref 2.5–4.9)
POTASSIUM SERPL-SCNC: 3.9 MMOL/L (ref 3.5–5.1)
SODIUM BLD-SCNC: 143 MMOL/L (ref 136–145)

## 2021-05-06 PROCEDURE — 76770 US EXAM ABDO BACK WALL COMP: CPT

## 2021-05-07 LAB
KAPPA, FREE LIGHT CHAINS, SERUM: 16.8 MG/L (ref 3.3–19.4)
KAPPA/LAMBDA RATIO: 1.36 (ref 0.26–1.65)
KAPPA/LAMBDA TEST COMMENT: NORMAL
LAMBDA, FREE LIGHT CHAINS, SERUM: 12.39 MG/L (ref 5.71–26.3)

## 2021-05-08 DIAGNOSIS — J45.20 MILD INTERMITTENT ASTHMA WITHOUT COMPLICATION: ICD-10-CM

## 2021-05-10 RX ORDER — BUDESONIDE AND FORMOTEROL FUMARATE DIHYDRATE 160; 4.5 UG/1; UG/1
AEROSOL RESPIRATORY (INHALATION)
Qty: 30.6 INHALER | Refills: 1 | OUTPATIENT
Start: 2021-05-10

## 2021-05-10 RX ORDER — BUDESONIDE AND FORMOTEROL FUMARATE DIHYDRATE 160; 4.5 UG/1; UG/1
AEROSOL RESPIRATORY (INHALATION)
Qty: 30.6 INHALER | Refills: 1 | Status: SHIPPED | OUTPATIENT
Start: 2021-05-10

## 2021-05-19 ENCOUNTER — TELEPHONE (OUTPATIENT)
Dept: FAMILY MEDICINE CLINIC | Age: 71
End: 2021-05-19

## 2021-05-24 DIAGNOSIS — J45.20 MILD INTERMITTENT ASTHMA WITHOUT COMPLICATION: ICD-10-CM

## 2021-05-24 RX ORDER — DESLORATADINE 5 MG/1
TABLET ORAL
Qty: 90 TABLET | Refills: 0 | Status: SHIPPED | OUTPATIENT
Start: 2021-05-24

## 2021-05-25 ENCOUNTER — OFFICE VISIT (OUTPATIENT)
Dept: FAMILY MEDICINE CLINIC | Age: 71
End: 2021-05-25
Payer: MEDICARE

## 2021-05-25 VITALS
WEIGHT: 201 LBS | BODY MASS INDEX: 34.31 KG/M2 | DIASTOLIC BLOOD PRESSURE: 70 MMHG | SYSTOLIC BLOOD PRESSURE: 132 MMHG | OXYGEN SATURATION: 94 % | HEART RATE: 84 BPM | HEIGHT: 64 IN

## 2021-05-25 DIAGNOSIS — R06.09 EXERTIONAL DYSPNEA: ICD-10-CM

## 2021-05-25 DIAGNOSIS — R06.02 SOB (SHORTNESS OF BREATH) ON EXERTION: ICD-10-CM

## 2021-05-25 DIAGNOSIS — N18.30 TYPE 2 DIABETES MELLITUS WITH STAGE 3 CHRONIC KIDNEY DISEASE, WITHOUT LONG-TERM CURRENT USE OF INSULIN, UNSPECIFIED WHETHER STAGE 3A OR 3B CKD (HCC): Primary | ICD-10-CM

## 2021-05-25 DIAGNOSIS — E11.22 TYPE 2 DIABETES MELLITUS WITH STAGE 3 CHRONIC KIDNEY DISEASE, WITHOUT LONG-TERM CURRENT USE OF INSULIN, UNSPECIFIED WHETHER STAGE 3A OR 3B CKD (HCC): Primary | ICD-10-CM

## 2021-05-25 PROCEDURE — 4040F PNEUMOC VAC/ADMIN/RCVD: CPT | Performed by: INTERNAL MEDICINE

## 2021-05-25 PROCEDURE — 1036F TOBACCO NON-USER: CPT | Performed by: INTERNAL MEDICINE

## 2021-05-25 PROCEDURE — 1090F PRES/ABSN URINE INCON ASSESS: CPT | Performed by: INTERNAL MEDICINE

## 2021-05-25 PROCEDURE — 2022F DILAT RTA XM EVC RTNOPTHY: CPT | Performed by: INTERNAL MEDICINE

## 2021-05-25 PROCEDURE — 3052F HG A1C>EQUAL 8.0%<EQUAL 9.0%: CPT | Performed by: INTERNAL MEDICINE

## 2021-05-25 PROCEDURE — 99214 OFFICE O/P EST MOD 30 MIN: CPT | Performed by: INTERNAL MEDICINE

## 2021-05-25 PROCEDURE — 3017F COLORECTAL CA SCREEN DOC REV: CPT | Performed by: INTERNAL MEDICINE

## 2021-05-25 PROCEDURE — G8399 PT W/DXA RESULTS DOCUMENT: HCPCS | Performed by: INTERNAL MEDICINE

## 2021-05-25 PROCEDURE — G8417 CALC BMI ABV UP PARAM F/U: HCPCS | Performed by: INTERNAL MEDICINE

## 2021-05-25 PROCEDURE — 93000 ELECTROCARDIOGRAM COMPLETE: CPT | Performed by: INTERNAL MEDICINE

## 2021-05-25 PROCEDURE — G8427 DOCREV CUR MEDS BY ELIG CLIN: HCPCS | Performed by: INTERNAL MEDICINE

## 2021-05-25 PROCEDURE — 1123F ACP DISCUSS/DSCN MKR DOCD: CPT | Performed by: INTERNAL MEDICINE

## 2021-05-25 ASSESSMENT — ENCOUNTER SYMPTOMS
VOMITING: 0
NAUSEA: 0
CHEST TIGHTNESS: 0
SHORTNESS OF BREATH: 0
ABDOMINAL PAIN: 0

## 2021-05-25 NOTE — PROGRESS NOTES
desloratadine (CLARINEX) 5 MG tablet TAKE 1 TABLET BY MOUTH EVERY DAY 5/24/21  Yes Delaney Dominguez MD   budesonide-formoterol (SYMBICORT) 160-4.5 MCG/ACT AERO TAKE 2 PUFFS BY MOUTH TWICE A DAY 5/10/21  Yes Delaney Dominguez MD   carvedilol (COREG) 6.25 MG tablet Take 1 tablet by mouth 2 times daily 5/4/21  Yes Conrado Casey MD   amLODIPine (NORVASC) 5 MG tablet Take 1 tablet by mouth 2 times daily 4/13/21  Yes Delaney Dominguez MD   blood glucose monitor kit and supplies Dispense sufficient amount for indicated testing frequency plus additional to accommodate PRN testing needs. Dispense all needed supplies to include: monitor, strips, lancing device, lancets, control solutions, alcohol swabs. 3/17/21  Yes Delaney Dominguez MD   albuterol sulfate HFA (PROVENTIL HFA) 108 (90 Base) MCG/ACT inhaler INHALE 2 PUFFS INTO THE LUNGS EVERY 6 HOURS AS NEEDED. 3/2/21  Yes Delaney Dominguez MD   predniSONE (DELTASONE) 10 MG tablet TAKE 1 TABLET BY MOUTH EVERY DAY 3/2/21  Yes Delaney Dominguez MD   metFORMIN (GLUMETZA) 1000 MG extended release tablet Take 2 tablets by mouth daily (with breakfast) 12/31/20  Yes Delaney Dominguez MD   montelukast (SINGULAIR) 10 MG tablet TAKE 1 TABLET BY MOUTH EVERY DAY EVERY NIGHT 12/17/20  Yes Delaney Dominguez MD   fluticasone (FLONASE) 50 MCG/ACT nasal spray SPRAY 1 SPRAY INTO EACH NOSTRIL EVERY DAY 10/19/20  Yes Delaney Dominguez MD   atorvastatin (LIPITOR) 40 MG tablet Take 1 tablet by mouth daily 3/31/20  Yes Delaney Dominguez MD   mometasone (NASONEX) 50 MCG/ACT nasal spray 2 sprays by Nasal route daily as needed (as needed for allergies) 9/6/16  Yes Bola Nevarez MD   calcium carbonate (OSCAL) 500 MG TABS tablet Take 500 mg by mouth daily. Yes Historical Provider, MD   Spacer/Aero Chamber Mouthpiece MISC by Does not apply route. 1/24/12  Yes Bola Nevarez MD   Multiple Vitamin (MULTIVITAMIN PO) Take  by mouth daily.  8/6/10  Yes Historical motion and neck supple. Lymphadenopathy:      Head:      Right side of head: No submental or submandibular adenopathy. Left side of head: No submental or submandibular adenopathy. Cervical: No cervical adenopathy. Right cervical: No superficial or deep cervical adenopathy. Left cervical: No superficial or deep cervical adenopathy. Skin:     Findings: No rash. Neurological:      Mental Status: She is alert and oriented to person, place, and time. GCS: GCS eye subscore is 4. GCS verbal subscore is 5. GCS motor subscore is 6. Motor: No abnormal muscle tone. Deep Tendon Reflexes: Reflexes are normal and symmetric. Psychiatric:         Behavior: Behavior normal.         Thought Content: Thought content normal.       EKG- NSR no acute ischemic changes   Assessment/Plan:   Antonietta Romero was seen today for 1 month follow-up. Diagnoses and all orders for this visit:    Type 2 diabetes mellitus with stage 3 chronic kidney disease, without long-term current use of insulin, unspecified whether stage 3a or 3b CKD (HCC)  Last A1C higher but pt does well on her Semaglutide current dose  2 samples given  She will monitor glucoses an A1C inn 2 months   -     HEMOGLOBIN A1C; Future    SOB (shortness of breath) on exertion  No chest pain  Exam ok   EKG- unremarkable  She will try to exercise asad   If not resolving stress test   -     EKG 12 Lead    Exertional dyspnea  -     NM MYOCARDIAL SPECT REST EXERCISE OR RX; Future    Other orders  -     Semaglutide 3 MG TABS; Take 3 mg by mouth daily      - Patient was encouraged to call the office (and ask to see me) or be seen by other provider for any worsening or lack of improvement of the symptoms or any new symptoms.    - Pt was asked toschedule an appointment in 3 months, and to let me know of any scheduling difficulties.      Additional patients instructions (if given):   Patient Instructions   Please call the office (and ask to see me) or be seen by other provider for any worsening or lack of improvement of the symptoms or for any new symptoms as soon as possible. Please make sure to schedule your follow appointment as discussed. Please let me know if ay further questions. Please let me know if there are any symptoms or concerns that you feel that needs to be further addressed. Bienvenido Jimenez MD, M.D.   5/25/2021, 1:05 PM      NOTE: This report was transcribed using voice recognition software. Every effort was made to ensure accuracy, however, inadvertent computerized transcription errors may be present.

## 2021-06-04 NOTE — PROGRESS NOTES
Medical Nutrition Therapy for Diabetes Follow Up    Rizwan Larson  June 4, 2021      Patient Care Team:  Antonietta Hamilton MD as PCP - Thea Tan MD as PCP - [facility] Empaneled Provider  Mendoza Potter MD (Obstetrics & Gynecology)  Kathy Espinosa RD, LD as Dietitian (Dietitian)    Reason for visit: VIRTUAL VISIT  - f/u DM  Patient self-assessment of Progress: \"I handled vacation without going off track\"    ASSESSMENT/PLAN:   NUTRITION DIAGNOSIS    #1 Problem: Altered Nutrition-Related Laboratory Values (NC-2.2)  Related to: Endocrine/Diabetes   As Evidenced by: Elevated Plasma glucose and/or HgbA1c levels           #2 Problem: Overweight/Obesity (NC-3.3)  Related to: Excessive energy intake or physical inactivity  As Evidenced by: BMI more than normative standard for age and sex (BMI=34.50)      NUTRITION INTERVENTION  Nutrition Prescription: Aim for 30 g Carb per meal; 15 - 20 g Carb per snack      Diabetes Education/Counseling included:  other: problem solving    Interventions:  Relapse prevention    NUTRITION MONITORING AND EVALUATION  5 =always  4 = most of the time   3 = half the days  2 = sometimes  1 = hasn't started  Indicator/Goal Progress Rating   #1  Consistent carb intake  #1 4/5   #2  Add probiotics #2 4/5   #3  Eat at regular intervals #3 4/5          Patient Active Problem List   Diagnosis    Asthma    Shortness of breath    [facility] [address]    Essential hypertension, benign    Pure hypercholesterolemia    Impaired fasting glucose    Cataract    Murmur, cardiac    Stage 3 chronic kidney disease    Type 2 diabetes mellitus with stage 3 chronic kidney disease, without long-term current use of insulin ([address]    Morbidly obese ([address]    VIOLA (acute kidney injury) ([facility] [address]       Current Outpatient Medications   Medication Sig Dispense Refill    Semaglutide 3 MG TABS Take 3 mg by mouth daily 90 tablet 0    desloratadine (CLARINEX) 5 MG tablet TAKE 1 TABLET BY MOUTH EVERY DAY 90 tablet 0    budesonide-formoterol (SYMBICORT) 160-4.5 MCG/ACT AERO TAKE 2 PUFFS BY MOUTH TWICE A DAY 30.6 Inhaler 1    carvedilol (COREG) 6.25 MG tablet Take 1 tablet by mouth 2 times daily 60 tablet 3    amLODIPine (NORVASC) 5 MG tablet Take 1 tablet by mouth 2 times daily 180 tablet 1    blood glucose monitor kit and supplies Dispense sufficient amount for indicated testing frequency plus additional to accommodate PRN testing needs. Dispense all needed supplies to include: monitor, strips, lancing device, lancets, control solutions, alcohol swabs. 1 kit 0    albuterol sulfate HFA (PROVENTIL HFA) 108 (90 Base) MCG/ACT inhaler INHALE 2 PUFFS INTO THE LUNGS EVERY 6 HOURS AS NEEDED. 1 Inhaler 3    predniSONE (DELTASONE) 10 MG tablet TAKE 1 TABLET BY MOUTH EVERY DAY 90 tablet 3    metFORMIN (GLUMETZA) 1000 MG extended release tablet Take 2 tablets by mouth daily (with breakfast) 60 tablet 3    montelukast (SINGULAIR) 10 MG tablet TAKE 1 TABLET BY MOUTH EVERY DAY EVERY NIGHT 90 tablet 3    fluticasone (FLONASE) 50 MCG/ACT nasal spray SPRAY 1 SPRAY INTO EACH NOSTRIL EVERY DAY 1 Bottle 2    atorvastatin (LIPITOR) 40 MG tablet Take 1 tablet by mouth daily 90 tablet 3    mometasone (NASONEX) 50 MCG/ACT nasal spray 2 sprays by Nasal route daily as needed (as needed for allergies) 3 Inhaler 3    calcium carbonate (OSCAL) 500 MG TABS tablet Take 500 mg by mouth daily.  Spacer/Aero Chamber Mouthpiece MISC by Does not apply route. 1 each 1    Multiple Vitamin (MULTIVITAMIN PO) Take  by mouth daily.        Current Facility-Administered Medications   Medication Dose Route Frequency Provider Last Rate Last Admin    ipratropium-albuterol (DUONEB) nebulizer solution 1 ampule  1 ampule Inhalation Once Kvng Benítez MD             NUTRITION ASSESSMENT    Biochemical Data:    Lab Results   Component Value Date    LABA1C 8.4 04/09/2021     Lab Results   Component Value Date    .4 04/09/2021 Lab Results   Component Value Date    CHOL 177 04/09/2021    CHOL 140 12/30/2020    CHOL 174 08/13/2020     Lab Results   Component Value Date    TRIG 110 04/09/2021    TRIG 87 12/30/2020    TRIG 136 08/13/2020     Lab Results   Component Value Date    HDL 68 (H) 04/09/2021    HDL 67 (H) 12/30/2020    HDL 73 (H) 08/13/2020     Lab Results   Component Value Date    LDLCALC 87 04/09/2021    LDLCALC 56 12/30/2020    LDLCALC 74 08/13/2020     Lab Results   Component Value Date    LABVLDL 22 04/09/2021    LABVLDL 17 12/30/2020    LABVLDL 27 08/13/2020     Lab Results   Component Value Date    CHOLHDLRATIO 3.0 05/08/2015       Lab Results   Component Value Date    WBC 15.8 (H) 04/09/2021    HGB 13.0 04/09/2021    HCT 39.0 04/09/2021    MCV 90.1 04/09/2021     04/09/2021       Lab Results   Component Value Date    CREATININE 1.3 (H) 05/06/2021    BUN 18 05/06/2021     05/06/2021    K 3.9 05/06/2021     05/06/2021    CO2 28 05/06/2021       Anthropometric Measurements: Wt Change since last visit:  Yes - 199 on home scale  Comments: Wt Readings from Last 3 Encounters:   05/25/21 201 lb (91.2 kg)   05/04/21 208 lb 6.4 oz (94.5 kg)   04/27/21 206 lb (93.4 kg)       Food and Nutrition Changes:   Beverage consumption:changes: No  Patient reported changes:       Physical Activity Changes:   Increased frequency, intensity or length of time? YES - treadmill 45 minutes - 5 days per week. Walking while visiting family in [place]  Obstacles to activity: stress a few days last week with car problems     Diabetes Medications:   Recent change in medication type/dosage: started on semaglutide  Comments: using samples    Monitoring:   Changes to testing regimen?  No  Recent results: FBG 88    Barriers:   -none noted        Follow Up Plan: one month    Referring Provider: Marquita Bucio MD  Time spent with patient: 20 minutes

## 2021-06-07 ENCOUNTER — VIRTUAL VISIT (OUTPATIENT)
Dept: INTERNAL MEDICINE CLINIC | Age: 71
End: 2021-06-07

## 2021-06-07 DIAGNOSIS — N18.30 TYPE 2 DIABETES MELLITUS WITH STAGE 3 CHRONIC KIDNEY DISEASE, WITHOUT LONG-TERM CURRENT USE OF INSULIN, UNSPECIFIED WHETHER STAGE 3A OR 3B CKD (HCC): Primary | ICD-10-CM

## 2021-06-07 DIAGNOSIS — E66.01 MORBIDLY OBESE (HCC): ICD-10-CM

## 2021-06-07 DIAGNOSIS — E11.22 TYPE 2 DIABETES MELLITUS WITH STAGE 3 CHRONIC KIDNEY DISEASE, WITHOUT LONG-TERM CURRENT USE OF INSULIN, UNSPECIFIED WHETHER STAGE 3A OR 3B CKD (HCC): Primary | ICD-10-CM

## 2021-06-07 PROCEDURE — 99999 PR OFFICE/OUTPT VISIT,PROCEDURE ONLY: CPT | Performed by: DIETITIAN, REGISTERED

## 2021-06-14 ENCOUNTER — HOSPITAL ENCOUNTER (OUTPATIENT)
Dept: ULTRASOUND IMAGING | Age: 71
Discharge: HOME | End: 2021-06-14
Payer: MEDICARE

## 2021-06-14 DIAGNOSIS — K75.4 AUTOIMMUNE HEPATITIS (HCC): ICD-10-CM

## 2021-06-14 PROCEDURE — 76705 ECHO EXAM OF ABDOMEN: CPT

## 2021-06-18 DIAGNOSIS — N18.30 STAGE 3 CHRONIC KIDNEY DISEASE, UNSPECIFIED WHETHER STAGE 3A OR 3B CKD (HCC): ICD-10-CM

## 2021-06-18 LAB
ALBUMIN SERPL-MCNC: 4.3 G/DL (ref 3.4–5)
ANION GAP SERPL CALCULATED.3IONS-SCNC: 10 MMOL/L (ref 3–16)
BUN BLDV-MCNC: 16 MG/DL (ref 7–20)
CALCIUM SERPL-MCNC: 9.9 MG/DL (ref 8.3–10.6)
CHLORIDE BLD-SCNC: 105 MMOL/L (ref 99–110)
CO2: 26 MMOL/L (ref 21–32)
CREAT SERPL-MCNC: 1.3 MG/DL (ref 0.6–1.2)
GFR AFRICAN AMERICAN: 49
GFR NON-AFRICAN AMERICAN: 41
GLUCOSE BLD-MCNC: 84 MG/DL (ref 70–99)
PHOSPHORUS: 4.1 MG/DL (ref 2.5–4.9)
POTASSIUM SERPL-SCNC: 3.8 MMOL/L (ref 3.5–5.1)
SODIUM BLD-SCNC: 141 MMOL/L (ref 136–145)

## 2021-06-21 ENCOUNTER — HOSPITAL ENCOUNTER (OUTPATIENT)
Age: 71
Setting detail: OUTPATIENT SURGERY
Discharge: HOME | End: 2021-06-21
Attending: INTERNAL MEDICINE | Admitting: INTERNAL MEDICINE
Payer: MEDICARE

## 2021-06-21 VITALS
BODY MASS INDEX: 33.97 KG/M2 | SYSTOLIC BLOOD PRESSURE: 158 MMHG | RESPIRATION RATE: 16 BRPM | OXYGEN SATURATION: 99 % | TEMPERATURE: 97.7 F | WEIGHT: 199 LBS | HEIGHT: 64 IN | DIASTOLIC BLOOD PRESSURE: 82 MMHG | HEART RATE: 67 BPM

## 2021-06-21 DIAGNOSIS — Z86.010 HISTORY OF COLON POLYPS: ICD-10-CM

## 2021-06-21 LAB
GLUCOSE BLD-MCNC: 79 MG/DL (ref 70–99)
PERFORMED ON: NORMAL

## 2021-06-21 PROCEDURE — 6360000002 HC RX W HCPCS: Performed by: INTERNAL MEDICINE

## 2021-06-21 PROCEDURE — 2709999900 HC NON-CHARGEABLE SUPPLY: Performed by: INTERNAL MEDICINE

## 2021-06-21 PROCEDURE — 2580000003 HC RX 258: Performed by: INTERNAL MEDICINE

## 2021-06-21 PROCEDURE — 99152 MOD SED SAME PHYS/QHP 5/>YRS: CPT | Performed by: INTERNAL MEDICINE

## 2021-06-21 PROCEDURE — 7100000010 HC PHASE II RECOVERY - FIRST 15 MIN: Performed by: INTERNAL MEDICINE

## 2021-06-21 PROCEDURE — 3609010600 HC COLONOSCOPY POLYPECTOMY SNARE/COLD BIOPSY: Performed by: INTERNAL MEDICINE

## 2021-06-21 PROCEDURE — 7100000011 HC PHASE II RECOVERY - ADDTL 15 MIN: Performed by: INTERNAL MEDICINE

## 2021-06-21 PROCEDURE — 99153 MOD SED SAME PHYS/QHP EA: CPT | Performed by: INTERNAL MEDICINE

## 2021-06-21 PROCEDURE — 88305 TISSUE EXAM BY PATHOLOGIST: CPT

## 2021-06-21 RX ORDER — FENTANYL CITRATE 50 UG/ML
INJECTION, SOLUTION INTRAMUSCULAR; INTRAVENOUS PRN
Status: DISCONTINUED | OUTPATIENT
Start: 2021-06-21 | End: 2021-06-21 | Stop reason: ALTCHOICE

## 2021-06-21 RX ORDER — MIDAZOLAM HYDROCHLORIDE 1 MG/ML
INJECTION INTRAMUSCULAR; INTRAVENOUS PRN
Status: DISCONTINUED | OUTPATIENT
Start: 2021-06-21 | End: 2021-06-21 | Stop reason: ALTCHOICE

## 2021-06-21 RX ORDER — SODIUM CHLORIDE 9 MG/ML
INJECTION, SOLUTION INTRAVENOUS CONTINUOUS
Status: DISCONTINUED | OUTPATIENT
Start: 2021-06-21 | End: 2021-06-21 | Stop reason: HOSPADM

## 2021-06-21 RX ADMIN — SODIUM CHLORIDE: 9 INJECTION, SOLUTION INTRAVENOUS at 13:47

## 2021-06-21 ASSESSMENT — PAIN - FUNCTIONAL ASSESSMENT
PAIN_FUNCTIONAL_ASSESSMENT: 0-10
PAIN_FUNCTIONAL_ASSESSMENT: 0-10
PAIN_FUNCTIONAL_ASSESSMENT: FACES
PAIN_FUNCTIONAL_ASSESSMENT: 0-10
PAIN_FUNCTIONAL_ASSESSMENT: 0-10
PAIN_FUNCTIONAL_ASSESSMENT: FACES
PAIN_FUNCTIONAL_ASSESSMENT: FACES
PAIN_FUNCTIONAL_ASSESSMENT: 0-10
PAIN_FUNCTIONAL_ASSESSMENT: FACES
PAIN_FUNCTIONAL_ASSESSMENT: FACES

## 2021-06-21 NOTE — PROGRESS NOTES
Ambulatory Surgery/Procedure Discharge Note    Pt tolerated procedure well. Denies any pain or nausea post procedure. Reviewed discharge instructions. Verbalize understanding. Written instructions provided at discharge. Discharged in wheelchair to lobby level. Friend to drive home. Vitals:    06/21/21 1500   BP: (!) 158/82   Pulse: 67   Resp: 16   Temp:    SpO2: 99%       No intake/output data recorded. Restroom use offered before discharge. Yes    Pain assessment:  none  Pain Level: 0        Patient discharged to home/self care.  Patient discharged via wheel chair by transporter to waiting family/S.O.       6/21/2021 3:20 PM

## 2021-06-21 NOTE — BRIEF OP NOTE
Brief Postoperative Note      Patient: Raul Abdalla  YOB: 1950  MRN: [id number]    Date of Procedure: 6/21/2021    Pre-Op Diagnosis: History of colon polyps [Z86.010]    Post-Op Diagnosis: Same       Procedure(s):  COLONOSCOPY POLYPECTOMY SNARE/COLD BIOPSY    Surgeon(s):  Zan Cevallos MD    Assistant:  * No surgical staff found *    Anesthesia: IV Sedation    Estimated Blood Loss (mL): Minimal    Complications: None    Specimens:   ID Type Source Tests Collected by Time Destination   A : BX FORCEP REMOVAL ASCENDING COLON POLYP Tissue Colon SURGICAL PATHOLOGY Zan Cevallos MD 6/21/2021 1420    B : BX FORCEP REMOVAL CECAL POLYP Tissue Colon SURGICAL PATHOLOGY Zan Cevallos MD 6/21/2021 1423        Implants:  * No implants in log *      Drains: * No LDAs found *    Findings: polyp    Electronically signed by Castro Beltran MD on 6/21/2021 at 2:37 PM

## 2021-06-21 NOTE — H&P
History and Physical / Pre-Sedation Assessment    Kailey Wilcox is a 78 y.o. female who presents today for colonoscopy procedure. PMHx:    Past Medical History:   Diagnosis Date    Allergic rhinitis     Asthma     COPD (chronic obstructive pulmonary disease) ([facility] [address]     pt denies    Hepatitis     autoimmune    Hyperlipidemia     Hypertension     Influenza A 1/10/2015    Osteoporosis     Type 2 diabetes mellitus without complication, without long-term current use of insulin ([facility] [address] 12/16/2019       Medications:    Prior to Admission medications    Medication Sig Start Date End Date Taking? Authorizing Provider   Semaglutide 3 MG TABS Take 3 mg by mouth daily 5/25/21  Yes Ranjeet Galdamez MD   budesonide-formoterol (SYMBICORT) 160-4.5 MCG/ACT AERO TAKE 2 PUFFS BY MOUTH TWICE A DAY 5/10/21  Yes Ranjeet Galdamez MD   carvedilol (COREG) 6.25 MG tablet Take 1 tablet by mouth 2 times daily 5/4/21  Yes Dewayne Cope MD   amLODIPine (NORVASC) 5 MG tablet Take 1 tablet by mouth 2 times daily 4/13/21  Yes Ranjeet Galdamez MD   predniSONE (DELTASONE) 10 MG tablet TAKE 1 TABLET BY MOUTH EVERY DAY 3/2/21  Yes Ranjeet Galdamez MD   metFORMIN (GLUMETZA) 1000 MG extended release tablet Take 2 tablets by mouth daily (with breakfast) 12/31/20  Yes Ranjeet Galdamez MD   montelukast (SINGULAIR) 10 MG tablet TAKE 1 TABLET BY MOUTH EVERY DAY EVERY NIGHT 12/17/20  Yes Ranjeet Galdamez MD   atorvastatin (LIPITOR) 40 MG tablet Take 1 tablet by mouth daily 3/31/20  Yes Ranjeet Galdamez MD   calcium carbonate (OSCAL) 500 MG TABS tablet Take 500 mg by mouth daily. Yes Historical Provider, MD   Multiple Vitamin (MULTIVITAMIN PO) Take  by mouth daily.  8/6/10  Yes Historical Provider, MD   desloratadine (CLARINEX) 5 MG tablet TAKE 1 TABLET BY MOUTH EVERY DAY 5/24/21   Ranjeet Galdamez MD   blood glucose monitor kit and supplies Dispense sufficient amount for indicated testing frequency plus additional to accommodate PRN testing needs. Dispense all needed supplies to include: monitor, strips, lancing device, lancets, control solutions, alcohol swabs. 3/17/21   Soy Arciniega MD   albuterol sulfate HFA (PROVENTIL HFA) 108 (90 Base) MCG/ACT inhaler INHALE 2 PUFFS INTO THE LUNGS EVERY 6 HOURS AS NEEDED. 3/2/21   Soy Arciniega MD   fluticasone (FLONASE) 50 MCG/ACT nasal spray SPRAY 1 SPRAY INTO EACH NOSTRIL EVERY DAY 10/19/20   Soy Arciniega MD   mometasone (NASONEX) 50 MCG/ACT nasal spray 2 sprays by Nasal route daily as needed (as needed for allergies) 9/6/16   Isaak Schuster MD   Spacer/Aero Chamber Mouthpiece MISC by Does not apply route. 1/24/12   Isaak Schuster MD       Allergies: Allergies   Allergen Reactions    Iodine Shortness Of Breath    Other      Steroid (injection) taken for hepatitis elevated liver enzymes, pt. Uncertain of name    Shellfish-Derived Products Rash     Shortness of breath       PSHx:    Past Surgical History:   Procedure Laterality Date    BREAST BIOPSY  10/2005    Dr. Luciano Bear - right , benign    Rogenia Naeem      Dr. Dedrick Griffin  - [facility] 10 yrs     COLONOSCOPY  07/08/13    Dr. Dedrick Griffin- internal hemorrhoids, adenomatous and hyperplastic polyps. Repeat in 3 yrs    COLONOSCOPY  07/17/2016    Dr. Dedrick Griffin, No evidence of any colorectal neoplasia ~5 years    LIVER BIOPSY  8/2006     autoimmune hepatitis        Social Hx:    Social History     Socioeconomic History    Marital status:       Spouse name: Mateo Blas Number of children: 0    Years of education: 15    Highest education level: Not on file   Occupational History    Occupation: /   Tobacco Use    Smoking status: Never Smoker    Smokeless tobacco: Never Used   Substance and Sexual Activity    Alcohol use: Not Currently     Comment: rare - few times a year    Drug use: No    Sexual activity: Not on file   Other Topics Concern    Not on file   Social History Narrative    Not on file     Social Determinants of Health     Financial Resource Strain:     Difficulty of Paying Living Expenses:    Food Insecurity:     Worried About Running Out of Food in the Last Year:     [address] in the Last Year:    Transportation Needs:     Lack of Transportation (Medical):  Lack of Transportation (Non-Medical):    Physical Activity:     Days of Exercise per Week:     Minutes of Exercise per Session:    Stress:     Feeling of Stress :    Social Connections:     Frequency of Communication with Friends and Family:     Frequency of Social Gatherings with Friends and Family:     Attends [employer] witness Services:     Active Member of Clubs or Organizations:     Attends Club or Organization Meetings:     Marital Status:    Intimate Partner Violence:     Fear of Current or Ex-Partner:     Emotionally Abused:     Physically Abused:     Sexually Abused:        Family Hx:   Family History   Problem Relation Age of Onset    Heart Disease Mother     Cancer Mother         breast, ovarian     Cancer Father         prostate     Kidney Disease Brother        Physical Exam:  Vital Signs: BP (!) 154/77   Pulse 72   Temp 97.7 °F (36.5 °C) (Temporal)   Resp 27   Ht 5' 4\" (1.626 m)   Wt 199 lb (90.3 kg)   LMP 12/07/2005   SpO2 98%   BMI 34.16 kg/m²    Pulmonary: Normal  Cardiac: Normal  Abdomen: Normal    Pre-Procedure Assessment / Plan:  ASA Classification: Class 2 - A normal healthy patient with mild systemic disease  Level of Sedation Plan: Moderate sedation   Mallampati Score:  I (soft palate, uvula, fauces, tonsillar pillars visible)  Post Procedure plan: Return to same level of care    Colonoscopy Interval History:  3 or more years since last colonoscopy, Less than 3 years since the patient's last colonoscopy due to medical reasons and Less than 3 years since the patient's last colonoscopy due to system reasons    Medical Reason for Colonoscopy before 3 years last colonoscopy incomplete, last colonoscopy had inadequate prep, piecemeal removal of adenomas and last colonoscopy found greater than 10 adenomas  System Reasons for Colonoscopy before 3 years:   previous colonoscopy report unavailable or unable to locate    I assessed the patient and find that the patient is in satisfactory condition to proceed with the planned procedure and sedation plan. Risks/benefits/alternatives of procedure discussed with patient and any present family members. Risks including, but not limited to: bleeding, perforation, post polypectomy syndrome, splenic injury, need for additional procedures or surgery, risks of anesthesia. Patient understands it is their responsibility to call office for pathology results if they do not hear from my office within 1-2 weeks. All questions answered.     Lynn Goodwin MD  6/21/2021

## 2021-06-25 DIAGNOSIS — N18.30 STAGE 3 CHRONIC KIDNEY DISEASE, UNSPECIFIED WHETHER STAGE 3A OR 3B CKD (HCC): ICD-10-CM

## 2021-06-25 LAB
CREATININE URINE: 233.5 MG/DL (ref 28–259)
MICROALBUMIN UR-MCNC: 1.4 MG/DL
MICROALBUMIN/CREAT UR-RTO: 6 MG/G (ref 0–30)

## 2021-07-01 DIAGNOSIS — E11.22 TYPE 2 DIABETES MELLITUS WITH STAGE 3 CHRONIC KIDNEY DISEASE, WITHOUT LONG-TERM CURRENT USE OF INSULIN, UNSPECIFIED WHETHER STAGE 3A OR 3B CKD (HCC): ICD-10-CM

## 2021-07-01 DIAGNOSIS — N18.30 TYPE 2 DIABETES MELLITUS WITH STAGE 3 CHRONIC KIDNEY DISEASE, WITHOUT LONG-TERM CURRENT USE OF INSULIN, UNSPECIFIED WHETHER STAGE 3A OR 3B CKD (HCC): ICD-10-CM

## 2021-07-02 LAB
ESTIMATED AVERAGE GLUCOSE: 142.7 MG/DL
HBA1C MFR BLD: 6.6 %

## 2021-07-02 NOTE — PROGRESS NOTES
Medical Nutrition Therapy for Diabetes Follow Up    Anant Arreaga  July 2, 2021      Patient Care Team:  Radha Suggs MD as PCP - Ramon Mitchell MD as PCP - [facility] Empaneled Provider  Wilner Elias MD (Obstetrics & Gynecology)  Barbie Cabrera RD, LD as Dietitian (Dietitian)    Reason for visit: VIRTUAL VISIT -  F/u for DM  Patient self-assessment of Progress:\" I've been staying on track for the most part\"  A1c down from 8.4 to 6.6 in past three months    ASSESSMENT/PLAN:   NUTRITION DIAGNOSIS    #1 Problem: Altered Nutrition-Related Laboratory Values (NC-2.2)  Related to: Endocrine/Diabetes   As Evidenced by: Elevated Plasma glucose and/or HgbA1c levels           #2 Problem: Overweight/Obesity (NC-3.3)  Related to: Excessive energy intake or physical inactivity  As Evidenced by: BMI more than normative standard for age and sex (BMI=34.16)      NUTRITION INTERVENTION  Nutrition Prescription: Aim for 30 g Carb per meal; 15 - 20 g Carb per snack    Interventions:  Problem solving  Relapse prevention    NUTRITION MONITORING AND EVALUATION  5 =always  4 = most of the time   3 = half the days  2 = sometimes  1 = hasn't started  Indicator/Goal Progress Rating   #1  Consistent carb intake , eat at regular intervals #1 4/5   #2  Use probiotics #2 4/5   #3  Exercise all or most days #3 4/5          Patient Active Problem List   Diagnosis    Asthma    Shortness of breath    Autoimmune hepatitis ([facility] [address]    Essential hypertension, benign    Pure hypercholesterolemia    Impaired fasting glucose    Cataract    Murmur, cardiac    Stage 3 chronic kidney disease ([address]    Type 2 diabetes mellitus with stage 3 chronic kidney disease, without long-term current use of insulin ([address]    Morbidly obese ([address]    VIOLA (acute kidney injury) ([address]       Current Outpatient Medications   Medication Sig Dispense Refill    Semaglutide 3 MG TABS Take 3 mg by mouth daily 90 tablet 0    desloratadine (CLARINEX) 5 MG tablet TAKE 1 TABLET BY MOUTH EVERY DAY 90 tablet 0    budesonide-formoterol (SYMBICORT) 160-4.5 MCG/ACT AERO TAKE 2 PUFFS BY MOUTH TWICE A DAY 30.6 Inhaler 1    carvedilol (COREG) 6.25 MG tablet Take 1 tablet by mouth 2 times daily 60 tablet 3    amLODIPine (NORVASC) 5 MG tablet Take 1 tablet by mouth 2 times daily 180 tablet 1    blood glucose monitor kit and supplies Dispense sufficient amount for indicated testing frequency plus additional to accommodate PRN testing needs. Dispense all needed supplies to include: monitor, strips, lancing device, lancets, control solutions, alcohol swabs. 1 kit 0    albuterol sulfate HFA (PROVENTIL HFA) 108 (90 Base) MCG/ACT inhaler INHALE 2 PUFFS INTO THE LUNGS EVERY 6 HOURS AS NEEDED. 1 Inhaler 3    predniSONE (DELTASONE) 10 MG tablet TAKE 1 TABLET BY MOUTH EVERY DAY 90 tablet 3    metFORMIN (GLUMETZA) 1000 MG extended release tablet Take 2 tablets by mouth daily (with breakfast) 60 tablet 3    montelukast (SINGULAIR) 10 MG tablet TAKE 1 TABLET BY MOUTH EVERY DAY EVERY NIGHT 90 tablet 3    fluticasone (FLONASE) 50 MCG/ACT nasal spray SPRAY 1 SPRAY INTO EACH NOSTRIL EVERY DAY 1 Bottle 2    atorvastatin (LIPITOR) 40 MG tablet Take 1 tablet by mouth daily 90 tablet 3    mometasone (NASONEX) 50 MCG/ACT nasal spray 2 sprays by Nasal route daily as needed (as needed for allergies) 3 Inhaler 3    calcium carbonate (OSCAL) 500 MG TABS tablet Take 500 mg by mouth daily.  Spacer/Aero Chamber Mouthpiece MISC by Does not apply route. 1 each 1    Multiple Vitamin (MULTIVITAMIN PO) Take  by mouth daily. No current facility-administered medications for this visit.          NUTRITION ASSESSMENT    Biochemical Data:    Lab Results   Component Value Date    LABA1C 6.6 07/01/2021     Lab Results   Component Value Date    .7 07/01/2021       Lab Results   Component Value Date    CHOL 177 04/09/2021    CHOL 140 12/30/2020    CHOL 174 08/13/2020     Lab Results Component Value Date    TRIG 110 04/09/2021    TRIG 87 12/30/2020    TRIG 136 08/13/2020     Lab Results   Component Value Date    HDL 68 (H) 04/09/2021    HDL 67 (H) 12/30/2020    HDL 73 (H) 08/13/2020     Lab Results   Component Value Date    LDLCALC 87 04/09/2021    LDLCALC 56 12/30/2020    LDLCALC 74 08/13/2020     Lab Results   Component Value Date    LABVLDL 22 04/09/2021    LABVLDL 17 12/30/2020    LABVLDL 27 08/13/2020     Lab Results   Component Value Date    CHOLHDLRATIO 3.0 05/08/2015       Lab Results   Component Value Date    WBC 15.8 (H) 04/09/2021    HGB 13.0 04/09/2021    HCT 39.0 04/09/2021    MCV 90.1 04/09/2021     04/09/2021       Lab Results   Component Value Date    CREATININE 1.3 (H) 06/18/2021    BUN 16 06/18/2021     06/18/2021    K 3.8 06/18/2021     06/18/2021    CO2 26 06/18/2021       Anthropometric Measurements: Wt Change since last visit:  Yes  Comments: Wt Readings from Last 3 Encounters:   06/21/21 199 lb (90.3 kg)   06/15/21 199 lb 3.2 oz (90.4 kg)   05/25/21 201 lb (91.2 kg)     Food and Nutrition Changes:   Beverage consumption:changes: No  Patient reported changes: yes - increased incidence of eating chips/cookies     Physical Activity Changes:   Increased frequency, intensity or length of time? No - treadmill   Obstacles to activity: busy schedule    Diabetes Medications:   Recent change in medication type/dosage: still using samples of semaglutide tablets  Comments: med too expensive on insurance, relying on samples from PCP    Monitoring:   Changes to testing regimen?  No  Recent results: not testing  None noted  Barriers:   -none noted        Follow Up Plan: one month    Referring Provider: Sienna Gagnon MD  Time spent with patient: 20 minutes

## 2021-07-06 ENCOUNTER — VIRTUAL VISIT (OUTPATIENT)
Dept: INTERNAL MEDICINE CLINIC | Age: 71
End: 2021-07-06

## 2021-07-06 DIAGNOSIS — N18.30 TYPE 2 DIABETES MELLITUS WITH STAGE 3 CHRONIC KIDNEY DISEASE, WITHOUT LONG-TERM CURRENT USE OF INSULIN, UNSPECIFIED WHETHER STAGE 3A OR 3B CKD (HCC): Primary | ICD-10-CM

## 2021-07-06 DIAGNOSIS — E11.22 TYPE 2 DIABETES MELLITUS WITH STAGE 3 CHRONIC KIDNEY DISEASE, WITHOUT LONG-TERM CURRENT USE OF INSULIN, UNSPECIFIED WHETHER STAGE 3A OR 3B CKD (HCC): Primary | ICD-10-CM

## 2021-08-02 ENCOUNTER — VIRTUAL VISIT (OUTPATIENT)
Dept: INTERNAL MEDICINE CLINIC | Age: 71
End: 2021-08-02

## 2021-08-02 DIAGNOSIS — N18.30 TYPE 2 DIABETES MELLITUS WITH STAGE 3 CHRONIC KIDNEY DISEASE, WITHOUT LONG-TERM CURRENT USE OF INSULIN, UNSPECIFIED WHETHER STAGE 3A OR 3B CKD (HCC): Primary | ICD-10-CM

## 2021-08-02 DIAGNOSIS — E66.01 MORBIDLY OBESE (HCC): ICD-10-CM

## 2021-08-02 DIAGNOSIS — E11.22 TYPE 2 DIABETES MELLITUS WITH STAGE 3 CHRONIC KIDNEY DISEASE, WITHOUT LONG-TERM CURRENT USE OF INSULIN, UNSPECIFIED WHETHER STAGE 3A OR 3B CKD (HCC): Primary | ICD-10-CM

## 2021-08-02 PROCEDURE — 99999 PR OFFICE/OUTPT VISIT,PROCEDURE ONLY: CPT | Performed by: DIETITIAN, REGISTERED

## 2021-08-02 NOTE — PROGRESS NOTES
Medical Nutrition Therapy for Diabetes Follow Up    Fabio Smith  August 2, 2021      Patient Care Team:  Romell Cabot, MD as PCP - Esther Montoya MD as PCP - [facility] Empaneled Provider  Rk Seals MD (Obstetrics & Gynecology)  July Pearl RD, LD as Dietitian (Dietitian)    Reason for visit: . VIRTUALVISIT - f/u for DM and weight concerns  Patient self-assessment of Progress: \"I got off track a bit with traveling, but am back to my return\"    ASSESSMENT/PLAN:   NUTRITION DIAGNOSIS    #1 Problem: Altered Nutrition-Related Laboratory Values (NC-2.2)  Related to: Endocrine/Diabetes   As Evidenced by: Elevated Plasma glucose and/or HgbA1c levels           #2 Problem: Overweight/Obesity (NC-3.3)  Related to: Excessive energy intake or physical inactivity  As Evidenced by: BMI more than normative standard for age and sex (BMI=34.16)      NUTRITION INTERVENTION  Nutrition Prescription: Aim for 30 g Carb per meal; 15 - 20 g Carb per snack      Diabetes Education/Counseling included:  Problem solving    Interventions:  Relapse prevention    NUTRITION MONITORING AND EVALUATION  5 =always  4 = most of the time   3 = half the days  2 = sometimes  1 = hasn't started  Indicator/Goal Progress Rating   #1  Consistent carb intake, meals at regular intervals  #1 4/5   #2  Use probiotics #2 4/5   #3  Exercise all or most days #3 2/5   Plans to walk outside when weather is good, or in the mall.        Patient Active Problem List   Diagnosis    Asthma    Shortness of breath    Autoimmune hepatitis ([address]    Essential hypertension, benign    Pure hypercholesterolemia    Impaired fasting glucose    Cataract    Murmur, cardiac    Stage 3 chronic kidney disease (HCC)    Type 2 diabetes mellitus with stage 3 chronic kidney disease, without long-term current use of insulin ([address]    Morbidly obese ([address]    VIOLA (acute kidney injury) ([address]       Current Outpatient Medications   Medication Sig Dispense Refill    Semaglutide 3 MG TABS Take 3 mg by mouth daily 90 tablet 0    desloratadine (CLARINEX) 5 MG tablet TAKE 1 TABLET BY MOUTH EVERY DAY 90 tablet 0    budesonide-formoterol (SYMBICORT) 160-4.5 MCG/ACT AERO TAKE 2 PUFFS BY MOUTH TWICE A DAY 30.6 Inhaler 1    carvedilol (COREG) 6.25 MG tablet Take 1 tablet by mouth 2 times daily 60 tablet 3    amLODIPine (NORVASC) 5 MG tablet Take 1 tablet by mouth 2 times daily 180 tablet 1    blood glucose monitor kit and supplies Dispense sufficient amount for indicated testing frequency plus additional to accommodate PRN testing needs. Dispense all needed supplies to include: monitor, strips, lancing device, lancets, control solutions, alcohol swabs. 1 kit 0    albuterol sulfate HFA (PROVENTIL HFA) 108 (90 Base) MCG/ACT inhaler INHALE 2 PUFFS INTO THE LUNGS EVERY 6 HOURS AS NEEDED. 1 Inhaler 3    predniSONE (DELTASONE) 10 MG tablet TAKE 1 TABLET BY MOUTH EVERY DAY 90 tablet 3    metFORMIN (GLUMETZA) 1000 MG extended release tablet Take 2 tablets by mouth daily (with breakfast) 60 tablet 3    montelukast (SINGULAIR) 10 MG tablet TAKE 1 TABLET BY MOUTH EVERY DAY EVERY NIGHT 90 tablet 3    fluticasone (FLONASE) 50 MCG/ACT nasal spray SPRAY 1 SPRAY INTO EACH NOSTRIL EVERY DAY 1 Bottle 2    atorvastatin (LIPITOR) 40 MG tablet Take 1 tablet by mouth daily 90 tablet 3    mometasone (NASONEX) 50 MCG/ACT nasal spray 2 sprays by Nasal route daily as needed (as needed for allergies) 3 Inhaler 3    calcium carbonate (OSCAL) 500 MG TABS tablet Take 500 mg by mouth daily.  Spacer/Aero Chamber Mouthpiece MISC by Does not apply route. 1 each 1    Multiple Vitamin (MULTIVITAMIN PO) Take  by mouth daily. No current facility-administered medications for this visit.          NUTRITION ASSESSMENT    Biochemical Data:    Lab Results   Component Value Date    LABA1C 6.6 07/01/2021     Lab Results   Component Value Date    .7 07/01/2021       Lab Results Component Value Date    CHOL 177 04/09/2021    CHOL 140 12/30/2020    CHOL 174 08/13/2020     Lab Results   Component Value Date    TRIG 110 04/09/2021    TRIG 87 12/30/2020    TRIG 136 08/13/2020     Lab Results   Component Value Date    HDL 68 (H) 04/09/2021    HDL 67 (H) 12/30/2020    HDL 73 (H) 08/13/2020     Lab Results   Component Value Date    LDLCALC 87 04/09/2021    LDLCALC 56 12/30/2020    LDLCALC 74 08/13/2020     Lab Results   Component Value Date    LABVLDL 22 04/09/2021    LABVLDL 17 12/30/2020    LABVLDL 27 08/13/2020     Lab Results   Component Value Date    CHOLHDLRATIO 3.0 05/08/2015       Lab Results   Component Value Date    WBC 15.8 (H) 04/09/2021    HGB 13.0 04/09/2021    HCT 39.0 04/09/2021    MCV 90.1 04/09/2021     04/09/2021       Lab Results   Component Value Date    CREATININE 1.3 (H) 06/18/2021    BUN 16 06/18/2021     06/18/2021    K 3.8 06/18/2021     06/18/2021    CO2 26 06/18/2021       Anthropometric Measurements: Wt Change since last visit:    Comments: 192 lb on home scale    Food and Nutrition Changes:   Beverage consumption:changes: No  Patient reported changes:  Still careful about portion sizes even when out of town     Physical Activity Changes:   Increased frequency, intensity or length of time? No  Obstacles to activity: friend visiting, out of town trip, busy schedule    Diabetes Medications:   Recent change in medication type/dosage: No  Comments:     Monitoring:   Changes to testing regimen?  No  Recent results: not testing    Barriers:   -none noted        Follow Up Plan: one month    Referring Provider: Sienna Gagnon MD  Time spent with patient: 20 minutes

## 2021-08-24 ENCOUNTER — OFFICE VISIT (OUTPATIENT)
Dept: FAMILY MEDICINE CLINIC | Age: 71
End: 2021-08-24
Payer: MEDICARE

## 2021-08-24 VITALS
SYSTOLIC BLOOD PRESSURE: 152 MMHG | WEIGHT: 193 LBS | HEART RATE: 75 BPM | HEIGHT: 64 IN | TEMPERATURE: 96.8 F | DIASTOLIC BLOOD PRESSURE: 90 MMHG | BODY MASS INDEX: 32.95 KG/M2 | OXYGEN SATURATION: 98 %

## 2021-08-24 DIAGNOSIS — Z00.00 ROUTINE GENERAL MEDICAL EXAMINATION AT A HEALTH CARE FACILITY: Primary | ICD-10-CM

## 2021-08-24 DIAGNOSIS — N18.31 STAGE 3A CHRONIC KIDNEY DISEASE (HCC): ICD-10-CM

## 2021-08-24 DIAGNOSIS — J45.20 MILD INTERMITTENT ASTHMA WITHOUT COMPLICATION: ICD-10-CM

## 2021-08-24 DIAGNOSIS — N18.30 TYPE 2 DIABETES MELLITUS WITH STAGE 3 CHRONIC KIDNEY DISEASE, WITHOUT LONG-TERM CURRENT USE OF INSULIN, UNSPECIFIED WHETHER STAGE 3A OR 3B CKD (HCC): ICD-10-CM

## 2021-08-24 DIAGNOSIS — E78.00 PURE HYPERCHOLESTEROLEMIA: ICD-10-CM

## 2021-08-24 DIAGNOSIS — E11.22 TYPE 2 DIABETES MELLITUS WITH STAGE 3 CHRONIC KIDNEY DISEASE, WITHOUT LONG-TERM CURRENT USE OF INSULIN, UNSPECIFIED WHETHER STAGE 3A OR 3B CKD (HCC): ICD-10-CM

## 2021-08-24 DIAGNOSIS — K75.4 AUTOIMMUNE HEPATITIS (HCC): ICD-10-CM

## 2021-08-24 DIAGNOSIS — I10 ESSENTIAL HYPERTENSION, BENIGN: Chronic | ICD-10-CM

## 2021-08-24 PROBLEM — N17.9 AKI (ACUTE KIDNEY INJURY) (HCC): Status: RESOLVED | Noted: 2021-04-27 | Resolved: 2021-08-24

## 2021-08-24 LAB — DIABETIC RETINOPATHY: NEGATIVE

## 2021-08-24 PROCEDURE — 3044F HG A1C LEVEL LT 7.0%: CPT | Performed by: NURSE PRACTITIONER

## 2021-08-24 PROCEDURE — 4040F PNEUMOC VAC/ADMIN/RCVD: CPT | Performed by: NURSE PRACTITIONER

## 2021-08-24 PROCEDURE — 1123F ACP DISCUSS/DSCN MKR DOCD: CPT | Performed by: NURSE PRACTITIONER

## 2021-08-24 PROCEDURE — 3017F COLORECTAL CA SCREEN DOC REV: CPT | Performed by: NURSE PRACTITIONER

## 2021-08-24 PROCEDURE — G0438 PPPS, INITIAL VISIT: HCPCS | Performed by: NURSE PRACTITIONER

## 2021-08-24 RX ORDER — ALBUTEROL SULFATE 90 UG/1
AEROSOL, METERED RESPIRATORY (INHALATION)
Qty: 1 INHALER | Refills: 3 | Status: SHIPPED | OUTPATIENT
Start: 2021-08-24

## 2021-08-24 RX ORDER — ATORVASTATIN CALCIUM 40 MG/1
40 TABLET, FILM COATED ORAL DAILY
Qty: 90 TABLET | Refills: 3 | Status: SHIPPED | OUTPATIENT
Start: 2021-08-24 | End: 2022-02-22 | Stop reason: SDUPTHER

## 2021-08-24 ASSESSMENT — LIFESTYLE VARIABLES
HAVE YOU OR SOMEONE ELSE BEEN INJURED AS A RESULT OF YOUR DRINKING: 0
HOW OFTEN DURING THE LAST YEAR HAVE YOU NEEDED AN ALCOHOLIC DRINK FIRST THING IN THE MORNING TO GET YOURSELF GOING AFTER A NIGHT OF HEAVY DRINKING: 0
HOW OFTEN DURING THE LAST YEAR HAVE YOU FAILED TO DO WHAT WAS NORMALLY EXPECTED FROM YOU BECAUSE OF DRINKING: 0
HOW MANY STANDARD DRINKS CONTAINING ALCOHOL DO YOU HAVE ON A TYPICAL DAY: 0
HOW OFTEN DURING THE LAST YEAR HAVE YOU BEEN UNABLE TO REMEMBER WHAT HAPPENED THE NIGHT BEFORE BECAUSE YOU HAD BEEN DRINKING: 0
HOW OFTEN DO YOU HAVE A DRINK CONTAINING ALCOHOL: 1
HOW OFTEN DO YOU HAVE SIX OR MORE DRINKS ON ONE OCCASION: 0
AUDIT TOTAL SCORE: 1
AUDIT-C TOTAL SCORE: 1
HOW OFTEN DURING THE LAST YEAR HAVE YOU HAD A FEELING OF GUILT OR REMORSE AFTER DRINKING: 0
HOW OFTEN DURING THE LAST YEAR HAVE YOU FOUND THAT YOU WERE NOT ABLE TO STOP DRINKING ONCE YOU HAD STARTED: 0
HAS A RELATIVE, FRIEND, DOCTOR, OR ANOTHER HEALTH PROFESSIONAL EXPRESSED CONCERN ABOUT YOUR DRINKING OR SUGGESTED YOU CUT DOWN: 0

## 2021-08-24 ASSESSMENT — PATIENT HEALTH QUESTIONNAIRE - PHQ9
SUM OF ALL RESPONSES TO PHQ QUESTIONS 1-9: 0
SUM OF ALL RESPONSES TO PHQ QUESTIONS 1-9: 0
1. LITTLE INTEREST OR PLEASURE IN DOING THINGS: 0
2. FEELING DOWN, DEPRESSED OR HOPELESS: 0
SUM OF ALL RESPONSES TO PHQ9 QUESTIONS 1 & 2: 0
SUM OF ALL RESPONSES TO PHQ QUESTIONS 1-9: 0

## 2021-08-24 NOTE — PROGRESS NOTES
Medicare Annual Wellness Visit  Name: Katy Moura Date: 2021   MRN: <[id number]> Sex: Female   Age: 78 y.o. Ethnicity: Non- / Non    : 1950 Race: Black /       Michelle Alexandre is here for No chief complaint on file. Screenings for behavioral, psychosocial and functional/safety risks, and cognitive dysfunction are all negative except as indicated below. These results, as well as other patient data from the [address] form, are documented in Flowsheets linked to this Encounter. Allergies   Allergen Reactions    Iodine Shortness Of Breath    Other      Steroid (injection) taken for hepatitis elevated liver enzymes, pt. Uncertain of name    Shellfish-Derived Products Rash     Shortness of breath         Prior to Visit Medications    Medication Sig Taking? Authorizing Provider   Semaglutide 3 MG TABS Take 3 mg by mouth daily Yes ROSINA Zendejas CNP   atorvastatin (LIPITOR) 40 MG tablet Take 1 tablet by mouth daily Yes ROSINA Zendejas CNP   albuterol sulfate HFA (PROVENTIL HFA) 108 (90 Base) MCG/ACT inhaler INHALE 2 PUFFS INTO THE LUNGS EVERY 6 HOURS AS NEEDED. Yes ROSINA Mendoza CNP   desloratadine (CLARINEX) 5 MG tablet TAKE 1 TABLET BY MOUTH EVERY DAY Yes Flory Gaona MD   budesonide-formoterol (SYMBICORT) 160-4.5 MCG/ACT AERO TAKE 2 PUFFS BY MOUTH TWICE A DAY Yes Flory Gaona MD   carvedilol (COREG) 6.25 MG tablet Take 1 tablet by mouth 2 times daily Yes Rosangela Hernandez MD   amLODIPine (NORVASC) 5 MG tablet Take 1 tablet by mouth 2 times daily Yes Flory Gaona MD   blood glucose monitor kit and supplies Dispense sufficient amount for indicated testing frequency plus additional to accommodate PRN testing needs. Dispense all needed supplies to include: monitor, strips, lancing device, lancets, control solutions, alcohol swabs.  Yes Flory Gaona MD   predniSONE (DELTASONE) 10 MG tablet TAKE 1 TABLET BY MOUTH EVERY DAY Yes Jazmine Hidalgo MD   metFORMIN (GLUMETZA) 1000 MG extended release tablet Take 2 tablets by mouth daily (with breakfast) Yes Juana Lopez MD   montelukast (SINGULAIR) 10 MG tablet TAKE 1 TABLET BY MOUTH EVERY DAY EVERY NIGHT Yes Jazmine Hidalgo MD   fluticasone (FLONASE) 50 MCG/ACT nasal spray SPRAY 1 SPRAY INTO EACH NOSTRIL EVERY DAY Yes Jazmine Hidalgo MD   mometasone (NASONEX) 50 MCG/ACT nasal spray 2 sprays by Nasal route daily as needed (as needed for allergies) Yes Nikita Luque MD   calcium carbonate (OSCAL) 500 MG TABS tablet Take 500 mg by mouth daily. Yes Historical Provider, MD   Spacer/Aero Chamber Mouthpiece MISC by Does not apply route. Yes Nikita Luque MD   Multiple Vitamin (MULTIVITAMIN PO) Take  by mouth daily. Yes Historical Provider, MD         Past Medical History:   Diagnosis Date    Allergic rhinitis     Asthma     COPD (chronic obstructive pulmonary disease) ([facility]     pt denies    Hepatitis     autoimmune    Hyperlipidemia     Hypertension     Influenza A 1/10/2015    Osteoporosis     Type 2 diabetes mellitus without complication, without long-term current use of insulin ([facility] 12/16/2019       Past Surgical History:   Procedure Laterality Date    BREAST BIOPSY  10/2005    Dr. Ninfa Carty - right , benign    [place] Left      Dr. King  - [facility] 10 yrs     COLONOSCOPY  07/08/13    Dr. King- internal hemorrhoids, adenomatous and hyperplastic polyps.  Repeat in 3 yrs    COLONOSCOPY  07/17/2016    Dr. King, No evidence of any colorectal neoplasia ~5 years    COLONOSCOPY  6/21/2021    COLONOSCOPY POLYPECTOMY SNARE/COLD BIOPSY performed by Stephanie Warner MD at [address]  8/2006     autoimmune hepatitis          Family History   Problem Relation Age of Onset    Heart Disease Mother     Cancer Mother         breast, ovarian     Cancer Father prostate     Kidney Disease Brother        [facility] (Including outside providers/suppliers regularly involved in providing care):   Patient Care Team:  Antonio Cline MD as PCP - Emiliano Willams MD as PCP - [facility]  Oscar Noland MD (Obstetrics & Gynecology)  Kala Badillo RD, NETTIE as Dietitian (Dietitian)    Wt Readings from Last 3 Encounters:   08/24/21 193 lb (87.5 kg)   06/21/21 199 lb (90.3 kg)   06/15/21 199 lb 3.2 oz (90.4 kg)     Vitals:    08/24/21 1039   BP: (!) 152/90   Pulse: 75   Temp: 96.8 °F (36 °C)   SpO2: 98%   Weight: 193 lb (87.5 kg)   Height: 5' 4\" (1.626 m)     Body mass index is 33.13 kg/m². Based upon direct observation of the patient, evaluation of cognition reveals recent and remote memory intact. General Appearance: alert and oriented to person, place and time, well developed and well- nourished, in no acute distress  Skin: warm and dry, no rash or erythema, 1 cm nodule to left forehead, sts this has been present for years  Head: normocephalic and atraumatic  Eyes: pupils equal, round, and reactive to light, extraocular eye movements intact, conjunctivae normal  ENT: tympanic membrane, external ear and ear canal normal bilaterally, nose without deformity, nasal mucosa and turbinates normal without polyps  Neck: supple and non-tender without mass, no thyromegaly or thyroid nodules, no cervical lymphadenopathy  Pulmonary/Chest: clear to auscultation bilaterally- no wheezes, rales or rhonchi, normal air movement, no respiratory distress  Cardiovascular: normal rate, regular rhythm, normal S1 and S2, no murmurs, rubs, clicks, or gallops, distal pulses intact, no carotid bruits  Abdomen: soft, non-tender, non-distended, normal bowel sounds, no masses or organomegaly  Extremities: no cyanosis, clubbing or edema  Musculoskeletal: normal range of motion, no joint swelling, deformity or tenderness, feet in good condition bilaterally.  No skin breakdown, 6pt sensation intact, nails painted but in good condition  Neurologic: reflexes normal and symmetric, no cranial nerve deficit, gait, coordination and speech normal    Patient's complete Health Risk Assessment and screening values have been reviewed and are found in Flowsheets. The following problems were reviewed today and where indicated follow up appointments were made and/or referrals ordered.     Positive Risk Factor Screenings with Interventions:           Health Habits/Nutrition:  Health Habits/Nutrition  Do you exercise for at least 20 minutes 2-3 times per week?: Yes  Have you lost any weight without trying in the past 3 months?: No  Do you eat only one meal per day?: No  Have you seen the dentist within the past year?: Yes  Body mass index: (!) 33.12  Health Habits/Nutrition Interventions:  · Inadequate physical activity:  patient is not ready to increase his/her physical activity level at this time       Personalized Preventive Plan   Current Health Maintenance Status  Immunization History   Administered Date(s) Administered    COVID-19, Moderna, PF, 100mcg/0.5mL 02/13/2021, 03/13/2021    Influenza 10/25/2012, 10/01/2013    Influenza Virus Vaccine 10/20/2009, 09/01/2011, 11/11/2014, 09/24/2015    Influenza, High Dose (Fluzone 65 yrs and older) 10/09/2017, 10/18/2018    Influenza, Quadv, IM, PF (6 mo and older Fluzone, Flulaval, Fluarix, and 3 yrs and older Afluria) 09/06/2016    Pneumococcal Conjugate 13-valent ([id number]) 09/24/2015    Pneumococcal Polysaccharide ([id number]) 10/25/2011, 10/09/2017    Tdap (Boostrix, Adacel) 01/24/2012        Health Maintenance   Topic Date Due    Shingles Vaccine (1 of 2) Never done   [employer] Visit (AWV)  Never done    Flu vaccine (1) 09/01/2021    DTaP/Tdap/Td vaccine (2 - Td or Tdap) 01/24/2022    Lipid screen  04/09/2022    Potassium monitoring  06/18/2022    Creatinine monitoring  06/18/2022    Diabetic microalbuminuria test 06/25/2022    A1C test (Diabetic or Prediabetic)  07/01/2022    Diabetic retinal exam  07/24/2022    Diabetic foot exam  08/24/2022    Breast cancer screen  08/29/2022    Colon cancer screen colonoscopy  06/21/2031    DEXA (modify frequency per FRAX score)  Completed    Pneumococcal 65+ years Vaccine  Completed    COVID-19 Vaccine  Completed    Hepatitis A vaccine  Aged Out    Hib vaccine  Aged Out    Meningococcal (ACWY) vaccine  Aged Out    Hepatitis C screen  Discontinued     Recommendations for Preventive Services Due: see orders and patient instructions/AVS.  Niece or brother knows end of life wishes    Recommended screening schedule for the next 5-10 years is provided to the patient in written form: see Patient Instructions/AVS.    Diagnoses and all orders for this visit:    Routine general medical examination at a health care facility    Pure hypercholesterolemia  -     atorvastatin (LIPITOR) 40 MG tablet; Take 1 tablet by mouth daily    Type 2 diabetes mellitus with stage 3 chronic kidney disease, without long-term current use of insulin, unspecified whether stage 3a or 3b CKD (HCC)  -      DIABETES FOOT EXAM  -     Semaglutide 3 MG TABS; Take 3 mg by mouth daily    Autoimmune hepatitis (HCC)    Stage 3a chronic kidney disease (HCC)    Mild intermittent asthma without complication  -     albuterol sulfate HFA (PROVENTIL HFA) 108 (90 Base) MCG/ACT inhaler; INHALE 2 PUFFS INTO THE LUNGS EVERY 6 HOURS AS NEEDED.     Essential hypertension, benign

## 2021-08-24 NOTE — ASSESSMENT & PLAN NOTE
Uncontrolled, continue current medications   Seeberenice Galindo in October  I have contacted Amaya Lopez for earlier follow up- will need closer monitoring  F/U with ambulatory pressures

## 2021-08-24 NOTE — PATIENT INSTRUCTIONS
Personalized Preventive Plan for Dario White - 8/24/2021  Medicare offers a range of preventive health benefits. Some of the tests and screenings are paid in full while other may be subject to a deductible, co-insurance, and/or copay. Some of these benefits include a comprehensive review of your medical history including lifestyle, illnesses that may run in your family, and various assessments and screenings as appropriate. After reviewing your medical record and screening and assessments performed today your provider may have ordered immunizations, labs, imaging, and/or referrals for you. A list of these orders (if applicable) as well as your Preventive Care list are included within your After Visit Summary for your review. Other Preventive Recommendations:    · A preventive eye exam performed by an eye specialist is recommended every 1-2 years to screen for glaucoma; cataracts, macular degeneration, and other eye disorders. · A preventive dental visit is recommended every 6 months. · Try to get at least 150 minutes of exercise per week or 10,000 steps per day on a pedometer . · Order or download the FREE \"Exercise & Physical Activity: Your Everyday Guide\" from [employer]. Call 0-925.173.2836 or search [employer] online. · You need 4913-9535 mg of calcium and 4068-5821 IU of vitamin D per day. It is possible to meet your calcium requirement with diet alone, but a vitamin D supplement is usually necessary to meet this goal.  · When exposed to the sun, use a sunscreen that protects against both UVA and UVB radiation with an SPF of 30 or greater. Reapply every 2 to 3 hours or after sweating, drying off with a towel, or swimming. · Always wear a seat belt when traveling in a car. Always wear a helmet when riding a bicycle or motorcycle. Personalized Preventive Plan for Dario White - 8/24/2021  Medicare offers a range of preventive health benefits.  Some of the tests and screenings are paid in full while other may be subject to a deductible, co-insurance, and/or copay. Some of these benefits include a comprehensive review of your medical history including lifestyle, illnesses that may run in your family, and various assessments and screenings as appropriate. After reviewing your medical record and screening and assessments performed today your provider may have ordered immunizations, labs, imaging, and/or referrals for you. A list of these orders (if applicable) as well as your Preventive Care list are included within your After Visit Summary for your review. Other Preventive Recommendations:    A preventive eye exam performed by an eye specialist is recommended every 1-2 years to screen for glaucoma; cataracts, macular degeneration, and other eye disorders. A preventive dental visit is recommended every 6 months. Try to get at least 150 minutes of exercise per week or 10,000 steps per day on a pedometer . Order or download the FREE \"Exercise & Physical Activity: Your Everyday Guide\" from [employer]. Call 8-183.384.7852 or search [employer] online. You need 3458-4567 mg of calcium and 0426-8708 IU of vitamin D per day. It is possible to meet your calcium requirement with diet alone, but a vitamin D supplement is usually necessary to meet this goal.  When exposed to the sun, use a sunscreen that protects against both UVA and UVB radiation with an SPF of 30 or greater. Reapply every 2 to 3 hours or after sweating, drying off with a towel, or swimming. Always wear a seat belt when traveling in a car. Always wear a helmet when riding a bicycle or motorcycle.

## 2021-08-26 ENCOUNTER — TELEPHONE (OUTPATIENT)
Dept: FAMILY MEDICINE CLINIC | Age: 71
End: 2021-08-26

## 2021-08-26 DIAGNOSIS — E11.22 TYPE 2 DIABETES MELLITUS WITH STAGE 3 CHRONIC KIDNEY DISEASE, WITHOUT LONG-TERM CURRENT USE OF INSULIN, UNSPECIFIED WHETHER STAGE 3A OR 3B CKD (HCC): Primary | ICD-10-CM

## 2021-08-26 DIAGNOSIS — I10 ESSENTIAL HYPERTENSION, BENIGN: ICD-10-CM

## 2021-08-26 DIAGNOSIS — N18.30 TYPE 2 DIABETES MELLITUS WITH STAGE 3 CHRONIC KIDNEY DISEASE, WITHOUT LONG-TERM CURRENT USE OF INSULIN, UNSPECIFIED WHETHER STAGE 3A OR 3B CKD (HCC): Primary | ICD-10-CM

## 2021-08-26 RX ORDER — LOSARTAN POTASSIUM 50 MG/1
50 TABLET ORAL DAILY
Qty: 30 TABLET | Refills: 5 | Status: SHIPPED | OUTPATIENT
Start: 2021-08-26 | End: 2021-09-20

## 2021-08-26 NOTE — TELEPHONE ENCOUNTER
Per Dr. Teri Acevedo we are adding Losartan 50mg daily for her HTN.   I have sent this in to her [facility].  Please follow up with me on home Bps after starting this,

## 2021-08-30 ENCOUNTER — VIRTUAL VISIT (OUTPATIENT)
Dept: INTERNAL MEDICINE CLINIC | Age: 71
End: 2021-08-30

## 2021-08-30 DIAGNOSIS — N18.30 TYPE 2 DIABETES MELLITUS WITH STAGE 3 CHRONIC KIDNEY DISEASE, WITHOUT LONG-TERM CURRENT USE OF INSULIN, UNSPECIFIED WHETHER STAGE 3A OR 3B CKD (HCC): Primary | ICD-10-CM

## 2021-08-30 DIAGNOSIS — E66.01 MORBIDLY OBESE (HCC): ICD-10-CM

## 2021-08-30 DIAGNOSIS — E11.22 TYPE 2 DIABETES MELLITUS WITH STAGE 3 CHRONIC KIDNEY DISEASE, WITHOUT LONG-TERM CURRENT USE OF INSULIN, UNSPECIFIED WHETHER STAGE 3A OR 3B CKD (HCC): Primary | ICD-10-CM

## 2021-08-30 NOTE — PROGRESS NOTES
Medical Nutrition Therapy for Diabetes Follow Up    Maria De Jesus Pickering  August 30, 2021      Patient Care Team:  Baljit Amaro MD as PCP - [facility] Empaneled Provider  Keisha Foster MD (Obstetrics & Gynecology)  Puneet Greenberg RD, NETTIE as Dietitian (Dietitian)    Reason for visit: VIRTUAL VISIT  - f/u DM and weight concerns  Patient self-assessment of Progress: \"I don't think I\"m doing as well as I should be\"     Appears to be experiencing \"diabetes burn-out\"    ASSESSMENT/PLAN:   NUTRITION DIAGNOSIS    #1 Problem: Altered Nutrition-Related Laboratory Values (NC-2.2)  Related to: Endocrine/Diabetes   As Evidenced by: Elevated Plasma glucose and/or HgbA1c levels           #2 Problem: Overweight/Obesity (NC-3.3)  Related to: Excessive energy intake or physical inactivity  As Evidenced by: BMI more than normative standard for age and sex (BMI=33.13)    NUTRITION INTERVENTION  Nutrition Prescription: Aim for 30 g Carb per meal; 15 - 20 g Carb per snack      Diabetes Education/Counseling included:  Problem solving  Relapse prevention      NUTRITION MONITORING AND EVALUATION  5 =always  4 = most of the time   3 = half the days  2 = sometimes  1 = hasn't started  Indicator/Goal Progress Rating   #1  Walking outside or at mall  #1 3/5   #2  Use probiotics #2 3/5   #3  Consistent carb intake, meals at regular intervals #3 4/5     New goal: schedule time to exercise - walking and using stretch bands for strength training  Continue silvana meal at mid-day; have salad or other vegetables in evening instead of snacking       Patient Active Problem List   Diagnosis    Asthma    Autoimmune hepatitis ([address]    Essential hypertension, benign    Pure hypercholesterolemia    Impaired fasting glucose    Murmur, cardiac    Stage 3 chronic kidney disease ([address]    Type 2 diabetes mellitus with stage 3 chronic kidney disease, without long-term current use of insulin ([address]    Morbidly obese ([address]       Current Outpatient Medications   Medication Sig Dispense Refill    carvedilol (COREG) 6.25 MG tablet TAKE 1 TABLET BY MOUTH 2 TIMES DAILY 180 tablet 1    losartan (COZAAR) 50 MG tablet Take 1 tablet by mouth daily 30 tablet 5    Semaglutide 3 MG TABS Take 3 mg by mouth daily 90 tablet 0    atorvastatin (LIPITOR) 40 MG tablet Take 1 tablet by mouth daily 90 tablet 3    albuterol sulfate HFA (PROVENTIL HFA) 108 (90 Base) MCG/ACT inhaler INHALE 2 PUFFS INTO THE LUNGS EVERY 6 HOURS AS NEEDED. 1 Inhaler 3    desloratadine (CLARINEX) 5 MG tablet TAKE 1 TABLET BY MOUTH EVERY DAY 90 tablet 0    budesonide-formoterol (SYMBICORT) 160-4.5 MCG/ACT AERO TAKE 2 PUFFS BY MOUTH TWICE A DAY 30.6 Inhaler 1    amLODIPine (NORVASC) 5 MG tablet Take 1 tablet by mouth 2 times daily 180 tablet 1    blood glucose monitor kit and supplies Dispense sufficient amount for indicated testing frequency plus additional to accommodate PRN testing needs. Dispense all needed supplies to include: monitor, strips, lancing device, lancets, control solutions, alcohol swabs. 1 kit 0    predniSONE (DELTASONE) 10 MG tablet TAKE 1 TABLET BY MOUTH EVERY DAY 90 tablet 3    metFORMIN (GLUMETZA) 1000 MG extended release tablet Take 2 tablets by mouth daily (with breakfast) 60 tablet 3    montelukast (SINGULAIR) 10 MG tablet TAKE 1 TABLET BY MOUTH EVERY DAY EVERY NIGHT 90 tablet 3    fluticasone (FLONASE) 50 MCG/ACT nasal spray SPRAY 1 SPRAY INTO EACH NOSTRIL EVERY DAY 1 Bottle 2    mometasone (NASONEX) 50 MCG/ACT nasal spray 2 sprays by Nasal route daily as needed (as needed for allergies) 3 Inhaler 3    calcium carbonate (OSCAL) 500 MG TABS tablet Take 500 mg by mouth daily.  Spacer/Aero Chamber Mouthpiece MISC by Does not apply route. 1 each 1    Multiple Vitamin (MULTIVITAMIN PO) Take  by mouth daily. No current facility-administered medications for this visit.          NUTRITION ASSESSMENT    Biochemical Data:    Lab Results   Component Value Date LABA1C 6.6 07/01/2021     Lab Results   Component Value Date    .7 07/01/2021       Lab Results   Component Value Date    CHOL 177 04/09/2021    CHOL 140 12/30/2020    CHOL 174 08/13/2020     Lab Results   Component Value Date    TRIG 110 04/09/2021    TRIG 87 12/30/2020    TRIG 136 08/13/2020     Lab Results   Component Value Date    HDL 68 (H) 04/09/2021    HDL 67 (H) 12/30/2020    HDL 73 (H) 08/13/2020     Lab Results   Component Value Date    LDLCALC 87 04/09/2021    LDLCALC 56 12/30/2020    LDLCALC 74 08/13/2020     Lab Results   Component Value Date    LABVLDL 22 04/09/2021    LABVLDL 17 12/30/2020    LABVLDL 27 08/13/2020     Lab Results   Component Value Date    CHOLHDLRATIO 3.0 05/08/2015       Lab Results   Component Value Date    WBC 15.8 (H) 04/09/2021    HGB 13.0 04/09/2021    HCT 39.0 04/09/2021    MCV 90.1 04/09/2021     04/09/2021       Lab Results   Component Value Date    CREATININE 1.3 (H) 06/18/2021    BUN 16 06/18/2021     06/18/2021    K 3.8 06/18/2021     06/18/2021    CO2 26 06/18/2021       Anthropometric Measurements: Wt Change since last visit:  Yes  Comments: Wt Readings from Last 3 Encounters:   08/24/21 193 lb (87.5 kg)   06/21/21 199 lb (90.3 kg)   06/15/21 199 lb 3.2 oz (90.4 kg)         Food and Nutrition Changes:   Beverage consumption:changes: No -water and diet soda or diet green tea (regular only twice in the past 3 weeks)  Patient reported changes:  No  8a B: yogurt or cereal/milk or egg/applesauce  2p D:chicken or salmon with vegetables and rice or corn    Physical Activity Changes:   Increased frequency, intensity or length of time? No - mall-walking or treadmill at home  Obstacles to activity: too hot outside    Diabetes Medications:   Recent change in medication type/dosage: No      Monitoring:   Changes to testing regimen?  No  Recent results: not testing    Barriers:   -none        Follow Up Plan: one month    Referring Provider: Kaushal Vincent Warfel, CNP  Time spent with patient: 20 minutes

## 2021-09-14 ENCOUNTER — HOSPITAL ENCOUNTER (OUTPATIENT)
Dept: WOMENS IMAGING | Age: 71
Discharge: HOME | End: 2021-09-14
Payer: MEDICARE

## 2021-09-14 DIAGNOSIS — Z12.31 VISIT FOR SCREENING MAMMOGRAM: ICD-10-CM

## 2021-09-14 PROCEDURE — 77067 SCR MAMMO BI INCL CAD: CPT

## 2021-09-17 DIAGNOSIS — I10 ESSENTIAL HYPERTENSION, BENIGN: ICD-10-CM

## 2021-09-17 DIAGNOSIS — E11.22 TYPE 2 DIABETES MELLITUS WITH STAGE 3 CHRONIC KIDNEY DISEASE, WITHOUT LONG-TERM CURRENT USE OF INSULIN, UNSPECIFIED WHETHER STAGE 3A OR 3B CKD (HCC): ICD-10-CM

## 2021-09-17 DIAGNOSIS — N18.30 TYPE 2 DIABETES MELLITUS WITH STAGE 3 CHRONIC KIDNEY DISEASE, WITHOUT LONG-TERM CURRENT USE OF INSULIN, UNSPECIFIED WHETHER STAGE 3A OR 3B CKD (HCC): ICD-10-CM

## 2021-09-20 RX ORDER — LOSARTAN POTASSIUM 50 MG/1
TABLET ORAL
Qty: 30 TABLET | Refills: 5 | Status: SHIPPED | OUTPATIENT
Start: 2021-09-20 | End: 2022-03-14

## 2021-09-27 ENCOUNTER — VIRTUAL VISIT (OUTPATIENT)
Dept: INTERNAL MEDICINE CLINIC | Age: 71
End: 2021-09-27

## 2021-09-27 DIAGNOSIS — E66.01 MORBIDLY OBESE (HCC): ICD-10-CM

## 2021-09-27 DIAGNOSIS — N18.30 TYPE 2 DIABETES MELLITUS WITH STAGE 3 CHRONIC KIDNEY DISEASE, WITHOUT LONG-TERM CURRENT USE OF INSULIN, UNSPECIFIED WHETHER STAGE 3A OR 3B CKD (HCC): Primary | ICD-10-CM

## 2021-09-27 DIAGNOSIS — E11.22 TYPE 2 DIABETES MELLITUS WITH STAGE 3 CHRONIC KIDNEY DISEASE, WITHOUT LONG-TERM CURRENT USE OF INSULIN, UNSPECIFIED WHETHER STAGE 3A OR 3B CKD (HCC): Primary | ICD-10-CM

## 2021-09-27 NOTE — PROGRESS NOTES
Medical Nutrition Therapy for Diabetes Follow Up    Isabelle Bergeron  September 27, 2021      Patient Care Team:  ROSINA East CNP as PCP - General (Nurse Practitioner)  ROSINA East CNP as PCP - [facility] Empaneled Provider  Morteza Elena MD (Obstetrics & Gynecology)  Darin Murphy RD, LD as Dietitian (Dietitian)    Reason for visit: f/u - DM and weight concerns  Patient self-assessment of Progress:\" I feel like I\"m back on track\"    ASSESSMENT/PLAN:   NUTRITION DIAGNOSIS    #1 Problem: Altered Nutrition-Related Laboratory Values (NC-2.2)  Related to: Endocrine/Diabetes   As Evidenced by: Elevated Plasma glucose and/or HgbA1c levels           #2 Problem: Overweight/Obesity (NC-3.3)  Related to: Excessive energy intake or physical inactivity  As Evidenced by: BMI more than normative standard for age and sex (BMI=33.13)        NUTRITION INTERVENTION  Nutrition Prescription: Aim for 30 g Carb per meal; 15 - 20 g Carb per snack      Diabetes Education/Counseling included:  Problem solving        NUTRITION MONITORING AND EVALUATION  5 =always  4 = most of the time   3 = half the days  2 = sometimes  1 = hasn't started  Indicator/Goal Progress Rating   #1  Schedule time to exercise - walking and strength training using resistance bands  #1 4/5   #2  Continue main meal at mid-day, have salad or vegetables in evening instead of snacking #2 1/5   #3  Use probiotics #3 4/5   New Goals: Increase walking to 5 days per week.   Before leaving the house for work or social event, plan what and how much you will eat       Patient Active Problem List   Diagnosis    Asthma    Autoimmune hepatitis ([address]    Essential hypertension, benign    Pure hypercholesterolemia    Impaired fasting glucose    Murmur, cardiac    Stage 3 chronic kidney disease ([address]    Type 2 diabetes mellitus with stage 3 chronic kidney disease, without long-term current use of insulin ([address]    Morbidly obese ([address]       Current Outpatient Medications   Medication Sig Dispense Refill    losartan (COZAAR) 50 MG tablet TAKE 1 TABLET BY MOUTH EVERY DAY 30 tablet 5    carvedilol (COREG) 6.25 MG tablet TAKE 1 TABLET BY MOUTH 2 TIMES DAILY 180 tablet 1    Semaglutide 3 MG TABS Take 3 mg by mouth daily 90 tablet 0    atorvastatin (LIPITOR) 40 MG tablet Take 1 tablet by mouth daily 90 tablet 3    albuterol sulfate HFA (PROVENTIL HFA) 108 (90 Base) MCG/ACT inhaler INHALE 2 PUFFS INTO THE LUNGS EVERY 6 HOURS AS NEEDED. 1 Inhaler 3    desloratadine (CLARINEX) 5 MG tablet TAKE 1 TABLET BY MOUTH EVERY DAY 90 tablet 0    budesonide-formoterol (SYMBICORT) 160-4.5 MCG/ACT AERO TAKE 2 PUFFS BY MOUTH TWICE A DAY 30.6 Inhaler 1    amLODIPine (NORVASC) 5 MG tablet Take 1 tablet by mouth 2 times daily 180 tablet 1    blood glucose monitor kit and supplies Dispense sufficient amount for indicated testing frequency plus additional to accommodate PRN testing needs. Dispense all needed supplies to include: monitor, strips, lancing device, lancets, control solutions, alcohol swabs. 1 kit 0    predniSONE (DELTASONE) 10 MG tablet TAKE 1 TABLET BY MOUTH EVERY DAY 90 tablet 3    metFORMIN (GLUMETZA) 1000 MG extended release tablet Take 2 tablets by mouth daily (with breakfast) 60 tablet 3    montelukast (SINGULAIR) 10 MG tablet TAKE 1 TABLET BY MOUTH EVERY DAY EVERY NIGHT 90 tablet 3    fluticasone (FLONASE) 50 MCG/ACT nasal spray SPRAY 1 SPRAY INTO EACH NOSTRIL EVERY DAY 1 Bottle 2    mometasone (NASONEX) 50 MCG/ACT nasal spray 2 sprays by Nasal route daily as needed (as needed for allergies) 3 Inhaler 3    calcium carbonate (OSCAL) 500 MG TABS tablet Take 500 mg by mouth daily.  Spacer/Aero Chamber Mouthpiece MISC by Does not apply route. 1 each 1    Multiple Vitamin (MULTIVITAMIN PO) Take  by mouth daily. No current facility-administered medications for this visit.          NUTRITION ASSESSMENT    Biochemical Data:    Lab Results Component Value Date    LABA1C 6.6 07/01/2021     Lab Results   Component Value Date    .7 07/01/2021       Lab Results   Component Value Date    CHOL 177 04/09/2021    CHOL 140 12/30/2020    CHOL 174 08/13/2020     Lab Results   Component Value Date    TRIG 110 04/09/2021    TRIG 87 12/30/2020    TRIG 136 08/13/2020     Lab Results   Component Value Date    HDL 68 (H) 04/09/2021    HDL 67 (H) 12/30/2020    HDL 73 (H) 08/13/2020     Lab Results   Component Value Date    LDLCALC 87 04/09/2021    LDLCALC 56 12/30/2020    LDLCALC 74 08/13/2020     Lab Results   Component Value Date    LABVLDL 22 04/09/2021    LABVLDL 17 12/30/2020    LABVLDL 27 08/13/2020     Lab Results   Component Value Date    CHOLHDLRATIO 3.0 05/08/2015       Lab Results   Component Value Date    WBC 15.8 (H) 04/09/2021    HGB 13.0 04/09/2021    HCT 39.0 04/09/2021    MCV 90.1 04/09/2021     04/09/2021       Lab Results   Component Value Date    CREATININE 1.3 (H) 06/18/2021    BUN 16 06/18/2021     06/18/2021    K 3.8 06/18/2021     06/18/2021    CO2 26 06/18/2021       Anthropometric Measurements: Wt Change since last visit:  187 Lb (lost a few pounds after oral surgery)      Food and Nutrition Changes:   Beverage consumption:changes: No  Patient reported changes:  Recently on dental soft diet  B:egg and yogurt  L: soup  D: soup     Now transitioning back to more sold foods -breakfast bar/and yogurt sandwiches, salmon, potatoes, green beans     Physical Activity Changes:   Increased frequency, intensity or length of time? Yes  Using resistance bands daily first thing after waking. Walking at mall 4 days per week - trying for 5    Diabetes Medications:   Recent change in medication type/dosage: No    Monitoring:   Changes to testing regimen?  No      Barriers:   -none noted        Follow Up Plan: one month    Referring Provider: ROSINA Seymour CNP  Time spent with patient: 20 minutes

## 2021-10-25 ENCOUNTER — VIRTUAL VISIT (OUTPATIENT)
Dept: INTERNAL MEDICINE CLINIC | Age: 71
End: 2021-10-25

## 2021-10-25 DIAGNOSIS — E11.22 TYPE 2 DIABETES MELLITUS WITH STAGE 3 CHRONIC KIDNEY DISEASE, WITHOUT LONG-TERM CURRENT USE OF INSULIN, UNSPECIFIED WHETHER STAGE 3A OR 3B CKD (HCC): Primary | ICD-10-CM

## 2021-10-25 DIAGNOSIS — N18.30 TYPE 2 DIABETES MELLITUS WITH STAGE 3 CHRONIC KIDNEY DISEASE, WITHOUT LONG-TERM CURRENT USE OF INSULIN, UNSPECIFIED WHETHER STAGE 3A OR 3B CKD (HCC): Primary | ICD-10-CM

## 2021-10-25 NOTE — PROGRESS NOTES
Medical Nutrition Therapy for Diabetes Follow Up    Maria Luz Rosales  October 25, 2021      Patient Care Team:  ROSINA Valdivia CNP as PCP - General (Nurse Practitioner)  ROSINA Valdivia CNP as PCP - [facility] Empaneled Provider  Emelyn Bello MD (Obstetrics & Gynecology)  Akil Irizarry RD, LD as Dietitian (Dietitian)    Reason for visit: f/u - DM and weight concerns  Patient self-assessment of Progress: I enjoyed my cruise without over-eating, and I was very active    ASSESSMENT/PLAN:   NUTRITION DIAGNOSIS    #1 Problem: Altered Nutrition-Related Laboratory Values (NC-2.2)  Related to: Endocrine/Diabetes   As Evidenced by: Elevated Plasma glucose and/or HgbA1c levels           #2 Problem: Overweight/Obesity (NC-3.3)  Related to: Excessive energy intake or physical inactivity  As Evidenced by: BMI more than normative standard for age and sex (BMI=33.13)        NUTRITION INTERVENTION  Nutrition Prescription: Aim for 30 g Carb per meal; 15 - 20 g Carb per snack      Diabetes Education/Counseling included:  Problem solving        NUTRITION MONITORING AND EVALUATION  5 =always  4 = most of the time   3 = half the days  2 = sometimes  1 = hasn't started  Indicator/Goal Progress Rating   #1 Walk 5 days per week  #1 5/5   #2  Plan for social events  #2 4/5   #3  Use probiotics #3 2/5   New Goals: return to pre-vacation habits.           Patient Active Problem List   Diagnosis    Asthma    Autoimmune hepatitis ([address]    Essential hypertension, benign    Pure hypercholesterolemia    Impaired fasting glucose    Murmur, cardiac    Stage 3 chronic kidney disease (HCC)    Type 2 diabetes mellitus with stage 3 chronic kidney disease, without long-term current use of insulin (HCC)    Morbidly obese (HCC)       Current Outpatient Medications   Medication Sig Dispense Refill    losartan (COZAAR) 50 MG tablet TAKE 1 TABLET BY MOUTH EVERY DAY 30 tablet 5    carvedilol (COREG) 6.25 MG tablet TAKE 1 TABLET BY MOUTH 2 TIMES DAILY 180 tablet 1    Semaglutide 3 MG TABS Take 3 mg by mouth daily 90 tablet 0    atorvastatin (LIPITOR) 40 MG tablet Take 1 tablet by mouth daily 90 tablet 3    albuterol sulfate HFA (PROVENTIL HFA) 108 (90 Base) MCG/ACT inhaler INHALE 2 PUFFS INTO THE LUNGS EVERY 6 HOURS AS NEEDED. 1 Inhaler 3    desloratadine (CLARINEX) 5 MG tablet TAKE 1 TABLET BY MOUTH EVERY DAY 90 tablet 0    budesonide-formoterol (SYMBICORT) 160-4.5 MCG/ACT AERO TAKE 2 PUFFS BY MOUTH TWICE A DAY 30.6 Inhaler 1    amLODIPine (NORVASC) 5 MG tablet Take 1 tablet by mouth 2 times daily 180 tablet 1    blood glucose monitor kit and supplies Dispense sufficient amount for indicated testing frequency plus additional to accommodate PRN testing needs. Dispense all needed supplies to include: monitor, strips, lancing device, lancets, control solutions, alcohol swabs. 1 kit 0    predniSONE (DELTASONE) 10 MG tablet TAKE 1 TABLET BY MOUTH EVERY DAY 90 tablet 3    metFORMIN (GLUMETZA) 1000 MG extended release tablet Take 2 tablets by mouth daily (with breakfast) 60 tablet 3    montelukast (SINGULAIR) 10 MG tablet TAKE 1 TABLET BY MOUTH EVERY DAY EVERY NIGHT 90 tablet 3    fluticasone (FLONASE) 50 MCG/ACT nasal spray SPRAY 1 SPRAY INTO EACH NOSTRIL EVERY DAY 1 Bottle 2    mometasone (NASONEX) 50 MCG/ACT nasal spray 2 sprays by Nasal route daily as needed (as needed for allergies) 3 Inhaler 3    calcium carbonate (OSCAL) 500 MG TABS tablet Take 500 mg by mouth daily.  Spacer/Aero Chamber Mouthpiece MISC by Does not apply route. 1 each 1    Multiple Vitamin (MULTIVITAMIN PO) Take  by mouth daily. No current facility-administered medications for this visit.          NUTRITION ASSESSMENT    Biochemical Data:    Lab Results   Component Value Date    LABA1C 6.6 07/01/2021     Lab Results   Component Value Date    .7 07/01/2021       Lab Results   Component Value Date    CHOL 177 04/09/2021    CHOL 140 12/30/2020 CHOL 174 08/13/2020     Lab Results   Component Value Date    TRIG 110 04/09/2021    TRIG 87 12/30/2020    TRIG 136 08/13/2020     Lab Results   Component Value Date    HDL 68 (H) 04/09/2021    HDL 67 (H) 12/30/2020    HDL 73 (H) 08/13/2020     Lab Results   Component Value Date    LDLCALC 87 04/09/2021    LDLCALC 56 12/30/2020    LDLCALC 74 08/13/2020     Lab Results   Component Value Date    LABVLDL 22 04/09/2021    LABVLDL 17 12/30/2020    LABVLDL 27 08/13/2020     Lab Results   Component Value Date    CHOLHDLRATIO 3.0 05/08/2015       Lab Results   Component Value Date    WBC 15.8 (H) 04/09/2021    HGB 13.0 04/09/2021    HCT 39.0 04/09/2021    MCV 90.1 04/09/2021     04/09/2021       Lab Results   Component Value Date    CREATININE 1.3 (H) 06/18/2021    BUN 16 06/18/2021     06/18/2021    K 3.8 06/18/2021     06/18/2021    CO2 26 06/18/2021       Anthropometric Measurements: Wt Change since last visit:  187 Lb (lost a few pounds after oral surgery)      Food and Nutrition Changes:   Previous visit: Beverage consumption:changes: No  Patient reported changes:  Recently on dental soft diet  B:egg and yogurt  L: soup  D: soup   Now transitioning back to more sold foods -breakfast bar/and yogurt sandwiches, salmon, potatoes, green beans    Update: Able to stay on track with eating while on cruise     Physical Activity Changes:   Previous visit: Using resistance bands daily first thing after waking. Walking at mall 4 days per week - trying for 5    Update: walking at mall 5 days per week. Walked daily while on cruise    Diabetes Medications:   Recent change in medication type/dosage: No    Monitoring:   Changes to testing regimen?  No      Barriers:   -none noted        Follow Up Plan: one month    Referring Provider: ROSINA Flores CNP  Time spent with patient: 20 minutes

## 2021-11-19 NOTE — PROGRESS NOTES
Medical Nutrition Therapy for Diabetes Follow Up    Ayo De La Torre  November 19, 2021      Patient Care Team:  ROSINA Hartley CNP as PCP - General (Nurse Practitioner)  ROSINA Hartley CNP as PCP - [facility] Provider  Cyndi Wilkerson MD (Obstetrics & Gynecology)  Dulce Burks RD, LD as Dietitian (Dietitian)    Reason for visit: f/u - DM and weight concerns  Patient self-assessment of Progress: \"I'm not quite back to my good habits since returning from vacation\"    ASSESSMENT/PLAN:   NUTRITION DIAGNOSIS    #1 Problem: Altered Nutrition-Related Laboratory Values (NC-2.2)  Related to: Endocrine/Diabetes   As Evidenced by: Elevated Plasma glucose and/or HgbA1c levels           #2 Problem: Overweight/Obesity (NC-3.3)  Related to: Excessive energy intake or physical inactivity  As Evidenced by: BMI more than normative standard for age and sex (BMI=33.13)        NUTRITION INTERVENTION  Nutrition Prescription: Aim for 30 g Carb per meal; 15 - 20 g Carb per snack      Diabetes Education/Counseling included:  Problem solving        NUTRITION MONITORING AND EVALUATION  5 =always  4 = most of the time   3 = half the days  2 = sometimes  1 = hasn't started  Indicator/Goal Progress Rating   #1 Walk 5 days per week  #1 3/5   #2  Plan for social events  #2 4/5   #3  Use probiotics #3 4/5   New Goals: return to walking every day not working          Patient Active Problem List   Diagnosis    Asthma    Autoimmune hepatitis ([facility] [facility]    Essential hypertension, benign    Pure hypercholesterolemia    Impaired fasting glucose    Murmur, cardiac    Stage 3 chronic kidney disease ([facility]    Type 2 diabetes mellitus with stage 3 chronic kidney disease, without long-term current use of insulin ([facility] [facility]    Morbidly obese ([facility]       Current Outpatient Medications   Medication Sig Dispense Refill    losartan (COZAAR) 50 MG tablet TAKE 1 TABLET BY MOUTH EVERY DAY 30 tablet 5    carvedilol (COREG) 6.25 MG tablet

## 2021-11-22 ENCOUNTER — VIRTUAL VISIT (OUTPATIENT)
Dept: INTERNAL MEDICINE CLINIC | Age: 71
End: 2021-11-22

## 2021-11-22 DIAGNOSIS — N18.30 TYPE 2 DIABETES MELLITUS WITH STAGE 3 CHRONIC KIDNEY DISEASE, WITHOUT LONG-TERM CURRENT USE OF INSULIN, UNSPECIFIED WHETHER STAGE 3A OR 3B CKD (HCC): Primary | ICD-10-CM

## 2021-11-22 DIAGNOSIS — E11.22 TYPE 2 DIABETES MELLITUS WITH STAGE 3 CHRONIC KIDNEY DISEASE, WITHOUT LONG-TERM CURRENT USE OF INSULIN, UNSPECIFIED WHETHER STAGE 3A OR 3B CKD (HCC): Primary | ICD-10-CM

## 2021-11-22 DIAGNOSIS — E66.01 MORBIDLY OBESE (HCC): ICD-10-CM

## 2021-12-20 ENCOUNTER — VIRTUAL VISIT (OUTPATIENT)
Dept: INTERNAL MEDICINE CLINIC | Age: 71
End: 2021-12-20

## 2021-12-20 DIAGNOSIS — E66.01 MORBIDLY OBESE (HCC): ICD-10-CM

## 2021-12-20 DIAGNOSIS — E11.22 TYPE 2 DIABETES MELLITUS WITH STAGE 3 CHRONIC KIDNEY DISEASE, WITHOUT LONG-TERM CURRENT USE OF INSULIN, UNSPECIFIED WHETHER STAGE 3A OR 3B CKD (HCC): Primary | ICD-10-CM

## 2021-12-20 DIAGNOSIS — N18.30 TYPE 2 DIABETES MELLITUS WITH STAGE 3 CHRONIC KIDNEY DISEASE, WITHOUT LONG-TERM CURRENT USE OF INSULIN, UNSPECIFIED WHETHER STAGE 3A OR 3B CKD (HCC): Primary | ICD-10-CM

## 2021-12-20 NOTE — PROGRESS NOTES
Medical Nutrition Therapy for Diabetes Follow Up    Stacy Cervantes  December 20, 2021      Patient Care Team:  ROSINA Conti CNP as PCP - General (Nurse Practitioner)  ROSINA Conti CNP as PCP - [facility]  Ivelisse Verma MD (Obstetrics & Gynecology)  Laura Tanner RD, LD as Dietitian (Dietitian)    Reason for visit: f/u - DM and weight concerns  Patient self-assessment of Progress: \"Things are going well.  I've been working a lot so I'm very active\"    ASSESSMENT/PLAN:   NUTRITION DIAGNOSIS    #1 Problem: Altered Nutrition-Related Laboratory Values (NC-2.2)  Related to: Endocrine/Diabetes   As Evidenced by: Elevated Plasma glucose and/or HgbA1c levels           #2 Problem: Overweight/Obesity (NC-3.3)  Related to: Excessive energy intake or physical inactivity  As Evidenced by: BMI more than normative standard for age and sex (BMI=33.13)        NUTRITION INTERVENTION  Nutrition Prescription: Aim for 30 g Carb per meal; 15 - 20 g Carb per snack      Diabetes Education/Counseling included:  Problem solving        NUTRITION MONITORING AND EVALUATION  5 =always  4 = most of the time   3 = half the days  2 = sometimes  1 = hasn't started  Indicator/Goal Progress Rating   #1 Walk 5 days per week  #1 5/5   #2  Plan for social events  #2 4/5   #3  Use probiotics #3 4/5             Patient Active Problem List   Diagnosis    Asthma    Autoimmune hepatitis ([facility] [address]    Essential hypertension, benign    Pure hypercholesterolemia    Impaired fasting glucose    Murmur, cardiac    Stage 3 chronic kidney disease (HCC)    Type 2 diabetes mellitus with stage 3 chronic kidney disease, without long-term current use of insulin (HCC)    Morbidly obese (HCC)       Current Outpatient Medications   Medication Sig Dispense Refill    losartan (COZAAR) 50 MG tablet TAKE 1 TABLET BY MOUTH EVERY DAY 30 tablet 5    carvedilol (COREG) 6.25 MG tablet TAKE 1 TABLET BY MOUTH 2 TIMES DAILY 180 tablet 1    Semaglutide 3 MG TABS Take 3 mg by mouth daily 90 tablet 0    atorvastatin (LIPITOR) 40 MG tablet Take 1 tablet by mouth daily 90 tablet 3    albuterol sulfate HFA (PROVENTIL HFA) 108 (90 Base) MCG/ACT inhaler INHALE 2 PUFFS INTO THE LUNGS EVERY 6 HOURS AS NEEDED. 1 Inhaler 3    desloratadine (CLARINEX) 5 MG tablet TAKE 1 TABLET BY MOUTH EVERY DAY 90 tablet 0    budesonide-formoterol (SYMBICORT) 160-4.5 MCG/ACT AERO TAKE 2 PUFFS BY MOUTH TWICE A DAY 30.6 Inhaler 1    amLODIPine (NORVASC) 5 MG tablet Take 1 tablet by mouth 2 times daily 180 tablet 1    blood glucose monitor kit and supplies Dispense sufficient amount for indicated testing frequency plus additional to accommodate PRN testing needs. Dispense all needed supplies to include: monitor, strips, lancing device, lancets, control solutions, alcohol swabs. 1 kit 0    predniSONE (DELTASONE) 10 MG tablet TAKE 1 TABLET BY MOUTH EVERY DAY 90 tablet 3    metFORMIN (GLUMETZA) 1000 MG extended release tablet Take 2 tablets by mouth daily (with breakfast) 60 tablet 3    montelukast (SINGULAIR) 10 MG tablet TAKE 1 TABLET BY MOUTH EVERY DAY EVERY NIGHT 90 tablet 3    fluticasone (FLONASE) 50 MCG/ACT nasal spray SPRAY 1 SPRAY INTO EACH NOSTRIL EVERY DAY 1 Bottle 2    mometasone (NASONEX) 50 MCG/ACT nasal spray 2 sprays by Nasal route daily as needed (as needed for allergies) 3 Inhaler 3    calcium carbonate (OSCAL) 500 MG TABS tablet Take 500 mg by mouth daily.  Spacer/Aero Chamber Mouthpiece MISC by Does not apply route. 1 each 1    Multiple Vitamin (MULTIVITAMIN PO) Take  by mouth daily. No current facility-administered medications for this visit.          NUTRITION ASSESSMENT    Biochemical Data:    Lab Results   Component Value Date    LABA1C 6.6 07/01/2021     Lab Results   Component Value Date    .7 07/01/2021       Lab Results   Component Value Date    CHOL 177 04/09/2021    CHOL 140 12/30/2020    CHOL 174 08/13/2020     Lab Results Component Value Date    TRIG 110 04/09/2021    TRIG 87 12/30/2020    TRIG 136 08/13/2020     Lab Results   Component Value Date    HDL 68 (H) 04/09/2021    HDL 67 (H) 12/30/2020    HDL 73 (H) 08/13/2020     Lab Results   Component Value Date    LDLCALC 87 04/09/2021    LDLCALC 56 12/30/2020    LDLCALC 74 08/13/2020     Lab Results   Component Value Date    LABVLDL 22 04/09/2021    LABVLDL 17 12/30/2020    LABVLDL 27 08/13/2020     Lab Results   Component Value Date    CHOLHDLRATIO 3.0 05/08/2015       Lab Results   Component Value Date    WBC 15.8 (H) 04/09/2021    HGB 13.0 04/09/2021    HCT 39.0 04/09/2021    MCV 90.1 04/09/2021     04/09/2021       Lab Results   Component Value Date    CREATININE 1.3 (H) 06/18/2021    BUN 16 06/18/2021     06/18/2021    K 3.8 06/18/2021     06/18/2021    CO2 26 06/18/2021       Anthropometric Measurements: Wt Change since last visit:   Wt Readings from Last 3 Encounters:   11/11/21 191 lb 3.2 oz (86.7 kg)   08/24/21 193 lb (87.5 kg)   06/21/21 199 lb (90.3 kg)   States she's still 189 lb home scale  Down 21 lb in past 4 years    Food and Nutrition Changes:   Previous visit:  several social situations with different types of food, able to control portion sizes  Update: Eating at regular intervals, 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  No      Barriers:   -none noted        Follow Up Plan: one month    Referring Provider: ROSINA Villalta CNP  Time spent with patient: 15 minutes

## 2021-12-30 ENCOUNTER — TELEPHONE (OUTPATIENT)
Dept: FAMILY MEDICINE CLINIC | Age: 71
End: 2021-12-30

## 2021-12-30 NOTE — TELEPHONE ENCOUNTER
Advised pt that Saeed Mondragon is out of office and Dr. Judy Bello is done with pt for the day due to Appointment. I advised pt to go to urgent care and get checked out. She verbalized she understood no further questions.

## 2021-12-30 NOTE — TELEPHONE ENCOUNTER
----- Message from Jose Francisco Gunderson sent at 12/30/2021 11:36 AM EST -----  Subject: Appointment Request    Reason for Call: Urgent Cough Cold    QUESTIONS  Type of Appointment? Established Patient  Reason for appointment request? No appointments available during search  Additional Information for Provider? Pt has been having some congestion,   little fatigue, and a cough since Monday 12/27/21. Pt is wondering if she   needs an appointment or if there a way the doctor could sent in an   antibiotic.   ---------------------------------------------------------------------------  --------------  CALL BACK INFO  What is the best way for the office to contact you? OK to leave message on   voicemail  Preferred Call Back Phone Number?  [phone]  ---------------------------------------------------------------------------  --------------  SCRIPT ANSWERS

## 2022-01-04 ENCOUNTER — TELEPHONE (OUTPATIENT)
Dept: FAMILY MEDICINE CLINIC | Age: 72
End: 2022-01-04

## 2022-01-05 ENCOUNTER — VIRTUAL VISIT (OUTPATIENT)
Dept: FAMILY MEDICINE CLINIC | Age: 72
End: 2022-01-05
Payer: MEDICARE

## 2022-01-05 DIAGNOSIS — N18.32 TYPE 2 DIABETES MELLITUS WITH STAGE 3B CHRONIC KIDNEY DISEASE, WITHOUT LONG-TERM CURRENT USE OF INSULIN (HCC): ICD-10-CM

## 2022-01-05 DIAGNOSIS — K75.4 AUTOIMMUNE HEPATITIS (HCC): ICD-10-CM

## 2022-01-05 DIAGNOSIS — N18.30 STAGE 3 CHRONIC KIDNEY DISEASE, UNSPECIFIED WHETHER STAGE 3A OR 3B CKD (HCC): ICD-10-CM

## 2022-01-05 DIAGNOSIS — E11.22 TYPE 2 DIABETES MELLITUS WITH STAGE 3B CHRONIC KIDNEY DISEASE, WITHOUT LONG-TERM CURRENT USE OF INSULIN (HCC): ICD-10-CM

## 2022-01-05 DIAGNOSIS — R05.9 COUGH: Primary | ICD-10-CM

## 2022-01-05 PROCEDURE — 1123F ACP DISCUSS/DSCN MKR DOCD: CPT | Performed by: NURSE PRACTITIONER

## 2022-01-05 PROCEDURE — 1090F PRES/ABSN URINE INCON ASSESS: CPT | Performed by: NURSE PRACTITIONER

## 2022-01-05 PROCEDURE — 4040F PNEUMOC VAC/ADMIN/RCVD: CPT | Performed by: NURSE PRACTITIONER

## 2022-01-05 PROCEDURE — 3017F COLORECTAL CA SCREEN DOC REV: CPT | Performed by: NURSE PRACTITIONER

## 2022-01-05 PROCEDURE — 99214 OFFICE O/P EST MOD 30 MIN: CPT | Performed by: NURSE PRACTITIONER

## 2022-01-05 PROCEDURE — 3046F HEMOGLOBIN A1C LEVEL >9.0%: CPT | Performed by: NURSE PRACTITIONER

## 2022-01-05 PROCEDURE — G8482 FLU IMMUNIZE ORDER/ADMIN: HCPCS | Performed by: NURSE PRACTITIONER

## 2022-01-05 PROCEDURE — G8399 PT W/DXA RESULTS DOCUMENT: HCPCS | Performed by: NURSE PRACTITIONER

## 2022-01-05 PROCEDURE — 2022F DILAT RTA XM EVC RTNOPTHY: CPT | Performed by: NURSE PRACTITIONER

## 2022-01-05 PROCEDURE — G8427 DOCREV CUR MEDS BY ELIG CLIN: HCPCS | Performed by: NURSE PRACTITIONER

## 2022-01-05 PROCEDURE — 1036F TOBACCO NON-USER: CPT | Performed by: NURSE PRACTITIONER

## 2022-01-05 PROCEDURE — G8417 CALC BMI ABV UP PARAM F/U: HCPCS | Performed by: NURSE PRACTITIONER

## 2022-01-05 RX ORDER — DOXYCYCLINE HYCLATE 100 MG
100 TABLET ORAL 2 TIMES DAILY
Qty: 14 TABLET | Refills: 0 | Status: SHIPPED | OUTPATIENT
Start: 2022-01-05 | End: 2022-01-12

## 2022-01-05 NOTE — PROGRESS NOTES
2022    TELEHEALTH EVALUATION -- Audio/Visual (During [id number]-[id number] public health emergency)    HPI:    Raul Abdalla (:  1950) has requested an audio/video evaluation for the following concern(s):    Lightheaded and weak, cough  Productive dark yellow  No fever  Over 10 days  This am with small amt diarrhea- hasn't been eating  BS this am 82  Hasn't been using her inhalers at all    Review of Systems    Prior to Visit Medications    Medication Sig Taking? Authorizing Provider   doxycycline hyclate (VIBRA-TABS) 100 MG tablet Take 1 tablet by mouth 2 times daily for 7 days Yes ROSINA Velazquez CNP   losartan (COZAAR) 50 MG tablet TAKE 1 TABLET BY MOUTH EVERY DAY Yes ROSINA Zendejas CNP   carvedilol (COREG) 6.25 MG tablet TAKE 1 TABLET BY MOUTH 2 TIMES DAILY Yes Eleanor Matthews MD   Semaglutide 3 MG TABS Take 3 mg by mouth daily Yes ROSINA Zendejas CNP   atorvastatin (LIPITOR) 40 MG tablet Take 1 tablet by mouth daily Yes ROSINA Zendejas CNP   albuterol sulfate HFA (PROVENTIL HFA) 108 (90 Base) MCG/ACT inhaler INHALE 2 PUFFS INTO THE LUNGS EVERY 6 HOURS AS NEEDED. Yes ROSINA Velazquez CNP   desloratadine (CLARINEX) 5 MG tablet TAKE 1 TABLET BY MOUTH EVERY DAY Yes Kyra Carney MD   budesonide-formoterol (SYMBICORT) 160-4.5 MCG/ACT AERO TAKE 2 PUFFS BY MOUTH TWICE A DAY Yes Kyra Carney MD   amLODIPine (NORVASC) 5 MG tablet Take 1 tablet by mouth 2 times daily Yes Kyra Carney MD   blood glucose monitor kit and supplies Dispense sufficient amount for indicated testing frequency plus additional to accommodate PRN testing needs. Dispense all needed supplies to include: monitor, strips, lancing device, lancets, control solutions, alcohol swabs.  Yes Kyra Carney MD   predniSONE (DELTASONE) 10 MG tablet TAKE 1 TABLET BY MOUTH EVERY DAY Yes Kyra Carney MD   metFORMIN (GLUMETZA) 1000 MG extended release tablet Take 2 tablets by mouth daily (with breakfast) Yes Juana Lopez MD   montelukast (SINGULAIR) 10 MG tablet TAKE 1 TABLET BY MOUTH EVERY DAY EVERY NIGHT Yes Kvng Benítez MD   fluticasone (FLONASE) 50 MCG/ACT nasal spray SPRAY 1 SPRAY INTO EACH NOSTRIL EVERY DAY Yes Kvng Benítez MD   mometasone (NASONEX) 50 MCG/ACT nasal spray 2 sprays by Nasal route daily as needed (as needed for allergies) Yes Jorge Glover MD   calcium carbonate (OSCAL) 500 MG TABS tablet Take 500 mg by mouth daily. Yes Historical Provider, MD   Spacer/Aero Chamber Mouthpiece MISC by Does not apply route. Yes Jorge Glover MD   Multiple Vitamin (MULTIVITAMIN PO) Take  by mouth daily. Yes Historical Provider, MD       Social History     Tobacco Use    Smoking status: Never Smoker    Smokeless tobacco: Never Used   Substance Use Topics    Alcohol use: Not Currently     Comment: rare - few times a year    Drug use: No        Past Medical History:   Diagnosis Date    Allergic rhinitis     Asthma     COPD (chronic obstructive pulmonary disease) ([facility] [address]     pt denies    Hepatitis     autoimmune    Hyperlipidemia     Hypertension     Influenza A 1/10/2015    Osteoporosis     Type 2 diabetes mellitus without complication, without long-term current use of insulin ([facility] [address] 12/16/2019       Past Surgical History:   Procedure Laterality Date    BREAST BIOPSY  10/2005    Dr. Josie Altamirano - right , benign    [facility] [place]      Dr. Ezra Chowdhury  - [facility] 10 yrs     COLONOSCOPY  07/08/13    Dr. Ezra Chowdhury- internal hemorrhoids, adenomatous and hyperplastic polyps.  Repeat in 3 yrs    COLONOSCOPY  07/17/2016    Dr. Ezra Chowdhury, No evidence of any colorectal neoplasia ~5 years    COLONOSCOPY  6/21/2021    COLONOSCOPY POLYPECTOMY SNARE/COLD BIOPSY performed by Debbie Vera MD at [address]  8/2006     autoimmune hepatitis        PHYSICAL EXAMINATION:  [ INSTRUCTIONS:  \"[x]\" Indicates a positive item  \"[]\" Indicates a negative item  -- DELETE ALL ITEMS NOT EXAMINED]  Vital Signs: (As obtained by patient/caregiver or practitioner observation)    Blood pressure-  Heart rate-    Respiratory rate-    Temperature-  Pulse oximetry-     Constitutional: [x] Appears well-developed and well-nourished [x] No apparent distress      [] Abnormal-   Mental status  [x] Alert and awake  [x] Oriented to person/place/time [x]Able to follow commands      Eyes:  EOM    [x]  Normal  [] Abnormal-  Sclera  [x]  Normal  [] Abnormal -         Discharge [x]  None visible  [] Abnormal -    HENT:   [x] Normocephalic, atraumatic. [] Abnormal   [] Mouth/Throat: Mucous membranes are moist.     External Ears [] Normal  [] Abnormal-     Neck: [x] No visualized mass     Pulmonary/Chest: [x] Respiratory effort normal.  [x] No visualized signs of difficulty breathing or respiratory distress        [] Abnormal-      Musculoskeletal:   [] Normal gait with no signs of ataxia         [] Normal range of motion of neck        [] Abnormal-       Neurological:        [x] No Facial Asymmetry (Cranial nerve 7 motor function) (limited exam to video visit)          [x] No gaze palsy        [] Abnormal-         Skin:        [x] No significant exanthematous lesions or discoloration noted on facial skin         [] Abnormal-            Psychiatric:       [x] Normal Affect [x] No Hallucinations        [] Abnormal-     Other pertinent observable physical exam findings-     ASSESSMENT/PLAN:   Diagnosis Orders   1. Cough  doxycycline hyclate (VIBRA-TABS) 100 MG tablet   2. Type 2 diabetes mellitus with stage 3b chronic kidney disease, without long-term current use of insulin ([facility] [address]     3.  Autoimmune hepatitis ([facility] [address]     4. Stage 3 chronic kidney disease, unspecified whether stage 3a or 3b CKD ([facility] [address]       Stage 3 chronic kidney disease ([facility] [address]   Monitored by specialist- no acute findings meriting change in the plan    Autoimmune hepatitis   Monitored by

## 2022-01-05 NOTE — ASSESSMENT & PLAN NOTE
COPD exac  Restart inhalers as direceted  Doxy x7 days BID  Push fluids  F/u in 1 week or sooner if no improvement

## 2022-01-10 ENCOUNTER — VIRTUAL VISIT (OUTPATIENT)
Dept: INTERNAL MEDICINE CLINIC | Age: 72
End: 2022-01-10

## 2022-01-10 DIAGNOSIS — E11.22 TYPE 2 DIABETES MELLITUS WITH STAGE 3 CHRONIC KIDNEY DISEASE, WITHOUT LONG-TERM CURRENT USE OF INSULIN, UNSPECIFIED WHETHER STAGE 3A OR 3B CKD (HCC): Primary | ICD-10-CM

## 2022-01-10 DIAGNOSIS — N18.30 TYPE 2 DIABETES MELLITUS WITH STAGE 3 CHRONIC KIDNEY DISEASE, WITHOUT LONG-TERM CURRENT USE OF INSULIN, UNSPECIFIED WHETHER STAGE 3A OR 3B CKD (HCC): Primary | ICD-10-CM

## 2022-01-10 DIAGNOSIS — E66.01 MORBIDLY OBESE (HCC): ICD-10-CM

## 2022-01-10 NOTE — PROGRESS NOTES
Medical Nutrition Therapy for Diabetes Follow Up    David Thomas  January 10, 2022      Patient Care Team:  ROSINA Shields CNP as PCP - General (Nurse Practitioner)  ROSINA Shields CNP as PCP - [facility] Provider  Tania Mullins MD (Obstetrics & Gynecology)  Sachi Vera RD, LD as Dietitian (Dietitian)    Reason for visit: f/u - DM and weight concerns  Patient self-assessment of Progress: \"I've been sick with cough and weakness for the past 3 weeks.  Just now starting to feel better\"    ASSESSMENT/PLAN:   NUTRITION DIAGNOSIS    #1 Problem: Altered Nutrition-Related Laboratory Values (NC-2.2)  Related to: Endocrine/Diabetes   As Evidenced by: Elevated Plasma glucose and/or HgbA1c levels           #2 Problem: Overweight/Obesity (NC-3.3)  Related to: Excessive energy intake or physical inactivity  As Evidenced by: BMI more than normative standard for age and sex (BMI=33.13)        NUTRITION INTERVENTION  Nutrition Prescription: Aim for 30 g Carb per meal; 15 - 20 g Carb per snack      Diabetes Education/Counseling included:  Sick day guidelines        NUTRITION MONITORING AND EVALUATION  5 =always  4 = most of the time   3 = half the days  2 = sometimes  1 = hasn't started  Indicator/Goal Progress Rating   #1 Walk 5 days per week  #1 1/5   #2  Plan for social events  #2 1/5   #3  Use probiotics #3 2/5             Patient Active Problem List   Diagnosis    Asthma    Autoimmune hepatitis ([facility] [address]    Essential hypertension, benign    Pure hypercholesterolemia    Impaired fasting glucose    Murmur, cardiac    Stage 3 chronic kidney disease (HCC)    Type 2 diabetes mellitus with stage 3 chronic kidney disease, without long-term current use of insulin (HCC)    Morbidly obese (HCC)    Cough       Current Outpatient Medications   Medication Sig Dispense Refill    doxycycline hyclate (VIBRA-TABS) 100 MG tablet Take 1 tablet by mouth 2 times daily for 7 days 14 tablet 0    losartan (COZAAR) 50 MG tablet TAKE 1 TABLET BY MOUTH EVERY DAY 30 tablet 5    carvedilol (COREG) 6.25 MG tablet TAKE 1 TABLET BY MOUTH 2 TIMES DAILY 180 tablet 1    Semaglutide 3 MG TABS Take 3 mg by mouth daily 90 tablet 0    atorvastatin (LIPITOR) 40 MG tablet Take 1 tablet by mouth daily 90 tablet 3    albuterol sulfate HFA (PROVENTIL HFA) 108 (90 Base) MCG/ACT inhaler INHALE 2 PUFFS INTO THE LUNGS EVERY 6 HOURS AS NEEDED. 1 Inhaler 3    desloratadine (CLARINEX) 5 MG tablet TAKE 1 TABLET BY MOUTH EVERY DAY 90 tablet 0    budesonide-formoterol (SYMBICORT) 160-4.5 MCG/ACT AERO TAKE 2 PUFFS BY MOUTH TWICE A DAY 30.6 Inhaler 1    amLODIPine (NORVASC) 5 MG tablet Take 1 tablet by mouth 2 times daily 180 tablet 1    blood glucose monitor kit and supplies Dispense sufficient amount for indicated testing frequency plus additional to accommodate PRN testing needs. Dispense all needed supplies to include: monitor, strips, lancing device, lancets, control solutions, alcohol swabs. 1 kit 0    predniSONE (DELTASONE) 10 MG tablet TAKE 1 TABLET BY MOUTH EVERY DAY 90 tablet 3    metFORMIN (GLUMETZA) 1000 MG extended release tablet Take 2 tablets by mouth daily (with breakfast) 60 tablet 3    montelukast (SINGULAIR) 10 MG tablet TAKE 1 TABLET BY MOUTH EVERY DAY EVERY NIGHT 90 tablet 3    fluticasone (FLONASE) 50 MCG/ACT nasal spray SPRAY 1 SPRAY INTO EACH NOSTRIL EVERY DAY 1 Bottle 2    mometasone (NASONEX) 50 MCG/ACT nasal spray 2 sprays by Nasal route daily as needed (as needed for allergies) 3 Inhaler 3    calcium carbonate (OSCAL) 500 MG TABS tablet Take 500 mg by mouth daily.  Spacer/Aero Chamber Mouthpiece MISC by Does not apply route. 1 each 1    Multiple Vitamin (MULTIVITAMIN PO) Take  by mouth daily. No current facility-administered medications for this visit.          NUTRITION ASSESSMENT    Biochemical Data:    Lab Results   Component Value Date    LABA1C 6.6 07/01/2021     Lab Results   Component Value Date    .7 07/01/2021       Lab Results   Component Value Date    CHOL 177 04/09/2021    CHOL 140 12/30/2020    CHOL 174 08/13/2020     Lab Results   Component Value Date    TRIG 110 04/09/2021    TRIG 87 12/30/2020    TRIG 136 08/13/2020     Lab Results   Component Value Date    HDL 68 (H) 04/09/2021    HDL 67 (H) 12/30/2020    HDL 73 (H) 08/13/2020     Lab Results   Component Value Date    LDLCALC 87 04/09/2021    LDLCALC 56 12/30/2020    LDLCALC 74 08/13/2020     Lab Results   Component Value Date    LABVLDL 22 04/09/2021    LABVLDL 17 12/30/2020    LABVLDL 27 08/13/2020     Lab Results   Component Value Date    CHOLHDLRATIO 3.0 05/08/2015       Lab Results   Component Value Date    WBC 15.8 (H) 04/09/2021    HGB 13.0 04/09/2021    HCT 39.0 04/09/2021    MCV 90.1 04/09/2021     04/09/2021       Lab Results   Component Value Date    CREATININE 1.3 (H) 06/18/2021    BUN 16 06/18/2021     06/18/2021    K 3.8 06/18/2021     06/18/2021    CO2 26 06/18/2021       Anthropometric Measurements:   Wt Change since last visit:   Wt Readings from Last 3 Encounters:   11/11/21 191 lb 3.2 oz (86.7 kg)   08/24/21 193 lb (87.5 kg)   06/21/21 199 lb (90.3 kg)   States she's now 180 lb home scale - unintentional weight loss of 9 lb over past few weeks while ill  [place]ero previously achieved 21 lb loss over past 4 years with exercise and eating changes    Food and Nutrition Changes:   Previous visit:  Eating at regular intervals, 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  No      Barriers:   -none noted        Follow Up Plan: one month    Referring Provider: ROSINA Fajardo CNP  Time spent with patient: 15 minutes

## 2022-02-04 PROBLEM — R05.9 COUGH: Status: RESOLVED | Noted: 2022-01-05 | Resolved: 2022-02-04

## 2022-02-07 ENCOUNTER — VIRTUAL VISIT (OUTPATIENT)
Dept: INTERNAL MEDICINE CLINIC | Age: 72
End: 2022-02-07

## 2022-02-07 DIAGNOSIS — E66.01 MORBIDLY OBESE (HCC): ICD-10-CM

## 2022-02-07 DIAGNOSIS — N18.30 TYPE 2 DIABETES MELLITUS WITH STAGE 3 CHRONIC KIDNEY DISEASE, WITHOUT LONG-TERM CURRENT USE OF INSULIN, UNSPECIFIED WHETHER STAGE 3A OR 3B CKD (HCC): Primary | ICD-10-CM

## 2022-02-07 DIAGNOSIS — E11.22 TYPE 2 DIABETES MELLITUS WITH STAGE 3 CHRONIC KIDNEY DISEASE, WITHOUT LONG-TERM CURRENT USE OF INSULIN, UNSPECIFIED WHETHER STAGE 3A OR 3B CKD (HCC): Primary | ICD-10-CM

## 2022-02-07 NOTE — PROGRESS NOTES
Medical Nutrition Therapy for Diabetes Follow Up    João Mills  February 7, 2022      Patient Care Team:  ROSINA Cisneros CNP as PCP - General (Nurse Practitioner)  ROSINA Cisneros CNP as PCP - [facility] Empaneled Provider  Taylor Gutierres MD (Obstetrics & Gynecology)  Kendal Johnson RD, LD as Dietitian (Dietitian)    Reason for visit: f/u - DM and weight concerns  Patient self-assessment of Progress: \"I got off track while sick, now still drinking juice and regular soda\"    ASSESSMENT/PLAN:   NUTRITION DIAGNOSIS    #1 Problem: Altered Nutrition-Related Laboratory Values (NC-2.2)  Related to: Endocrine/Diabetes   As Evidenced by: Elevated Plasma glucose and/or HgbA1c levels           #2 Problem: Overweight/Obesity (NC-3.3)  Related to: Excessive energy intake or physical inactivity  As Evidenced by: BMI more than normative standard for age and sex (BMI=33.13)        NUTRITION INTERVENTION  Nutrition Prescription: Aim for 30 g Carb per meal; 15 - 20 g Carb per snack      Diabetes Education/Counseling included:  Problem solving      NUTRITION MONITORING AND EVALUATION  5 =always  4 = most of the time   3 = half the days  2 = sometimes  1 = hasn't started  Indicator/Goal Progress Rating   #1 Walk 5 days per week  #1 1/5   #2  Plan for social events  #2 1/5   #3  Use probiotics #3 2/5             Patient Active Problem List   Diagnosis    Asthma    Autoimmune [facility] ([facility]    Essential hypertension, benign    Pure hypercholesterolemia    Impaired fasting glucose    Murmur, cardiac    Stage 3 chronic kidney disease ([facility]    Type 2 diabetes mellitus with stage 3 chronic kidney disease, without long-term current use of insulin ([facility]    Morbidly obese ([facility]       Current Outpatient Medications   Medication Sig Dispense Refill    losartan (COZAAR) 50 MG tablet TAKE 1 TABLET BY MOUTH EVERY DAY 30 tablet 5    carvedilol (COREG) 6.25 MG tablet TAKE 1 TABLET BY MOUTH 2 TIMES DAILY 180 tablet 1  Semaglutide 3 MG TABS Take 3 mg by mouth daily 90 tablet 0    atorvastatin (LIPITOR) 40 MG tablet Take 1 tablet by mouth daily 90 tablet 3    albuterol sulfate HFA (PROVENTIL HFA) 108 (90 Base) MCG/ACT inhaler INHALE 2 PUFFS INTO THE LUNGS EVERY 6 HOURS AS NEEDED. 1 Inhaler 3    desloratadine (CLARINEX) 5 MG tablet TAKE 1 TABLET BY MOUTH EVERY DAY 90 tablet 0    budesonide-formoterol (SYMBICORT) 160-4.5 MCG/ACT AERO TAKE 2 PUFFS BY MOUTH TWICE A DAY 30.6 Inhaler 1    amLODIPine (NORVASC) 5 MG tablet Take 1 tablet by mouth 2 times daily 180 tablet 1    blood glucose monitor kit and supplies Dispense sufficient amount for indicated testing frequency plus additional to accommodate PRN testing needs. Dispense all needed supplies to include: monitor, strips, lancing device, lancets, control solutions, alcohol swabs. 1 kit 0    predniSONE (DELTASONE) 10 MG tablet TAKE 1 TABLET BY MOUTH EVERY DAY 90 tablet 3    metFORMIN (GLUMETZA) 1000 MG extended release tablet Take 2 tablets by mouth daily (with breakfast) 60 tablet 3    montelukast (SINGULAIR) 10 MG tablet TAKE 1 TABLET BY MOUTH EVERY DAY EVERY NIGHT 90 tablet 3    fluticasone (FLONASE) 50 MCG/ACT nasal spray SPRAY 1 SPRAY INTO EACH NOSTRIL EVERY DAY 1 Bottle 2    mometasone (NASONEX) 50 MCG/ACT nasal spray 2 sprays by Nasal route daily as needed (as needed for allergies) 3 Inhaler 3    calcium carbonate (OSCAL) 500 MG TABS tablet Take 500 mg by mouth daily.  Spacer/Aero Chamber Mouthpiece MISC by Does not apply route. 1 each 1    Multiple Vitamin (MULTIVITAMIN PO) Take  by mouth daily. No current facility-administered medications for this visit.          NUTRITION ASSESSMENT    Biochemical Data:    Lab Results   Component Value Date    LABA1C 6.6 07/01/2021     Lab Results   Component Value Date    .7 07/01/2021       Lab Results   Component Value Date    CHOL 177 04/09/2021    CHOL 140 12/30/2020    CHOL 174 08/13/2020     Lab Results   Component Value Date    TRIG 110 04/09/2021    TRIG 87 12/30/2020    TRIG 136 08/13/2020     Lab Results   Component Value Date    HDL 68 (H) 04/09/2021    HDL 67 (H) 12/30/2020    HDL 73 (H) 08/13/2020     Lab Results   Component Value Date    LDLCALC 87 04/09/2021    LDLCALC 56 12/30/2020    LDLCALC 74 08/13/2020     Lab Results   Component Value Date    LABVLDL 22 04/09/2021    LABVLDL 17 12/30/2020    LABVLDL 27 08/13/2020     Lab Results   Component Value Date    CHOLHDLRATIO 3.0 05/08/2015       Lab Results   Component Value Date    WBC 15.8 (H) 04/09/2021    HGB 13.0 04/09/2021    HCT 39.0 04/09/2021    MCV 90.1 04/09/2021     04/09/2021       Lab Results   Component Value Date    CREATININE 1.3 (H) 06/18/2021    BUN 16 06/18/2021     06/18/2021    K 3.8 06/18/2021     06/18/2021    CO2 26 06/18/2021       Anthropometric Measurements: Wt Change since last visit:   Wt Readings from Last 3 Encounters:   11/11/21 191 lb 3.2 oz (86.7 kg)   08/24/21 193 lb (87.5 kg)   06/21/21 199 lb (90.3 kg)   Previous visit: 180 lb home scale - unintentional weight loss of 9 lb over past few weeks while ill  Salguero previously achieved 21 lb loss over past 4 years with exercise and eating changes    Update: Back up to 187 lb    Food and Nutrition Changes:   Previous visit: had been Eating at regular intervals, 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  No  FBG 84 - 90    Barriers:   -none noted        Follow Up Plan: one month    Referring Provider: ROSINA Carlson CNP  Time spent with patient: 20 minutes

## 2022-02-22 DIAGNOSIS — E78.00 PURE HYPERCHOLESTEROLEMIA: ICD-10-CM

## 2022-02-22 RX ORDER — ATORVASTATIN CALCIUM 40 MG/1
40 TABLET, FILM COATED ORAL DAILY
Qty: 90 TABLET | Refills: 3 | Status: SHIPPED | OUTPATIENT
Start: 2022-02-22

## 2022-03-07 ENCOUNTER — TELEMEDICINE (OUTPATIENT)
Dept: INTERNAL MEDICINE CLINIC | Age: 72
End: 2022-03-07

## 2022-03-07 DIAGNOSIS — E66.01 MORBIDLY OBESE (HCC): ICD-10-CM

## 2022-03-07 DIAGNOSIS — N18.30 TYPE 2 DIABETES MELLITUS WITH STAGE 3 CHRONIC KIDNEY DISEASE, WITHOUT LONG-TERM CURRENT USE OF INSULIN, UNSPECIFIED WHETHER STAGE 3A OR 3B CKD (HCC): Primary | ICD-10-CM

## 2022-03-07 DIAGNOSIS — E11.22 TYPE 2 DIABETES MELLITUS WITH STAGE 3 CHRONIC KIDNEY DISEASE, WITHOUT LONG-TERM CURRENT USE OF INSULIN, UNSPECIFIED WHETHER STAGE 3A OR 3B CKD (HCC): Primary | ICD-10-CM

## 2022-03-07 NOTE — PROGRESS NOTES
Medical Nutrition Therapy for Diabetes Follow Up    Oliverio Faulkner  March 7, 2022      Patient Care Team:  ROSINA Patel CNP as PCP - General (Nurse Practitioner)  ROSINA Patel CNP as PCP - [address] [place] [address] [facility]  Gabriele Dejesus MD (Obstetrics & Gynecology)  Joseline García RD, LD as Dietitian (Dietitian)    Reason for visit: f/u - DM and weight concerns  Patient self-assessment of Progress: \"I got off track while sick, now still drinking juice and regular soda\"    ASSESSMENT/PLAN:   NUTRITION DIAGNOSIS    #1 Problem: Altered Nutrition-Related Laboratory Values (NC-2.2)  Related to: Endocrine/Diabetes   As Evidenced by: Elevated Plasma glucose and/or HgbA1c levels           #2 Problem: Overweight/Obesity (NC-3.3)  Related to: Excessive energy intake or physical inactivity  As Evidenced by: BMI more than normative standard for age and sex (BMI=33.13)        NUTRITION INTERVENTION  Nutrition Prescription: Aim for 30 g Carb per meal; 15 - 20 g Carb per snack      Diabetes Education/Counseling included:  Problem solving      NUTRITION MONITORING AND EVALUATION  5 =always  4 = most of the time   3 = half the days  2 = sometimes  1 = hasn't started  Indicator/Goal Progress Rating   #1 Walk 5 days per week  #1 3/5   #2  Plan for social events  #2 3/5   #3  Use probiotics #3 4/5      New Goal: exercise daily (stretches and/or walking)       Patient Active Problem List   Diagnosis    Asthma    Autoimmune hepatitis ([facility]    Essential hypertension, benign    Pure hypercholesterolemia    Impaired fasting glucose    Murmur, cardiac    Stage 3 chronic kidney disease ([facility]    Type 2 diabetes mellitus with stage 3 chronic kidney disease, without long-term current use of insulin ([address]    Morbidly obese ([facility] [address] [facility]       Current Outpatient Medications   Medication Sig Dispense Refill    atorvastatin (LIPITOR) 40 MG tablet Take 1 tablet by mouth daily 90 tablet 3    carvedilol (COREG) 6.25 MG tablet TAKE 1 TABLET BY MOUTH TWICE A  tablet 1    losartan (COZAAR) 50 MG tablet TAKE 1 TABLET BY MOUTH EVERY DAY 30 tablet 5    Semaglutide 3 MG TABS Take 3 mg by mouth daily 90 tablet 0    albuterol sulfate HFA (PROVENTIL HFA) 108 (90 Base) MCG/ACT inhaler INHALE 2 PUFFS INTO THE LUNGS EVERY 6 HOURS AS NEEDED. 1 Inhaler 3    desloratadine (CLARINEX) 5 MG tablet TAKE 1 TABLET BY MOUTH EVERY DAY 90 tablet 0    budesonide-formoterol (SYMBICORT) 160-4.5 MCG/ACT AERO TAKE 2 PUFFS BY MOUTH TWICE A DAY 30.6 Inhaler 1    amLODIPine (NORVASC) 5 MG tablet Take 1 tablet by mouth 2 times daily 180 tablet 1    blood glucose monitor kit and supplies Dispense sufficient amount for indicated testing frequency plus additional to accommodate PRN testing needs. Dispense all needed supplies to include: monitor, strips, lancing device, lancets, control solutions, alcohol swabs. 1 kit 0    predniSONE (DELTASONE) 10 MG tablet TAKE 1 TABLET BY MOUTH EVERY DAY 90 tablet 3    metFORMIN (GLUMETZA) 1000 MG extended release tablet Take 2 tablets by mouth daily (with breakfast) 60 tablet 3    montelukast (SINGULAIR) 10 MG tablet TAKE 1 TABLET BY MOUTH EVERY DAY EVERY NIGHT 90 tablet 3    fluticasone (FLONASE) 50 MCG/ACT nasal spray SPRAY 1 SPRAY INTO EACH NOSTRIL EVERY DAY 1 Bottle 2    mometasone (NASONEX) 50 MCG/ACT nasal spray 2 sprays by Nasal route daily as needed (as needed for allergies) 3 Inhaler 3    calcium carbonate (OSCAL) 500 MG TABS tablet Take 500 mg by mouth daily.  Spacer/Aero Chamber Mouthpiece MISC by Does not apply route. 1 each 1    Multiple Vitamin (MULTIVITAMIN PO) Take  by mouth daily. No current facility-administered medications for this visit.          NUTRITION ASSESSMENT    Biochemical Data:    Lab Results   Component Value Date    LABA1C 6.6 07/01/2021     Lab Results   Component Value Date    .7 07/01/2021       Lab Results   Component Value Date    CHOL 177 04/09/2021    CHOL 140 12/30/2020    CHOL 174 08/13/2020     Lab Results   Component Value Date    TRIG 110 04/09/2021    TRIG 87 12/30/2020    TRIG 136 08/13/2020     Lab Results   Component Value Date    HDL 68 (H) 04/09/2021    HDL 67 (H) 12/30/2020    HDL 73 (H) 08/13/2020     Lab Results   Component Value Date    LDLCALC 87 04/09/2021    LDLCALC 56 12/30/2020    LDLCALC 74 08/13/2020     Lab Results   Component Value Date    LABVLDL 22 04/09/2021    LABVLDL 17 12/30/2020    LABVLDL 27 08/13/2020     Lab Results   Component Value Date    CHOLHDLRATIO 3.0 05/08/2015       Lab Results   Component Value Date    WBC 15.8 (H) 04/09/2021    HGB 13.0 04/09/2021    HCT 39.0 04/09/2021    MCV 90.1 04/09/2021     04/09/2021       Lab Results   Component Value Date    CREATININE 1.3 (H) 06/18/2021    BUN 16 06/18/2021     06/18/2021    K 3.8 06/18/2021     06/18/2021    CO2 26 06/18/2021       Anthropometric Measurements: Wt Change since last visit:   Wt Readings from Last 3 Encounters:   11/11/21 191 lb 3.2 oz (86.7 kg)   08/24/21 193 lb (87.5 kg)   06/21/21 199 lb (90.3 kg)   Previous visit: 180 lb home scale - unintentional weight loss of 9 lb over past few weeks while ill  Had previously achieved 21 lb loss over past 4 years with exercise and eating changes    Last visit: Back up to 187 lb  Update: 186 lb at home    Food and Nutrition Changes:   Previous visit: had been Eating at regular intervals, 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  No  FBG 84 - 90    Barriers:   -none noted        Follow Up Plan: one month    Referring Provider: ROSINA Goldstein CNP  Time spent with patient: 20 minutes

## 2022-03-12 DIAGNOSIS — N18.30 TYPE 2 DIABETES MELLITUS WITH STAGE 3 CHRONIC KIDNEY DISEASE, WITHOUT LONG-TERM CURRENT USE OF INSULIN, UNSPECIFIED WHETHER STAGE 3A OR 3B CKD (HCC): ICD-10-CM

## 2022-03-12 DIAGNOSIS — E11.22 TYPE 2 DIABETES MELLITUS WITH STAGE 3 CHRONIC KIDNEY DISEASE, WITHOUT LONG-TERM CURRENT USE OF INSULIN, UNSPECIFIED WHETHER STAGE 3A OR 3B CKD (HCC): ICD-10-CM

## 2022-03-12 DIAGNOSIS — I10 ESSENTIAL HYPERTENSION, BENIGN: ICD-10-CM

## 2022-03-14 RX ORDER — LOSARTAN POTASSIUM 50 MG/1
TABLET ORAL
Qty: 90 TABLET | Refills: 1 | Status: SHIPPED | OUTPATIENT
Start: 2022-03-14 | End: 2022-05-12 | Stop reason: SDUPTHER

## 2022-04-04 ENCOUNTER — TELEMEDICINE (OUTPATIENT)
Dept: INTERNAL MEDICINE CLINIC | Age: 72
End: 2022-04-04

## 2022-04-04 DIAGNOSIS — E66.01 MORBIDLY OBESE (HCC): ICD-10-CM

## 2022-04-04 DIAGNOSIS — N18.30 TYPE 2 DIABETES MELLITUS WITH STAGE 3 CHRONIC KIDNEY DISEASE, WITHOUT LONG-TERM CURRENT USE OF INSULIN, UNSPECIFIED WHETHER STAGE 3A OR 3B CKD (HCC): Primary | ICD-10-CM

## 2022-04-04 DIAGNOSIS — E11.22 TYPE 2 DIABETES MELLITUS WITH STAGE 3 CHRONIC KIDNEY DISEASE, WITHOUT LONG-TERM CURRENT USE OF INSULIN, UNSPECIFIED WHETHER STAGE 3A OR 3B CKD (HCC): Primary | ICD-10-CM

## 2022-04-04 NOTE — PROGRESS NOTES
Medical Nutrition Therapy for Diabetes Follow Up    Cassandra Vasquez  April 4, 2022      Patient Care Team:  ROSINA Man CNP as PCP - General (Nurse Practitioner)  ROSINA Man CNP as PCP - [facility] Empaneled Provider  Maikel Cardona MD (Obstetrics & Gynecology)  Naveed Denise RD, LD as Dietitian (Dietitian)    Reason for visit: f/u - DM and weight concerns  Patient self-assessment of Progress:\"I'm back on track\"      ASSESSMENT/PLAN:   NUTRITION DIAGNOSIS    #1 Problem: Altered Nutrition-Related Laboratory Values (NC-2.2)  Related to: Endocrine/Diabetes   As Evidenced by: Elevated Plasma glucose and/or HgbA1c levels           #2 Problem: Overweight/Obesity (NC-3.3)  Related to: Excessive energy intake or physical inactivity  As Evidenced by: BMI more than normative standard for age and sex (BMI=33.13)        NUTRITION INTERVENTION  Nutrition Prescription: Aim for 30 g Carb per meal; 15 - 20 g Carb per snack      Diabetes Education/Counseling included:  Problem solving      NUTRITION MONITORING AND EVALUATION  5 =always  4 = most of the time   3 = half the days  2 = sometimes  1 = hasn't started  Indicator/Goal Progress Rating   #1 Walk 5 days per week  #1 4/5   #2  Eliminate regular soda and juice #2 4/5   #3  Use probiotics #3 2/5     New Goal: Plan ahead for family gathering.        Patient Active Problem List   Diagnosis    Asthma    Autoimmune hepatitis ([facility]    Essential hypertension, benign    Pure hypercholesterolemia    Impaired fasting glucose    Murmur, cardiac    Stage 3 chronic kidney disease (HCC)    Type 2 diabetes mellitus with stage 3 chronic kidney disease, without long-term current use of insulin (HCC)    Morbidly obese (HCC)       Current Outpatient Medications   Medication Sig Dispense Refill    losartan (COZAAR) 50 MG tablet TAKE 1 TABLET BY MOUTH EVERY DAY 90 tablet 1    atorvastatin (LIPITOR) 40 MG tablet Take 1 tablet by mouth daily 90 tablet 3    carvedilol (COREG) 6.25 MG tablet TAKE 1 TABLET BY MOUTH TWICE A  tablet 1    Semaglutide 3 MG TABS Take 3 mg by mouth daily 90 tablet 0    albuterol sulfate HFA (PROVENTIL HFA) 108 (90 Base) MCG/ACT inhaler INHALE 2 PUFFS INTO THE LUNGS EVERY 6 HOURS AS NEEDED. 1 Inhaler 3    desloratadine (CLARINEX) 5 MG tablet TAKE 1 TABLET BY MOUTH EVERY DAY 90 tablet 0    budesonide-formoterol (SYMBICORT) 160-4.5 MCG/ACT AERO TAKE 2 PUFFS BY MOUTH TWICE A DAY 30.6 Inhaler 1    amLODIPine (NORVASC) 5 MG tablet Take 1 tablet by mouth 2 times daily 180 tablet 1    blood glucose monitor kit and supplies Dispense sufficient amount for indicated testing frequency plus additional to accommodate PRN testing needs. Dispense all needed supplies to include: monitor, strips, lancing device, lancets, control solutions, alcohol swabs. 1 kit 0    predniSONE (DELTASONE) 10 MG tablet TAKE 1 TABLET BY MOUTH EVERY DAY 90 tablet 3    metFORMIN (GLUMETZA) 1000 MG extended release tablet Take 2 tablets by mouth daily (with breakfast) 60 tablet 3    montelukast (SINGULAIR) 10 MG tablet TAKE 1 TABLET BY MOUTH EVERY DAY EVERY NIGHT 90 tablet 3    fluticasone (FLONASE) 50 MCG/ACT nasal spray SPRAY 1 SPRAY INTO EACH NOSTRIL EVERY DAY 1 Bottle 2    mometasone (NASONEX) 50 MCG/ACT nasal spray 2 sprays by Nasal route daily as needed (as needed for allergies) 3 Inhaler 3    calcium carbonate (OSCAL) 500 MG TABS tablet Take 500 mg by mouth daily.  Spacer/Aero Chamber Mouthpiece MISC by Does not apply route. 1 each 1    Multiple Vitamin (MULTIVITAMIN PO) Take  by mouth daily. No current facility-administered medications for this visit.          NUTRITION ASSESSMENT    Biochemical Data:    Lab Results   Component Value Date    LABA1C 6.6 07/01/2021     Lab Results   Component Value Date    .7 07/01/2021       Lab Results   Component Value Date    CHOL 177 04/09/2021    CHOL 140 12/30/2020    CHOL 174 08/13/2020     Lab Results   Component Value Date    TRIG 110 04/09/2021    TRIG 87 12/30/2020    TRIG 136 08/13/2020     Lab Results   Component Value Date    HDL 68 (H) 04/09/2021    HDL 67 (H) 12/30/2020    HDL 73 (H) 08/13/2020     Lab Results   Component Value Date    LDLCALC 87 04/09/2021    LDLCALC 56 12/30/2020    LDLCALC 74 08/13/2020     Lab Results   Component Value Date    LABVLDL 22 04/09/2021    LABVLDL 17 12/30/2020    LABVLDL 27 08/13/2020     Lab Results   Component Value Date    CHOLHDLRATIO 3.0 05/08/2015       Lab Results   Component Value Date    WBC 15.8 (H) 04/09/2021    HGB 13.0 04/09/2021    HCT 39.0 04/09/2021    MCV 90.1 04/09/2021     04/09/2021       Lab Results   Component Value Date    CREATININE 1.3 (H) 06/18/2021    BUN 16 06/18/2021     06/18/2021    K 3.8 06/18/2021     06/18/2021    CO2 26 06/18/2021       Anthropometric Measurements: Wt Change since last visit:   Wt Readings from Last 3 Encounters:   11/11/21 191 lb 3.2 oz (86.7 kg)   08/24/21 193 lb (87.5 kg)   06/21/21 199 lb (90.3 kg)   Previous visits: achieved 21 lb loss over past 4 years (222 lb in 2019)with exercise and eating changes  180 lb home scale - unintentional weight loss of 9 lb while ill  Back up to 186 lb at home    Update: 186 lb at  home    Food and Nutrition Changes:   Previous visit: had been Eating at regular intervals, 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   Then got sick, eating soup  drinking juice  During illness, had been drinking juice and regular soda  B:yogurt, granola, hard cooked egg, toast  L:leftovers  D:salmon, frozen veggies, rice    March visit:returned to diet soft drinks, decided to give up sweets and regular soda for Colten Medrano  B: hard cooked egg, toast  OR Yogurt with granola, orange  L: soup or salad with egg/cheese and dressing  OR leftovers  D: salmon, rice, broccoli    Update:  Significantly reduced juice and returned to diet soda  Planning meals in advance to follow plate guide        Physical Activity Changes:   Previous visit: prior to getting sick, 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  No  FBG 84 - 90    Barriers:   -none noted        Follow Up Plan: one month    Referring Provider: ROSINA Love CNP  Time spent with patient: 25 minutes

## 2022-04-21 ENCOUNTER — TELEPHONE (OUTPATIENT)
Dept: FAMILY MEDICINE CLINIC | Age: 72
End: 2022-04-21

## 2022-04-21 DIAGNOSIS — E11.22 TYPE 2 DIABETES MELLITUS WITH STAGE 3 CHRONIC KIDNEY DISEASE, WITHOUT LONG-TERM CURRENT USE OF INSULIN, UNSPECIFIED WHETHER STAGE 3A OR 3B CKD (HCC): ICD-10-CM

## 2022-04-21 DIAGNOSIS — N18.30 TYPE 2 DIABETES MELLITUS WITH STAGE 3 CHRONIC KIDNEY DISEASE, WITHOUT LONG-TERM CURRENT USE OF INSULIN, UNSPECIFIED WHETHER STAGE 3A OR 3B CKD (HCC): ICD-10-CM

## 2022-04-21 NOTE — TELEPHONE ENCOUNTER
----- Message from [facility] sent at 4/21/2022 12:54 PM EDT -----  Subject: Refill Request    QUESTIONS  Name of Medication? Other - Rybelsus   Patient-reported dosage and instructions? 3mg tablet once a Day In the   morning   How many days do you have left? 0  Preferred Pharmacy? [id number] Pharmacy phone number (if available)? [phone]  Additional Information for Provider? Pt is out of medication she schedule   appointment for 05/31/2022 at 2:30pm  ---------------------------------------------------------------------------  --------------  [address]  What is the best way for the office to contact you? OK to leave message on   voicemail  Preferred Call Back Phone Number? [phone]  ---------------------------------------------------------------------------  --------------  SCRIPT ANSWERS  Relationship to Patient?  Self

## 2022-04-29 NOTE — PROGRESS NOTES
Medical Nutrition Therapy for Diabetes Follow Up    Jeraldine Siemens  May 2, 2022    Patient Care Team:  ROSINA Herrera CNP as PCP - General (Nurse Practitioner)  ROSINA Herrera CNP as PCP - [facility] Empaneled Provider  Ella Veronica MD (Obstetrics & Gynecology)  Светлана Nicolas RD, LD as Dietitian (Dietitian)    Reason for visit: f/u - DM and weight concerns  Patient self-assessment of Progress:\"I'm still working on get back my motivation to exercise\"      ASSESSMENT/PLAN:   NUTRITION DIAGNOSIS    #1 Problem: Altered Nutrition-Related Laboratory Values (NC-2.2)  Related to: Endocrine/Diabetes   As Evidenced by: Elevated Plasma glucose and/or HgbA1c levels           #2 Problem: Overweight/Obesity (NC-3.3)  Related to: Excessive energy intake or physical inactivity  As Evidenced by: BMI more than normative standard for age and sex (BMI=33.13)        NUTRITION INTERVENTION  Nutrition Prescription: Aim for 30 g Carb per meal; 15 - 20 g Carb per snack      Diabetes Education/Counseling included:  Relapse prevention - behaviour choice cost/benefit      NUTRITION MONITORING AND EVALUATION  5 =always  4 = most of the time   3 = half the days  2 = sometimes  1 = hasn't started  Indicator/Goal Progress Rating   #1 Walk 5 days per week  #1 3/5   #2  Eliminate regular soda and juice #2 4/5   #3  Plan ahead for family gathering #3 4/5     New Goal: schedule exercise for 5 days per week. Focus on changing behaviors to see good results at next PCP appointment on 5/31.        Patient Active Problem List   Diagnosis    Asthma    Autoimmune hepatitis ([address]    Essential hypertension, benign    Pure hypercholesterolemia    Impaired fasting glucose    Murmur, cardiac    Stage 3 chronic kidney disease (HCC)    Type 2 diabetes mellitus with stage 3 chronic kidney disease, without long-term current use of insulin ([address]    Morbidly obese ([address]       Current Outpatient Medications   Medication Sig Dispense Refill  Semaglutide 3 MG TABS Take 3 mg by mouth daily 90 tablet 0    losartan (COZAAR) 50 MG tablet TAKE 1 TABLET BY MOUTH EVERY DAY 90 tablet 1    atorvastatin (LIPITOR) 40 MG tablet Take 1 tablet by mouth daily 90 tablet 3    carvedilol (COREG) 6.25 MG tablet TAKE 1 TABLET BY MOUTH TWICE A  tablet 1    albuterol sulfate HFA (PROVENTIL HFA) 108 (90 Base) MCG/ACT inhaler INHALE 2 PUFFS INTO THE LUNGS EVERY 6 HOURS AS NEEDED. 1 Inhaler 3    desloratadine (CLARINEX) 5 MG tablet TAKE 1 TABLET BY MOUTH EVERY DAY 90 tablet 0    budesonide-formoterol (SYMBICORT) 160-4.5 MCG/ACT AERO TAKE 2 PUFFS BY MOUTH TWICE A DAY 30.6 Inhaler 1    amLODIPine (NORVASC) 5 MG tablet Take 1 tablet by mouth 2 times daily 180 tablet 1    blood glucose monitor kit and supplies Dispense sufficient amount for indicated testing frequency plus additional to accommodate PRN testing needs. Dispense all needed supplies to include: monitor, strips, lancing device, lancets, control solutions, alcohol swabs. 1 kit 0    predniSONE (DELTASONE) 10 MG tablet TAKE 1 TABLET BY MOUTH EVERY DAY 90 tablet 3    metFORMIN (GLUMETZA) 1000 MG extended release tablet Take 2 tablets by mouth daily (with breakfast) 60 tablet 3    montelukast (SINGULAIR) 10 MG tablet TAKE 1 TABLET BY MOUTH EVERY DAY EVERY NIGHT 90 tablet 3    fluticasone (FLONASE) 50 MCG/ACT nasal spray SPRAY 1 SPRAY INTO EACH NOSTRIL EVERY DAY 1 Bottle 2    mometasone (NASONEX) 50 MCG/ACT nasal spray 2 sprays by Nasal route daily as needed (as needed for allergies) 3 Inhaler 3    calcium carbonate (OSCAL) 500 MG TABS tablet Take 500 mg by mouth daily.  Spacer/Aero Chamber Mouthpiece MISC by Does not apply route. 1 each 1    Multiple Vitamin (MULTIVITAMIN PO) Take  by mouth daily. No current facility-administered medications for this visit.          NUTRITION ASSESSMENT    Biochemical Data:    Lab Results   Component Value Date    LABA1C 6.6 07/01/2021     Lab Results Component Value Date    .7 07/01/2021       Lab Results   Component Value Date    CHOL 177 04/09/2021    CHOL 140 12/30/2020    CHOL 174 08/13/2020     Lab Results   Component Value Date    TRIG 110 04/09/2021    TRIG 87 12/30/2020    TRIG 136 08/13/2020     Lab Results   Component Value Date    HDL 68 (H) 04/09/2021    HDL 67 (H) 12/30/2020    HDL 73 (H) 08/13/2020     Lab Results   Component Value Date    LDLCALC 87 04/09/2021    LDLCALC 56 12/30/2020    LDLCALC 74 08/13/2020     Lab Results   Component Value Date    LABVLDL 22 04/09/2021    LABVLDL 17 12/30/2020    LABVLDL 27 08/13/2020     Lab Results   Component Value Date    CHOLHDLRATIO 3.0 05/08/2015       Lab Results   Component Value Date    WBC 15.8 (H) 04/09/2021    HGB 13.0 04/09/2021    HCT 39.0 04/09/2021    MCV 90.1 04/09/2021     04/09/2021       Lab Results   Component Value Date    CREATININE 1.3 (H) 06/18/2021    BUN 16 06/18/2021     06/18/2021    K 3.8 06/18/2021     06/18/2021    CO2 26 06/18/2021       Anthropometric Measurements: Wt Change since last visit:   Wt Readings from Last 3 Encounters:   11/11/21 191 lb 3.2 oz (86.7 kg)   08/24/21 193 lb (87.5 kg)   06/21/21 199 lb (90.3 kg)   Previous visits: achieved 21 lb loss over past 4 years (222 lb in 2019)with exercise and eating changes  180 lb home scale - unintentional weight loss of 9 lb while ill  Back up to 186 lb at home    Update: 190 lb at  home    Food and Nutrition Changes:   Previous visit: had been Eating at regular intervals, 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   Then got sick, eating soup  drinking juice  During illness, had been drinking juice and regular soda  B:yogurt, granola, hard cooked egg, toast  L:leftovers  D:salmon, frozen veggies, rice    March visit:returned to diet soft drinks, decided to give up sweets and regular soda for Sueebran Fragoso  B: hard cooked egg, toast  OR Yogurt with granola, orange  L: soup or salad with egg/cheese and dressing  OR leftovers  D: salmon, rice, broccoli    April Visit:Significantly reduced juice and returned to diet soda  Planning meals in advance to follow plate guide    Update: stopped urge to get fast food - ate at home instead  Working to avoid sabotaging self  Successfully navigated family  dinner - ordered bowl but didn't eat it all  Yogurt most days      Physical Activity Changes:   Previous visit: prior to getting sick, 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  No  FBG none reporte    Barriers:   -none noted        Follow Up Plan: one month    Referring Provider: ROSINA Carlson CNP  Time spent with patient: 20 minutes

## 2022-05-02 ENCOUNTER — TELEMEDICINE (OUTPATIENT)
Dept: INTERNAL MEDICINE CLINIC | Age: 72
End: 2022-05-02

## 2022-05-02 DIAGNOSIS — E66.01 MORBIDLY OBESE (HCC): ICD-10-CM

## 2022-05-02 DIAGNOSIS — E11.22 TYPE 2 DIABETES MELLITUS WITH STAGE 3 CHRONIC KIDNEY DISEASE, WITHOUT LONG-TERM CURRENT USE OF INSULIN, UNSPECIFIED WHETHER STAGE 3A OR 3B CKD (HCC): Primary | ICD-10-CM

## 2022-05-02 DIAGNOSIS — N18.30 TYPE 2 DIABETES MELLITUS WITH STAGE 3 CHRONIC KIDNEY DISEASE, WITHOUT LONG-TERM CURRENT USE OF INSULIN, UNSPECIFIED WHETHER STAGE 3A OR 3B CKD (HCC): Primary | ICD-10-CM

## 2022-05-10 DIAGNOSIS — N18.30 STAGE 3 CHRONIC KIDNEY DISEASE, UNSPECIFIED WHETHER STAGE 3A OR 3B CKD (HCC): ICD-10-CM

## 2022-05-10 LAB
ALBUMIN SERPL-MCNC: 4.2 G/DL (ref 3.4–5)
ANION GAP SERPL CALCULATED.3IONS-SCNC: 16 MMOL/L (ref 3–16)
BUN BLDV-MCNC: 21 MG/DL (ref 7–20)
CALCIUM SERPL-MCNC: 9.4 MG/DL (ref 8.3–10.6)
CHLORIDE BLD-SCNC: 106 MMOL/L (ref 99–110)
CO2: 24 MMOL/L (ref 21–32)
CREAT SERPL-MCNC: 1.2 MG/DL (ref 0.6–1.2)
CREATININE URINE: 139.5 MG/DL (ref 28–259)
GFR AFRICAN AMERICAN: 54
GFR NON-AFRICAN AMERICAN: 44
GLUCOSE BLD-MCNC: 72 MG/DL (ref 70–99)
MICROALBUMIN UR-MCNC: 1.4 MG/DL
MICROALBUMIN/CREAT UR-RTO: 10 MG/G (ref 0–30)
PHOSPHORUS: 4.1 MG/DL (ref 2.5–4.9)
POTASSIUM SERPL-SCNC: 3.8 MMOL/L (ref 3.5–5.1)
SODIUM BLD-SCNC: 146 MMOL/L (ref 136–145)

## 2022-05-31 ENCOUNTER — OFFICE VISIT (OUTPATIENT)
Dept: FAMILY MEDICINE CLINIC | Age: 72
End: 2022-05-31
Payer: MEDICARE

## 2022-05-31 VITALS
BODY MASS INDEX: 33.3 KG/M2 | HEART RATE: 57 BPM | OXYGEN SATURATION: 99 % | WEIGHT: 194 LBS | SYSTOLIC BLOOD PRESSURE: 132 MMHG | TEMPERATURE: 96.9 F | DIASTOLIC BLOOD PRESSURE: 82 MMHG

## 2022-05-31 DIAGNOSIS — E78.00 PURE HYPERCHOLESTEROLEMIA: ICD-10-CM

## 2022-05-31 DIAGNOSIS — K75.4 AUTOIMMUNE HEPATITIS (HCC): ICD-10-CM

## 2022-05-31 DIAGNOSIS — R22.31 MASS OF RIGHT AXILLA: ICD-10-CM

## 2022-05-31 DIAGNOSIS — N18.30 TYPE 2 DIABETES MELLITUS WITH STAGE 3 CHRONIC KIDNEY DISEASE, WITHOUT LONG-TERM CURRENT USE OF INSULIN, UNSPECIFIED WHETHER STAGE 3A OR 3B CKD (HCC): Primary | ICD-10-CM

## 2022-05-31 DIAGNOSIS — I10 ESSENTIAL HYPERTENSION, BENIGN: ICD-10-CM

## 2022-05-31 DIAGNOSIS — E11.22 TYPE 2 DIABETES MELLITUS WITH STAGE 3 CHRONIC KIDNEY DISEASE, WITHOUT LONG-TERM CURRENT USE OF INSULIN, UNSPECIFIED WHETHER STAGE 3A OR 3B CKD (HCC): Primary | ICD-10-CM

## 2022-05-31 LAB — HBA1C MFR BLD: 6.8 %

## 2022-05-31 PROCEDURE — 1036F TOBACCO NON-USER: CPT | Performed by: NURSE PRACTITIONER

## 2022-05-31 PROCEDURE — 3017F COLORECTAL CA SCREEN DOC REV: CPT | Performed by: NURSE PRACTITIONER

## 2022-05-31 PROCEDURE — 3044F HG A1C LEVEL LT 7.0%: CPT | Performed by: NURSE PRACTITIONER

## 2022-05-31 PROCEDURE — G8427 DOCREV CUR MEDS BY ELIG CLIN: HCPCS | Performed by: NURSE PRACTITIONER

## 2022-05-31 PROCEDURE — G8399 PT W/DXA RESULTS DOCUMENT: HCPCS | Performed by: NURSE PRACTITIONER

## 2022-05-31 PROCEDURE — 1123F ACP DISCUSS/DSCN MKR DOCD: CPT | Performed by: NURSE PRACTITIONER

## 2022-05-31 PROCEDURE — 83036 HEMOGLOBIN GLYCOSYLATED A1C: CPT | Performed by: NURSE PRACTITIONER

## 2022-05-31 PROCEDURE — 99214 OFFICE O/P EST MOD 30 MIN: CPT | Performed by: NURSE PRACTITIONER

## 2022-05-31 PROCEDURE — G8417 CALC BMI ABV UP PARAM F/U: HCPCS | Performed by: NURSE PRACTITIONER

## 2022-05-31 PROCEDURE — 2022F DILAT RTA XM EVC RTNOPTHY: CPT | Performed by: NURSE PRACTITIONER

## 2022-05-31 PROCEDURE — 1090F PRES/ABSN URINE INCON ASSESS: CPT | Performed by: NURSE PRACTITIONER

## 2022-05-31 ASSESSMENT — ENCOUNTER SYMPTOMS
EYE ITCHING: 0
STRIDOR: 0
WHEEZING: 0
COUGH: 0
DIARRHEA: 0
SINUS PAIN: 0
ABDOMINAL PAIN: 0
SINUS PRESSURE: 0
EYE REDNESS: 0
RHINORRHEA: 0
CHEST TIGHTNESS: 0
ABDOMINAL DISTENTION: 0
CONSTIPATION: 0
NAUSEA: 0
SHORTNESS OF BREATH: 0
BACK PAIN: 0
EYE DISCHARGE: 0
VOMITING: 0
TROUBLE SWALLOWING: 0
EYE PAIN: 0

## 2022-05-31 ASSESSMENT — PATIENT HEALTH QUESTIONNAIRE - PHQ9
SUM OF ALL RESPONSES TO PHQ QUESTIONS 1-9: 0
SUM OF ALL RESPONSES TO PHQ9 QUESTIONS 1 & 2: 0
1. LITTLE INTEREST OR PLEASURE IN DOING THINGS: 0
SUM OF ALL RESPONSES TO PHQ QUESTIONS 1-9: 0
2. FEELING DOWN, DEPRESSED OR HOPELESS: 0
SUM OF ALL RESPONSES TO PHQ QUESTIONS 1-9: 0
SUM OF ALL RESPONSES TO PHQ QUESTIONS 1-9: 0

## 2022-05-31 NOTE — PROGRESS NOTES
Anant Arreaga (:  1950) is a 73 y.o. female,Established patient, here for evaluation of the following chief complaint(s):  Diabetes      ASSESSMENT/PLAN:  1. Type 2 diabetes mellitus with stage 3 chronic kidney disease, without long-term current use of insulin, unspecified whether stage 3a or 3b CKD (HCC)  -     POCT glycosylated hemoglobin (Hb A1C)  2. Essential hypertension, benign  3. Pure hypercholesterolemia  -     LIPID PANEL; Future  4. Autoimmune hepatitis ([facility]  -     CBC with Auto Differential; Future  5. Mass of right axilla  Assessment & Plan:  Suspect epidermal cyst for both. Pt will consider excision. Call for referral when decided      No follow-ups on file. SUBJECTIVE/OBJECTIVE:  Home sugars fasting this am 79- 90. Feels well. Taking meds as ordered. No complaints or SE  Allergies bothering her. Takes prednisone daily for hepatitis- this is followed by GI and under control. Takes claritin as well but still having symptoms. Has left forehead nodule and rt axilla nodule that have been present for years off and on. They do not hurt but increase in size and decrease on their own. Do not drain or become red. Current Outpatient Medications   Medication Sig Dispense Refill    losartan (COZAAR) 100 MG tablet Take 1 tablet by mouth daily (Patient taking differently: Take 100 mg by mouth daily 200mg now) 30 tablet 5    Semaglutide 3 MG TABS Take 3 mg by mouth daily 90 tablet 0    atorvastatin (LIPITOR) 40 MG tablet Take 1 tablet by mouth daily 90 tablet 3    carvedilol (COREG) 6.25 MG tablet TAKE 1 TABLET BY MOUTH TWICE A  tablet 1    albuterol sulfate HFA (PROVENTIL HFA) 108 (90 Base) MCG/ACT inhaler INHALE 2 PUFFS INTO THE LUNGS EVERY 6 HOURS AS NEEDED.  1 Inhaler 3    desloratadine (CLARINEX) 5 MG tablet TAKE 1 TABLET BY MOUTH EVERY DAY 90 tablet 0    budesonide-formoterol (SYMBICORT) 160-4.5 MCG/ACT AERO TAKE 2 PUFFS BY MOUTH TWICE A DAY 30.6 Inhaler 1    amLODIPine (NORVASC) 5 MG tablet Take 1 tablet by mouth 2 times daily 180 tablet 1    blood glucose monitor kit and supplies Dispense sufficient amount for indicated testing frequency plus additional to accommodate PRN testing needs. Dispense all needed supplies to include: monitor, strips, lancing device, lancets, control solutions, alcohol swabs. 1 kit 0    predniSONE (DELTASONE) 10 MG tablet TAKE 1 TABLET BY MOUTH EVERY DAY 90 tablet 3    metFORMIN (GLUMETZA) 1000 MG extended release tablet Take 2 tablets by mouth daily (with breakfast) 60 tablet 3    montelukast (SINGULAIR) 10 MG tablet TAKE 1 TABLET BY MOUTH EVERY DAY EVERY NIGHT 90 tablet 3    fluticasone (FLONASE) 50 MCG/ACT nasal spray SPRAY 1 SPRAY INTO EACH NOSTRIL EVERY DAY 1 Bottle 2    mometasone (NASONEX) 50 MCG/ACT nasal spray 2 sprays by Nasal route daily as needed (as needed for allergies) 3 Inhaler 3    calcium carbonate (OSCAL) 500 MG TABS tablet Take 500 mg by mouth daily.  Multiple Vitamin (MULTIVITAMIN PO) Take  by mouth daily. No current facility-administered medications for this visit. Review of Systems   Constitutional: Negative for chills, fatigue and fever. HENT: Negative for congestion, ear pain, hearing loss, rhinorrhea, sinus pressure, sinus pain, tinnitus and trouble swallowing. Eyes: Negative for pain, discharge, redness and itching. Respiratory: Negative for cough, chest tightness, shortness of breath, wheezing and stridor. Cardiovascular: Negative for chest pain, palpitations and leg swelling. Gastrointestinal: Negative for abdominal distention, abdominal pain, constipation, diarrhea, nausea and vomiting. Genitourinary: Negative for difficulty urinating, dysuria, hematuria and urgency. Musculoskeletal: Negative for back pain, joint swelling, myalgias and neck pain. Skin: Negative for rash and wound. Left forehead and rt axillary mass   Neurological: Negative for dizziness and headaches. Vitals:    05/31/22 1445   BP: 132/82   Site: Right Upper Arm   Position: Sitting   Cuff Size: Medium Adult   Pulse: 57   Temp: 96.9 °F (36.1 °C)   SpO2: 99%   Weight: 194 lb (88 kg)       Physical Exam  Constitutional:       Appearance: She is well-developed. HENT:      Head: Normocephalic. Eyes:      Pupils: Pupils are equal, round, and reactive to light. Cardiovascular:      Rate and Rhythm: Normal rate and regular rhythm. Heart sounds: Normal heart sounds. Pulmonary:      Effort: Pulmonary effort is normal.      Breath sounds: Normal breath sounds. Musculoskeletal:         General: Normal range of motion. Cervical back: Normal range of motion. Skin:     General: Skin is warm and dry. Comments: 1 cm firm smmoth semi mobile nodule to left forehead and rt axilla. Central dark punctate noted   Neurological:      Mental Status: She is alert and oriented to person, place, and time. An electronic signature was used to authenticate this note.     --Leatha Ortiz, APRN - CNP

## 2022-06-01 PROBLEM — R22.31 MASS OF RIGHT AXILLA: Status: ACTIVE | Noted: 2022-06-01

## 2022-06-07 ENCOUNTER — TELEMEDICINE (OUTPATIENT)
Dept: INTERNAL MEDICINE CLINIC | Age: 72
End: 2022-06-07

## 2022-06-07 DIAGNOSIS — N18.30 TYPE 2 DIABETES MELLITUS WITH STAGE 3 CHRONIC KIDNEY DISEASE, WITHOUT LONG-TERM CURRENT USE OF INSULIN, UNSPECIFIED WHETHER STAGE 3A OR 3B CKD (HCC): Primary | ICD-10-CM

## 2022-06-07 DIAGNOSIS — E11.22 TYPE 2 DIABETES MELLITUS WITH STAGE 3 CHRONIC KIDNEY DISEASE, WITHOUT LONG-TERM CURRENT USE OF INSULIN, UNSPECIFIED WHETHER STAGE 3A OR 3B CKD (HCC): Primary | ICD-10-CM

## 2022-06-07 DIAGNOSIS — E66.01 MORBIDLY OBESE (HCC): ICD-10-CM

## 2022-06-07 NOTE — PROGRESS NOTES
Medical Nutrition Therapy for Diabetes Follow Up    [place]ing [place]  May 2, 2022    Patient Care Team:  ROSINA Knowles CNP as PCP - General (Nurse Practitioner)  ROSINA Knowles CNP as PCP - [facility]  Markell Aragon MD (Obstetrics & Gynecology)  Damion Schulz RD, LD as Dietitian (Dietitian Registered)    Reason for visit: f/u - DM and weight concerns  Patient self-assessment of Progress:\"Still not quite up to my goals\"        ASSESSMENT/PLAN:   NUTRITION DIAGNOSIS    #1 Problem: Altered Nutrition-Related Laboratory Values (NC-2.2)  Related to: Endocrine/Diabetes   As Evidenced by: Elevated Plasma glucose and/or HgbA1c levels           #2 Problem: Overweight/Obesity (NC-3.3)  Related to: Excessive energy intake or physical inactivity  As Evidenced by: BMI more than normative standard for age and sex (BMI=33.13)        NUTRITION INTERVENTION  Nutrition Prescription: Aim for 30 g Carb per meal; 15 - 20 g Carb per snack      Diabetes Education/Counseling included:  Relapse prevention - behaviour choice cost/benefit      NUTRITION MONITORING AND EVALUATION  5 =always  4 = most of the time   3 = half the days  2 = sometimes  1 = hasn't started  Indicator/Goal Progress Rating   #1 Walk 5 days per week  #1 5/5   #2  Eliminate regular soda and juice #2 4/5   #3  Plan ahead for family gathering #3 4/5     New Goal: Focus on small changes for long-term results/       Patient Active Problem List   Diagnosis    Asthma    Autoimmune hepatitis ([address]    Essential hypertension, benign    Pure hypercholesterolemia    Impaired fasting glucose    Murmur, cardiac    Stage 3 chronic kidney disease ([address]    Type 2 diabetes mellitus with stage 3 chronic kidney disease, without long-term current use of insulin ([address]    Morbidly obese ([address]    Mass of right axilla       Current Outpatient Medications   Medication Sig Dispense Refill    losartan (COZAAR) 100 MG tablet Take 1 tablet by mouth daily (Patient taking differently: Take 100 mg by mouth daily 200mg now) 30 tablet 5    Semaglutide 3 MG TABS Take 3 mg by mouth daily 90 tablet 0    atorvastatin (LIPITOR) 40 MG tablet Take 1 tablet by mouth daily 90 tablet 3    carvedilol (COREG) 6.25 MG tablet TAKE 1 TABLET BY MOUTH TWICE A  tablet 1    albuterol sulfate HFA (PROVENTIL HFA) 108 (90 Base) MCG/ACT inhaler INHALE 2 PUFFS INTO THE LUNGS EVERY 6 HOURS AS NEEDED. 1 Inhaler 3    desloratadine (CLARINEX) 5 MG tablet TAKE 1 TABLET BY MOUTH EVERY DAY 90 tablet 0    budesonide-formoterol (SYMBICORT) 160-4.5 MCG/ACT AERO TAKE 2 PUFFS BY MOUTH TWICE A DAY 30.6 Inhaler 1    amLODIPine (NORVASC) 5 MG tablet Take 1 tablet by mouth 2 times daily 180 tablet 1    blood glucose monitor kit and supplies Dispense sufficient amount for indicated testing frequency plus additional to accommodate PRN testing needs. Dispense all needed supplies to include: monitor, strips, lancing device, lancets, control solutions, alcohol swabs. 1 kit 0    predniSONE (DELTASONE) 10 MG tablet TAKE 1 TABLET BY MOUTH EVERY DAY 90 tablet 3    metFORMIN (GLUMETZA) 1000 MG extended release tablet Take 2 tablets by mouth daily (with breakfast) 60 tablet 3    montelukast (SINGULAIR) 10 MG tablet TAKE 1 TABLET BY MOUTH EVERY DAY EVERY NIGHT 90 tablet 3    fluticasone (FLONASE) 50 MCG/ACT nasal spray SPRAY 1 SPRAY INTO EACH NOSTRIL EVERY DAY 1 Bottle 2    mometasone (NASONEX) 50 MCG/ACT nasal spray 2 sprays by Nasal route daily as needed (as needed for allergies) 3 Inhaler 3    calcium carbonate (OSCAL) 500 MG TABS tablet Take 500 mg by mouth daily.  Multiple Vitamin (MULTIVITAMIN PO) Take  by mouth daily. No current facility-administered medications for this visit.          NUTRITION ASSESSMENT    Biochemical Data:    Lab Results   Component Value Date    LABA1C 6.8 05/31/2022     Lab Results   Component Value Date    .7 07/01/2021       Lab Results Component Value Date    CHOL 177 04/09/2021    CHOL 140 12/30/2020    CHOL 174 08/13/2020     Lab Results   Component Value Date    TRIG 110 04/09/2021    TRIG 87 12/30/2020    TRIG 136 08/13/2020     Lab Results   Component Value Date    HDL 68 (H) 04/09/2021    HDL 67 (H) 12/30/2020    HDL 73 (H) 08/13/2020     Lab Results   Component Value Date    LDLCALC 87 04/09/2021    LDLCALC 56 12/30/2020    LDLCALC 74 08/13/2020     Lab Results   Component Value Date    LABVLDL 22 04/09/2021    LABVLDL 17 12/30/2020    LABVLDL 27 08/13/2020     Lab Results   Component Value Date    CHOLHDLRATIO 3.0 05/08/2015       Lab Results   Component Value Date    WBC 15.8 (H) 04/09/2021    HGB 13.0 04/09/2021    HCT 39.0 04/09/2021    MCV 90.1 04/09/2021     04/09/2021       Lab Results   Component Value Date    CREATININE 1.2 05/10/2022    BUN 21 (H) 05/10/2022     (H) 05/10/2022    K 3.8 05/10/2022     05/10/2022    CO2 24 05/10/2022       Anthropometric Measurements: Wt Change since last visit:   Wt Readings from Last 3 Encounters:   05/31/22 194 lb (88 kg)   05/12/22 191 lb (86.6 kg)   11/11/21 191 lb 3.2 oz (86.7 kg)   Previous visits: achieved 21 lb loss over past 4 years (222 lb in 2019)with exercise and eating changes  180 lb home scale - unintentional weight loss of 9 lb while ill  Back up to 186 lb at home    Update: 193 lb at  home    Food and Nutrition Changes:   Previous visit: had been Eating at regular intervals, eats before working an event so she can avoid the [employer].   Then got sick, eating soup  drinking juice  During illness, had been drinking juice and regular soda  B:yogurt, granola, hard cooked egg, toast  L:leftovers  D:salmon, frozen veggies, rice    March visit:returned to diet soft drinks, decided to give up sweets and regular soda for [place]  B: hard cooked egg, toast  OR Yogurt with granola, orange  L: soup or salad with egg/cheese and dressing  OR leftovers  D: salmon, rice, roopa    April Visit:Significantly reduced juice and returned to diet soda  Planning meals in advance to follow plate guide    May Update: stopped urge to get fast food - ate at home instead  Working to avoid sabotaging self  Successfully navigated family  dinner - ordered bowl but didn't eat it all  Yogurt most days     Update: diet drinks almost all the time, orange juice once for leg cramps  Working many events, had to eat fast food a few times, but trying to cook in advance  Yogurt for breakfast every other day      Physical Activity Changes:   · April Update: trying to \"get back on schedule\" with exercise  Tries to exercise daily first thing in the morning--walks on treadmill and doing stretching  · May Update: 3 days per week due to busy schedule  ·  update: working as usher daily, walking to and from parking lot    Diabetes Medications:   Recent change in medication type/dosage: No    Monitoring:   Changes to testing regimen?  No  FBG none reporte    Barriers:   -none noted        Follow Up Plan: one month    Referring Provider: ROSINA May CNP  Time spent with patient: 20 minutes

## 2022-06-14 DIAGNOSIS — E78.00 PURE HYPERCHOLESTEROLEMIA: ICD-10-CM

## 2022-06-14 DIAGNOSIS — K75.4 AUTOIMMUNE HEPATITIS (HCC): ICD-10-CM

## 2022-06-14 LAB
BASOPHILS ABSOLUTE: 0.1 K/UL (ref 0–0.2)
BASOPHILS RELATIVE PERCENT: 1 %
CHOLESTEROL, TOTAL: 173 MG/DL (ref 0–199)
EOSINOPHILS ABSOLUTE: 0.4 K/UL (ref 0–0.6)
EOSINOPHILS RELATIVE PERCENT: 4.1 %
HCT VFR BLD CALC: 38.9 % (ref 36–48)
HDLC SERPL-MCNC: 76 MG/DL (ref 40–60)
HEMOGLOBIN: 13.2 G/DL (ref 12–16)
LDL CHOLESTEROL CALCULATED: 70 MG/DL
LYMPHOCYTES ABSOLUTE: 2.3 K/UL (ref 1–5.1)
LYMPHOCYTES RELATIVE PERCENT: 21.4 %
MCH RBC QN AUTO: 30.9 PG (ref 26–34)
MCHC RBC AUTO-ENTMCNC: 33.8 G/DL (ref 31–36)
MCV RBC AUTO: 91.5 FL (ref 80–100)
MONOCYTES ABSOLUTE: 1.1 K/UL (ref 0–1.3)
MONOCYTES RELATIVE PERCENT: 10.2 %
NEUTROPHILS ABSOLUTE: 6.6 K/UL (ref 1.7–7.7)
NEUTROPHILS RELATIVE PERCENT: 63.3 %
PDW BLD-RTO: 13.9 % (ref 12.4–15.4)
PLATELET # BLD: 309 K/UL (ref 135–450)
PMV BLD AUTO: 7.1 FL (ref 5–10.5)
RBC # BLD: 4.26 M/UL (ref 4–5.2)
TRIGL SERPL-MCNC: 133 MG/DL (ref 0–150)
VLDLC SERPL CALC-MCNC: 27 MG/DL
WBC # BLD: 10.5 K/UL (ref 4–11)

## 2022-07-08 NOTE — PROGRESS NOTES
Medical Nutrition Therapy for Diabetes Follow Up    Erika Coreas  May 2, 2022    Patient Care Team:  ROSINA Emerson CNP as PCP - General (Nurse Practitioner)  ROSINA Emerson CNP as PCP - [facility] Empaneled Provider  Suki Michael MD (Obstetrics & Gynecology)  Pardeep Mcneil RD, LD as Dietitian (Dietitian Registered)    Reason for visit: f/u - DM and weight concerns  Patient self-assessment of Progress: \"Things are not too bad - I've been working almost every day so that means I walk a lot\"      ASSESSMENT/PLAN:   NUTRITION DIAGNOSIS    #1 Problem: Altered Nutrition-Related Laboratory Values (NC-2.2)  Related to: Endocrine/Diabetes   As Evidenced by: Elevated Plasma glucose and/or HgbA1c levels           #2 Problem: Overweight/Obesity (NC-3.3)  Related to: Excessive energy intake or physical inactivity  As Evidenced by: BMI more than normative standard for age and sex (BMI=33.13)        NUTRITION INTERVENTION  Nutrition Prescription: Aim for 30 g Carb per meal; 15 - 20 g Carb per snack      Diabetes Education/Counseling included:  Meal Planning      NUTRITION MONITORING AND EVALUATION  5 =always  4 = most of the time   3 = half the days  2 = sometimes  1 = hasn't started  Indicator/Goal Progress Rating   #1 Walk 5 days per week  #1 3/5   #2  Eliminate regular soda and juice #2 5/5   #3  Plan ahead for social gathering #3 4/5     New Goal: Eat small meal when coming home from work rather than snacking.        Patient Active Problem List   Diagnosis    Asthma    Autoimmune hepatitis ([address]    Essential hypertension, benign    Pure hypercholesterolemia    Impaired fasting glucose    Murmur, cardiac    Stage 3 chronic kidney disease (HCC)    Type 2 diabetes mellitus with stage 3 chronic kidney disease, without long-term current use of insulin ([address]    Morbidly obese (HCC)    Mass of right axilla       Current Outpatient Medications   Medication Sig Dispense Refill    losartan (COZAAR) 100 MG tablet Take 2 tablets by mouth daily 200mg now 60 tablet 5    Semaglutide 3 MG TABS Take 3 mg by mouth daily 90 tablet 0    atorvastatin (LIPITOR) 40 MG tablet Take 1 tablet by mouth daily 90 tablet 3    carvedilol (COREG) 6.25 MG tablet TAKE 1 TABLET BY MOUTH TWICE A  tablet 1    albuterol sulfate HFA (PROVENTIL HFA) 108 (90 Base) MCG/ACT inhaler INHALE 2 PUFFS INTO THE LUNGS EVERY 6 HOURS AS NEEDED. 1 Inhaler 3    desloratadine (CLARINEX) 5 MG tablet TAKE 1 TABLET BY MOUTH EVERY DAY 90 tablet 0    budesonide-formoterol (SYMBICORT) 160-4.5 MCG/ACT AERO TAKE 2 PUFFS BY MOUTH TWICE A DAY 30.6 Inhaler 1    amLODIPine (NORVASC) 5 MG tablet Take 1 tablet by mouth 2 times daily 180 tablet 1    blood glucose monitor kit and supplies Dispense sufficient amount for indicated testing frequency plus additional to accommodate PRN testing needs. Dispense all needed supplies to include: monitor, strips, lancing device, lancets, control solutions, alcohol swabs. 1 kit 0    predniSONE (DELTASONE) 10 MG tablet TAKE 1 TABLET BY MOUTH EVERY DAY 90 tablet 3    metFORMIN (GLUMETZA) 1000 MG extended release tablet Take 2 tablets by mouth daily (with breakfast) 60 tablet 3    montelukast (SINGULAIR) 10 MG tablet TAKE 1 TABLET BY MOUTH EVERY DAY EVERY NIGHT 90 tablet 3    fluticasone (FLONASE) 50 MCG/ACT nasal spray SPRAY 1 SPRAY INTO EACH NOSTRIL EVERY DAY 1 Bottle 2    mometasone (NASONEX) 50 MCG/ACT nasal spray 2 sprays by Nasal route daily as needed (as needed for allergies) 3 Inhaler 3    calcium carbonate (OSCAL) 500 MG TABS tablet Take 500 mg by mouth daily.  Multiple Vitamin (MULTIVITAMIN PO) Take  by mouth daily. No current facility-administered medications for this visit.          NUTRITION ASSESSMENT    Biochemical Data:    Lab Results   Component Value Date    LABA1C 6.8 05/31/2022     Lab Results   Component Value Date    .7 07/01/2021       Lab Results   Component Value Date    CHOL 173 06/14/2022    CHOL 177 04/09/2021    CHOL 140 12/30/2020     Lab Results   Component Value Date    TRIG 133 06/14/2022    TRIG 110 04/09/2021    TRIG 87 12/30/2020     Lab Results   Component Value Date    HDL 76 (H) 06/14/2022    HDL 68 (H) 04/09/2021    HDL 67 (H) 12/30/2020     Lab Results   Component Value Date    LDLCALC 70 06/14/2022    LDLCALC 87 04/09/2021    LDLCALC 56 12/30/2020     Lab Results   Component Value Date    LABVLDL 27 06/14/2022    LABVLDL 22 04/09/2021    LABVLDL 17 12/30/2020     Lab Results   Component Value Date    CHOLHDLRATIO 3.0 05/08/2015       Lab Results   Component Value Date    WBC 10.5 06/14/2022    HGB 13.2 06/14/2022    HCT 38.9 06/14/2022    MCV 91.5 06/14/2022     06/14/2022       Lab Results   Component Value Date    CREATININE 1.2 05/10/2022    BUN 21 (H) 05/10/2022     (H) 05/10/2022    K 3.8 05/10/2022     05/10/2022    CO2 24 05/10/2022       Anthropometric Measurements: Wt Change since last visit:   Wt Readings from Last 3 Encounters:   05/31/22 194 lb (88 kg)   05/12/22 191 lb (86.6 kg)   11/11/21 191 lb 3.2 oz (86.7 kg)   Previous visits: achieved 21 lb loss over past 4 years (222 lb in 2019)with exercise and eating changes    193 lb at  Home - no update    Food and Nutrition Changes:   Previous visit: had been Eating at regular intervals, 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   Then got sick, eating soup  drinking juice  During illness, had been drinking juice and regular soda  B:yogurt, granola, hard cooked egg, toast  L:leftovers  D:salmon, frozen veggies, rice    March visit:returned to diet soft drinks, decided to give up sweets and regular soda for Rayjorden Parkerer  B: hard cooked egg, toast  OR Yogurt with granola, orange  L: soup or salad with egg/cheese and dressing  OR leftovers  D: salmon, rice, broccoli    April Visit:Significantly reduced juice and returned to diet soda  Planning meals in advance to follow plate guide    May Update: stopped urge to get fast food - ate at home instead  Working to avoid sabotaging self  Successfully navigated family  dinner - ordered bowl but didn't eat it all  Yogurt most days     Update: diet drinks almost all the time, orange juice once for leg cramps  Working many events, had to eat fast food a few times, but trying to cook in advance  Yogurt for breakfast every other day    July update: sugar free soda or gatorade or water,  planned meals (salmon, fish, tuna salad, veggies) 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  No  FBG none reporte    Barriers:   -none noted        Follow Up Plan: one month    Referring Provider: ROSINA Osborne CNP  Time spent with patient: 20 minutes

## 2022-07-11 ENCOUNTER — TELEMEDICINE (OUTPATIENT)
Dept: INTERNAL MEDICINE CLINIC | Age: 72
End: 2022-07-11

## 2022-07-11 ENCOUNTER — HOSPITAL ENCOUNTER (OUTPATIENT)
Age: 72
Setting detail: OUTPATIENT SURGERY
Discharge: HOME | End: 2022-07-11
Attending: INTERNAL MEDICINE | Admitting: INTERNAL MEDICINE
Payer: MEDICARE

## 2022-07-11 VITALS
HEART RATE: 79 BPM | RESPIRATION RATE: 18 BRPM | DIASTOLIC BLOOD PRESSURE: 97 MMHG | WEIGHT: 196 LBS | SYSTOLIC BLOOD PRESSURE: 169 MMHG | BODY MASS INDEX: 33.46 KG/M2 | HEIGHT: 64 IN | OXYGEN SATURATION: 95 % | TEMPERATURE: 97.4 F

## 2022-07-11 DIAGNOSIS — E11.22 TYPE 2 DIABETES MELLITUS WITH STAGE 3 CHRONIC KIDNEY DISEASE, WITHOUT LONG-TERM CURRENT USE OF INSULIN, UNSPECIFIED WHETHER STAGE 3A OR 3B CKD (HCC): Primary | ICD-10-CM

## 2022-07-11 DIAGNOSIS — N18.30 TYPE 2 DIABETES MELLITUS WITH STAGE 3 CHRONIC KIDNEY DISEASE, WITHOUT LONG-TERM CURRENT USE OF INSULIN, UNSPECIFIED WHETHER STAGE 3A OR 3B CKD (HCC): Primary | ICD-10-CM

## 2022-07-11 LAB
GLUCOSE BLD-MCNC: 91 MG/DL (ref 70–99)
PERFORMED ON: NORMAL

## 2022-07-11 PROCEDURE — 7100000010 HC PHASE II RECOVERY - FIRST 15 MIN: Performed by: INTERNAL MEDICINE

## 2022-07-11 PROCEDURE — 6370000000 HC RX 637 (ALT 250 FOR IP): Performed by: INTERNAL MEDICINE

## 2022-07-11 PROCEDURE — 2580000003 HC RX 258: Performed by: INTERNAL MEDICINE

## 2022-07-11 PROCEDURE — 3609017100 HC EGD: Performed by: INTERNAL MEDICINE

## 2022-07-11 PROCEDURE — 99152 MOD SED SAME PHYS/QHP 5/>YRS: CPT | Performed by: INTERNAL MEDICINE

## 2022-07-11 PROCEDURE — 2709999900 HC NON-CHARGEABLE SUPPLY: Performed by: INTERNAL MEDICINE

## 2022-07-11 PROCEDURE — 6360000002 HC RX W HCPCS: Performed by: INTERNAL MEDICINE

## 2022-07-11 PROCEDURE — 7100000011 HC PHASE II RECOVERY - ADDTL 15 MIN: Performed by: INTERNAL MEDICINE

## 2022-07-11 RX ORDER — SODIUM CHLORIDE, SODIUM LACTATE, POTASSIUM CHLORIDE, CALCIUM CHLORIDE 600; 310; 30; 20 MG/100ML; MG/100ML; MG/100ML; MG/100ML
INJECTION, SOLUTION INTRAVENOUS CONTINUOUS
Status: DISCONTINUED | OUTPATIENT
Start: 2022-07-11 | End: 2022-07-11 | Stop reason: HOSPADM

## 2022-07-11 RX ORDER — MIDAZOLAM HYDROCHLORIDE 1 MG/ML
INJECTION INTRAMUSCULAR; INTRAVENOUS PRN
Status: DISCONTINUED | OUTPATIENT
Start: 2022-07-11 | End: 2022-07-11 | Stop reason: ALTCHOICE

## 2022-07-11 RX ORDER — FENTANYL CITRATE 50 UG/ML
INJECTION, SOLUTION INTRAMUSCULAR; INTRAVENOUS PRN
Status: DISCONTINUED | OUTPATIENT
Start: 2022-07-11 | End: 2022-07-11 | Stop reason: ALTCHOICE

## 2022-07-11 RX ADMIN — SODIUM CHLORIDE, POTASSIUM CHLORIDE, SODIUM LACTATE AND CALCIUM CHLORIDE: 600; 310; 30; 20 INJECTION, SOLUTION INTRAVENOUS at 10:46

## 2022-07-11 ASSESSMENT — PAIN SCALES - WONG BAKER
WONGBAKER_NUMERICALRESPONSE: 0
WONGBAKER_NUMERICALRESPONSE: 0

## 2022-07-11 ASSESSMENT — PAIN SCALES - GENERAL: PAINLEVEL_OUTOF10: 0

## 2022-07-11 ASSESSMENT — PAIN - FUNCTIONAL ASSESSMENT: PAIN_FUNCTIONAL_ASSESSMENT: 0-10

## 2022-07-11 NOTE — PROGRESS NOTES
Ambulatory Surgery/Procedure Discharge Note    Vitals:    07/11/22 1221   BP: (!) 169/97   Pulse: 79   Resp: 18   Temp:    SpO2: 95%     B/p meets shantell standards  Patient states that she didn't take her b/p medicine today but will take it once she gets home    In: 340 [P.O.:240; I.V.:100]  Out: -     Restroom use offered before discharge. Yes    Pain assessment:  level of pain (1-10, 10 severe)  Pain Level: 0        Patient discharged to home/self care.  Patient discharged via wheel chair by transporter to waiting family/S.O.       7/11/2022 12:30 PM

## 2022-07-11 NOTE — H&P
History and Physical / Pre-Sedation Assessment    Fran Ty is a 73 y.o. female who presents today for EGD procedure. PMHx:    Past Medical History:   Diagnosis Date    Allergic rhinitis     Asthma     COPD (chronic obstructive pulmonary disease) ([facility]     pt denies    Hepatitis     autoimmune    Hyperlipidemia     Hypertension     Influenza A 1/10/2015    Osteoporosis     Type 2 diabetes mellitus without complication, without long-term current use of insulin ([facility] 12/16/2019       Medications:    Prior to Admission medications    Medication Sig Start Date End Date Taking? Authorizing Provider   losartan (COZAAR) 100 MG tablet Take 2 tablets by mouth daily 200mg now 6/13/22   Saul Harmon MD   Semaglutide 3 MG TABS Take 3 mg by mouth daily 4/21/22   ROSINA Travis CNP   atorvastatin (LIPITOR) 40 MG tablet Take 1 tablet by mouth daily 2/22/22   ROSINA Dorsey CNP   carvedilol (COREG) 6.25 MG tablet TAKE 1 TABLET BY MOUTH TWICE A DAY 2/21/22   Saul Harmon MD   albuterol sulfate HFA (PROVENTIL HFA) 108 (90 Base) MCG/ACT inhaler INHALE 2 PUFFS INTO THE LUNGS EVERY 6 HOURS AS NEEDED. 8/24/21   ROSINA Dorsey CNP   desloratadine (CLARINEX) 5 MG tablet TAKE 1 TABLET BY MOUTH EVERY DAY 5/24/21   Saurabh Davidson MD   budesonide-formoterol (SYMBICORT) 160-4.5 MCG/ACT AERO TAKE 2 PUFFS BY MOUTH TWICE A DAY 5/10/21   Saurabh Davidson MD   amLODIPine (NORVASC) 5 MG tablet Take 1 tablet by mouth 2 times daily 4/13/21   Saurabh Davidson MD   blood glucose monitor kit and supplies Dispense sufficient amount for indicated testing frequency plus additional to accommodate PRN testing needs. Dispense all needed supplies to include: monitor, strips, lancing device, lancets, control solutions, alcohol swabs.  3/17/21   Saurabh Davidson MD   predniSONE (DELTASONE) 10 MG tablet TAKE 1 TABLET BY MOUTH EVERY DAY 3/2/21   Saurabh Davidson MD   metFORMIN [place]) 1000 MG extended release tablet Take 2 tablets by mouth daily (with breakfast) 12/31/20   Kirill Hermosillo MD   montelukast (SINGULAIR) 10 MG tablet TAKE 1 TABLET BY MOUTH EVERY DAY EVERY NIGHT 12/17/20   Kirill Hermosillo MD   fluticasone (FLONASE) 50 MCG/ACT nasal spray SPRAY 1 SPRAY INTO EACH NOSTRIL EVERY DAY 10/19/20   Kirill Hermosillo MD   mometasone (NASONEX) 50 MCG/ACT nasal spray 2 sprays by Nasal route daily as needed (as needed for allergies) 9/6/16   Mandy Nicolas MD   calcium carbonate (OSCAL) 500 MG TABS tablet Take 500 mg by mouth daily. Historical Provider, MD   Multiple Vitamin (MULTIVITAMIN PO) Take  by mouth daily. 8/6/10   Historical Provider, MD       Allergies: Allergies   Allergen Reactions    Iodine Shortness Of Breath    Other      Steroid (injection) taken for hepatitis elevated liver enzymes, pt. Uncertain of name    Shellfish-Derived Products Rash     Shortness of breath       PSHx:    Past Surgical History:   Procedure Laterality Date    BREAST BIOPSY  10/2005    Dr. Angelic Durant - right , benign    Annice Helms      Dr. Mona Sexton  - Cornelious Shaw 10 yrs     COLONOSCOPY  07/08/13    Dr. Mona Sexton- internal hemorrhoids, adenomatous and hyperplastic polyps. Repeat in 3 yrs    COLONOSCOPY  07/17/2016    Dr. Mona Sexton, No evidence of any colorectal neoplasia ~5 years    COLONOSCOPY  6/21/2021    COLONOSCOPY POLYPECTOMY SNARE/COLD BIOPSY performed by Jeanne Garcia MD at [address]  8/2006     autoimmune hepatitis        Social Hx:    Social History     Socioeconomic History    Marital status:       Spouse name: Elise Reyes Number of children: 0    Years of education: 15    Highest education level: Not on file   Occupational History    Occupation: /   Tobacco Use    Smoking status: Never Smoker    Smokeless tobacco: Never Used   Vaping Use    Vaping Use: Never used   Substance and Sexual Activity    Alcohol use: Not Currently     Comment: rare - few times a year    Drug use: No    Sexual activity: Not on file   Other Topics Concern    Not on file   Social History Narrative    Not on file     Social Determinants of Health     Financial Resource Strain:     Difficulty of Paying Living Expenses: Not on file   Food Insecurity:     Worried About Running Out of Food in the Last Year: Not on file    Ramiro of Food in the Last Year: Not on file   Transportation Needs:     Lack of Transportation (Medical): Not on file    Lack of Transportation (Non-Medical):  Not on file   Physical Activity:     Days of Exercise per Week: Not on file    Minutes of Exercise per Session: Not on file   Stress:     Feeling of Stress : Not on file   Social Connections:     Frequency of Communication with Friends and Family: Not on file    Frequency of Social Gatherings with Friends and Family: Not on file    Attends Quaker Services: Not on file    Active Member of [address] [place] [address] or Organizations: Not on file    Attends Club or Organization Meetings: Not on file    Marital Status: Not on file   Intimate Partner Violence:     Fear of Current or Ex-Partner: Not on file    Emotionally Abused: Not on file    Physically Abused: Not on file    Sexually Abused: Not on file   Housing Stability:     Unable to Pay for Housing in the Last Year: Not on file    Number of [place] in the Last Year: Not on file    Unstable Housing in the Last Year: Not on file       Family Hx:   Family History   Problem Relation Age of Onset    Heart Disease Mother     Cancer Mother         breast, ovarian     Cancer Father         prostate     Kidney Disease Brother        Physical Exam:  Vital Signs: BP (!) 175/86   Pulse 75   Temp 97.5 °F (36.4 °C) (Temporal)   Resp 14   Ht 5' 4\" (1.626 m)   Wt 196 lb (88.9 kg)   LMP 12/07/2005   SpO2 99%   BMI 33.64 kg/m²    Pulmonary: Normal  Cardiac: Normal  Abdomen: Normal    Pre-Procedure Assessment / Plan:  ASA Classification: Class 2 - A normal healthy patient with mild systemic disease  Level of Sedation Plan: Moderate sedation   Mallampati Score: I (soft palate, uvula, fauces, tonsillar pillars visible)  Post Procedure plan: Return to same level of care      I assessed the patient and find that the patient is in satisfactory condition to proceed with the planned procedure and sedation plan. Risks/benefits/alternatives of procedure discussed with patient and any present family members. Risks including, but not limited to: bleeding, perforation, post polypectomy syndrome, splenic injury, need for additional procedures or surgery, risks of anesthesia. Patient understands it is their responsibility to call office for pathology results if they do not hear from my office within 1-2 weeks. All questions answered.     Marvin Caballero MD  7/11/2022

## 2022-07-21 ENCOUNTER — HOSPITAL ENCOUNTER (EMERGENCY)
Age: 72
Discharge: HOME | End: 2022-07-22
Payer: MEDICARE

## 2022-07-21 ENCOUNTER — APPOINTMENT (OUTPATIENT)
Dept: GENERAL RADIOLOGY | Age: 72
End: 2022-07-21
Payer: MEDICARE
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
## 2022-07-21 DIAGNOSIS — I10 ELEVATED BLOOD PRESSURE READING IN OFFICE WITH DIAGNOSIS OF HYPERTENSION: ICD-10-CM

## 2022-07-21 DIAGNOSIS — S89.91XA RIGHT KNEE INJURY, INITIAL ENCOUNTER: Primary | ICD-10-CM

## 2022-07-21 PROCEDURE — 99283 EMERGENCY DEPT VISIT LOW MDM: CPT

## 2022-07-21 PROCEDURE — 6370000000 HC RX 637 (ALT 250 FOR IP): Performed by: NURSE PRACTITIONER

## 2022-07-21 PROCEDURE — 73562 X-RAY EXAM OF KNEE 3: CPT

## 2022-07-21 RX ORDER — LIDOCAINE 50 MG/G
1 PATCH TOPICAL DAILY
Qty: 10 PATCH | Refills: 0 | Status: SHIPPED | OUTPATIENT
Start: 2022-07-21 | End: 2022-07-31

## 2022-07-21 RX ORDER — IBUPROFEN 600 MG/1
600 TABLET ORAL 3 TIMES DAILY PRN
Qty: 15 TABLET | Refills: 0 | Status: SHIPPED | OUTPATIENT
Start: 2022-07-21 | End: 2022-11-01 | Stop reason: ALTCHOICE

## 2022-07-21 RX ORDER — LIDOCAINE 4 G/G
1 PATCH TOPICAL ONCE
Status: DISCONTINUED | OUTPATIENT
Start: 2022-07-21 | End: 2022-07-22 | Stop reason: HOSPADM

## 2022-07-21 RX ORDER — ACETAMINOPHEN 500 MG
1000 TABLET ORAL ONCE
Status: COMPLETED | OUTPATIENT
Start: 2022-07-21 | End: 2022-07-21

## 2022-07-21 RX ORDER — ACETAMINOPHEN 500 MG
500 TABLET ORAL 4 TIMES DAILY PRN
Qty: 28 TABLET | Refills: 0 | Status: SHIPPED | OUTPATIENT
Start: 2022-07-21 | End: 2022-08-29

## 2022-07-21 RX ORDER — IBUPROFEN 400 MG/1
800 TABLET ORAL ONCE
Status: COMPLETED | OUTPATIENT
Start: 2022-07-21 | End: 2022-07-21

## 2022-07-21 RX ADMIN — ACETAMINOPHEN 1000 MG: 500 TABLET ORAL at 23:31

## 2022-07-21 RX ADMIN — IBUPROFEN 800 MG: 400 TABLET, FILM COATED ORAL at 23:31

## 2022-07-21 ASSESSMENT — PAIN DESCRIPTION - FREQUENCY: FREQUENCY: INTERMITTENT

## 2022-07-21 ASSESSMENT — PAIN SCALES - GENERAL
PAINLEVEL_OUTOF10: 8
PAINLEVEL_OUTOF10: 8

## 2022-07-21 ASSESSMENT — PAIN - FUNCTIONAL ASSESSMENT: PAIN_FUNCTIONAL_ASSESSMENT: 0-10

## 2022-07-21 ASSESSMENT — PAIN DESCRIPTION - DESCRIPTORS: DESCRIPTORS: BURNING

## 2022-07-21 ASSESSMENT — PAIN DESCRIPTION - PAIN TYPE: TYPE: ACUTE PAIN

## 2022-07-21 ASSESSMENT — PAIN DESCRIPTION - LOCATION: LOCATION: KNEE

## 2022-07-22 VITALS
HEIGHT: 64 IN | HEART RATE: 76 BPM | TEMPERATURE: 97.3 F | DIASTOLIC BLOOD PRESSURE: 100 MMHG | BODY MASS INDEX: 34.59 KG/M2 | SYSTOLIC BLOOD PRESSURE: 172 MMHG | RESPIRATION RATE: 16 BRPM | WEIGHT: 202.6 LBS | OXYGEN SATURATION: 100 %

## 2022-07-22 ASSESSMENT — ENCOUNTER SYMPTOMS
ABDOMINAL PAIN: 0
BACK PAIN: 0
EYE PAIN: 0
VOMITING: 0
NAUSEA: 0
COUGH: 0
SHORTNESS OF BREATH: 0
ANAL BLEEDING: 0
SORE THROAT: 0

## 2022-07-22 NOTE — ED TRIAGE NOTES
Patient states she was running two weeks ago and tripped and fell landing on her right knee. Swelling to right knee. Patient states the bruising is getting better but swelling wont go down.

## 2022-07-22 NOTE — ED PROVIDER NOTES
[address]  [address] [address]  Dept: [phone]  Loc: [address] ENCOUNTER        This patient was not seen or evaluated by the attending physician. I evaluated this patient, the attending physician was available for consultation. CHIEF COMPLAINT    Chief Complaint   Patient presents with    Fall     Right knee pain and swelling        HPI    Keren Lau is a 73 y.o., nontoxic, well-appearing female who presents with right knee pain. The onset was approximately 2 weeks ago. The duration has been constant since the onset but she endorses improvement of the pain and associated ecchymosis. The quality of the pain is \"burning and aching\" and the severity is 8/10. The patient had no other associated injury. The context is patient states that she missed a step and fell onto her right knee. Denies head injury, loss of consciousness, neck/back pain, blood thinner use, saddle anesthesia, incontinence of urine or stool, lower extremity weakness/inability to ambulate, or urinary retention. Review of Systems   Constitutional:  Negative for chills, diaphoresis, fatigue and fever. HENT:  Negative for congestion and sore throat. Eyes:  Negative for pain and visual disturbance. Respiratory:  Negative for cough and shortness of breath. Cardiovascular:  Negative for chest pain and leg swelling. Gastrointestinal:  Negative for abdominal pain, anal bleeding, nausea and vomiting. Genitourinary:  Negative for difficulty urinating, dysuria, frequency and urgency. Musculoskeletal:  Positive for arthralgias (Limited to the right knee). Negative for back pain, gait problem, neck pain and neck stiffness. Skin:  Negative for rash and wound. Neurological:  Negative for dizziness and light-headedness.    Hematological:  Bruises/bleeds easily (+ Ecchymosis noted to the right knee and lower right leg). Psychiatric/Behavioral: Negative. PAST MEDICAL & SURGICAL HISTORY    Past Medical History:   Diagnosis Date    Allergic rhinitis     Asthma     COPD (chronic obstructive pulmonary disease) ([facility] [address]     pt denies    Hepatitis     autoimmune    Hyperlipidemia     Hypertension     Influenza A 1/10/2015    Osteoporosis     Type 2 diabetes mellitus without complication, without long-term current use of insulin ([facility] [address] 12/16/2019     Past Surgical History:   Procedure Laterality Date    BREAST BIOPSY  10/2005    Dr. So Camacho - right , benign     CATARACT REMOVAL Bilateral     COLONOSCOPY      Dr. Lisa Mclean  - [facility] 10 yrs     COLONOSCOPY  07/08/13    Dr. Lisa Mclean- internal hemorrhoids, adenomatous and hyperplastic polyps. Repeat in 3 yrs    COLONOSCOPY  07/17/2016    Dr. Lisa Mclean, No evidence of any colorectal neoplasia ~5 years    COLONOSCOPY  6/21/2021    COLONOSCOPY POLYPECTOMY SNARE/COLD BIOPSY [facility]  8/2006     autoimmune hepatitis     UPPER GASTROINTESTINAL ENDOSCOPY N/A 7/11/2022    ESOPHAGOGASTRODUODENOSCOPY performed by Keisha Urias MD at [address]  (may include discharge medications prescribed in the ED)  Current Outpatient Rx   Medication Sig Dispense Refill    losartan (COZAAR) 100 MG tablet TAKE 2 TABLETS BY MOUTH EVERY DAY 60 tablet 5    Semaglutide 3 MG TABS Take 3 mg by mouth daily 90 tablet 0    atorvastatin (LIPITOR) 40 MG tablet Take 1 tablet by mouth daily 90 tablet 3    carvedilol (COREG) 6.25 MG tablet TAKE 1 TABLET BY MOUTH TWICE A  tablet 1    albuterol sulfate HFA (PROVENTIL HFA) 108 (90 Base) MCG/ACT inhaler INHALE 2 PUFFS INTO THE LUNGS EVERY 6 HOURS AS NEEDED.  1 Inhaler 3    desloratadine (CLARINEX) 5 MG tablet TAKE 1 TABLET BY MOUTH EVERY DAY 90 tablet 0    budesonide-formoterol (SYMBICORT) 160-4.5 MCG/ACT AERO TAKE 2 PUFFS BY MOUTH TWICE A DAY 30.6 Inhaler 1 amLODIPine (NORVASC) 5 MG tablet Take 1 tablet by mouth 2 times daily 180 tablet 1    blood glucose monitor kit and supplies Dispense sufficient amount for indicated testing frequency plus additional to accommodate PRN testing needs. Dispense all needed supplies to include: monitor, strips, lancing device, lancets, control solutions, alcohol swabs. 1 kit 0    predniSONE (DELTASONE) 10 MG tablet TAKE 1 TABLET BY MOUTH EVERY DAY 90 tablet 3    metFORMIN (GLUMETZA) 1000 MG extended release tablet Take 2 tablets by mouth daily (with breakfast) 60 tablet 3    montelukast (SINGULAIR) 10 MG tablet TAKE 1 TABLET BY MOUTH EVERY DAY EVERY NIGHT 90 tablet 3    fluticasone (FLONASE) 50 MCG/ACT nasal spray SPRAY 1 SPRAY INTO EACH NOSTRIL EVERY DAY 1 Bottle 2    mometasone (NASONEX) 50 MCG/ACT nasal spray 2 sprays by Nasal route daily as needed (as needed for allergies) 3 Inhaler 3    calcium carbonate (OSCAL) 500 MG TABS tablet Take 500 mg by mouth daily. Multiple Vitamin (MULTIVITAMIN PO) Take  by mouth daily. ALLERGIES    Allergies   Allergen Reactions    Iodine Shortness Of Breath    Other      Steroid (injection) taken for hepatitis elevated liver enzymes, pt. Uncertain of name    Shellfish-Derived Products Rash     Shortness of breath       SOCIAL & FAMILY HISTORY    Social History     Socioeconomic History    Marital status:       Spouse name: Terrie Colby    Number of children: 0    Years of education: 15   Occupational History    Occupation: /   Tobacco Use    Smoking status: Never    Smokeless tobacco: Never   Vaping Use    Vaping Use: Never used   Substance and Sexual Activity    Alcohol use: Not Currently     Comment: rare - few times a year    Drug use: No     Family History   Problem Relation Age of Onset    Heart Disease Mother     Cancer Mother         breast, ovarian     Cancer Father         prostate     Kidney Disease Brother          PHYSICAL EXAM    VITAL SIGNS: BP (!) 175/94 Pulse 68   Temp 97.3 °F (36.3 °C) (Oral)   Resp 16   Ht 5' 4\" (1.626 m)   Wt 202 lb 9.6 oz (91.9 kg)   LMP 12/07/2005   SpO2 99%   BMI 34.78 kg/m²   Constitutional:  Well nourished, no acute distress  HENT:  Atraumatic, moist mucous membranes  NECK: normal range of motion,  supple. No bony midline cervical spine tenderness upon palpation. Respiratory:  No respiratory distress  Cardiovascular:  No JVD  Vascular: right DP/PT pulse 2+, + brisk capillary refill less than 2 seconds  Musculoskeletal:  + Anterior left knee tenderness to palpation of the anterior left knee, + edema noted to the right knee, no deformity.  + Full strength upon flexion, extension, abduction, and adduction bilateral lower extremity.  + Full ROM of the knee. There is no bony midline thoracic or lumbar spine tenderness upon palpation. Integument:  Well hydrated, no skin lacerations, no erythema, no significant difference in warmth between the left and right knee. Neurologic:  Awake alert, no slurred speech, sensory and motor intact          RADIOLOGY   XR KNEE RIGHT (3 VIEWS)    Result Date: 7/21/2022  EXAMINATION: THREE XRAY VIEWS OF THE RIGHT KNEE 7/21/2022 10:52 pm COMPARISON: None. HISTORY: ORDERING SYSTEM PROVIDED HISTORY: r/o frx/dislocation TECHNOLOGIST PROVIDED HISTORY: Reason for exam:->r/o frx/dislocation Reason for Exam: fall FINDINGS: There is no acute fracture or dislocation at the right knee. There is joint space narrowing of the lateral compartment more than the medial compartment. Hypertrophy of the tibial spines and has small marginal osteophytes off the lateral aspect of the knee. Small spurs along the undersurface of the patella as well. There is no significant joint effusion. There is enthesopathy at the upper pole of the patella. No radiopaque foreign bodies around the knee. No acute fracture or dislocation at the right knee. Osteoarthritic changes are present, greater in the lateral compartment.         ED COURSE & MEDICAL DECISION MAKING   See chart for medications given during emergency department course. Differential diagnosis: includes but not limited to Arterial Injury/Ischemia, Fracture, Dislocation, goutinfection, Compartment Syndrome, Neurologic Deficit/Injury. Patient presents to the emergency department status post 2 weeks ago she experienced a mechanical fall after she missed a step. She fell onto her right knee. There was no other injuries. I will obtain an x-ray of the patient's right knee. Patient medicated with ibuprofen, Tylenol, and a lidocaine patch was placed. Work-up pending. Imaging reviewed: As noted above Identifying no acute fracture dislocation of the right knee. Osteoarthritic changes are present, greater in the lateral compartment. No evidence of neurovascular injury on exam.  There is a significant bruising noted to the patient's anterior right knee however, the patient states that the bruising and and swelling have significantly improved. Therefore, I have low suspicion for the presence of emergent medical condition at this time and feel that the risk of additional imaging, laboratory testing, and/or admission outweighs any potential benefit at this time. However, the patient was given strict return precautions. Patient verbalizes understanding is agreeable with the plan for discharge and follow-up    The patient was instructed to follow up as an outpatient in 1-2 days with her PCP P as well as orthopedic specialist for reevaluation. The patient was instructed to return to the ED immediately for any new or worsening symptoms. The patient verbalized understanding and is agreeable with the plan for discharge and follow-up.     FINAL IMPRESSION    Right knee pain-with osteoarthritic changes  Fall on same level, initial encounter-fall 2 weeks ago    PLAN  Discharge with outpatient follow-up and discharge instructions (see EMR)    (Please note that this note was completed with a voice recognition program.  Every attempt was made to edit the dictations, but inevitably there remain words that are mis-transcribed.)               ROSINA Hoang - MAXINE  07/22/22 0245

## 2022-07-22 NOTE — DISCHARGE INSTRUCTIONS
Return to the emergency department for new or worsening symptoms including, not limited to, developing inability to ambulate, increased pain, swelling, blue toes, loss of sensation, if your leg becomes cold or discolored, or other concerns. Medications as prescribed. Do not sleep in the ace wrap. Use the wrap for comfort and to compress/reduced  edema/swelling. Follow your PCP for reevaluation next 1-2 days. Also, follow-up with the orthopedic specialist for reevaluation of your right knee.

## 2022-07-25 ENCOUNTER — CARE COORDINATION (OUTPATIENT)
Dept: CARE COORDINATION | Age: 72
End: 2022-07-25

## 2022-07-25 DIAGNOSIS — K75.4 AUTOIMMUNE HEPATITIS (HCC): ICD-10-CM

## 2022-07-25 DIAGNOSIS — N18.30 TYPE 2 DIABETES MELLITUS WITH STAGE 3 CHRONIC KIDNEY DISEASE, WITHOUT LONG-TERM CURRENT USE OF INSULIN, UNSPECIFIED WHETHER STAGE 3A OR 3B CKD (HCC): ICD-10-CM

## 2022-07-25 DIAGNOSIS — E11.22 TYPE 2 DIABETES MELLITUS WITH STAGE 3 CHRONIC KIDNEY DISEASE, WITHOUT LONG-TERM CURRENT USE OF INSULIN, UNSPECIFIED WHETHER STAGE 3A OR 3B CKD (HCC): ICD-10-CM

## 2022-07-25 RX ORDER — PREDNISONE 10 MG/1
TABLET ORAL
Qty: 90 TABLET | Refills: 3 | Status: SHIPPED | OUTPATIENT
Start: 2022-07-25 | End: 2022-11-01 | Stop reason: ALTCHOICE

## 2022-07-25 NOTE — TELEPHONE ENCOUNTER
----- Message from Gisselle Villatoro sent at 7/25/2022 11:36 AM EDT -----  Subject: Refill Request    QUESTIONS  Name of Medication? predniSONE (DELTASONE) 10 MG tablet  Patient-reported dosage and instructions? TAKE 1 TABLET BY MOUTH EVERY DAY  How many days do you have left? 4  Preferred Pharmacy? [id number] Pharmacy phone number (if available)? [phone]  ---------------------------------------------------------------------------  --------------,  Name of Medication? Semaglutide 3 MG TABS  Patient-reported dosage and instructions? Take 3 mg by mouth daily  How many days do you have left? 0  Preferred Pharmacy? [id number] Pharmacy phone number (if available)? [phone]  ---------------------------------------------------------------------------  --------------  Omelia Counter INFO  What is the best way for the office to contact you? OK to leave message on   voicemail  Preferred Call Back Phone Number? [phone]  ---------------------------------------------------------------------------  --------------  SCRIPT ANSWERS  Relationship to Patient?  Self

## 2022-07-26 ENCOUNTER — CARE COORDINATION (OUTPATIENT)
Dept: CARE COORDINATION | Age: 72
End: 2022-07-26

## 2022-07-26 ENCOUNTER — OFFICE VISIT (OUTPATIENT)
Dept: FAMILY MEDICINE CLINIC | Age: 72
End: 2022-07-26
Payer: MEDICARE

## 2022-07-26 VITALS
DIASTOLIC BLOOD PRESSURE: 80 MMHG | WEIGHT: 198 LBS | BODY MASS INDEX: 33.99 KG/M2 | HEART RATE: 58 BPM | TEMPERATURE: 97.1 F | SYSTOLIC BLOOD PRESSURE: 122 MMHG | OXYGEN SATURATION: 99 %

## 2022-07-26 DIAGNOSIS — K75.4 AUTOIMMUNE HEPATITIS (HCC): ICD-10-CM

## 2022-07-26 DIAGNOSIS — I10 ESSENTIAL HYPERTENSION, BENIGN: Chronic | ICD-10-CM

## 2022-07-26 DIAGNOSIS — M25.561 ACUTE PAIN OF RIGHT KNEE: Primary | ICD-10-CM

## 2022-07-26 DIAGNOSIS — N95.9 POST MENOPAUSAL PROBLEMS: ICD-10-CM

## 2022-07-26 DIAGNOSIS — Z23 NEED FOR TETANUS BOOSTER: ICD-10-CM

## 2022-07-26 PROCEDURE — 90471 IMMUNIZATION ADMIN: CPT | Performed by: NURSE PRACTITIONER

## 2022-07-26 PROCEDURE — G8427 DOCREV CUR MEDS BY ELIG CLIN: HCPCS | Performed by: NURSE PRACTITIONER

## 2022-07-26 PROCEDURE — G8417 CALC BMI ABV UP PARAM F/U: HCPCS | Performed by: NURSE PRACTITIONER

## 2022-07-26 PROCEDURE — 99214 OFFICE O/P EST MOD 30 MIN: CPT | Performed by: NURSE PRACTITIONER

## 2022-07-26 PROCEDURE — 1036F TOBACCO NON-USER: CPT | Performed by: NURSE PRACTITIONER

## 2022-07-26 PROCEDURE — G8399 PT W/DXA RESULTS DOCUMENT: HCPCS | Performed by: NURSE PRACTITIONER

## 2022-07-26 PROCEDURE — 1090F PRES/ABSN URINE INCON ASSESS: CPT | Performed by: NURSE PRACTITIONER

## 2022-07-26 PROCEDURE — 90715 TDAP VACCINE 7 YRS/> IM: CPT | Performed by: NURSE PRACTITIONER

## 2022-07-26 PROCEDURE — 1123F ACP DISCUSS/DSCN MKR DOCD: CPT | Performed by: NURSE PRACTITIONER

## 2022-07-26 PROCEDURE — 3017F COLORECTAL CA SCREEN DOC REV: CPT | Performed by: NURSE PRACTITIONER

## 2022-07-26 NOTE — CARE COORDINATION
Ambulatory Care Coordination Note  7/26/2022    ACC: Molina Trujillo, RN    Summary Note: Spoke with pt. Went to ER on 7/21 after falling onto rt knee. Reports swelling and pain is better. Reports is walking ok today. Has an appt with her Dr today and ortho next Friday. Is concerned about her BP being elevated but feels it goes up because the cuff pinches the back of her arm. Reviewed diabetic, low fat, low sodium diets. Justin has been working a lot more so it was difficult for her to be consistent with her meals. Discussed good carb snacks that are 20 grams. Finds it difficult at times to cook for 1 since  passed 2 years ago. Plans to do better with her diet and exercise. Discussed the importance of diet and exercise to help with BP along with taking her medications. Justin is consistent with taking her medications and rarely forgets to take her night time meds. Has been working with dietician the past year. Reports fs was 82 last check and usually checks it daily and feels it's doing well. Reviewed recent a1c 6.8. PLAN  1) fu appts 7/26 and ortho 8/5)  2) fu labs  3) Review BP   4) review FS  5) review falls  Diabetes Assessment      Meal Planning: Avoidance of concentrated sweets   How often do you test your blood sugar?: Daily   Do you have barriers with adherence to non-pharmacologic self-management interventions?  (Nutrition/Exercise/Self-Monitoring): No   Have you ever had to go to the ED for symptoms of low blood sugar?: No       No patient-reported symptoms   Do you have hyperglycemia symptoms?: No   Do you have hypoglycemia symptoms?: No   Last Blood Sugar Value: 82   Blood Sugar Monitoring Regimen: Once a Day   Blood Sugar Trends: No Change              Ambulatory Care Coordination Assessment    Care Coordination Protocol  Referral from Primary Care Provider: No  Week 1 - Initial Assessment                             Suggested Interventions and Community Resources Prior to Admission medications    Medication Sig Start Date End Date Taking? Authorizing Provider   predniSONE (DELTASONE) 10 MG tablet TAKE 1 TABLET BY MOUTH EVERY DAY 7/25/22   ROSINA Carvajal CNP   Semaglutide 3 MG TABS Take 3 mg by mouth daily 7/25/22   ROSINA Carvajal CNP   ibuprofen (ADVIL;MOTRIN) 600 MG tablet Take 1 tablet by mouth 3 times daily as needed for Pain With meals 7/21/22 7/26/22  ROSINA Charlton CNP   acetaminophen (TYLENOL) 500 MG tablet Take 1 tablet by mouth 4 times daily as needed for Pain 7/21/22 7/28/22  ROSINA Charlton CNP   lidocaine (LIDODERM) 5 % Place 1 patch onto the skin in the morning for 10 days. As needed, 12 hours on, 12 hours off. . 7/21/22 7/31/22  ROSINA Charlton CNP   losartan (COZAAR) 100 MG tablet TAKE 2 TABLETS BY MOUTH EVERY DAY 7/14/22   Otilio Preston MD   atorvastatin (LIPITOR) 40 MG tablet Take 1 tablet by mouth daily 2/22/22   ROSINA Carvajal CNP   carvedilol (COREG) 6.25 MG tablet TAKE 1 TABLET BY MOUTH TWICE A DAY 2/21/22   Otilio Preston MD   albuterol sulfate HFA (PROVENTIL HFA) 108 (90 Base) MCG/ACT inhaler INHALE 2 PUFFS INTO THE LUNGS EVERY 6 HOURS AS NEEDED. 8/24/21   ROSINA Carvajal CNP   desloratadine (CLARINEX) 5 MG tablet TAKE 1 TABLET BY MOUTH EVERY DAY 5/24/21   Geovanni Barajas MD   budesonide-formoterol (SYMBICORT) 160-4.5 MCG/ACT AERO TAKE 2 PUFFS BY MOUTH TWICE A DAY 5/10/21   Geovanni Barajas MD   amLODIPine (NORVASC) 5 MG tablet Take 1 tablet by mouth 2 times daily 4/13/21   Geovanni Barajas MD   blood glucose monitor kit and supplies Dispense sufficient amount for indicated testing frequency plus additional to accommodate PRN testing needs. Dispense all needed supplies to include: monitor, strips, lancing device, lancets, control solutions, alcohol swabs.  3/17/21   Geovanni Barajas MD   metFORMIN (GLUMETZA) 1000 MG extended release tablet Take 2 tablets by mouth daily (with breakfast) 12/31/20   Rohan Hardy MD   montelukast (SINGULAIR) 10 MG tablet TAKE 1 TABLET BY MOUTH EVERY DAY EVERY NIGHT 12/17/20   Rohan Hardy MD   fluticasone (FLONASE) 50 MCG/ACT nasal spray SPRAY 1 SPRAY INTO EACH NOSTRIL EVERY DAY 10/19/20   Rohan Hardy MD   mometasone (NASONEX) 50 MCG/ACT nasal spray 2 sprays by Nasal route daily as needed (as needed for allergies) 9/6/16   Oliverio Lewis MD   calcium carbonate (OSCAL) 500 MG TABS tablet Take 500 mg by mouth daily. Historical Provider, MD   Multiple Vitamin (MULTIVITAMIN PO) Take  by mouth daily. 8/6/10   Historical Provider, MD       Future Appointments   Date Time Provider Kirby Loni   7/26/2022  1:30 PM ROSINA Conti - CNP [facility] [address] [place] - [facility]   8/5/2022  9:15 AM Flaquita Chambers MD [facility]   8/9/2022  9:00 AM Laura Tanner RD, LD [facility] [place] - [facility]   9/15/2022 12:15 PM Huong Lewis MD [facility]      and   Diabetes Assessment      Meal Planning: Avoidance of concentrated sweets   How often do you test your blood sugar?: Daily   Do you have barriers with adherence to non-pharmacologic self-management interventions?  (Nutrition/Exercise/Self-Monitoring): No   Have you ever had to go to the ED for symptoms of low blood sugar?: No       No patient-reported symptoms   Do you have hyperglycemia symptoms?: No   Do you have hypoglycemia symptoms?: No   Last Blood Sugar Value: 82   Blood Sugar Monitoring Regimen: Once a Day   Blood Sugar Trends: No Change

## 2022-07-26 NOTE — PROGRESS NOTES
Zohaib Yeung (:  1950) is a 73 y.o. female,Established patient, here for evaluation of the following chief complaint(s):  Follow-Up from Hospital      ASSESSMENT/PLAN:  1. Acute pain of right knee  Assessment & Plan:  Suspect occult fx. She will need and MRI. Has appt to follow up with ortho next week. Rec RICE. 2. Need for tetanus booster  -     Tdap, BOOSTRIX, (age 8 yrs+), IM  3. Essential hypertension, benign  Assessment & Plan:  Controlled- cozaar, coreg. No SE  4. Autoimmune hepatitis ([facility] [address]  Assessment & Plan:  Controlled on Prednisone daily. Follows [facility]  Lab Results   Component Value Date    ALT 24 2022    AST 18 2022    ALKPHOS 57 2022    BILITOT 0.7 2022       5. Post menopausal problems  -     DEXA BONE DENSITY 2 SITES; Future    No follow-ups on file. SUBJECTIVE/OBJECTIVE:  Follow up knee pain right x1 week following a mechanical fall with patellar impact. Current Outpatient Medications   Medication Sig Dispense Refill    predniSONE (DELTASONE) 10 MG tablet TAKE 1 TABLET BY MOUTH EVERY DAY 90 tablet 3    Semaglutide 3 MG TABS Take 3 mg by mouth daily 90 tablet 0    ibuprofen (ADVIL;MOTRIN) 600 MG tablet Take 1 tablet by mouth 3 times daily as needed for Pain With meals 15 tablet 0    acetaminophen (TYLENOL) 500 MG tablet Take 1 tablet by mouth 4 times daily as needed for Pain 28 tablet 0    lidocaine (LIDODERM) 5 % Place 1 patch onto the skin in the morning for 10 days. As needed, 12 hours on, 12 hours off. . 10 patch 0    losartan (COZAAR) 100 MG tablet TAKE 2 TABLETS BY MOUTH EVERY DAY 60 tablet 5    atorvastatin (LIPITOR) 40 MG tablet Take 1 tablet by mouth daily 90 tablet 3    carvedilol (COREG) 6.25 MG tablet TAKE 1 TABLET BY MOUTH TWICE A  tablet 1    albuterol sulfate HFA (PROVENTIL HFA) 108 (90 Base) MCG/ACT inhaler INHALE 2 PUFFS INTO THE LUNGS EVERY 6 HOURS AS NEEDED.  1 Inhaler 3    desloratadine (CLARINEX) 5 MG tablet TAKE 1 TABLET BY MOUTH EVERY DAY 90 tablet 0    budesonide-formoterol (SYMBICORT) 160-4.5 MCG/ACT AERO TAKE 2 PUFFS BY MOUTH TWICE A DAY 30.6 Inhaler 1    amLODIPine (NORVASC) 5 MG tablet Take 1 tablet by mouth 2 times daily 180 tablet 1    blood glucose monitor kit and supplies Dispense sufficient amount for indicated testing frequency plus additional to accommodate PRN testing needs. Dispense all needed supplies to include: monitor, strips, lancing device, lancets, control solutions, alcohol swabs. 1 kit 0    metFORMIN (GLUMETZA) 1000 MG extended release tablet Take 2 tablets by mouth daily (with breakfast) 60 tablet 3    montelukast (SINGULAIR) 10 MG tablet TAKE 1 TABLET BY MOUTH EVERY DAY EVERY NIGHT 90 tablet 3    fluticasone (FLONASE) 50 MCG/ACT nasal spray SPRAY 1 SPRAY INTO EACH NOSTRIL EVERY DAY 1 Bottle 2    mometasone (NASONEX) 50 MCG/ACT nasal spray 2 sprays by Nasal route daily as needed (as needed for allergies) 3 Inhaler 3    calcium carbonate (OSCAL) 500 MG TABS tablet Take 500 mg by mouth daily. Multiple Vitamin (MULTIVITAMIN PO) Take  by mouth daily. No current facility-administered medications for this visit. Review of Systems   Musculoskeletal:  Positive for arthralgias, gait problem and joint swelling. Ongoing rt knee swelling and pain following a mechanical fall 7/21 where she landed on her right patella. Imaging negative. Pain with any flexion and walking. Not improved with tylenol. Vitals:    07/26/22 1341   BP: 122/80   Site: Left Upper Arm   Position: Sitting   Cuff Size: Medium Adult   Pulse: 58   Temp: 97.1 °F (36.2 °C)   SpO2: 99%   Weight: 198 lb (89.8 kg)       Physical Exam  Musculoskeletal:      Right knee: Swelling and effusion present. Decreased range of motion. Tenderness present over the medial joint line and lateral joint line. Instability Tests: Anterior drawer test negative. Posterior drawer test negative. Anterior Lachman test negative.  Medial Maxine test negative and lateral Maxine test negative. An electronic signature was used to authenticate this note.     --Evy Li, ROSINA - CNP

## 2022-07-29 PROBLEM — N95.9 POST MENOPAUSAL PROBLEMS: Status: ACTIVE | Noted: 2022-07-29

## 2022-07-29 PROBLEM — M25.561 ACUTE PAIN OF RIGHT KNEE: Status: ACTIVE | Noted: 2022-07-29

## 2022-07-29 NOTE — ASSESSMENT & PLAN NOTE
Lab Results   Component Value Date     (H) 05/10/2022    K 3.8 05/10/2022     05/10/2022    CO2 24 05/10/2022    BUN 21 (H) 05/10/2022    CREATININE 1.2 05/10/2022    GLUCOSE 72 05/10/2022    CALCIUM 9.4 05/10/2022    PROT 6.5 06/14/2022    LABALBU 4.3 06/14/2022    BILITOT 0.7 06/14/2022    ALKPHOS 57 06/14/2022    AST 18 06/14/2022    ALT 24 06/14/2022    LABGLOM 44 (A) 05/10/2022    GFRAA 54 (A) 05/10/2022    AGRATIO 1.4 04/09/2021    GLOB 3.1 04/09/2021   GFR remains stable

## 2022-07-29 NOTE — ASSESSMENT & PLAN NOTE
Lab Results   Component Value Date    LABA1C 6.8 05/31/2022     Lab Results   Component Value Date    .7 07/01/2021   Controlled:  Metformin  Rybelsus

## 2022-07-29 NOTE — ASSESSMENT & PLAN NOTE
Controlled on Prednisone daily.   Follows [facility]  Lab Results   Component Value Date    ALT 24 06/14/2022    AST 18 06/14/2022    ALKPHOS 57 06/14/2022    BILITOT 0.7 06/14/2022

## 2022-08-01 ENCOUNTER — CARE COORDINATION (OUTPATIENT)
Dept: CARE COORDINATION | Age: 72
End: 2022-08-01

## 2022-08-01 NOTE — CARE COORDINATION
BY MOUTH EVERY DAY 7/25/22   ROSINA Johns CNP   Semaglutide 3 MG TABS Take 3 mg by mouth daily 7/25/22   ROSINA Johns CNP   ibuprofen (ADVIL;MOTRIN) 600 MG tablet Take 1 tablet by mouth 3 times daily as needed for Pain With meals 7/21/22 7/26/22  ROSINA Victor CNP   acetaminophen (TYLENOL) 500 MG tablet Take 1 tablet by mouth 4 times daily as needed for Pain 7/21/22 7/28/22  ROSINA Victor CNP   losartan (COZAAR) 100 MG tablet TAKE 2 TABLETS BY MOUTH EVERY DAY 7/14/22   Santosh Keith MD   atorvastatin (LIPITOR) 40 MG tablet Take 1 tablet by mouth daily 2/22/22   ROSINA Johns CNP   carvedilol (COREG) 6.25 MG tablet TAKE 1 TABLET BY MOUTH TWICE A DAY 2/21/22   Santosh Keith MD   albuterol sulfate HFA (PROVENTIL HFA) 108 (90 Base) MCG/ACT inhaler INHALE 2 PUFFS INTO THE LUNGS EVERY 6 HOURS AS NEEDED. 8/24/21   ROSINA Johns CNP   desloratadine (CLARINEX) 5 MG tablet TAKE 1 TABLET BY MOUTH EVERY DAY 5/24/21   Delores Greenberg MD   budesonide-formoterol (SYMBICORT) 160-4.5 MCG/ACT AERO TAKE 2 PUFFS BY MOUTH TWICE A DAY 5/10/21   Delores Greenberg MD   amLODIPine (NORVASC) 5 MG tablet Take 1 tablet by mouth 2 times daily 4/13/21   Delores Greenberg MD   blood glucose monitor kit and supplies Dispense sufficient amount for indicated testing frequency plus additional to accommodate PRN testing needs. Dispense all needed supplies to include: monitor, strips, lancing device, lancets, control solutions, alcohol swabs.  3/17/21   Delores Greenberg MD   metFORMIN (GLUMETZA) 1000 MG extended release tablet Take 2 tablets by mouth daily (with breakfast) 12/31/20   Delores Greenberg MD   montelukast (SINGULAIR) 10 MG tablet TAKE 1 TABLET BY MOUTH EVERY DAY EVERY NIGHT 12/17/20   Delores Greenberg MD   fluticasone (FLONASE) 50 MCG/ACT nasal spray SPRAY 1 SPRAY INTO EACH NOSTRIL EVERY DAY 10/19/20   Delores Greenberg MD mometasone (NASONEX) 50 MCG/ACT nasal spray 2 sprays by Nasal route daily as needed (as needed for allergies) 9/6/16   Benji Nettles MD   calcium carbonate (OSCAL) 500 MG TABS tablet Take 500 mg by mouth daily. Historical Provider, MD   Multiple Vitamin (MULTIVITAMIN PO) Take  by mouth daily.  8/6/10   Historical Provider, MD       Future Appointments   Date Time Provider Kirby Ivey   8/5/2022  9:15 AM Reynaldo Kyle MD [facility]   8/9/2022  9:00 AM Alea Walker RD, LD [facility] [place] [facility]   8/16/2022 10:15 AM [facility] [address] [place]   8/29/2022 10:00 AM ROSINA Johns - CNP [facility] [address] [place] - [facility]   9/15/2022 12:15 PM Santosh Keith MD [facility]

## 2022-08-05 ENCOUNTER — OFFICE VISIT (OUTPATIENT)
Dept: ORTHOPEDIC SURGERY | Age: 72
End: 2022-08-05
Payer: MEDICARE

## 2022-08-05 VITALS — HEIGHT: 64 IN | WEIGHT: 198 LBS | BODY MASS INDEX: 33.8 KG/M2

## 2022-08-05 DIAGNOSIS — S80.01XA CONTUSION OF RIGHT KNEE, INITIAL ENCOUNTER: Primary | ICD-10-CM

## 2022-08-05 PROCEDURE — 3017F COLORECTAL CA SCREEN DOC REV: CPT | Performed by: ORTHOPAEDIC SURGERY

## 2022-08-05 PROCEDURE — 1123F ACP DISCUSS/DSCN MKR DOCD: CPT | Performed by: ORTHOPAEDIC SURGERY

## 2022-08-05 PROCEDURE — G8399 PT W/DXA RESULTS DOCUMENT: HCPCS | Performed by: ORTHOPAEDIC SURGERY

## 2022-08-05 PROCEDURE — 1036F TOBACCO NON-USER: CPT | Performed by: ORTHOPAEDIC SURGERY

## 2022-08-05 PROCEDURE — 1090F PRES/ABSN URINE INCON ASSESS: CPT | Performed by: ORTHOPAEDIC SURGERY

## 2022-08-05 PROCEDURE — G8417 CALC BMI ABV UP PARAM F/U: HCPCS | Performed by: ORTHOPAEDIC SURGERY

## 2022-08-05 PROCEDURE — G8427 DOCREV CUR MEDS BY ELIG CLIN: HCPCS | Performed by: ORTHOPAEDIC SURGERY

## 2022-08-05 PROCEDURE — 99203 OFFICE O/P NEW LOW 30 MIN: CPT | Performed by: ORTHOPAEDIC SURGERY

## 2022-08-05 RX ORDER — NAPROXEN 500 MG/1
500 TABLET ORAL 2 TIMES DAILY WITH MEALS
Qty: 60 TABLET | Refills: 0 | Status: SHIPPED | OUTPATIENT
Start: 2022-08-05 | End: 2022-09-02

## 2022-08-09 ENCOUNTER — TELEMEDICINE (OUTPATIENT)
Dept: INTERNAL MEDICINE CLINIC | Age: 72
End: 2022-08-09

## 2022-08-09 DIAGNOSIS — E66.01 MORBIDLY OBESE (HCC): ICD-10-CM

## 2022-08-09 DIAGNOSIS — N18.30 TYPE 2 DIABETES MELLITUS WITH STAGE 3 CHRONIC KIDNEY DISEASE, WITHOUT LONG-TERM CURRENT USE OF INSULIN, UNSPECIFIED WHETHER STAGE 3A OR 3B CKD (HCC): Primary | ICD-10-CM

## 2022-08-09 DIAGNOSIS — E11.22 TYPE 2 DIABETES MELLITUS WITH STAGE 3 CHRONIC KIDNEY DISEASE, WITHOUT LONG-TERM CURRENT USE OF INSULIN, UNSPECIFIED WHETHER STAGE 3A OR 3B CKD (HCC): Primary | ICD-10-CM

## 2022-08-09 NOTE — PROGRESS NOTES
Medical Nutrition Therapy for Diabetes Follow Up    Stacy Cervantes  May 2, 2022    Patient Care Team:  ROSINA Conti CNP as PCP - General (Nurse Practitioner)  ROSINA Conti CNP as PCP - [facility] Empaneled Provider  Ivelisse Verma MD (Obstetrics & Gynecology)  Laura Tanner RD, LD as Dietitian (Dietitian Registered)    Reason for visit: f/u - DM and weight concerns  Patient self-assessment of Progress: \"I fell and hurt my knee so I haven't been working much. Endoscopy done - no problems identified\"      ASSESSMENT/PLAN:   NUTRITION DIAGNOSIS    #1 Problem: Altered Nutrition-Related Laboratory Values (NC-2.2)  Related to: Endocrine/Diabetes   As Evidenced by: Elevated Plasma glucose and/or HgbA1c levels           #2 Problem: Overweight/Obesity (NC-3.3)  Related to: Excessive energy intake or physical inactivity  As Evidenced by: BMI more than normative standard for age and sex (BMI=33.13)        NUTRITION INTERVENTION  Nutrition Prescription: Aim for 30 g Carb per meal; 15 - 20 g Carb per snack      Diabetes Education/Counseling included:  Meal Planning      NUTRITION MONITORING AND EVALUATION  5 =always  4 = most of the time   3 = half the days  2 = sometimes  1 = hasn't started  Indicator/Goal Progress Rating   #1 Walk 5 days per week  #1 2/5   #2  Eliminate regular soda and juice #2 5/5   #3  Plan ahead for social gathering #3 4/5     New Goal: Stick with lemon water and avoid regular soda.        Patient Active Problem List   Diagnosis    Asthma    Autoimmune hepatitis ([facility] [address]    Essential hypertension, benign    Pure hypercholesterolemia    Murmur, cardiac    Stage 3 chronic kidney disease ([address]    Type 2 diabetes mellitus with stage 3 chronic kidney disease, without long-term current use of insulin (HCC)    Morbidly obese (HCC)    Mass of right axilla    Acute pain of right knee    Post menopausal problems       Current Outpatient Medications   Medication Sig Dispense Refill    naproxen (NAPROSYN) 500 MG tablet Take 1 tablet by mouth in the morning and 1 tablet in the evening. Take with meals. 60 tablet 0    predniSONE (DELTASONE) 10 MG tablet TAKE 1 TABLET BY MOUTH EVERY DAY 90 tablet 3    Semaglutide 3 MG TABS Take 3 mg by mouth daily 90 tablet 0    ibuprofen (ADVIL;MOTRIN) 600 MG tablet Take 1 tablet by mouth 3 times daily as needed for Pain With meals 15 tablet 0    acetaminophen (TYLENOL) 500 MG tablet Take 1 tablet by mouth 4 times daily as needed for Pain 28 tablet 0    losartan (COZAAR) 100 MG tablet TAKE 2 TABLETS BY MOUTH EVERY DAY 60 tablet 5    atorvastatin (LIPITOR) 40 MG tablet Take 1 tablet by mouth daily 90 tablet 3    carvedilol (COREG) 6.25 MG tablet TAKE 1 TABLET BY MOUTH TWICE A  tablet 1    albuterol sulfate HFA (PROVENTIL HFA) 108 (90 Base) MCG/ACT inhaler INHALE 2 PUFFS INTO THE LUNGS EVERY 6 HOURS AS NEEDED. 1 Inhaler 3    desloratadine (CLARINEX) 5 MG tablet TAKE 1 TABLET BY MOUTH EVERY DAY 90 tablet 0    budesonide-formoterol (SYMBICORT) 160-4.5 MCG/ACT AERO TAKE 2 PUFFS BY MOUTH TWICE A DAY 30.6 Inhaler 1    amLODIPine (NORVASC) 5 MG tablet Take 1 tablet by mouth 2 times daily 180 tablet 1    blood glucose monitor kit and supplies Dispense sufficient amount for indicated testing frequency plus additional to accommodate PRN testing needs. Dispense all needed supplies to include: monitor, strips, lancing device, lancets, control solutions, alcohol swabs. 1 kit 0    metFORMIN (GLUMETZA) 1000 MG extended release tablet Take 2 tablets by mouth daily (with breakfast) 60 tablet 3    montelukast (SINGULAIR) 10 MG tablet TAKE 1 TABLET BY MOUTH EVERY DAY EVERY NIGHT 90 tablet 3    fluticasone (FLONASE) 50 MCG/ACT nasal spray SPRAY 1 SPRAY INTO EACH NOSTRIL EVERY DAY 1 Bottle 2    mometasone (NASONEX) 50 MCG/ACT nasal spray 2 sprays by Nasal route daily as needed (as needed for allergies) 3 Inhaler 3    calcium carbonate (OSCAL) 500 MG TABS tablet Take 500 mg by mouth daily. Multiple Vitamin (MULTIVITAMIN PO) Take  by mouth daily. No current facility-administered medications for this visit. NUTRITION ASSESSMENT    Biochemical Data:    Lab Results   Component Value Date    LABA1C 6.8 05/31/2022     Lab Results   Component Value Date    .7 07/01/2021       Lab Results   Component Value Date    CHOL 173 06/14/2022    CHOL 177 04/09/2021    CHOL 140 12/30/2020     Lab Results   Component Value Date    TRIG 133 06/14/2022    TRIG 110 04/09/2021    TRIG 87 12/30/2020     Lab Results   Component Value Date    HDL 76 (H) 06/14/2022    HDL 68 (H) 04/09/2021    HDL 67 (H) 12/30/2020     Lab Results   Component Value Date    LDLCALC 70 06/14/2022    LDLCALC 87 04/09/2021    LDLCALC 56 12/30/2020     Lab Results   Component Value Date    LABVLDL 27 06/14/2022    LABVLDL 22 04/09/2021    LABVLDL 17 12/30/2020     Lab Results   Component Value Date    CHOLHDLRATIO 3.0 05/08/2015       Lab Results   Component Value Date    WBC 10.5 06/14/2022    HGB 13.2 06/14/2022    HCT 38.9 06/14/2022    MCV 91.5 06/14/2022     06/14/2022       Lab Results   Component Value Date    CREATININE 1.2 05/10/2022    BUN 21 (H) 05/10/2022     (H) 05/10/2022    K 3.8 05/10/2022     05/10/2022    CO2 24 05/10/2022       Anthropometric Measurements: Wt Change since last visit:   Wt Readings from Last 3 Encounters:   08/05/22 198 lb (89.8 kg)   07/26/22 198 lb (89.8 kg)   07/21/22 202 lb 9.6 oz (91.9 kg)   Previous visits: achieved 21 lb loss over past 4 years (222 lb in 2019)with exercise and eating changes    Weight up since fall    Food and Nutrition Changes:   Previous visit: had been Eating at regular intervals, 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   Then got sick, eating soup  drinking juice  During illness, had been drinking juice and regular soda  B:yogurt, granola, hard cooked egg, toast  L:leftovers  D:salmon, frozen veggies, rice    March visit:returned to diet soft drinks, decided to give up sweets and regular soda for Ramy Son  B: hard cooked egg, toast  OR Yogurt with granola, orange  L: soup or salad with egg/cheese and dressing  OR leftovers  D: salmon, rice, broccoli    April Visit:Significantly reduced juice and returned to diet soda  Planning meals in advance to follow plate guide    May Update: stopped urge to get fast food - ate at home instead  Working to avoid sabotaging self  Successfully navigated family  dinner - ordered bowl but didn't eat it all  Yogurt most days     Update: diet drinks almost all the time, orange juice once for leg cramps  Working many events, had to eat fast food a few times, but trying to cook in advance  Yogurt for breakfast every other day    July update: sugar free soda or gatorade or water,  planned meals (salmon, fish, tuna salad, veggies) 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, hungry when returning home later at night - sandwich or yogurt or chips/soda    August update: since  happened, has been drinking regular 7-Up, now returning to diet soda  B: yogurt with nuts  S: varies  D: salmon, half baked potato, broccoli    Physical Activity Changes:   April Update: trying to \"get back on schedule\" with exercise  Tries to exercise daily first thing in the morning--walks on treadmill and doing stretching  May Update: 3 days per week due to busy schedule   update: working as usher daily, walking to and from parking lot  July update: hurt knee and hand in  - mostly bruised  August update: swollen knee - not working    Diabetes Medications:   Recent change in medication type/dosage: No    Monitoring:   Changes to testing regimen?  No  FBG - 99 - 102    Barriers:   -none noted        Follow Up Plan: one month    Referring Provider: ROSINA Mays CNP  Time spent with patient: 30 minutes

## 2022-08-10 ENCOUNTER — CARE COORDINATION (OUTPATIENT)
Dept: CARE COORDINATION | Age: 72
End: 2022-08-10

## 2022-08-10 NOTE — CARE COORDINATION
Ambulatory Care Coordination Note  8/10/2022    ACC: Michelle Carr, RN    Summary Note: F/u with pt about her ortho appt. Reports started Naproxen but is only taking it as needed and will take it with food. No other changes in medicines. Does wrap her rt knee if she's on her feet a lot which provides some support. Does rest, ice and elevate it when she is able to. Was on her ft a lot yesterday and knee was swollen. Reports xrays were good and no fractures which she was thankful for this. No recent falls. Reports fs are usually between  in the mornings. Reviewed diabetic, low fat, low sodium diets and is working with dietician. Reports her wt is up a few pounds since she wasn't able to walk daily due to fall on her knee. PLAN  1) fu appts  2) review fs  3) review knee/pain  4) review falls    Diabetes Assessment      Meal Planning: Avoidance of concentrated sweets, Plate Method   How often do you test your blood sugar?: Daily   Do you have barriers with adherence to non-pharmacologic self-management interventions? (Nutrition/Exercise/Self-Monitoring): No   Have you ever had to go to the ED for symptoms of low blood sugar?: No       No patient-reported symptoms   Do you have hyperglycemia symptoms?: No   Do you have hypoglycemia symptoms?: No   Last Blood Sugar Value: 99   Blood Sugar Trends: No Change              Lab Results       None            Care Coordination Interventions    Referral from Primary Care Provider: No  Suggested Interventions and Community Resources  Diabetes Education: Completed  Fall Risk Prevention: Completed  Registered Dietician: Completed (Comment: 7/26/22-reports has been working with RD)  [employer] Management Tools: Completed (Comment: reviewed)          Goals Addressed                   This Visit's Progress     Conditions and Symptoms   Improving     I will notify my provider of any symptoms that indicate a worsening of my condition.     Barriers: lack of support and lack of education  Plan for overcoming my barriers: will work with [facility]  Confidence: 9/10  Anticipated Goal Completion Date: 1/25/23                Prior to Admission medications    Medication Sig Start Date End Date Taking? Authorizing Provider   naproxen (NAPROSYN) 500 MG tablet Take 1 tablet by mouth in the morning and 1 tablet in the evening. Take with meals. 8/5/22 9/4/22  Norbert Zhao MD   predniSONE (DELTASONE) 10 MG tablet TAKE 1 TABLET BY MOUTH EVERY DAY 7/25/22   ROSINA Winter CNP   Semaglutide 3 MG TABS Take 3 mg by mouth daily 7/25/22   ROSINA Winter CNP   ibuprofen (ADVIL;MOTRIN) 600 MG tablet Take 1 tablet by mouth 3 times daily as needed for Pain With meals 7/21/22 7/26/22  ROSINA Akhtar CNP   acetaminophen (TYLENOL) 500 MG tablet Take 1 tablet by mouth 4 times daily as needed for Pain 7/21/22 7/28/22  ROSINA Akhtar CNP   losartan (COZAAR) 100 MG tablet TAKE 2 TABLETS BY MOUTH EVERY DAY 7/14/22   Deyvi Elmore MD   atorvastatin (LIPITOR) 40 MG tablet Take 1 tablet by mouth daily 2/22/22   ROSINA Winter CNP   carvedilol (COREG) 6.25 MG tablet TAKE 1 TABLET BY MOUTH TWICE A DAY 2/21/22   Deyvi Elmore MD   albuterol sulfate HFA (PROVENTIL HFA) 108 (90 Base) MCG/ACT inhaler INHALE 2 PUFFS INTO THE LUNGS EVERY 6 HOURS AS NEEDED. 8/24/21   ROSINA Winter CNP   desloratadine (CLARINEX) 5 MG tablet TAKE 1 TABLET BY MOUTH EVERY DAY 5/24/21   Gerardo Swartz MD   budesonide-formoterol (SYMBICORT) 160-4.5 MCG/ACT AERO TAKE 2 PUFFS BY MOUTH TWICE A DAY 5/10/21   Gerardo Swartz MD   amLODIPine (NORVASC) 5 MG tablet Take 1 tablet by mouth 2 times daily 4/13/21   Gerardo Swartz MD   blood glucose monitor kit and supplies Dispense sufficient amount for indicated testing frequency plus additional to accommodate PRN testing needs.  Dispense all needed supplies to include: monitor, strips, lancing device, lancets, control solutions,

## 2022-08-16 ENCOUNTER — HOSPITAL ENCOUNTER (OUTPATIENT)
Dept: WOMENS IMAGING | Age: 72
Discharge: HOME | End: 2022-08-16
Payer: MEDICARE

## 2022-08-16 DIAGNOSIS — N95.9 POST MENOPAUSAL PROBLEMS: ICD-10-CM

## 2022-08-16 PROCEDURE — 77080 DXA BONE DENSITY AXIAL: CPT

## 2022-08-21 PROBLEM — S80.01XA CONTUSION OF RIGHT KNEE: Status: ACTIVE | Noted: 2022-08-21

## 2022-08-21 NOTE — PROGRESS NOTES
CHIEF COMPLAINT: Right knee pain/ knee contusion. DATE OF INJURY:  7/9/2022    Ms. Torres 73 y.o.  [place]  female  presents today for the first visit for evaluation of a right knee pain which occurred when she fell. She is complaining of right knee pain 2/10. This is better with rest and worse with bearing any wt. The pain is achy burning and not radiating. No other complaint. She was seen 1st at [facility], where she was x-rayed and asked to f/u with orthopaedics. Past Medical History:   Diagnosis Date    Allergic rhinitis     Asthma     COPD (chronic obstructive pulmonary disease) ([facility] [address]     pt denies    Hepatitis     autoimmune    Hyperlipidemia     Hypertension     Influenza A 1/10/2015    Osteoporosis     Type 2 diabetes mellitus without complication, without long-term current use of insulin ([facility] [address] 12/16/2019       Past Surgical History:   Procedure Laterality Date    BREAST BIOPSY  10/2005    Dr. Jama Salinas - right , benign     CATARACT REMOVAL Bilateral     COLONOSCOPY      Dr. Melly Crowley  - [facility] 10 yrs     COLONOSCOPY  07/08/13    Dr. Melly Crowley- internal hemorrhoids, adenomatous and hyperplastic polyps. Repeat in 3 yrs    COLONOSCOPY  07/17/2016    Dr. Melly Crowley, No evidence of any colorectal neoplasia ~5 years    COLONOSCOPY  6/21/2021    COLONOSCOPY POLYPECTOMY SNARE/COLD BIOPSY performed by Veronica Garcia MD at [address]  8/2006     autoimmune hepatitis     UPPER GASTROINTESTINAL ENDOSCOPY N/A 7/11/2022    ESOPHAGOGASTRODUODENOSCOPY performed by Veronica Garcia MD at [address] History     Socioeconomic History    Marital status:       Spouse name: Susannah Mina    Number of children: 0    Years of education: 14    Highest education level: Not on file   Occupational History    Occupation: /   Tobacco Use    Smoking status: Never    Smokeless tobacco: Never   Vaping Use    Vaping Use: Never used   Substance and Sexual Activity testing needs. Dispense all needed supplies to include: monitor, strips, lancing device, lancets, control solutions, alcohol swabs. 1 kit 0    metFORMIN (GLUMETZA) 1000 MG extended release tablet Take 2 tablets by mouth daily (with breakfast) 60 tablet 3    montelukast (SINGULAIR) 10 MG tablet TAKE 1 TABLET BY MOUTH EVERY DAY EVERY NIGHT 90 tablet 3    fluticasone (FLONASE) 50 MCG/ACT nasal spray SPRAY 1 SPRAY INTO EACH NOSTRIL EVERY DAY 1 Bottle 2    mometasone (NASONEX) 50 MCG/ACT nasal spray 2 sprays by Nasal route daily as needed (as needed for allergies) 3 Inhaler 3    calcium carbonate (OSCAL) 500 MG TABS tablet Take 500 mg by mouth daily. Multiple Vitamin (MULTIVITAMIN PO) Take  by mouth daily. No current facility-administered medications on file prior to visit. Pertinent items are noted in HPI  Review of systems reviewed from Patient History Form and available in the patient's chart under the Media tab. No change noted. PHYSICAL EXAMINATION:  Ms. Ngozi Rodriguez is a very pleasant 73 y.o.  female who presents today in no acute distress, awake, alert, and oriented. She is well dressed, nourished and  groomed. Patient with normal affect. Height is  5' 4\" (1.626 m), weight is 198 lb (89.8 kg), Body mass index is 33.99 kg/m². Resting respiratory rate is 16. Examination of the gait, showed that the patient walks with no limp. Examination of both lower extrimiteis showing a good range of motion of the right knee compare to the other side. There is no swelling or ecchymosis that can be seen, over the right knee. She has intact sensation and good pedal pulses. She has no tenderness on deep palpation over the right knee. IMAGING: Claus Crews were reviewed, dated 7/21/2022,  2 views of the right knee, and showed no fracture. IMPRESSION: Right knee pain/ knee contusion. PLAN:  I assured the patient that the xray is negative for acute fracture.   She can go back to normal activity with no restrictions. ROM and Quad exercises. Naprosyn. She will be seen in 4 weeks, PT if indicated.        Jaya Benites MD

## 2022-08-26 ENCOUNTER — CARE COORDINATION (OUTPATIENT)
Dept: CARE COORDINATION | Age: 72
End: 2022-08-26

## 2022-08-26 NOTE — CARE COORDINATION
Follow up outreach to introduce ACM s/p interim ACM handoff & review w/pt opportunity to participate in [facility] (for self mgmt CKD support). Left vm requesting return callback.     Provided & repeated direct callback to reach ACM    Encouraged return callback at pt's earliest convenience

## 2022-08-29 ENCOUNTER — TELEMEDICINE (OUTPATIENT)
Dept: FAMILY MEDICINE CLINIC | Age: 72
End: 2022-08-29
Payer: MEDICARE

## 2022-08-29 DIAGNOSIS — Z00.00 MEDICARE ANNUAL WELLNESS VISIT, SUBSEQUENT: Primary | ICD-10-CM

## 2022-08-29 DIAGNOSIS — Z00.00 ENCOUNTER FOR SUBSEQUENT ANNUAL WELLNESS VISIT (AWV) IN MEDICARE PATIENT: ICD-10-CM

## 2022-08-29 PROCEDURE — G0439 PPPS, SUBSEQ VISIT: HCPCS | Performed by: NURSE PRACTITIONER

## 2022-08-29 PROCEDURE — 3017F COLORECTAL CA SCREEN DOC REV: CPT | Performed by: NURSE PRACTITIONER

## 2022-08-29 PROCEDURE — 1123F ACP DISCUSS/DSCN MKR DOCD: CPT | Performed by: NURSE PRACTITIONER

## 2022-08-29 SDOH — HEALTH STABILITY: PHYSICAL HEALTH: ON AVERAGE, HOW MANY MINUTES DO YOU ENGAGE IN EXERCISE AT THIS LEVEL?: 30 MIN

## 2022-08-29 SDOH — HEALTH STABILITY: PHYSICAL HEALTH: ON AVERAGE, HOW MANY DAYS PER WEEK DO YOU ENGAGE IN MODERATE TO STRENUOUS EXERCISE (LIKE A BRISK WALK)?: 3 DAYS

## 2022-08-29 ASSESSMENT — PATIENT HEALTH QUESTIONNAIRE - PHQ9
1. LITTLE INTEREST OR PLEASURE IN DOING THINGS: 0
2. FEELING DOWN, DEPRESSED OR HOPELESS: 0
SUM OF ALL RESPONSES TO PHQ QUESTIONS 1-9: 0
SUM OF ALL RESPONSES TO PHQ9 QUESTIONS 1 & 2: 0
SUM OF ALL RESPONSES TO PHQ QUESTIONS 1-9: 0

## 2022-08-29 ASSESSMENT — LIFESTYLE VARIABLES
HOW MANY STANDARD DRINKS CONTAINING ALCOHOL DO YOU HAVE ON A TYPICAL DAY: 1 OR 2
HOW OFTEN DO YOU HAVE A DRINK CONTAINING ALCOHOL: 2
HOW MANY STANDARD DRINKS CONTAINING ALCOHOL DO YOU HAVE ON A TYPICAL DAY: 1
HOW OFTEN DO YOU HAVE SIX OR MORE DRINKS ON ONE OCCASION: 1
HOW OFTEN DO YOU HAVE A DRINK CONTAINING ALCOHOL: MONTHLY OR LESS

## 2022-08-29 NOTE — PROGRESS NOTES
Medicare Annual Wellness Visit    Simran Aguila is here for Medicare AWV    Assessment & Plan   Encounter for subsequent annual wellness visit (AWV) in Medicare patient    Recommendations for Preventive Services Due: see orders and patient instructions/AVS.  Recommended screening schedule for the next 5-10 years is provided to the patient in written form: see Patient Instructions/AVS.     No follow-ups on file. Subjective     Reports right knee pain improving on a daily basis. Was seen orthopedics and cleared. Patient's complete Health Risk Assessment and screening values have been reviewed and are found in Flowsheets. The following problems were reviewed today and where indicated follow up appointments were made and/or referrals ordered. Positive Risk Factor Screenings with Interventions:    Fall Risk:  Do you feel unsteady or are you worried about falling? : no  2 or more falls in past year?: no  Fall with injury in past year?: (!) yes   Fall Risk Interventions:    Patient declines any further evaluation/treatment for this issue             Health Habits/Nutrition:  Physical Activity: Insufficiently Active    Days of Exercise per Week: 3 days    Minutes of Exercise per Session: 30 min     Have you lost any weight without trying in the past 3 months?: No     Have you seen the dentist within the past year?: Yes  Health Habits/Nutrition Interventions:  No hearing issues             Objective      Patient-Reported Vitals  No data recorded          Allergies   Allergen Reactions    Iodine Shortness Of Breath    Other      Steroid (injection) taken for hepatitis elevated liver enzymes, pt. Uncertain of name    Shellfish-Derived Products Rash     Shortness of breath     Prior to Visit Medications    Medication Sig Taking?  Authorizing Provider   carvedilol (COREG) 6.25 MG tablet TAKE 1 TABLET BY MOUTH TWICE A DAY Yes Santosh Keith MD   naproxen (NAPROSYN) 500 MG tablet Take 1 tablet by mouth in the morning and 1 tablet in the evening. Take with meals. Yes More Stover MD   predniSONE (DELTASONE) 10 MG tablet TAKE 1 TABLET BY MOUTH EVERY DAY Yes ROSINA Zendejas CNP   Semaglutide 3 MG TABS Take 3 mg by mouth daily Yes ROSINA Hernandez CNP   acetaminophen (TYLENOL) 500 MG tablet Take 1 tablet by mouth 4 times daily as needed for Pain Yes Lynvone E Louellen Goldberg, APRN - CNP   losartan (COZAAR) 100 MG tablet TAKE 2 TABLETS BY MOUTH EVERY DAY Yes Penny Rice MD   atorvastatin (LIPITOR) 40 MG tablet Take 1 tablet by mouth daily Yes ROSINA Zendejas CNP   albuterol sulfate HFA (PROVENTIL HFA) 108 (90 Base) MCG/ACT inhaler INHALE 2 PUFFS INTO THE LUNGS EVERY 6 HOURS AS NEEDED. Yes ROSINA Hernandez CNP   desloratadine (CLARINEX) 5 MG tablet TAKE 1 TABLET BY MOUTH EVERY DAY Yes Vickie Golden MD   budesonide-formoterol (SYMBICORT) 160-4.5 MCG/ACT AERO TAKE 2 PUFFS BY MOUTH TWICE A DAY Yes Vickie Golden MD   amLODIPine (NORVASC) 5 MG tablet Take 1 tablet by mouth 2 times daily Yes Vickie Golden MD   blood glucose monitor kit and supplies Dispense sufficient amount for indicated testing frequency plus additional to accommodate PRN testing needs. Dispense all needed supplies to include: monitor, strips, lancing device, lancets, control solutions, alcohol swabs. Yes Vickie Golden MD   metFORMIN (GLUMETZA) 1000 MG extended release tablet Take 2 tablets by mouth daily (with breakfast) Yes Juana Lopez MD   montelukast (SINGULAIR) 10 MG tablet TAKE 1 TABLET BY MOUTH EVERY DAY EVERY NIGHT Yes Vickie Golden MD   fluticasone (FLONASE) 50 MCG/ACT nasal spray SPRAY 1 SPRAY INTO EACH NOSTRIL EVERY DAY Yes Vickie Golden MD   mometasone (NASONEX) 50 MCG/ACT nasal spray 2 sprays by Nasal route daily as needed (as needed for allergies) Yes Vinay Calvo MD   calcium carbonate (OSCAL) 500 MG TABS tablet Take 500 mg by mouth daily.    Yes Historical Provider, MD   Multiple Vitamin (MULTIVITAMIN PO) Take  by mouth daily. Yes Historical Provider, MD   ibuprofen (ADVIL;MOTRIN) 600 MG tablet Take 1 tablet by mouth 3 times daily as needed for Pain With meals  ROSINA Alvarez CNP       CareTeam (Including outside providers/suppliers regularly involved in providing care):   Patient Care Team:  ROSINA William CNP as PCP - General (Nurse Practitioner)  ROSINA William CNP as PCP - [facility] Empaneled Provider  Saul Grace MD (Obstetrics & Gynecology)  Maria Diaz RD, LD as Dietitian (Dietitian Registered)  Clara Guardado RN as Ambulatory Care Manager     Reviewed and updated this visit:  Allergies  Meds           Rhea Mckeon, was evaluated through a synchronous (real-time) audio-video encounter. The patient (or guardian if applicable) is aware that this is a billable service, which includes applicable co-pays. This Virtual Visit was conducted with patient's (and/or legal guardian's) consent. The visit was conducted pursuant to the emergency declaration under the [facility], [address] [place] [address] waiver authority and the [employer] and [employer] General Act. Patient identification was verified, and a caregiver was present when appropriate. The patient was located at Home: [address] [place] [address]. Provider was located at [facility] ([address] [place] [address]): [phone] [address]  [address] [place] [address]. Advance Care Planning   Advanced Care Planning: Discussed the patients choices for care and treatment in case of a health event that adversely affects decision-making abilities. Also discussed the patients long-term treatment options. Reviewed with the patient the appropriate state-specific advance directive documents.  Reviewed the process of designating a competent adult as an Agent (or -in-fact) that could take make health care decisions for the patient if incompetent. Patient was asked to complete the declaration forms, either acknowledge the forms by a public notary or an eligible witness and provide a signed copy to the practice office.   Time spent (minutes): 5

## 2022-08-29 NOTE — PATIENT INSTRUCTIONS
Personalized Preventive Plan for Simran Aguila - 8/29/2022  Medicare offers a range of preventive health benefits. Some of the tests and screenings are paid in full while other may be subject to a deductible, co-insurance, and/or copay. Some of these benefits include a comprehensive review of your medical history including lifestyle, illnesses that may run in your family, and various assessments and screenings as appropriate. After reviewing your medical record and screening and assessments performed today your provider may have ordered immunizations, labs, imaging, and/or referrals for you. A list of these orders (if applicable) as well as your Preventive Care list are included within your After Visit Summary for your review. Other Preventive Recommendations:    A preventive eye exam performed by an eye specialist is recommended every 1-2 years to screen for glaucoma; cataracts, macular degeneration, and other eye disorders. A preventive dental visit is recommended every 6 months. Try to get at least 150 minutes of exercise per week or 10,000 steps per day on a pedometer . Order or download the FREE \"Exercise & Physical Activity: Your Everyday Guide\" from [employer]. Call 1-355.737.8368 or search [employer] online. You need 7188-1252 mg of calcium and 2917-5893 IU of vitamin D per day. It is possible to meet your calcium requirement with diet alone, but a vitamin D supplement is usually necessary to meet this goal.  When exposed to the sun, use a sunscreen that protects against both UVA and UVB radiation with an SPF of 30 or greater. Reapply every 2 to 3 hours or after sweating, drying off with a towel, or swimming. Always wear a seat belt when traveling in a car. Always wear a helmet when riding a bicycle or motorcycle.

## 2022-08-29 NOTE — CARE COORDINATION
Ambulatory Care Coordination Note  8/29/2022    ACC: Claudia Lyon RN  General Assessment    Do you have any symptoms that are causing concern?: No        Summary Note:   Diabetes Assessment      Meal Planning: Avoidance of concentrated sweets, Plate Method   How often do you test your blood sugar?: Daily   Do you have barriers with adherence to non-pharmacologic self-management interventions? (Nutrition/Exercise/Self-Monitoring): No   Have you ever had to go to the ED for symptoms of low blood sugar?: No       No patient-reported symptoms         Reviewed w/pt opportunity to engage in [place] program, through [facility]. Pt verbalizes interest and willingness for outreach from care team member for [facility]. Pt did inquire whether participation in [place] would negate her involvement w/RD, with whom she is actively following. ACM advised pt should be able to continue following w/RD while participating in [employer]. Reviewed upcoming appts. Pt reports she is scheduled w/Dr Galindo 9.12.22. ACM did advise pt to call to confirm this appt, as it is not viewable to this writer to verify at this time. Pt verbalized understanding of above and agreement with the following  Plan: conclude ACC episode for [facility] [address] [facility] for [facility] - referral of candidate identified for [employer]. Pt will keep [facility] contact information for future reference and understands open invitation to outreach for [facility] support in future, as needed.     Care Coordination Interventions    Referral from Primary Care Provider: No  Suggested Interventions and Community Resources  Diabetes Education: Completed  Fall Risk Prevention: Completed  Registered Dietician: Completed (Comment: 7/26/22-reports has been working with RD)  Specialty Services Referral: Completed (Comment: Dr Lisa Coleman (nephrology) 9.15.22)  Zone Management Tools: Completed (Comment: DM)  Other Services or Interventions: reviewed [facility] Program through [facility]          Goals Addressed                   This Visit's Progress     Conditions and Symptoms   On track     I will notify my provider of any symptoms that indicate a worsening of my condition. Barriers: lack of support and lack of education  Plan for overcoming my barriers: will work with AC  Confidence: 9/10  Anticipated Goal Completion Date: 1/25/23                Prior to Admission medications    Medication Sig Start Date End Date Taking? Authorizing Provider   carvedilol (COREG) 6.25 MG tablet TAKE 1 TABLET BY MOUTH TWICE A DAY 8/22/22   Anjali Hawk MD   naproxen (NAPROSYN) 500 MG tablet Take 1 tablet by mouth in the morning and 1 tablet in the evening. Take with meals.  8/5/22 9/4/22  Gage Longoria MD   predniSONE (DELTASONE) 10 MG tablet TAKE 1 TABLET BY MOUTH EVERY DAY 7/25/22   ROSINA Cunningham CNP   Semaglutide 3 MG TABS Take 3 mg by mouth daily 7/25/22   ROSINA Cunningham CNP   ibuprofen (ADVIL;MOTRIN) 600 MG tablet Take 1 tablet by mouth 3 times daily as needed for Pain With meals 7/21/22 7/26/22  ROSINA Todd CNP   acetaminophen (TYLENOL) 500 MG tablet Take 1 tablet by mouth 4 times daily as needed for Pain 7/21/22 8/29/22  ROSINA Todd CNP   losartan (COZAAR) 100 MG tablet TAKE 2 TABLETS BY MOUTH EVERY DAY 7/14/22   Anjali Hawk MD   atorvastatin (LIPITOR) 40 MG tablet Take 1 tablet by mouth daily 2/22/22   RSOINA Cunningham CNP   albuterol sulfate HFA (PROVENTIL HFA) 108 (90 Base) MCG/ACT inhaler INHALE 2 PUFFS INTO THE LUNGS EVERY 6 HOURS AS NEEDED. 8/24/21   ROSINA Cunningham CNP   desloratadine (CLARINEX) 5 MG tablet TAKE 1 TABLET BY MOUTH EVERY DAY 5/24/21   Anita Joseph MD   budesonide-formoterol (SYMBICORT) 160-4.5 MCG/ACT AERO TAKE 2 PUFFS BY MOUTH TWICE A DAY 5/10/21   Anita Joseph MD   amLODIPine (NORVASC) 5 MG tablet Take 1 tablet by mouth 2 times daily 4/13/21   Anita Joseph MD   blood glucose monitor kit and supplies Dispense sufficient amount for indicated testing frequency plus additional to accommodate PRN testing needs. Dispense all needed supplies to include: monitor, strips, lancing device, lancets, control solutions, alcohol swabs. 3/17/21   Lyly Way MD   metFORMIN (GLUMETZA) 1000 MG extended release tablet Take 2 tablets by mouth daily (with breakfast) 12/31/20   Lyly Way MD   montelukast (SINGULAIR) 10 MG tablet TAKE 1 TABLET BY MOUTH EVERY DAY EVERY NIGHT 12/17/20   Chris Lopez MD   fluticasone (FLONASE) 50 MCG/ACT nasal spray SPRAY 1 SPRAY INTO EACH NOSTRIL EVERY DAY 10/19/20   Lyly Way MD   mometasone (NASONEX) 50 MCG/ACT nasal spray 2 sprays by Nasal route daily as needed (as needed for allergies) 9/6/16   Sharmila Jones MD   calcium carbonate (OSCAL) 500 MG TABS tablet Take 500 mg by mouth daily. Historical Provider, MD   Multiple Vitamin (MULTIVITAMIN PO) Take  by mouth daily.  8/6/10   Historical Provider, MD       Future Appointments   Date Time Provider Kirby Ivey   9/9/2022  9:00 AM Sachin Garcia RD, LD GIANNA ROMEO [place] - [facility]   9/15/2022 12:15 PM MD CHRISTINE Sawant

## 2022-09-02 RX ORDER — NAPROXEN 500 MG/1
500 TABLET ORAL 2 TIMES DAILY WITH MEALS
Qty: 60 TABLET | Refills: 0 | Status: SHIPPED | OUTPATIENT
Start: 2022-09-02 | End: 2022-11-01 | Stop reason: ALTCHOICE

## 2022-09-09 ENCOUNTER — TELEMEDICINE (OUTPATIENT)
Dept: INTERNAL MEDICINE CLINIC | Age: 72
End: 2022-09-09

## 2022-09-09 DIAGNOSIS — E66.01 MORBIDLY OBESE (HCC): ICD-10-CM

## 2022-09-09 DIAGNOSIS — N18.30 TYPE 2 DIABETES MELLITUS WITH STAGE 3 CHRONIC KIDNEY DISEASE, WITHOUT LONG-TERM CURRENT USE OF INSULIN, UNSPECIFIED WHETHER STAGE 3A OR 3B CKD (HCC): Primary | ICD-10-CM

## 2022-09-09 DIAGNOSIS — E11.22 TYPE 2 DIABETES MELLITUS WITH STAGE 3 CHRONIC KIDNEY DISEASE, WITHOUT LONG-TERM CURRENT USE OF INSULIN, UNSPECIFIED WHETHER STAGE 3A OR 3B CKD (HCC): Primary | ICD-10-CM

## 2022-09-09 NOTE — PROGRESS NOTES
tablet TAKE 1 TABLET BY MOUTH EVERY DAY 90 tablet 3    Semaglutide 3 MG TABS Take 3 mg by mouth daily 90 tablet 0    ibuprofen (ADVIL;MOTRIN) 600 MG tablet Take 1 tablet by mouth 3 times daily as needed for Pain With meals 15 tablet 0    acetaminophen (TYLENOL) 500 MG tablet Take 1 tablet by mouth 4 times daily as needed for Pain 28 tablet 0    losartan (COZAAR) 100 MG tablet TAKE 2 TABLETS BY MOUTH EVERY DAY 60 tablet 5    atorvastatin (LIPITOR) 40 MG tablet Take 1 tablet by mouth daily 90 tablet 3    albuterol sulfate HFA (PROVENTIL HFA) 108 (90 Base) MCG/ACT inhaler INHALE 2 PUFFS INTO THE LUNGS EVERY 6 HOURS AS NEEDED. 1 Inhaler 3    desloratadine (CLARINEX) 5 MG tablet TAKE 1 TABLET BY MOUTH EVERY DAY 90 tablet 0    budesonide-formoterol (SYMBICORT) 160-4.5 MCG/ACT AERO TAKE 2 PUFFS BY MOUTH TWICE A DAY 30.6 Inhaler 1    amLODIPine (NORVASC) 5 MG tablet Take 1 tablet by mouth 2 times daily 180 tablet 1    blood glucose monitor kit and supplies Dispense sufficient amount for indicated testing frequency plus additional to accommodate PRN testing needs. Dispense all needed supplies to include: monitor, strips, lancing device, lancets, control solutions, alcohol swabs. 1 kit 0    metFORMIN (GLUMETZA) 1000 MG extended release tablet Take 2 tablets by mouth daily (with breakfast) 60 tablet 3    montelukast (SINGULAIR) 10 MG tablet TAKE 1 TABLET BY MOUTH EVERY DAY EVERY NIGHT 90 tablet 3    fluticasone (FLONASE) 50 MCG/ACT nasal spray SPRAY 1 SPRAY INTO EACH NOSTRIL EVERY DAY 1 Bottle 2    mometasone (NASONEX) 50 MCG/ACT nasal spray 2 sprays by Nasal route daily as needed (as needed for allergies) 3 Inhaler 3    calcium carbonate (OSCAL) 500 MG TABS tablet Take 500 mg by mouth daily. Multiple Vitamin (MULTIVITAMIN PO) Take  by mouth daily. No current facility-administered medications for this visit.          NUTRITION ASSESSMENT    Biochemical Data:    Lab Results   Component Value Date    LABA1C 6.8 05/31/2022     Lab Results   Component Value Date    .7 07/01/2021       Lab Results   Component Value Date    CHOL 173 06/14/2022    CHOL 177 04/09/2021    CHOL 140 12/30/2020     Lab Results   Component Value Date    TRIG 133 06/14/2022    TRIG 110 04/09/2021    TRIG 87 12/30/2020     Lab Results   Component Value Date    HDL 76 (H) 06/14/2022    HDL 68 (H) 04/09/2021    HDL 67 (H) 12/30/2020     Lab Results   Component Value Date    LDLCALC 70 06/14/2022    LDLCALC 87 04/09/2021    LDLCALC 56 12/30/2020     Lab Results   Component Value Date    LABVLDL 27 06/14/2022    LABVLDL 22 04/09/2021    LABVLDL 17 12/30/2020     Lab Results   Component Value Date    CHOLHDLRATIO 3.0 05/08/2015       Lab Results   Component Value Date    WBC 10.5 06/14/2022    HGB 13.2 06/14/2022    HCT 38.9 06/14/2022    MCV 91.5 06/14/2022     06/14/2022       Lab Results   Component Value Date    CREATININE 1.2 05/10/2022    BUN 21 (H) 05/10/2022     (H) 05/10/2022    K 3.8 05/10/2022     05/10/2022    CO2 24 05/10/2022       Anthropometric Measurements: Wt Change since last visit:   Wt Readings from Last 3 Encounters:   08/05/22 198 lb (89.8 kg)   07/26/22 198 lb (89.8 kg)   07/21/22 202 lb 9.6 oz (91.9 kg)   Previous visits: achieved 21 lb loss over past 4 years (222 lb in 2019) with exercise and eating changes    Update: 193 lb on home scale    Food and Nutrition Changes:   Previous visit: had been Eating at regular intervals, 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   Then got sick, eating soup  drinking juice  During illness, had been drinking juice and regular soda  B:yogurt, granola, hard cooked egg, toast  L:leftovers  D:salmon, frozen veggies, rice    March visit:returned to diet soft drinks, decided to give up sweets and regular soda for Amanda Ping  B: hard cooked egg, toast  OR Yogurt with granola, orange  L: soup or salad with egg/cheese and dressing  OR leftovers  D: salmon, rice, broccoli    April Visit:Significantly reduced juice and returned to diet soda  Planning meals in advance to follow plate guide    May Update: stopped urge to get fast food - ate at home instead  Working to avoid sabotaging self  Successfully navigated family  dinner - ordered bowl but didn't eat it all  Yogurt most days     Update: diet drinks almost all the time, orange juice once for leg cramps  Working many events, had to eat fast food a few times, but trying to cook in advance  Yogurt for breakfast every other day    July update: sugar free soda or gatorade or water,  planned meals (salmon, fish, tuna salad, veggies) 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, hungry when returning home later at night - sandwich or yogurt or chips/soda    August update: since  happened, has been drinking regular 7-Up, now returning to diet soda  B: yogurt with nuts  S: varies  D: salmon, half baked potato, broccoli    September: back to diet soda, will occasionally have regular 7-up  B: yogurt or eggs or cold cereal or oatmeal(rarely)   L: only if breakfast is early - soup with or tuna salad with lettuce and crackers  D: Restaurant: 3-bean chili (half portion) and few bites grilled cheese    Physical Activity Changes:   April Update: trying to \"get back on schedule\" with exercise  Tries to exercise daily first thing in the morning--walks on treadmill and doing stretching  May Update: 3 days per week due to busy schedule   update: working as usher daily, walking to and from parking lot  July update: hurt knee and hand in  - mostly bruised  August update: swollen knee - not working  September: knee better, walked a few times at mall, hurts unless a day of rest in between; tomorrow will be first day back at work at [employer]    Diabetes Medications:   Recent change in medication type/dosage: No    Monitoring:   Changes to testing regimen?  No  FBG - 91 - 100    Barriers:   -none noted        Follow Up Plan: one month    Referring Provider: ROSINA Lee CNP  Time spent with patient: 30 minutes

## 2022-09-13 DIAGNOSIS — I10 ESSENTIAL HYPERTENSION, BENIGN: ICD-10-CM

## 2022-09-13 DIAGNOSIS — N18.30 TYPE 2 DIABETES MELLITUS WITH STAGE 3 CHRONIC KIDNEY DISEASE, WITHOUT LONG-TERM CURRENT USE OF INSULIN, UNSPECIFIED WHETHER STAGE 3A OR 3B CKD (HCC): ICD-10-CM

## 2022-09-13 DIAGNOSIS — E11.22 TYPE 2 DIABETES MELLITUS WITH STAGE 3 CHRONIC KIDNEY DISEASE, WITHOUT LONG-TERM CURRENT USE OF INSULIN, UNSPECIFIED WHETHER STAGE 3A OR 3B CKD (HCC): ICD-10-CM

## 2022-09-13 LAB
ALBUMIN SERPL-MCNC: 4.1 G/DL (ref 3.4–5)
ANION GAP SERPL CALCULATED.3IONS-SCNC: 17 MMOL/L (ref 3–16)
BUN BLDV-MCNC: 23 MG/DL (ref 7–20)
CALCIUM SERPL-MCNC: 9.6 MG/DL (ref 8.3–10.6)
CHLORIDE BLD-SCNC: 106 MMOL/L (ref 99–110)
CO2: 22 MMOL/L (ref 21–32)
CREAT SERPL-MCNC: 1.7 MG/DL (ref 0.6–1.2)
CREATININE URINE: 95.7 MG/DL (ref 28–259)
GFR AFRICAN AMERICAN: 36
GFR NON-AFRICAN AMERICAN: 30
GLUCOSE BLD-MCNC: 82 MG/DL (ref 70–99)
MICROALBUMIN UR-MCNC: <1.2 MG/DL
MICROALBUMIN/CREAT UR-RTO: NORMAL MG/G (ref 0–30)
PHOSPHORUS: 3.8 MG/DL (ref 2.5–4.9)
POTASSIUM SERPL-SCNC: 3.8 MMOL/L (ref 3.5–5.1)
SODIUM BLD-SCNC: 145 MMOL/L (ref 136–145)

## 2022-09-14 ENCOUNTER — TELEMEDICINE (OUTPATIENT)
Dept: PRIMARY CARE CLINIC | Age: 72
End: 2022-09-14
Payer: MEDICARE

## 2022-09-14 DIAGNOSIS — J06.9 VIRAL URI WITH COUGH: Primary | ICD-10-CM

## 2022-09-14 PROCEDURE — 1090F PRES/ABSN URINE INCON ASSESS: CPT | Performed by: NURSE PRACTITIONER

## 2022-09-14 PROCEDURE — 1123F ACP DISCUSS/DSCN MKR DOCD: CPT | Performed by: NURSE PRACTITIONER

## 2022-09-14 PROCEDURE — G8428 CUR MEDS NOT DOCUMENT: HCPCS | Performed by: NURSE PRACTITIONER

## 2022-09-14 PROCEDURE — 3017F COLORECTAL CA SCREEN DOC REV: CPT | Performed by: NURSE PRACTITIONER

## 2022-09-14 PROCEDURE — G8399 PT W/DXA RESULTS DOCUMENT: HCPCS | Performed by: NURSE PRACTITIONER

## 2022-09-14 PROCEDURE — 99213 OFFICE O/P EST LOW 20 MIN: CPT | Performed by: NURSE PRACTITIONER

## 2022-09-14 RX ORDER — BENZONATATE 200 MG/1
200 CAPSULE ORAL 3 TIMES DAILY PRN
Qty: 30 CAPSULE | Refills: 0 | Status: SHIPPED | OUTPATIENT
Start: 2022-09-14 | End: 2022-09-24

## 2022-09-14 RX ORDER — GUAIFENESIN 600 MG/1
600 TABLET, EXTENDED RELEASE ORAL 2 TIMES DAILY PRN
Qty: 20 TABLET | Refills: 0 | Status: SHIPPED | OUTPATIENT
Start: 2022-09-14 | End: 2022-09-24

## 2022-09-14 ASSESSMENT — ENCOUNTER SYMPTOMS
SHORTNESS OF BREATH: 0
WHEEZING: 0
DIARRHEA: 0
SINUS PAIN: 1
SORE THROAT: 0
COUGH: 1
NAUSEA: 0
SINUS PRESSURE: 1
RHINORRHEA: 1

## 2022-09-14 NOTE — PROGRESS NOTES
Arlen Villatoro (:  1950) is a Established patient, here for evaluation of the following:    URI (Symptoms x 2 days, negative home covid test)       Assessment & Plan:  Below is the assessment and plan developed based on review of pertinent history, physical exam, labs, studies, and medications. 1. Viral URI with cough  -     benzonatate (TESSALON) 200 MG capsule; Take 1 capsule by mouth 3 times daily as needed for Cough, Disp-30 capsule, R-0Normal  -     guaiFENesin (MUCINEX) 600 MG extended release tablet; Take 1 tablet by mouth 2 times daily as needed for Congestion, Disp-20 tablet, R-0Normal  Continue with respiratory medications as prescribed. Use inhaler for any shortness of breath or wheezing. Resume flonase. Steam, coughing and deep breathing for congestion. Warm tea with lemon for throat irritation. The patient was educated on reasons to seek urgent medical care including chest pain, shortness of breath or difficulty breathing at rest, severe pain, fever >102 not controlled by medication. Patient verbalized understanding  Advised patient on how to contact the virtualist using [place], isolation guidelines, and symptom management. Patient verbalized understanding. Return if symptoms worsen or fail to improve, for viral uri with cough. Subjective: negative home covid test  URI   This is a new problem. The current episode started in the past 7 days. The problem has been gradually worsening. Associated symptoms include congestion, coughing, headaches, a plugged ear sensation, rhinorrhea and sinus pain. Pertinent negatives include no diarrhea, nausea, sore throat or wheezing. Treatments tried: clarinex, benzonatate, allergy med. Review of Systems   Constitutional:  Positive for chills and fatigue. Negative for fever. HENT:  Positive for congestion, postnasal drip, rhinorrhea, sinus pressure and sinus pain. Negative for sore throat. Respiratory:  Positive for cough.  Negative for shortness of breath and wheezing. Gastrointestinal:  Negative for diarrhea and nausea. Musculoskeletal:  Negative for myalgias. Neurological:  Positive for headaches.      Objective:  Patient-Reported Vitals  No data recorded     Patient-Reported Vitals 9/14/2022   Patient-Reported Weight 193   Patient-Reported Height 5' 4\"   Patient-Reported Temperature 97.5        Physical Exam:  [INSTRUCTIONS:  \"[x]\" Indicates a positive item  \"[]\" Indicates a negative item  -- DELETE ALL ITEMS NOT EXAMINED]    Constitutional: [x] Appears well-developed and well-nourished [x] No apparent distress      [] Abnormal -     Mental status: [x] Alert and awake  [x] Oriented to person/place/time [x] Able to follow commands    [] Abnormal -     Eyes:   EOM    [x]  Normal    [] Abnormal -   Sclera  [x]  Normal    [] Abnormal -          Discharge [x]  None visible   [] Abnormal -     HENT: [x] Normocephalic, atraumatic  [] Abnormal -   [x] Mouth/Throat: Mucous membranes are moist    External Ears [x] Normal  [] Abnormal -    Neck: [x] No visualized mass [] Abnormal -     Pulmonary/Chest: [x] Respiratory effort normal   [x] No visualized signs of difficulty breathing or respiratory distress        [] Abnormal -      Musculoskeletal:   [] Normal gait with no signs of ataxia         [x] Normal range of motion of neck        [] Abnormal -     Neurological:        [x] No Facial Asymmetry (Cranial nerve 7 motor function) (limited exam due to video visit)          [x] No gaze palsy        [] Abnormal -          Skin:        [x] No significant exanthematous lesions or discoloration noted on facial skin         [] Abnormal -            Psychiatric:       [x] Normal Affect [] Abnormal -        [] No Hallucinations    Other pertinent observable physical exam findings:- minimal, dry cough noted during exam        On this date 9/14/2022 I have spent 23 minutes reviewing previous notes, test results and face to face (virtual) with the patient discussing the diagnosis and importance of compliance with the treatment plan as well as documenting on the day of the visit. Hilario Forbes, was evaluated through a synchronous (real-time) audio-video encounter. The patient (or guardian if applicable) is aware that this is a billable service, which includes applicable co-pays. This Virtual Visit was conducted with patient's (and/or legal guardian's) consent. The visit was conducted pursuant to the emergency declaration under the [address] [place] [address] authority and the [employer] General Act. Patient identification was verified, and a caregiver was present when appropriate. The patient was located at Home: [address] [place], [place] [id number].    Provider was located at Home ([facility]): [address] [place] [address]

## 2022-09-14 NOTE — LETTER
I had the pleasure of seeing Chitra Chi today for a primary care virtualist video visit secondary to viral uri with cough. I have provided the following recommendations: benzonatate and guiafenesin. I have included my note for your review and have asked the patient to follow up with you as needed. If you have questions, please reach out via [employer] secure messaging by searching for the [facility] Primary Care Virtualists. Your communication will be answered promptly by the Virtualist on service for the day. Additionally, we would love your overall feedback on this visit. Please hit shift and click the following link to let us know if the Virtualist service met your expectations. [email or web address]      Electronically signed by ROSINA Senior CNP on 9/14/22 at 2:52 PM EDT.

## 2022-10-04 ENCOUNTER — TELEMEDICINE (OUTPATIENT)
Dept: INTERNAL MEDICINE CLINIC | Age: 72
End: 2022-10-04

## 2022-10-04 DIAGNOSIS — E11.22 TYPE 2 DIABETES MELLITUS WITH STAGE 3 CHRONIC KIDNEY DISEASE, WITHOUT LONG-TERM CURRENT USE OF INSULIN, UNSPECIFIED WHETHER STAGE 3A OR 3B CKD (HCC): Primary | ICD-10-CM

## 2022-10-04 DIAGNOSIS — N18.30 TYPE 2 DIABETES MELLITUS WITH STAGE 3 CHRONIC KIDNEY DISEASE, WITHOUT LONG-TERM CURRENT USE OF INSULIN, UNSPECIFIED WHETHER STAGE 3A OR 3B CKD (HCC): Primary | ICD-10-CM

## 2022-10-04 NOTE — PROGRESS NOTES
Medical Nutrition Therapy for Diabetes Follow Up    Susan Alejandra  May 2, 2022    Patient Care Team:  ROSINA Badillo CNP as PCP - General (Nurse Practitioner)  ROSINA Badillo CNP as PCP - REHABILITATION [facility] Empaneled Provider  Cande Foster MD (Obstetrics & Gynecology)  Noemy Vera RD, LD as Dietitian (Dietitian Registered)    Reason for visit: f/u - DM and weight concerns  Patient self-assessment of Progress: \"Tested positive for COVID after celebrating 72th birthday party. I didn't feel sick, but I ate more than usual because my sister brought me restaurant food every day\"    ASSESSMENT/PLAN:   NUTRITION DIAGNOSIS    #1 Problem: Altered Nutrition-Related Laboratory Values (NC-2.2)  Related to: Endocrine/Diabetes   As Evidenced by: Elevated Plasma glucose and/or HgbA1c levels           #2 Problem: Overweight/Obesity (NC-3.3)  Related to: Excessive energy intake or physical inactivity  As Evidenced by: BMI more than normative standard for age and sex (BMI=33.13)        NUTRITION INTERVENTION  Nutrition Prescription: Aim for 30 g Carb per meal; 15 - 20 g Carb per snack      Diabetes Education/Counseling included:  Problem solving      NUTRITION MONITORING AND EVALUATION    New Goal: Return to walking - start with 5 minutes at a time on treadmill, then go to mall for walking       Patient Active Problem List   Diagnosis    Asthma    Autoimmune hepatitis ([address]    Essential hypertension, benign    Pure hypercholesterolemia    Murmur, cardiac    Stage 3 chronic kidney disease ([address]    Type 2 diabetes mellitus with stage 3 chronic kidney disease, without long-term current use of insulin ([address]    Morbidly obese ([address]    Mass of right axilla    Acute pain of right knee    Post menopausal problems    Contusion of right knee       Current Outpatient Medications   Medication Sig Dispense Refill    naproxen (NAPROSYN) 500 MG tablet TAKE 1 TABLET BY MOUTH IN THE MORNING AND 1 TABLET IN THE EVENING. TAKE WITH MEALS. 60 tablet 0    carvedilol (COREG) 6.25 MG tablet TAKE 1 TABLET BY MOUTH TWICE A  tablet 1    predniSONE (DELTASONE) 10 MG tablet TAKE 1 TABLET BY MOUTH EVERY DAY 90 tablet 3    Semaglutide 3 MG TABS Take 3 mg by mouth daily 90 tablet 0    ibuprofen (ADVIL;MOTRIN) 600 MG tablet Take 1 tablet by mouth 3 times daily as needed for Pain With meals 15 tablet 0    acetaminophen (TYLENOL) 500 MG tablet Take 1 tablet by mouth 4 times daily as needed for Pain 28 tablet 0    losartan (COZAAR) 100 MG tablet TAKE 2 TABLETS BY MOUTH EVERY DAY 60 tablet 5    atorvastatin (LIPITOR) 40 MG tablet Take 1 tablet by mouth daily 90 tablet 3    albuterol sulfate HFA (PROVENTIL HFA) 108 (90 Base) MCG/ACT inhaler INHALE 2 PUFFS INTO THE LUNGS EVERY 6 HOURS AS NEEDED. 1 Inhaler 3    desloratadine (CLARINEX) 5 MG tablet TAKE 1 TABLET BY MOUTH EVERY DAY 90 tablet 0    budesonide-formoterol (SYMBICORT) 160-4.5 MCG/ACT AERO TAKE 2 PUFFS BY MOUTH TWICE A DAY 30.6 Inhaler 1    amLODIPine (NORVASC) 5 MG tablet Take 1 tablet by mouth 2 times daily 180 tablet 1    blood glucose monitor kit and supplies Dispense sufficient amount for indicated testing frequency plus additional to accommodate PRN testing needs. Dispense all needed supplies to include: monitor, strips, lancing device, lancets, control solutions, alcohol swabs. 1 kit 0    metFORMIN (GLUMETZA) 1000 MG extended release tablet Take 2 tablets by mouth daily (with breakfast) 60 tablet 3    montelukast (SINGULAIR) 10 MG tablet TAKE 1 TABLET BY MOUTH EVERY DAY EVERY NIGHT 90 tablet 3    fluticasone (FLONASE) 50 MCG/ACT nasal spray SPRAY 1 SPRAY INTO EACH NOSTRIL EVERY DAY 1 Bottle 2    calcium carbonate (OSCAL) 500 MG TABS tablet Take 500 mg by mouth daily. Multiple Vitamin (MULTIVITAMIN PO) Take  by mouth daily. No current facility-administered medications for this visit.          NUTRITION ASSESSMENT    Biochemical Data:    Lab Results   Component Value Date    LABA1C 6.8 05/31/2022     Lab Results   Component Value Date    .7 07/01/2021       Lab Results   Component Value Date    CHOL 173 06/14/2022    CHOL 177 04/09/2021    CHOL 140 12/30/2020     Lab Results   Component Value Date    TRIG 133 06/14/2022    TRIG 110 04/09/2021    TRIG 87 12/30/2020     Lab Results   Component Value Date    HDL 76 (H) 06/14/2022    HDL 68 (H) 04/09/2021    HDL 67 (H) 12/30/2020     Lab Results   Component Value Date    LDLCALC 70 06/14/2022    LDLCALC 87 04/09/2021    LDLCALC 56 12/30/2020     Lab Results   Component Value Date    LABVLDL 27 06/14/2022    LABVLDL 22 04/09/2021    LABVLDL 17 12/30/2020     Lab Results   Component Value Date    CHOLHDLRATIO 3.0 05/08/2015       Lab Results   Component Value Date    WBC 10.5 06/14/2022    HGB 13.2 06/14/2022    HCT 38.9 06/14/2022    MCV 91.5 06/14/2022     06/14/2022       Lab Results   Component Value Date    CREATININE 1.7 (H) 09/13/2022    BUN 23 (H) 09/13/2022     09/13/2022    K 3.8 09/13/2022     09/13/2022    CO2 22 09/13/2022       Anthropometric Measurements: Wt Change since last visit:   Wt Readings from Last 3 Encounters:   08/05/22 198 lb (89.8 kg)   07/26/22 198 lb (89.8 kg)   07/21/22 202 lb 9.6 oz (91.9 kg)   Previous visits: achieved 21 lb loss over past 4 years (222 lb in 2019) with exercise and eating changes    Sept Update: 193 lb on home scale    Oct Update: 192 lb on home scale    Food and Nutrition Changes:   Previous visit: had been Eating at regular intervals, 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   Then got sick, eating soup  drinking juice  During illness, had been drinking juice and regular soda  B:yogurt, granola, hard cooked egg, toast  L:leftovers  D:salmon, frozen veggies, rice    March visit:returned to diet soft drinks, decided to give up sweets and regular soda for [employer]  B: hard cooked egg, toast  OR Yogurt with granola, orange  L: soup or salad with egg/cheese and dressing  OR leftovers  D: salmon, rice, broccoli    April Visit:Significantly reduced juice and returned to diet soda  Planning meals in advance to follow plate guide    May Update: stopped urge to get fast food - ate at home instead  Working to avoid sabotaging self  Successfully navigated family  dinner - ordered bowl but didn't eat it all  Yogurt most days     Update: diet drinks almost all the time, orange juice once for leg cramps  Working many events, had to eat fast food a few times, but trying to cook in advance  Yogurt for breakfast every other day    July update: sugar free soda or gatorade or water,  planned meals (salmon, fish, tuna salad, veggies) 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, hungry when returning home later at night - sandwich or yogurt or chips/soda    August update: since  happened, has been drinking regular 7-Up, now returning to diet soda  B: yogurt with nuts  S: varies  D: salmon, half baked potato, broccoli    September: back to diet soda, will occasionally have regular 7-up  B: yogurt or eggs or cold cereal or oatmeal(rarely)   L: only if breakfast is early - soup with or tuna salad with lettuce and crackers  D: Restaurant: 3-bean chili (half portion) and few bites grilled cheese    October update:   Back to regular soda a few times    Physical Activity Changes:   April Update: trying to \"get back on schedule\" with exercise  Tries to exercise daily first thing in the morning--walks on treadmill and doing stretching  May Update: 3 days per week due to busy schedule   update: working as Medprexher daily, walking to and from parking lot  July update: hurt knee and hand in fall - mostly bruised  August update: swollen knee - not working  September: knee better, walked a few times at mall, hurts unless a day of rest in between; tomorrow will be first day back at work at [employer]  October: knee better, but not walking due to [place].  Now doing home exercises with stretch bands.    Diabetes Medications:   Recent change in medication type/dosage: No    Monitoring:   Changes to testing regimen?  No  FBG - 91 - 100  Update: FBG yesterday 79 - 82    Barriers:   -none noted        Follow Up Plan: one month    Referring Provider: ROSINA Loving CNP  Time spent with patient: 30 minutes

## 2022-10-07 ENCOUNTER — CARE COORDINATION (OUTPATIENT)
Dept: CARE COORDINATION | Age: 72
End: 2022-10-07

## 2022-10-07 NOTE — CARE COORDINATION
Situation: outreach to review w/pt availability to resume Ambulatory Care Coordination [facility]) support as needed    Left  requesting return callback at pt's soonest convenience. Provided & repeated direct callback to reach this ACM. Plan: review w/pt until such time pt may be eligible to initiate w/support via [employer] program, ACM remains available to resume [facility] support as needed.

## 2022-10-12 ASSESSMENT — PATIENT HEALTH QUESTIONNAIRE - PHQ9
SUM OF ALL RESPONSES TO PHQ QUESTIONS 1-9: 0

## 2022-10-12 NOTE — CARE COORDINATION
Ambulatory Care Coordination Note  10/12/2022    ACC: Glendy Lopez, WILDER    outreach made in lieu of rollout for [employer] - currently suspended to initiate. For this reason, ACM did review w/pt. Pt notes she recently had Covid (9.22.22), now fully recovered. Denies any present concerns. Self notes efforts in continuing healthy initiatives towards pt centered goals. Pt invites resuming [address] outreach for support towards achieving pt centered goals. Pt states [address] outreach q 3-4 wks acceptable and agrees to outreach direct to ACM as needed, as well. Offered patient enrollment in the Remote Patient Monitoring (RPM) program for in-home monitoring: Patient is not eligible for RPM program.  Reviewed self mgmt concepts & ACM availability. Pt verbalized understanding of above and agreement with the following  Plan: Initiate [facility] support for health coaching, care collaboration, support w/community resources, and help with any newly presenting concerns as needed. Pt agrees to outreach direct to [facility], as needed, between routine follow up outreach initiated by [facility]   Ambulatory Care Coordination Assessment    Care Coordination Protocol  Referral from Primary Care Provider: No  Week 1 - Initial Assessment     Do you have all of your prescriptions and are they filled?: Yes (Comment: reviewed 10.12.22)  Barriers to medication adherence: None  Are you able to afford your medications?: Yes  How often do you have trouble taking your medications the way you have been told to take them?: I always take them as prescribed. Do you have Home O2 Therapy?: No      Ability to seek help/take action for Emergent Urgent situations i.e. fire, crime, inclement weather or health crisis. : Independent  Ability to ambulate to restroom: Independent  Ability handle personal hygeine needs (bathing/dressing/grooming): Independent  Ability to manage Medications:  Independent  Ability to prepare Food Preparation: Independent  Ability to maintain home (clean home, laundry): Independent  Ability to drive and/or has transportation: Independent  Ability to do shopping: Independent  Ability to manage finances: Independent  Is patient able to live independently?: Yes     Current Housing: Private Residence        Per the Fall Risk Screening, did the patient have 2 or more falls or 1 fall with injury in the past year?: Yes  How often do you think you are about to fall and you do NOT fall? For example, you grab something to stabilize yourself or hold onto a wall/furniture?: Never  Use of a Mobility Aid: Yes (Comment: walking cane)  Difficulty walking/impaired gait: No  Issues with feet or shoes like numbness, edema, shoes not fitting: No  Changes in vision, poor vision or poor lighting in environment: No  Dizziness: No  Other Fall Risk: No     Frequent urination at night?: No  Do you use rails/bars?: Yes  Do you have a non-slip tub mat?: Yes     Are you experiencing loss of meaning?: No  Are you experiencing loss of hope and peace?: No     Thinking about your patient's physical health needs, are there any symptoms or problems (risk indicators) you are unsure about that require further investigation?: No identified areas of uncertainly or problems already being investigated   Are the patients physical health problems impacting on their mental well-being?: No identified areas of concern   Are there any problems with your patients lifestyle behaviors (alcohol, drugs, diet, exercise) that are impacting on physical or mental well-being?: No identified areas of concern   Do you have any other concerns about your patients mental well-being?  How would you rate their severity and impact on the patient?: No identified areas of concern   How would you rate their home environment in terms of safety and stability (including domestic violence, insecure housing, neighbor harassment)?: Consistently safe, supportive, stable, no identified problems   How do daily activities impact on the patient's well-being? (include current or anticipated unemployment, work, caregiving, access to transportation or other): No identified problems or perceived positive benefits   How would you rate their social network (family, work, friends)?: Good participation with social networks   How would you rate their financial resources (including ability to afford all required medical care)?: Financially secure, resources adequate, no identified problems   How wells does the patient now understand their health and well-being (symptoms, signs or risk factors) and what they need to do to manage their health?: Reasonable to good understanding and already engages in managing health or is willing to undertake better management   How well do you think your patient can engage in healthcare discussions? (Barriers include language, deafness, aphasia, alcohol or drug problems, learning difficulties, concentration): Clear and open communication, no identified barriers   Do other services need to be involved to help this patient?: Other care/services not required at this time   Are current services involved with this patient well-coordinated? (Include coordination with other services you are now recommendation): All required care/services in place and well-coordinated   Suggested Interventions and Community Resources                  Prior to Admission medications    Medication Sig Start Date End Date Taking?  Authorizing Provider   carvedilol (COREG) 6.25 MG tablet TAKE 1 TABLET BY MOUTH TWICE A DAY 8/22/22  Yes Jefferson Hilton MD   predniSONE (DELTASONE) 10 MG tablet TAKE 1 TABLET BY MOUTH EVERY DAY 7/25/22  Yes ROSINA Brown CNP   Semaglutide 3 MG TABS Take 3 mg by mouth daily 7/25/22  Yes ROSINA Brown CNP   losartan (COZAAR) 100 MG tablet TAKE 2 TABLETS BY MOUTH EVERY DAY 7/14/22  Yes Jefferson Hilton MD   atorvastatin (LIPITOR) 40 MG tablet Take 1 tablet by mouth daily 2/22/22  Yes ROSINA Montoya CNP   albuterol sulfate HFA (PROVENTIL HFA) 108 (90 Base) MCG/ACT inhaler INHALE 2 PUFFS INTO THE LUNGS EVERY 6 HOURS AS NEEDED. 8/24/21  Yes ROSINA Zendejas CNP   desloratadine (CLARINEX) 5 MG tablet TAKE 1 TABLET BY MOUTH EVERY DAY 5/24/21  Yes Juan Velasquez MD   budesonide-formoterol (SYMBICORT) 160-4.5 MCG/ACT AERO TAKE 2 PUFFS BY MOUTH TWICE A DAY 5/10/21  Yes Juan Velasquez MD   amLODIPine (NORVASC) 5 MG tablet Take 1 tablet by mouth 2 times daily 4/13/21  Yes Juan Velasquez MD   blood glucose monitor kit and supplies Dispense sufficient amount for indicated testing frequency plus additional to accommodate PRN testing needs. Dispense all needed supplies to include: monitor, strips, lancing device, lancets, control solutions, alcohol swabs. 3/17/21  Yes Juan Velasquez MD   metFORMIN (GLUMETZA) 1000 MG extended release tablet Take 2 tablets by mouth daily (with breakfast) 12/31/20  Yes Juan Velasquez MD   montelukast (SINGULAIR) 10 MG tablet TAKE 1 TABLET BY MOUTH EVERY DAY EVERY NIGHT 12/17/20  Yes Juan Velasquez MD   fluticasone (FLONASE) 50 MCG/ACT nasal spray SPRAY 1 SPRAY INTO EACH NOSTRIL EVERY DAY 10/19/20  Yes Juan Velasquez MD   calcium carbonate (OSCAL) 500 MG TABS tablet Take 500 mg by mouth daily. Yes Historical Provider, MD   naproxen (NAPROSYN) 500 MG tablet TAKE 1 TABLET BY MOUTH IN THE MORNING AND 1 TABLET IN THE EVENING. TAKE WITH MEALS. 9/2/22 10/2/22  Brandi Green MD   ibuprofen (ADVIL;MOTRIN) 600 MG tablet Take 1 tablet by mouth 3 times daily as needed for Pain With meals 7/21/22 7/26/22  ROSINA Ware CNP   acetaminophen (TYLENOL) 500 MG tablet Take 1 tablet by mouth 4 times daily as needed for Pain 7/21/22 8/29/22  ROSINA Ware CNP   Multiple Vitamin (MULTIVITAMIN PO) Take  by mouth daily.  8/6/10   Historical Provider, MD       Future Appointments   Date Time Provider [place]   10/18/2022  8:40 AM [facility]   11/1/2022  9:00 AM Bro Dhillon RD, LD [facility] [place] [facility]   11/1/2022  3:15 PM Ángel Parish MD [facility]

## 2022-10-19 ENCOUNTER — HOSPITAL ENCOUNTER (OUTPATIENT)
Dept: WOMENS IMAGING | Age: 72
Discharge: HOME | End: 2022-10-19
Payer: MEDICARE

## 2022-10-19 DIAGNOSIS — Z12.31 VISIT FOR SCREENING MAMMOGRAM: ICD-10-CM

## 2022-10-19 PROCEDURE — 77063 BREAST TOMOSYNTHESIS BI: CPT

## 2022-11-01 ENCOUNTER — TELEMEDICINE (OUTPATIENT)
Dept: INTERNAL MEDICINE CLINIC | Age: 72
End: 2022-11-01

## 2022-11-01 DIAGNOSIS — N18.30 TYPE 2 DIABETES MELLITUS WITH STAGE 3 CHRONIC KIDNEY DISEASE, WITHOUT LONG-TERM CURRENT USE OF INSULIN, UNSPECIFIED WHETHER STAGE 3A OR 3B CKD (HCC): Primary | ICD-10-CM

## 2022-11-01 DIAGNOSIS — N18.30 TYPE 2 DIABETES MELLITUS WITH STAGE 3 CHRONIC KIDNEY DISEASE, WITHOUT LONG-TERM CURRENT USE OF INSULIN, UNSPECIFIED WHETHER STAGE 3A OR 3B CKD (HCC): ICD-10-CM

## 2022-11-01 DIAGNOSIS — E11.22 TYPE 2 DIABETES MELLITUS WITH STAGE 3 CHRONIC KIDNEY DISEASE, WITHOUT LONG-TERM CURRENT USE OF INSULIN, UNSPECIFIED WHETHER STAGE 3A OR 3B CKD (HCC): ICD-10-CM

## 2022-11-01 DIAGNOSIS — E11.22 TYPE 2 DIABETES MELLITUS WITH STAGE 3 CHRONIC KIDNEY DISEASE, WITHOUT LONG-TERM CURRENT USE OF INSULIN, UNSPECIFIED WHETHER STAGE 3A OR 3B CKD (HCC): Primary | ICD-10-CM

## 2022-11-01 NOTE — PROGRESS NOTES
Medical Nutrition Therapy for Diabetes Follow Up    Isabel Rubalcava  May 2, 2022    Patient Care Team:  ROSINA Sena CNP as PCP - General (Nurse Practitioner)  ROSINA Sena CNP as PCP - [facility]  Theodora Dubose MD (Obstetrics & Gynecology)  Steve Matamoros RD, LD as Dietitian (Dietitian Registered)  Jose Campbell RN as Ambulatory Care Manager    Reason for visit: f/u - DM and weight concerns  Patient self-assessment of Progress: \"Knee is better and I'm back to work, but lots of social events made it hard for me to eat light\"      ASSESSMENT/PLAN:   NUTRITION DIAGNOSIS    #1 Problem: Altered Nutrition-Related Laboratory Values (NC-2.2)  Related to: Endocrine/Diabetes   As Evidenced by: Elevated Plasma glucose and/or HgbA1c levels           #2 Problem: Overweight/Obesity (NC-3.3)  Related to: Excessive energy intake or physical inactivity  As Evidenced by: BMI more than normative standard for age and sex (BMI=33.13)        NUTRITION INTERVENTION  Nutrition Prescription: Aim for 30 g Carb per meal; 15 - 20 g Carb per snack      Diabetes Education/Counseling included:  Problem solving      NUTRITION MONITORING AND EVALUATION    New Goal: exercise daily, choose healthful meal options, including at Thanksgiving         Patient Active Problem List   Diagnosis    Asthma    Autoimmune hepatitis ([address]    Essential hypertension, benign    Pure hypercholesterolemia    Murmur, cardiac    Stage 3 chronic kidney disease ([address]    Type 2 diabetes mellitus with stage 3 chronic kidney disease, without long-term current use of insulin ([address]    Morbidly obese ([address]    Mass of right axilla    Acute pain of right knee    Post menopausal problems    Contusion of right knee       Current Outpatient Medications   Medication Sig Dispense Refill    naproxen (NAPROSYN) 500 MG tablet TAKE 1 TABLET BY MOUTH IN THE MORNING AND 1 TABLET IN THE EVENING. TAKE WITH MEALS.  60 tablet 0    carvedilol (COREG) 6.25 MG tablet TAKE 1 TABLET BY MOUTH TWICE A  tablet 1    predniSONE (DELTASONE) 10 MG tablet TAKE 1 TABLET BY MOUTH EVERY DAY 90 tablet 3    Semaglutide 3 MG TABS Take 3 mg by mouth daily 90 tablet 0    ibuprofen (ADVIL;MOTRIN) 600 MG tablet Take 1 tablet by mouth 3 times daily as needed for Pain With meals 15 tablet 0    acetaminophen (TYLENOL) 500 MG tablet Take 1 tablet by mouth 4 times daily as needed for Pain 28 tablet 0    losartan (COZAAR) 100 MG tablet TAKE 2 TABLETS BY MOUTH EVERY DAY 60 tablet 5    atorvastatin (LIPITOR) 40 MG tablet Take 1 tablet by mouth daily 90 tablet 3    albuterol sulfate HFA (PROVENTIL HFA) 108 (90 Base) MCG/ACT inhaler INHALE 2 PUFFS INTO THE LUNGS EVERY 6 HOURS AS NEEDED. 1 Inhaler 3    desloratadine (CLARINEX) 5 MG tablet TAKE 1 TABLET BY MOUTH EVERY DAY 90 tablet 0    budesonide-formoterol (SYMBICORT) 160-4.5 MCG/ACT AERO TAKE 2 PUFFS BY MOUTH TWICE A DAY 30.6 Inhaler 1    amLODIPine (NORVASC) 5 MG tablet Take 1 tablet by mouth 2 times daily 180 tablet 1    blood glucose monitor kit and supplies Dispense sufficient amount for indicated testing frequency plus additional to accommodate PRN testing needs. Dispense all needed supplies to include: monitor, strips, lancing device, lancets, control solutions, alcohol swabs. 1 kit 0    metFORMIN (GLUMETZA) 1000 MG extended release tablet Take 2 tablets by mouth daily (with breakfast) 60 tablet 3    montelukast (SINGULAIR) 10 MG tablet TAKE 1 TABLET BY MOUTH EVERY DAY EVERY NIGHT 90 tablet 3    fluticasone (FLONASE) 50 MCG/ACT nasal spray SPRAY 1 SPRAY INTO EACH NOSTRIL EVERY DAY 1 Bottle 2    calcium carbonate (OSCAL) 500 MG TABS tablet Take 500 mg by mouth daily. Multiple Vitamin (MULTIVITAMIN PO) Take  by mouth daily. No current facility-administered medications for this visit.          NUTRITION ASSESSMENT    Biochemical Data:    Lab Results   Component Value Date    LABA1C 6.8 05/31/2022     Lab Results Component Value Date    .7 07/01/2021       Lab Results   Component Value Date    CHOL 173 06/14/2022    CHOL 177 04/09/2021    CHOL 140 12/30/2020     Lab Results   Component Value Date    TRIG 133 06/14/2022    TRIG 110 04/09/2021    TRIG 87 12/30/2020     Lab Results   Component Value Date    HDL 76 (H) 06/14/2022    HDL 68 (H) 04/09/2021    HDL 67 (H) 12/30/2020     Lab Results   Component Value Date    LDLCALC 70 06/14/2022    LDLCALC 87 04/09/2021    LDLCALC 56 12/30/2020     Lab Results   Component Value Date    LABVLDL 27 06/14/2022    LABVLDL 22 04/09/2021    LABVLDL 17 12/30/2020     Lab Results   Component Value Date    CHOLHDLRATIO 3.0 05/08/2015       Lab Results   Component Value Date    WBC 10.5 06/14/2022    HGB 13.2 06/14/2022    HCT 38.9 06/14/2022    MCV 91.5 06/14/2022     06/14/2022       Lab Results   Component Value Date    CREATININE 1.7 (H) 09/13/2022    BUN 23 (H) 09/13/2022     09/13/2022    K 3.8 09/13/2022     09/13/2022    CO2 22 09/13/2022       Anthropometric Measurements: Wt Change since last visit:   Wt Readings from Last 3 Encounters:   08/05/22 198 lb (89.8 kg)   07/26/22 198 lb (89.8 kg)   07/21/22 202 lb 9.6 oz (91.9 kg)   Previous visits: achieved 21 lb loss over past 4 years (222 lb in 2019) with exercise and eating changes    Sept Update: 193 lb on home scale    Oct Update: 192 lb on home scale    November Update: 194 lb today, had been 192 lb recently    Food and Nutrition Changes:   Previous visit: had been Eating at regular intervals, 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   Then got sick, eating soup  drinking juice  During illness, had been drinking juice and regular soda  B:yogurt, granola, hard cooked egg, toast  L:leftovers  D:salmon, frozen veggies, rice    March visit:returned to diet soft drinks, decided to give up sweets and regular soda for Cherelle Remy  B: hard cooked egg, toast  OR Yogurt with granola, orange  L: soup or salad with egg/cheese and dressing  OR leftovers  D: salmon, rice, broccoli    April Visit:Significantly reduced juice and returned to diet soda  Planning meals in advance to follow plate guide    May Update: stopped urge to get fast food - ate at home instead  Working to avoid sabotaging self  Successfully navigated family  dinner - ordered bowl but didn't eat it all  Yogurt most days     Update: diet drinks almost all the time, orange juice once for leg cramps  Working many events, had to eat fast food a few times, but trying to cook in advance  Yogurt for breakfast every other day    July update: sugar free soda or gatorade or water,  planned meals (salmon, fish, tuna salad, veggies) 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, hungry when returning home later at night - sandwich or yogurt or chips/soda    August update: since  happened, has been drinking regular 7-Up, now returning to diet soda  B: yogurt with nuts  S: varies  D: salmon, half baked potato, broccoli    September: back to diet soda, will occasionally have regular 7-up  B: yogurt or eggs or cold cereal or oatmeal(rarely)   L: only if breakfast is early - soup with or tuna salad with lettuce and crackers  D: Restaurant: 3-bean chili (half portion) and few bites grilled cheese    October update:   Back to regular soda a few times    November update:diet soda most times, regular soda  (8 oz) a few times, juice to treat leg cramps  Dealt with social events by ordering \"lighter\" option (grilled chicken/vegetables or lettuce wrap) and eating less (small tastes of fried food at party)  B: egg and yogurt (yogurt every other day)  L: salad  D: salmon or chicken and broccoli    Physical Activity Changes:   April Update: trying to \"get back on schedule\" with exercise  Tries to exercise daily first thing in the morning--walks on treadmill and doing stretching  May Update: 3 days per week due to busy schedule   update: working as joe daily, walking to and from parking lot  July update: hurt knee and hand in fall - mostly bruised  August update: swollen knee - not working  September: knee better, walked a few times at mall, hurts unless a day of rest in between; tomorrow will be first day back at work at [employer]  October: knee better, but not walking due to [place]. Now doing home exercises with stretch bands. November update: knee almost back to normal, walking 10 minutes on the treadmill 2 days per week plus working 3 days/week    Diabetes Medications:   Recent change in medication type/dosage: No    Monitoring:   Changes to testing regimen?  No  FBG - 91 - 100  Update: FBG yesterday 79 - 82    Barriers:   -none noted        Follow Up Plan: one month    Referring Provider: ROSINA Hood CNP  Time spent with patient: 30 minutes

## 2022-11-02 ENCOUNTER — CARE COORDINATION (OUTPATIENT)
Dept: CARE COORDINATION | Age: 72
End: 2022-11-02

## 2022-11-02 NOTE — CARE COORDINATION
Routine follow up outreach for review pt status/progress towards goals. Left vm inquiring how pt is doing & requested return callback at pt's soonest convenience.     Provided & repeated direct callback to reach ACM

## 2022-11-04 DIAGNOSIS — I10 HYPERTENSION, UNSPECIFIED TYPE: ICD-10-CM

## 2022-11-04 DIAGNOSIS — N18.31 STAGE 3A CHRONIC KIDNEY DISEASE (HCC): ICD-10-CM

## 2022-11-04 DIAGNOSIS — E11.69 TYPE 2 DIABETES MELLITUS WITH OTHER SPECIFIED COMPLICATION, UNSPECIFIED WHETHER LONG TERM INSULIN USE (HCC): ICD-10-CM

## 2022-11-04 DIAGNOSIS — K75.4 AUTOIMMUNE HEPATITIS (HCC): ICD-10-CM

## 2022-11-04 LAB
ALBUMIN SERPL-MCNC: 4.3 G/DL (ref 3.4–5)
ANION GAP SERPL CALCULATED.3IONS-SCNC: 12 MMOL/L (ref 3–16)
BUN BLDV-MCNC: 22 MG/DL (ref 7–20)
CALCIUM SERPL-MCNC: 9.8 MG/DL (ref 8.3–10.6)
CHLORIDE BLD-SCNC: 103 MMOL/L (ref 99–110)
CO2: 27 MMOL/L (ref 21–32)
CREAT SERPL-MCNC: 1.5 MG/DL (ref 0.6–1.2)
CREATININE URINE: 135.7 MG/DL (ref 28–259)
GFR SERPL CREATININE-BSD FRML MDRD: 37 ML/MIN/{1.73_M2}
GLUCOSE BLD-MCNC: 92 MG/DL (ref 70–99)
MICROALBUMIN UR-MCNC: 1.6 MG/DL
MICROALBUMIN/CREAT UR-RTO: 11.8 MG/G (ref 0–30)
PHOSPHORUS: 3.8 MG/DL (ref 2.5–4.9)
POTASSIUM SERPL-SCNC: 3.9 MMOL/L (ref 3.5–5.1)
SODIUM BLD-SCNC: 142 MMOL/L (ref 136–145)

## 2022-11-08 ENCOUNTER — CARE COORDINATION (OUTPATIENT)
Dept: CARE COORDINATION | Age: 72
End: 2022-11-08

## 2022-11-28 ENCOUNTER — CARE COORDINATION (OUTPATIENT)
Dept: CARE COORDINATION | Age: 72
End: 2022-11-28

## 2022-11-28 NOTE — CARE COORDINATION
Routine follow up outreach for review pt status/progress towards goals. Left vm inquiring pt status & requested return callback at pt's earliest convenience  .     Provided & repeated direct callback to reach ACM

## 2022-11-29 ENCOUNTER — TELEMEDICINE (OUTPATIENT)
Dept: INTERNAL MEDICINE CLINIC | Age: 72
End: 2022-11-29

## 2022-11-29 DIAGNOSIS — N18.30 TYPE 2 DIABETES MELLITUS WITH STAGE 3 CHRONIC KIDNEY DISEASE, WITHOUT LONG-TERM CURRENT USE OF INSULIN, UNSPECIFIED WHETHER STAGE 3A OR 3B CKD (HCC): Primary | ICD-10-CM

## 2022-11-29 DIAGNOSIS — E11.22 TYPE 2 DIABETES MELLITUS WITH STAGE 3 CHRONIC KIDNEY DISEASE, WITHOUT LONG-TERM CURRENT USE OF INSULIN, UNSPECIFIED WHETHER STAGE 3A OR 3B CKD (HCC): Primary | ICD-10-CM

## 2022-11-29 NOTE — PROGRESS NOTES
Medical Nutrition Therapy for Diabetes Follow Up    Thalia Katz  May 2, 2022    Patient Care Team:  ROSINA Dejesus CNP as PCP - General (Nurse Practitioner)  ROSINA Dejesus CNP as PCP - [facility]  Jill Ballard MD (Obstetrics & Gynecology)  Caleb Peña RD, LD as Dietitian (Dietitian Registered)  Raghu Valderrama RN as Ambulatory Care Manager    Reason for visit: f/u - DM and weight concerns  Patient self-assessment of Progress: \"I've been working more, so my activity is increased, but I'm not doing the walking for exercise\"          ASSESSMENT/PLAN:   NUTRITION DIAGNOSIS    #1 Problem: Altered Nutrition-Related Laboratory Values (NC-2.2)  Related to: Endocrine/Diabetes   As Evidenced by: Elevated Plasma glucose and/or HgbA1c levels           #2 Problem: Overweight/Obesity (NC-3.3)  Related to: Excessive energy intake or physical inactivity  As Evidenced by: BMI more than normative standard for age and sex (BMI=33.13)        NUTRITION INTERVENTION  Nutrition Prescription: Aim for 30 g Carb per meal; 15 - 20 g Carb per snack      Diabetes Education/Counseling included:  Problem solving      NUTRITION MONITORING AND EVALUATION    New Goal: re-frame exercise goal to focus on frequency vs length of time. (I.e. treadmill daily for 5 minutes)         Patient Active Problem List   Diagnosis    Asthma    Autoimmune hepatitis ([address]    Essential hypertension, benign    Pure hypercholesterolemia    Murmur, cardiac    Stage 3 chronic kidney disease ([address]    Type 2 diabetes mellitus with stage 3 chronic kidney disease, without long-term current use of insulin ([address]    Morbidly obese ([address]    Mass of right axilla    Acute pain of right knee    Post menopausal problems    Contusion of right knee       Current Outpatient Medications   Medication Sig Dispense Refill    Semaglutide 3 MG TABS Take 3 mg by mouth daily 90 tablet 1    carvedilol (COREG) 6.25 MG tablet TAKE 1 TABLET BY MOUTH TWICE A  tablet 1    acetaminophen (TYLENOL) 500 MG tablet Take 1 tablet by mouth 4 times daily as needed for Pain 28 tablet 0    losartan (COZAAR) 100 MG tablet TAKE 2 TABLETS BY MOUTH EVERY DAY 60 tablet 5    atorvastatin (LIPITOR) 40 MG tablet Take 1 tablet by mouth daily 90 tablet 3    albuterol sulfate HFA (PROVENTIL HFA) 108 (90 Base) MCG/ACT inhaler INHALE 2 PUFFS INTO THE LUNGS EVERY 6 HOURS AS NEEDED. 1 Inhaler 3    desloratadine (CLARINEX) 5 MG tablet TAKE 1 TABLET BY MOUTH EVERY DAY 90 tablet 0    budesonide-formoterol (SYMBICORT) 160-4.5 MCG/ACT AERO TAKE 2 PUFFS BY MOUTH TWICE A DAY 30.6 Inhaler 1    amLODIPine (NORVASC) 5 MG tablet Take 1 tablet by mouth 2 times daily 180 tablet 1    blood glucose monitor kit and supplies Dispense sufficient amount for indicated testing frequency plus additional to accommodate PRN testing needs. Dispense all needed supplies to include: monitor, strips, lancing device, lancets, control solutions, alcohol swabs. 1 kit 0    metFORMIN (GLUMETZA) 1000 MG extended release tablet Take 2 tablets by mouth daily (with breakfast) (Patient not taking: Reported on 11/1/2022) 60 tablet 3    montelukast (SINGULAIR) 10 MG tablet TAKE 1 TABLET BY MOUTH EVERY DAY EVERY NIGHT 90 tablet 3    fluticasone (FLONASE) 50 MCG/ACT nasal spray SPRAY 1 SPRAY INTO EACH NOSTRIL EVERY DAY 1 Bottle 2    calcium carbonate (OSCAL) 500 MG TABS tablet Take 500 mg by mouth daily. Multiple Vitamin (MULTIVITAMIN PO) Take  by mouth daily. No current facility-administered medications for this visit.          NUTRITION ASSESSMENT    Biochemical Data:    Lab Results   Component Value Date    LABA1C 6.8 05/31/2022     Lab Results   Component Value Date    .7 07/01/2021       Lab Results   Component Value Date    CHOL 173 06/14/2022    CHOL 177 04/09/2021    CHOL 140 12/30/2020     Lab Results   Component Value Date    TRIG 133 06/14/2022    TRIG 110 04/09/2021    TRIG 87 12/30/2020 get fast food - ate at home instead  Working to avoid sabotaging self  Successfully navigated family  dinner - ordered bowl but didn't eat it all  Yogurt most days     Update: diet drinks almost all the time, orange juice once for leg cramps  Working many events, had to eat fast food a few times, but trying to cook in advance  Yogurt for breakfast every other day    July update: sugar free soda or gatorade or water,  planned meals (salmon, fish, tuna salad, veggies) 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, hungry when returning home later at night - sandwich or yogurt or chips/soda    August update: since  happened, has been drinking regular 7-Up, now returning to diet soda  B: yogurt with nuts  S: varies  D: salmon, half baked potato, broccoli    September: back to diet soda, will occasionally have regular 7-up  B: yogurt or eggs or cold cereal or oatmeal(rarely)   L: only if breakfast is early - soup with or tuna salad with lettuce and crackers  D: Restaurant: 3-bean chili (half portion) and few bites grilled cheese    October update:   Back to regular soda a few times    November update:diet soda most times, regular soda  (8 oz) a few times, juice to treat leg cramps  Dealt with social events by ordering \"lighter\" option (grilled chicken/vegetables or lettuce wrap) and eating less (small tastes of fried food at party)  B: egg and yogurt (yogurt every other day)  L: salad  D: salmon or chicken and broccoli    November update:   On a good day, has soup before going to work or a small serving chicken and vegetable or \"big salad\"  At work:hot tea, sandwich and fruit, or pretzels  After work: yogurt    Physical Activity Changes:   April Update: trying to \"get back on schedule\" with exercise  Tries to exercise daily first thing in the morning--walks on treadmill and doing stretching  May Update: 3 days per week due to busy schedule   update: working as usher daily, walking to and from parking lot  July update: hurt knee and hand in fall - mostly bruised  August update: swollen knee - not working  September: knee better, walked a few times at mall, hurts unless a day of rest in between; tomorrow will be first day back at work at [employer]  October: knee better, but not walking due to [place]. Now doing home exercises with stretch bands. November update: knee almost back to normal, walking 10 minutes on the treadmill 2 days per week plus working 3 days/week  Working at [employer] and [employer]     Diabetes Medications:   Recent change in medication type/dosage: No    Monitoring:   Changes to testing regimen?  No  FBG - 89 - 98    Barriers:   -none noted        Follow Up Plan: two weeks    Referring Provider: ROSINA Calhoun CNP  Time spent with patient: 20 minutes

## 2022-12-13 ENCOUNTER — TELEMEDICINE (OUTPATIENT)
Dept: INTERNAL MEDICINE CLINIC | Age: 72
End: 2022-12-13

## 2022-12-13 DIAGNOSIS — E66.01 MORBIDLY OBESE (HCC): ICD-10-CM

## 2022-12-13 DIAGNOSIS — N18.30 TYPE 2 DIABETES MELLITUS WITH STAGE 3 CHRONIC KIDNEY DISEASE, WITHOUT LONG-TERM CURRENT USE OF INSULIN, UNSPECIFIED WHETHER STAGE 3A OR 3B CKD (HCC): Primary | ICD-10-CM

## 2022-12-13 DIAGNOSIS — E11.22 TYPE 2 DIABETES MELLITUS WITH STAGE 3 CHRONIC KIDNEY DISEASE, WITHOUT LONG-TERM CURRENT USE OF INSULIN, UNSPECIFIED WHETHER STAGE 3A OR 3B CKD (HCC): Primary | ICD-10-CM

## 2022-12-13 NOTE — PROGRESS NOTES
Medical Nutrition Therapy for Diabetes Follow Up    Lara Zamorano  May 2, 2022    Patient Care Team:  ROSINA Moreno CNP as PCP - General (Nurse Practitioner)  ROSINA Moreno CNP as PCP - [facility] Empaneled Provider  Antwon Valenzuela MD (Obstetrics & Gynecology)  Héctor Arita RD, LD as Dietitian (Dietitian Registered)  Michael Nieves RN as Ambulatory Care Manager    Reason for visit: VIRTUAL VISIT 2 week f/u - DM and weight concerns  Patient self-assessment of Progress: \"I'm doing better with exercise\"        ASSESSMENT/PLAN:   NUTRITION DIAGNOSIS    #1 Problem: Altered Nutrition-Related Laboratory Values (NC-2.2)  Related to: Endocrine/Diabetes   As Evidenced by: Elevated Plasma glucose and/or HgbA1c levels           #2 Problem: Overweight/Obesity (NC-3.3)  Related to: Excessive energy intake or physical inactivity  As Evidenced by: BMI more than normative standard for age and sex (BMI=33.13)        NUTRITION INTERVENTION  Nutrition Prescription: Aim for 30 g Carb per meal; 15 - 20 g Carb per snack      Diabetes Education/Counseling included:  Relapse prevention      NUTRITION MONITORING AND EVALUATION    New Goal: Set reminders on calendar to \"check-in\" with self re: behavior goals         Patient Active Problem List   Diagnosis    Asthma    Autoimmune hepatitis ([address]    Essential hypertension, benign    Pure hypercholesterolemia    Murmur, cardiac    Stage 3 chronic kidney disease ([address]    Type 2 diabetes mellitus with stage 3 chronic kidney disease, without long-term current use of insulin ([address]    Morbidly obese ([address]    Mass of right axilla    Acute pain of right knee    Post menopausal problems    Contusion of right knee       Current Outpatient Medications   Medication Sig Dispense Refill    Semaglutide 3 MG TABS Take 3 mg by mouth daily 90 tablet 1    carvedilol (COREG) 6.25 MG tablet TAKE 1 TABLET BY MOUTH TWICE A  tablet 1    acetaminophen (TYLENOL) 500 MG tablet Take 1 tablet by mouth 4 times daily as needed for Pain 28 tablet 0    losartan (COZAAR) 100 MG tablet TAKE 2 TABLETS BY MOUTH EVERY DAY 60 tablet 5    atorvastatin (LIPITOR) 40 MG tablet Take 1 tablet by mouth daily 90 tablet 3    albuterol sulfate HFA (PROVENTIL HFA) 108 (90 Base) MCG/ACT inhaler INHALE 2 PUFFS INTO THE LUNGS EVERY 6 HOURS AS NEEDED. 1 Inhaler 3    desloratadine (CLARINEX) 5 MG tablet TAKE 1 TABLET BY MOUTH EVERY DAY 90 tablet 0    budesonide-formoterol (SYMBICORT) 160-4.5 MCG/ACT AERO TAKE 2 PUFFS BY MOUTH TWICE A DAY 30.6 Inhaler 1    amLODIPine (NORVASC) 5 MG tablet Take 1 tablet by mouth 2 times daily 180 tablet 1    blood glucose monitor kit and supplies Dispense sufficient amount for indicated testing frequency plus additional to accommodate PRN testing needs. Dispense all needed supplies to include: monitor, strips, lancing device, lancets, control solutions, alcohol swabs. 1 kit 0    metFORMIN (GLUMETZA) 1000 MG extended release tablet Take 2 tablets by mouth daily (with breakfast) (Patient not taking: Reported on 11/1/2022) 60 tablet 3    montelukast (SINGULAIR) 10 MG tablet TAKE 1 TABLET BY MOUTH EVERY DAY EVERY NIGHT 90 tablet 3    fluticasone (FLONASE) 50 MCG/ACT nasal spray SPRAY 1 SPRAY INTO EACH NOSTRIL EVERY DAY 1 Bottle 2    calcium carbonate (OSCAL) 500 MG TABS tablet Take 500 mg by mouth daily. Multiple Vitamin (MULTIVITAMIN PO) Take  by mouth daily. No current facility-administered medications for this visit.          NUTRITION ASSESSMENT    Biochemical Data:    Lab Results   Component Value Date    LABA1C 6.8 05/31/2022     Lab Results   Component Value Date    .7 07/01/2021       Lab Results   Component Value Date    CHOL 173 06/14/2022    CHOL 177 04/09/2021    CHOL 140 12/30/2020     Lab Results   Component Value Date    TRIG 133 06/14/2022    TRIG 110 04/09/2021    TRIG 87 12/30/2020     Lab Results   Component Value Date    HDL 76 (H) 06/14/2022    HDL 68 (H) self  Successfully navigated family  dinner - ordered bowl but didn't eat it all  Yogurt most days     Update: diet drinks almost all the time, orange juice once for leg cramps  Working many events, had to eat fast food a few times, but trying to cook in advance  Yogurt for breakfast every other day    July update: sugar free soda or gatorade or water,  planned meals (salmon, fish, tuna salad, veggies) 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, hungry when returning home later at night - sandwich or yogurt or chips/soda    August update: since  happened, has been drinking regular 7-Up, now returning to diet soda  B: yogurt with nuts  S: varies  D: salmon, half baked potato, broccoli    September: back to diet soda, will occasionally have regular 7-up  B: yogurt or eggs or cold cereal or oatmeal(rarely)   L: only if breakfast is early - soup with or tuna salad with lettuce and crackers  D: Restaurant: 3-bean chili (half portion) and few bites grilled cheese    October update:   Back to regular soda a few times     update:diet soda most times, regular soda  (8 oz) a few times, juice to treat leg cramps  Dealt with social events by ordering \"lighter\" option (grilled chicken/vegetables or lettuce wrap) and eating less (small tastes of fried food at party)  B: egg and yogurt (yogurt every other day)  L: salad  D: salmon or chicken and broccoli     update:   On a good day, has soup before going to work or a small serving chicken and vegetable or \"big salad\"  At work:hot tea, sandwich and fruit, or pretzels  After work: yogurt    December update: continues to eat main meal before work, takes snack (apple slices, cheese stick instead of pretzels)    Physical Activity Changes:   April Update: trying to \"get back on schedule\" with exercise  Tries to exercise daily first thing in the morning--walks on treadmill and doing stretching  May Update: 3 days per week due to busy schedule   update: working as usher daily, walking to and from parking lot  July update: hurt knee and hand in fall - mostly bruised  August update: swollen knee - not working  September: knee better, walked a few times at mall, hurts unless a day of rest in between; tomorrow will be first day back at work at [employer]  October: knee better, but not walking due to [place]. Now doing home exercises with stretch bands. November update: knee almost back to normal, walking 10 minutes on the treadmill 2 days per week plus working 3 days/week  Started doing 5 minutes on treadmill daily, then increased to 10 minutes daily (unless working as usher)    Diabetes Medications:   Recent change in medication type/dosage: No    Monitoring:   Changes to testing regimen?  No  FBG - 133, 98    Barriers:   -none noted        Follow Up Plan: none - provider retiring    Referring Provider: Claudeen Bowman, APRN - CNP  Time spent with patient: 20 minutes

## 2023-01-03 ENCOUNTER — CARE COORDINATION (OUTPATIENT)
Dept: CARE COORDINATION | Age: 73
End: 2023-01-03

## 2023-01-03 NOTE — CARE COORDINATION
Routine follow up outreach for review pt status/progress towards goals. Left vm requesting return callback at pt's soonest convenience.     Provided & repeated direct callback to reach ACM    Advised need to schedule next visit w/PCP

## 2023-01-19 ENCOUNTER — OFFICE VISIT (OUTPATIENT)
Dept: FAMILY MEDICINE CLINIC | Age: 73
End: 2023-01-19

## 2023-01-19 VITALS
HEART RATE: 76 BPM | OXYGEN SATURATION: 98 % | WEIGHT: 194 LBS | DIASTOLIC BLOOD PRESSURE: 106 MMHG | BODY MASS INDEX: 33.3 KG/M2 | TEMPERATURE: 97.4 F | SYSTOLIC BLOOD PRESSURE: 142 MMHG

## 2023-01-19 DIAGNOSIS — N18.32 STAGE 3B CHRONIC KIDNEY DISEASE (HCC): ICD-10-CM

## 2023-01-19 DIAGNOSIS — R06.09 DOE (DYSPNEA ON EXERTION): ICD-10-CM

## 2023-01-19 DIAGNOSIS — R06.09 DOE (DYSPNEA ON EXERTION): Primary | ICD-10-CM

## 2023-01-19 DIAGNOSIS — I85.00 ESOPHAGEAL VARICES WITHOUT BLEEDING, UNSPECIFIED ESOPHAGEAL VARICES TYPE (HCC): ICD-10-CM

## 2023-01-19 DIAGNOSIS — N18.32 TYPE 2 DIABETES MELLITUS WITH STAGE 3B CHRONIC KIDNEY DISEASE, WITHOUT LONG-TERM CURRENT USE OF INSULIN (HCC): ICD-10-CM

## 2023-01-19 DIAGNOSIS — E11.22 TYPE 2 DIABETES MELLITUS WITH STAGE 3B CHRONIC KIDNEY DISEASE, WITHOUT LONG-TERM CURRENT USE OF INSULIN (HCC): ICD-10-CM

## 2023-01-19 DIAGNOSIS — J45.20 MILD INTERMITTENT ASTHMA WITHOUT COMPLICATION: ICD-10-CM

## 2023-01-19 DIAGNOSIS — I10 ESSENTIAL HYPERTENSION, BENIGN: Chronic | ICD-10-CM

## 2023-01-19 DIAGNOSIS — E66.01 MORBIDLY OBESE (HCC): ICD-10-CM

## 2023-01-19 PROBLEM — S80.01XA CONTUSION OF RIGHT KNEE: Status: RESOLVED | Noted: 2022-08-21 | Resolved: 2023-01-19

## 2023-01-19 PROBLEM — M25.561 ACUTE PAIN OF RIGHT KNEE: Status: RESOLVED | Noted: 2022-07-29 | Resolved: 2023-01-19

## 2023-01-19 LAB
A/G RATIO: 1.8 (ref 1.1–2.2)
ALBUMIN SERPL-MCNC: 4.2 G/DL (ref 3.4–5)
ALP BLD-CCNC: 53 U/L (ref 40–129)
ALT SERPL-CCNC: 18 U/L (ref 10–40)
ANION GAP SERPL CALCULATED.3IONS-SCNC: 14 MMOL/L (ref 3–16)
AST SERPL-CCNC: 16 U/L (ref 15–37)
BASOPHILS ABSOLUTE: 0 K/UL (ref 0–0.2)
BASOPHILS RELATIVE PERCENT: 0 %
BILIRUB SERPL-MCNC: 0.6 MG/DL (ref 0–1)
BUN BLDV-MCNC: 28 MG/DL (ref 7–20)
CALCIUM SERPL-MCNC: 10.1 MG/DL (ref 8.3–10.6)
CHLORIDE BLD-SCNC: 104 MMOL/L (ref 99–110)
CO2: 28 MMOL/L (ref 21–32)
CREAT SERPL-MCNC: 2 MG/DL (ref 0.6–1.2)
DOHLE BODIES: PRESENT
EOSINOPHILS ABSOLUTE: 0 K/UL (ref 0–0.6)
EOSINOPHILS RELATIVE PERCENT: 0 %
GFR SERPL CREATININE-BSD FRML MDRD: 26 ML/MIN/{1.73_M2}
GLUCOSE BLD-MCNC: 106 MG/DL (ref 70–99)
HCT VFR BLD CALC: 40.4 % (ref 36–48)
HEMOGLOBIN: 12.7 G/DL (ref 12–16)
LYMPHOCYTES ABSOLUTE: 2.8 K/UL (ref 1–5.1)
LYMPHOCYTES RELATIVE PERCENT: 17 %
MCH RBC QN AUTO: 29.9 PG (ref 26–34)
MCHC RBC AUTO-ENTMCNC: 31.4 G/DL (ref 31–36)
MCV RBC AUTO: 95.2 FL (ref 80–100)
MONOCYTES ABSOLUTE: 1.3 K/UL (ref 0–1.3)
MONOCYTES RELATIVE PERCENT: 8 %
NEUTROPHILS ABSOLUTE: 12.3 K/UL (ref 1.7–7.7)
NEUTROPHILS RELATIVE PERCENT: 75 %
PDW BLD-RTO: 13.8 % (ref 12.4–15.4)
PLATELET # BLD: 337 K/UL (ref 135–450)
PMV BLD AUTO: 7.6 FL (ref 5–10.5)
POTASSIUM SERPL-SCNC: 3.9 MMOL/L (ref 3.5–5.1)
PRO-BNP: 352 PG/ML (ref 0–124)
RBC # BLD: 4.25 M/UL (ref 4–5.2)
SODIUM BLD-SCNC: 146 MMOL/L (ref 136–145)
TOTAL PROTEIN: 6.6 G/DL (ref 6.4–8.2)
TOXIC GRANULATION: PRESENT
WBC # BLD: 16.4 K/UL (ref 4–11)

## 2023-01-19 RX ORDER — AMLODIPINE BESYLATE 5 MG/1
5 TABLET ORAL 2 TIMES DAILY
Qty: 180 TABLET | Refills: 1 | Status: SHIPPED | OUTPATIENT
Start: 2023-01-19

## 2023-01-19 ASSESSMENT — ENCOUNTER SYMPTOMS: SHORTNESS OF BREATH: 1

## 2023-01-19 ASSESSMENT — PATIENT HEALTH QUESTIONNAIRE - PHQ9
1. LITTLE INTEREST OR PLEASURE IN DOING THINGS: 0
SUM OF ALL RESPONSES TO PHQ QUESTIONS 1-9: 0
2. FEELING DOWN, DEPRESSED OR HOPELESS: 0
SUM OF ALL RESPONSES TO PHQ QUESTIONS 1-9: 0
SUM OF ALL RESPONSES TO PHQ9 QUESTIONS 1 & 2: 0

## 2023-01-19 NOTE — PROGRESS NOTES
Anatoliy Nuñez (:  1950) is a 81 y.o. female,Established patient, here for evaluation of the following chief complaint(s):  Respiratory Distress      ASSESSMENT/PLAN:  1. SANDOVAL (dyspnea on exertion)  -     CBC with Auto Differential; Future  -     Comprehensive Metabolic Panel; Future  -     Brain Natriuretic Peptide; Future  -     Echo Cardiac Stress Test Imaging; Future  -     Cardiac Stress Test - Exercise Only; Future  -     perflutren lipid microspheres (DEFINITY) injection 1.5 mL; 1.5 mL, IntraVENous, IMG ONCE PRN, 1 dose, Starting on Thu 23 at 0902, Until Discontinued, Other, Inability to adequately visualize heart without contrastOnly to be given in Echo Lab during [address]. Echocardiogram should first be performed without contrast and if exam is adequate then DO NOT administer the contrast. If unable to adequately visualize heart without contrast and the patient has no contraindications to echo contrast then administer the echo contrast.Pre-procedure(Stress)  -     EKG 12 lead; Future  2. Esophageal varices without bleeding, unspecified esophageal varices type [facility])  Assessment & Plan:   At goal, continue current medications    3. Type 2 diabetes mellitus with stage 3b chronic kidney disease, without long-term current use of insulin ([facility])  Assessment & Plan:     Hemoglobin A1C   Date Value Ref Range Status   2022 6.8 % Final   Stable and well controlled    4. Stage 3b chronic kidney disease [facility])  Assessment & Plan:  Lab Results   Component Value Date    LABGLOM 37 (A) 2022    LABGLOM 30 (A) 2022    LABGLOM 44 (A) 05/10/2022    LABGLOM 41 (A) 2021    LABGLOM 41 (A) 2021   Worsening, sees nephro    5. Mild intermittent asthma without complication  Assessment & Plan:  Stable, uncertain if sob with activity is anginal equivalent or CHF  6. Morbidly obese ([address]  Assessment & Plan:   Uncontrolled, lifestyle modifications recommended  7.  Essential hypertension, benign  -     amLODIPine (NORVASC) 5 MG tablet; Take 1 tablet by mouth 2 times daily, Disp-180 tablet, R-1Normal    EKG- NSR  Labs today  Pt will schedule stress echo  No follow-ups on file. SUBJECTIVE/OBJECTIVE:  Candido Conner presents today with complaints of increasing shortness of breath with activity. She states is been going on for a number of years but over the past few months it seems of gotten worse. She has shortness of breath just simply walking up stairs. She denies any chest pain or dizziness with this. She denies any swelling to her lower extremities. Her weight has been stable. She does admit over the past month her diet has been fairly high in sodium as a result of the holidays. Current Outpatient Medications   Medication Sig Dispense Refill    amLODIPine (NORVASC) 5 MG tablet Take 1 tablet by mouth 2 times daily 180 tablet 1    Semaglutide 3 MG TABS Take 3 mg by mouth daily 90 tablet 1    carvedilol (COREG) 6.25 MG tablet TAKE 1 TABLET BY MOUTH TWICE A  tablet 1    acetaminophen (TYLENOL) 500 MG tablet Take 1 tablet by mouth 4 times daily as needed for Pain 28 tablet 0    losartan (COZAAR) 100 MG tablet TAKE 2 TABLETS BY MOUTH EVERY DAY 60 tablet 5    atorvastatin (LIPITOR) 40 MG tablet Take 1 tablet by mouth daily 90 tablet 3    albuterol sulfate HFA (PROVENTIL HFA) 108 (90 Base) MCG/ACT inhaler INHALE 2 PUFFS INTO THE LUNGS EVERY 6 HOURS AS NEEDED. 1 Inhaler 3    desloratadine (CLARINEX) 5 MG tablet TAKE 1 TABLET BY MOUTH EVERY DAY 90 tablet 0    budesonide-formoterol (SYMBICORT) 160-4.5 MCG/ACT AERO TAKE 2 PUFFS BY MOUTH TWICE A DAY 30.6 Inhaler 1    blood glucose monitor kit and supplies Dispense sufficient amount for indicated testing frequency plus additional to accommodate PRN testing needs. Dispense all needed supplies to include: monitor, strips, lancing device, lancets, control solutions, alcohol swabs.  1 kit 0    metFORMIN (GLUMETZA) 1000 MG extended release tablet Take 2 tablets by mouth daily (with breakfast) 60 tablet 3    montelukast (SINGULAIR) 10 MG tablet TAKE 1 TABLET BY MOUTH EVERY DAY EVERY NIGHT 90 tablet 3    fluticasone (FLONASE) 50 MCG/ACT nasal spray SPRAY 1 SPRAY INTO EACH NOSTRIL EVERY DAY 1 Bottle 2    calcium carbonate (OSCAL) 500 MG TABS tablet Take 500 mg by mouth daily. Multiple Vitamin (MULTIVITAMIN PO) Take  by mouth daily. No current facility-administered medications for this visit. Review of Systems   Respiratory:  Positive for shortness of breath. All other systems reviewed and are negative. Vitals:    01/19/23 0837 01/19/23 0928   BP: (!) 168/110 (!) 142/106   Site: Left Upper Arm Left Upper Arm   Position: Sitting Sitting   Cuff Size: Medium Adult Medium Adult   Pulse: 76    Temp: 97.4 °F (36.3 °C)    SpO2: 98%    Weight: 194 lb (88 kg)        Physical Exam  Constitutional:       Appearance: Normal appearance. She is well-developed and normal weight. HENT:      Head: Normocephalic. Right Ear: Tympanic membrane normal.      Left Ear: Tympanic membrane normal.      Mouth/Throat:      Mouth: Mucous membranes are moist.   Eyes:      Pupils: Pupils are equal, round, and reactive to light. Cardiovascular:      Rate and Rhythm: Normal rate and regular rhythm. Heart sounds: Normal heart sounds. Pulmonary:      Effort: Pulmonary effort is normal.      Breath sounds: Normal breath sounds. Musculoskeletal:         General: Normal range of motion. Cervical back: Normal range of motion. Skin:     General: Skin is warm and dry. Neurological:      Mental Status: She is alert and oriented to person, place, and time. An electronic signature was used to authenticate this note.     --Mónica Cerrato, APRN - CNP

## 2023-01-19 NOTE — ASSESSMENT & PLAN NOTE
Lab Results   Component Value Date    LABGLOM 37 (A) 11/04/2022    LABGLOM 30 (A) 09/13/2022    LABGLOM 44 (A) 05/10/2022    LABGLOM 41 (A) 06/18/2021    LABGLOM 41 (A) 05/06/2021   Worsening, sees nephro

## 2023-01-20 ENCOUNTER — CARE COORDINATION (OUTPATIENT)
Dept: CARE COORDINATION | Age: 73
End: 2023-01-20

## 2023-01-20 NOTE — CARE COORDINATION
Left detailed vm offering pt outreach to [email or web address] in future, as needed. Due to length of time since last successful connection w/pt for review - appropriate to conclude ACC episode for now d/t nonengaged    Emphasized Open Invitation for pt to outreach ACM in future, as needed. Provided & repeated ACM callback number. No further ACC outreach planned at this time. Patient Excluded from Care Coordination?  Yes     The patient will be excluded from Care Coordination for the following reason: Patient not engaged

## 2023-01-23 ENCOUNTER — CARE COORDINATION (OUTPATIENT)
Dept: CARE COORDINATION | Age: 73
End: 2023-01-23

## 2023-01-23 ASSESSMENT — PATIENT HEALTH QUESTIONNAIRE - PHQ9
SUM OF ALL RESPONSES TO PHQ QUESTIONS 1-9: 0

## 2023-01-23 NOTE — CARE COORDINATION
Ambulatory Care Coordination Note  1/23/2023    ACC: Marylene Hung, RN    [facility] to [facility] in review of past couple months. Pt shares since 's passing 3 yr ago, Mary Galdamez is 'very challenging time of year' for her. Pt explains that her friend has been in town, and that she has been able to enjoy a prolonged visit - to extent demands of her work schedule allowed. This is a life-long friend and pt shares her visit was timely and welcomed. Denies thoughts of self harm or SI. Until recently, she had been w/o a couple of her BP meds, but has since gotten Rx's filled and taking as prescribed. /100 during this call. Pt assures once she gets off phone, she will recheck later & call PCP if BP does not return to [facility] by this afternoon - notes she has just taken today's BP medicines. Review of med mgmt - pt has all currently prescribed medicines, taking as prescribed, and manages in a pill organizer - which is how she noticed last week when she had run out of a couple medications. Due to work & visiting w/her friend, pt notes she simply hadn't realized she was that low in supply of the couple medications she ran out, or nearly ran out of. Pt completed w/Shiela Lucia RD for nutritional support, at beginning of December. [facility] reviewed availability to engage w/RD through Care Coordination, should pt wish to follow for further nutritional counseling/support. Pt will consider if she requires further support. Blood sugar this a.m was 139, which is higher than usual for her. Reviewed potential contributors to the higher blood sugar; reviewed s/s to report, Zone [facility] guidance. Reviewed ACC support remains available as needed. Pt welcomes launching subsequent ACC episode of care, but declines RPM at this time d/t she is uncertain whether insurance would cover this and adds she already self monitors both blood sugars & BP.  Feels her breathing has remained at baseline - overall does not feel she requires additional equipment, especially as would be only on 'loan'. Pt agrees to continue self monitoring s/s & prompt self-reporting to provider(s) involved in her care, as appropriate. Offered patient enrollment in the Remote Patient Monitoring (RPM) program for in-home monitoring: Patient declined. Pt verbalized understanding of above and agreement with the following  Plan: pt will schedule future/return visit w/PCP as appropriate. Initiate [facility] support for health coaching, care collaboration, support w/community resources, and help with any newly presenting concerns as needed. Pt will consider if wishing to engage w/RD for nutritional counseling/support  ACM [facility] place [facility] info packet, Zone Mgmt tool: Diabetes  Pt agrees to outreach direct to ACM, as needed, between routine follow up outreach initiated by [facility]   Ambulatory Care Coordination Assessment    Care Coordination Protocol  Referral from Primary Care Provider: No  Week 1 - Initial Assessment     Do you have all of your prescriptions and are they filled?: Yes (Comment: reviewed 1.23.23)  Barriers to medication adherence: None  Are you able to afford your medications?: Yes  How often do you have trouble taking your medications the way you have been told to take them?: I always take them as prescribed. Do you have Home O2 Therapy?: No      Ability to seek help/take action for Emergent Urgent situations i.e. fire, crime, inclement weather or health crisis. : Independent  Ability to ambulate to restroom: Independent  Ability handle personal hygeine needs (bathing/dressing/grooming): Independent  Ability to manage Medications: Independent  Ability to prepare Food Preparation: Independent  Ability to maintain home (clean home, laundry): Independent  Ability to drive and/or has transportation: Independent  Ability to do shopping: Independent  Ability to manage finances:  Independent  Is patient able to live independently?: Yes     Current Housing: Private Residence        Per the [address], did the patient have 2 or more falls or 1 fall with injury in the past year?: No     Frequent urination at night?: No  Do you use rails/bars?: Yes  Do you have a non-slip tub mat?: Yes     Are you experiencing loss of meaning?: No  Are you experiencing loss of hope and peace?: No     Thinking about your patient's physical health needs, are there any symptoms or problems (risk indicators) you are unsure about that require further investigation?: No identified areas of uncertainly or problems already being investigated   Are the patients physical health problems impacting on their mental well-being?: No identified areas of concern   Are there any problems with your patients lifestyle behaviors (alcohol, drugs, diet, exercise) that are impacting on physical or mental well-being?: No identified areas of concern   Do you have any other concerns about your patients mental well-being?  How would you rate their severity and impact on the patient?: No identified areas of concern   How would you rate their home environment in terms of safety and stability (including domestic violence, insecure housing, neighbor harassment)?: Consistently safe, supportive, stable, no identified problems   How do daily activities impact on the patient's well-being? (include current or anticipated unemployment, work, caregiving, access to transportation or other): No identified problems or perceived positive benefits   How would you rate their social network (family, work, friends)?: Good participation with social networks   How would you rate their financial resources (including ability to afford all required medical care)?: Financially secure, resources adequate, no identified problems   How wells does the patient now understand their health and well-being (symptoms, signs or risk factors) and what they need to do to manage their health?: Reasonable to good understanding and already engages in managing health or is willing to undertake better management   How well do you think your patient can engage in healthcare discussions? (Barriers include language, deafness, aphasia, alcohol or drug problems, learning difficulties, concentration): Clear and open communication, no identified barriers   Do other services need to be involved to help this patient?: Other care/services not required at this time   Are current services involved with this patient well-coordinated? (Include coordination with other services you are now recommendation): All required care/services in place and well-coordinated   Suggested Interventions and Community Resources                  Prior to Admission medications    Medication Sig Start Date End Date Taking?  Authorizing Provider   amLODIPine (NORVASC) 5 MG tablet Take 1 tablet by mouth 2 times daily 1/19/23   ROSINA Rios CNP   Semaglutide 3 MG TABS Take 3 mg by mouth daily 11/1/22   ROSINA Rios CNP   carvedilol (COREG) 6.25 MG tablet TAKE 1 TABLET BY MOUTH TWICE A DAY 8/22/22   Kailey Araujo MD   acetaminophen (TYLENOL) 500 MG tablet Take 1 tablet by mouth 4 times daily as needed for Pain 7/21/22 1/19/23  ROSINA Flynn CNP   losartan (COZAAR) 100 MG tablet TAKE 2 TABLETS BY MOUTH EVERY DAY 7/14/22   Kailey Araujo MD   atorvastatin (LIPITOR) 40 MG tablet Take 1 tablet by mouth daily 2/22/22   ROSINA Rios CNP   albuterol sulfate HFA (PROVENTIL HFA) 108 (90 Base) MCG/ACT inhaler INHALE 2 PUFFS INTO THE LUNGS EVERY 6 HOURS AS NEEDED. 8/24/21   ROSINA Rios CNP   desloratadine (CLARINEX) 5 MG tablet TAKE 1 TABLET BY MOUTH EVERY DAY 5/24/21   Mariella Bello MD   budesonide-formoterol (SYMBICORT) 160-4.5 MCG/ACT AERO TAKE 2 PUFFS BY MOUTH TWICE A DAY 5/10/21   Mariella Bello MD   blood glucose monitor kit and supplies Dispense sufficient amount for indicated testing frequency plus additional to accommodate PRN testing needs. Dispense all needed supplies to include: monitor, strips, lancing device, lancets, control solutions, alcohol swabs. 3/17/21   James Ochoa MD   metFORMIN (GLUMETZA) 1000 MG extended release tablet Take 2 tablets by mouth daily (with breakfast) 12/31/20   James Ochoa MD   montelukast (SINGULAIR) 10 MG tablet TAKE 1 TABLET BY MOUTH EVERY DAY EVERY NIGHT 12/17/20   James Ochoa MD   fluticasone (FLONASE) 50 MCG/ACT nasal spray SPRAY 1 SPRAY INTO EACH NOSTRIL EVERY DAY 10/19/20   James Ochoa MD   calcium carbonate (OSCAL) 500 MG TABS tablet Take 500 mg by mouth daily. Historical Provider, MD   Multiple Vitamin (MULTIVITAMIN PO) Take  by mouth daily.  8/6/10   Historical Provider, MD       Future Appointments   Date Time Provider Kirby Ivey   5/4/2023 12:15 PM Dasha Cain MD [facility]

## 2023-01-24 DIAGNOSIS — D72.829 LEUKOCYTOSIS, UNSPECIFIED TYPE: ICD-10-CM

## 2023-01-24 DIAGNOSIS — R06.02 SOB (SHORTNESS OF BREATH): Primary | ICD-10-CM

## 2023-02-13 ENCOUNTER — CARE COORDINATION (OUTPATIENT)
Dept: CARE COORDINATION | Age: 73
End: 2023-02-13

## 2023-02-13 NOTE — CARE COORDINATION
Ambulatory Care Coordination Note  2023  General Assessment    Do you have any symptoms that are causing concern?: Yes  Progression since Onset: Gradually Worsening  Reported Symptoms: Congestion, Cough (Comment: yellow mucous w/occasional cough; cough worse at night)        Patient Current Location:  Home: [address] [address]     ACM contacted the patient by telephone. Verified name and  with patient as identifiers. Provided introduction to self, and explanation of the ACM role. Challenges to be reviewed by the provider   Additional needs identified to be addressed with provider: No  Pt agrees to contact PCP direct to schedule same/next day or VV if needed. Method of communication with provider: chart routing. ACM: Shimon Coon RN    onset: Friday night into Saturday. Denies fever or chills. No associated symptoms beyond congestion w/productive cough. Using Mucinex OTC, which she believes is helping w/symptom mgmt. Offered patient enrollment in the Remote Patient Monitoring (RPM) program for in-home monitoring: Patient declined. 23    Reviewed availability/option for VV and/or scheduling same/next day visit with PCP for concern. Pt drinking green tea, pushing fluids (mostly water) and resting. Reviewed medications - pt will use Flonase & take Clarinex. Advised need to promptly report any new onset or worsening symptoms to PCP    Pt verbalized understanding of above and agreement with the following  Plan: pt will notify PCP for any new onset or worsening symptoms; and will schedule for future/return office visit w/PCP as appropriate  Continue ACC support for health coaching, care collaboration, support w/community resources, and help with any newly presenting concerns as needed.   Pt agrees to outreach direct to [employer], as needed, between routine follow up outreach initiated by [facility]   Care Coordination Interventions    Referral from Primary Care Provider: No  Suggested Interventions and Community Resources  Diabetes Education: Completed  Fall Risk Prevention: Completed  Medi Set or Pill Pack: Completed (Comment: uses a pill organizer to self manage)  Registered Dietician: In Process (Comment: 1.23.23 pt will consider if needed  (concluded w/Shiela Sharp RD beginning of Dec 2022))  Specialty Services Referral: Completed (Comment: Dr Grace Castañeda (Ortho); Dr Suzanna Zhao (nephrology))  Zone Management Tools: Completed (Comment: DM)  Other Services or Interventions: review of self mgmt concepts; scheduling          Goals Addressed                      This Visit's Progress      Conditions and Symptoms   On track      I will notify my provider of any symptoms that indicate a worsening of my condition. Barriers: lack of support and lack of education  Plan for overcoming my barriers: will work with ACM  Confidence: 9/10  Anticipated Goal Completion Date: 1/25/23          increase activity (pt-stated)   No change      eg walking    Barriers: time constraints  Plan for overcoming my barriers: engage in Care Coordination for added support/health coaching as needed  Confidence: 10/10  Anticipated Goal Completion Date: 7.23.23        Self Monitoring   On track      Self-Monitored Blood Glucose - I will check my blood sugar Fasting blood sugar and random blood sugars  I will notify my provider of any trends of increasing or decreasing blood sugars over a 1 month period. I will notify my provider if I have any blood sugar readings less than 70 more than 2 times a month. Blood Pressure - I will take my blood pressure as directed - Daily  I will notify my provider of any trends of increasing or decreasing blood pressures over a month period of time. I will notify my provider of any changes in blood pressure associated with symptoms of dizziness, falls, passing out, headache, confusion/change in mental status.     Barriers: stress  Plan for overcoming my barriers: diversion, relaxation techniques reviewed (breathing, rest to help w/elevated BP) and engaging in Care Coordination for added care team support as needed  Confidence: 10/10  Anticipated Goal Completion Date: 7.23.23                Future Appointments   Date Time Provider Kirby Ivey   5/4/2023 12:15 PM MD CHRISTINE Boone [facility]

## 2023-02-22 ENCOUNTER — CARE COORDINATION (OUTPATIENT)
Dept: CARE COORDINATION | Age: 73
End: 2023-02-22

## 2023-02-22 NOTE — CARE COORDINATION
Ambulatory Care Coordination Note  2023    Patient Current Location:  Home: [address] [place] [id number]     ACM contacted the patient by telephone. Verified name and  with patient as identifiers. Provided introduction to self, and explanation of the ACM role.     Challenges to be reviewed by the provider   Additional needs identified to be addressed with provider: No  none               Method of communication with provider: none.    ACM: Alexa Brady RN    routine outreach in review of pt status. Pt reports noted improvement towards baseline health. She does still have some residual noted congestion, but overall doing much better. Using inhaler only when at work, but requiring use much less often - certainly within prescribed frequency.  Reviewed self mgmt concepts, Zone Mgmt guidance, and availability of ACM as needed.  Offered patient enrollment in the Remote Patient Monitoring (RPM) program for in-home monitoring: Patient declined.23  Pt verbalized understanding of above and agreement with the following  Plan: pt will consider availability and self outreach to PCP to r/s follow up appt w/PCP.  Continue [facility] support for health coaching, care collaboration, support w/community resources, and help with any newly presenting concerns as needed.  Pt agrees to outreach direct to ACM, as needed, between routine follow up outreach initiated by [facility]  Diabetes Assessment    Medic Alert ID: No  Meal Planning: Avoidance of concentrated sweets, Plate Method, Carb counting   How often do you test your blood sugar?: Daily   Do you have barriers with adherence to non-pharmacologic self-management interventions? (Nutrition/Exercise/Self-Monitoring): No   Have you ever had to go to the ED for symptoms of low blood sugar?: No       No patient-reported symptoms             Care Coordination Interventions    Referral from Primary Care Provider: No  Suggested Interventions and Community Resources  Diabetes  Education: Completed  Fall Risk Prevention: Completed  Medi Set or Pill Pack: Completed (Comment: uses a pill organizer to self manage)  Registered Dietician: In Process (Comment: 1.23.23 pt will consider if needed  (concluded w/Shiela Sharp RD beginning of Dec 2022))  Specialty Services Referral: Completed (Comment: Dr Carmina Carcamo (Ortho); Dr Connie Lott (nephrology))  Zone Management Tools: Completed (Comment: DM)  Other Services or Interventions: review of self mgmt concepts; scheduling          Goals Addressed                      This Visit's Progress      Conditions and Symptoms   On track      I will notify my provider of any symptoms that indicate a worsening of my condition. Barriers: lack of support and lack of education  Plan for overcoming my barriers: will work with ACM  Confidence: 9/10  Anticipated Goal Completion Date: 1/25/23          increase activity (pt-stated)   Improving      eg walking    Barriers: time constraints  Plan for overcoming my barriers: engage in Care Coordination for added support/health coaching as needed  Confidence: 10/10  Anticipated Goal Completion Date: 7.23.23        Self Monitoring   On track      Self-Monitored Blood Glucose - I will check my blood sugar Fasting blood sugar and random blood sugars  I will notify my provider of any trends of increasing or decreasing blood sugars over a 1 month period. I will notify my provider if I have any blood sugar readings less than 70 more than 2 times a month. Blood Pressure - I will take my blood pressure as directed - Daily  I will notify my provider of any trends of increasing or decreasing blood pressures over a month period of time. I will notify my provider of any changes in blood pressure associated with symptoms of dizziness, falls, passing out, headache, confusion/change in mental status.     Barriers: stress  Plan for overcoming my barriers: diversion, relaxation techniques reviewed (breathing, rest to help w/elevated BP) and engaging in Care Coordination for added care team support as needed  Confidence: 10/10  Anticipated Goal Completion Date: 7.23.23                Future Appointments   Date Time Provider Kirby Ivey   5/4/2023 12:15 PM MD CHRISTINE Gracia [facility]

## 2023-03-01 ENCOUNTER — OFFICE VISIT (OUTPATIENT)
Dept: FAMILY MEDICINE CLINIC | Age: 73
End: 2023-03-01

## 2023-03-01 ENCOUNTER — HOSPITAL ENCOUNTER (OUTPATIENT)
Dept: GENERAL RADIOLOGY | Age: 73
Discharge: HOME | End: 2023-03-01
Payer: OTHER GOVERNMENT

## 2023-03-01 VITALS
HEART RATE: 88 BPM | OXYGEN SATURATION: 97 % | WEIGHT: 195.8 LBS | DIASTOLIC BLOOD PRESSURE: 84 MMHG | SYSTOLIC BLOOD PRESSURE: 139 MMHG | HEIGHT: 64 IN | BODY MASS INDEX: 33.43 KG/M2

## 2023-03-01 DIAGNOSIS — I10 ESSENTIAL HYPERTENSION, BENIGN: ICD-10-CM

## 2023-03-01 DIAGNOSIS — R05.1 ACUTE COUGH: Primary | ICD-10-CM

## 2023-03-01 DIAGNOSIS — N18.30 TYPE 2 DIABETES MELLITUS WITH STAGE 3 CHRONIC KIDNEY DISEASE, WITHOUT LONG-TERM CURRENT USE OF INSULIN, UNSPECIFIED WHETHER STAGE 3A OR 3B CKD (HCC): ICD-10-CM

## 2023-03-01 DIAGNOSIS — E11.22 TYPE 2 DIABETES MELLITUS WITH STAGE 3 CHRONIC KIDNEY DISEASE, WITHOUT LONG-TERM CURRENT USE OF INSULIN, UNSPECIFIED WHETHER STAGE 3A OR 3B CKD (HCC): ICD-10-CM

## 2023-03-01 DIAGNOSIS — R05.1 ACUTE COUGH: ICD-10-CM

## 2023-03-01 DIAGNOSIS — N18.32 STAGE 3B CHRONIC KIDNEY DISEASE (HCC): ICD-10-CM

## 2023-03-01 LAB — HBA1C MFR BLD: 8.1 %

## 2023-03-01 PROCEDURE — 71046 X-RAY EXAM CHEST 2 VIEWS: CPT

## 2023-03-01 RX ORDER — LISINOPRIL 20 MG/1
20 TABLET ORAL DAILY
Qty: 90 TABLET | Refills: 0 | Status: SHIPPED | OUTPATIENT
Start: 2023-03-01

## 2023-03-01 RX ORDER — PREDNISONE 20 MG/1
TABLET ORAL
Qty: 15 TABLET | Refills: 0 | Status: SHIPPED | OUTPATIENT
Start: 2023-03-01

## 2023-03-01 RX ORDER — AZITHROMYCIN 250 MG/1
250 TABLET, FILM COATED ORAL SEE ADMIN INSTRUCTIONS
Qty: 6 TABLET | Refills: 0 | Status: SHIPPED | OUTPATIENT
Start: 2023-03-01 | End: 2023-03-06

## 2023-03-01 RX ORDER — PIOGLITAZONEHYDROCHLORIDE 15 MG/1
15 TABLET ORAL DAILY
Qty: 30 TABLET | Refills: 3 | Status: SHIPPED | OUTPATIENT
Start: 2023-03-01 | End: 2023-03-03 | Stop reason: SDUPTHER

## 2023-03-01 SDOH — ECONOMIC STABILITY: INCOME INSECURITY: HOW HARD IS IT FOR YOU TO PAY FOR THE VERY BASICS LIKE FOOD, HOUSING, MEDICAL CARE, AND HEATING?: NOT HARD AT ALL

## 2023-03-01 SDOH — ECONOMIC STABILITY: HOUSING INSECURITY
IN THE LAST 12 MONTHS, WAS THERE A TIME WHEN YOU DID NOT HAVE A STEADY PLACE TO SLEEP OR SLEPT IN A SHELTER (INCLUDING NOW)?: NO

## 2023-03-01 SDOH — ECONOMIC STABILITY: FOOD INSECURITY: WITHIN THE PAST 12 MONTHS, YOU WORRIED THAT YOUR FOOD WOULD RUN OUT BEFORE YOU GOT MONEY TO BUY MORE.: NEVER TRUE

## 2023-03-01 SDOH — ECONOMIC STABILITY: FOOD INSECURITY: WITHIN THE PAST 12 MONTHS, THE FOOD YOU BOUGHT JUST DIDN'T LAST AND YOU DIDN'T HAVE MONEY TO GET MORE.: NEVER TRUE

## 2023-03-01 ASSESSMENT — PATIENT HEALTH QUESTIONNAIRE - PHQ9
SUM OF ALL RESPONSES TO PHQ9 QUESTIONS 1 & 2: 0
SUM OF ALL RESPONSES TO PHQ QUESTIONS 1-9: 0
1. LITTLE INTEREST OR PLEASURE IN DOING THINGS: 0
SUM OF ALL RESPONSES TO PHQ QUESTIONS 1-9: 0
SUM OF ALL RESPONSES TO PHQ QUESTIONS 1-9: 0
2. FEELING DOWN, DEPRESSED OR HOPELESS: 0
SUM OF ALL RESPONSES TO PHQ QUESTIONS 1-9: 0

## 2023-03-01 NOTE — PROGRESS NOTES
Enzo Murry (:  1950) is a 81 y.o. female,Established patient, here for evaluation of the following chief complaint(s):  Cough (3 weeks- nurse that comes by wanted her seen because she told her it sounded junky/Needs refill on Rybelsus-not currently taking and Losartan due to ins not covering) and Diabetes (Discuss A1c/)      ASSESSMENT/PLAN:  1. Acute cough  -     azithromycin (ZITHROMAX) 250 MG tablet; Take 1 tablet by mouth See Admin Instructions for 5 days 500mg on day 1 followed by 250mg on days 2 - 5, Disp-6 tablet, R-0Normal  -     predniSONE (DELTASONE) 20 MG tablet; Take 1 pill twice daily for 5 days then 1 pill daily for 5 days, Disp-15 tablet, R-0Normal  -     XR CHEST (2 VW); Future  2. Type 2 diabetes mellitus with stage 3 chronic kidney disease, without long-term current use of insulin, unspecified whether stage 3a or 3b CKD (HCC)  -     POCT glycosylated hemoglobin (Hb A1C)  -     pioglitazone (ACTOS) 15 MG tablet; Take 1 tablet by mouth daily, Disp-30 tablet, R-3Normal  -     empagliflozin (JARDIANCE) 10 MG tablet; Take 1 tablet by mouth daily, Disp-30 tablet, R-3Normal  3. Essential hypertension, benign  Assessment & Plan:  Stop losartan d/t cost, Start lisinopril, increase to 40 mg if BP >140  Orders:  -     lisinopril (PRINIVIL;ZESTRIL) 20 MG tablet; Take 1 tablet by mouth daily, Disp-90 tablet, R-0Normal  4. Stage 3b chronic kidney disease [facility])  Assessment & Plan:  Lab Results   Component Value Date    LABGLOM 26 (A) 2023    LABGLOM 37 (A) 2022    LABGLOM 30 (A) 2022    LABGLOM 44 (A) 05/10/2022    LABGLOM 41 (A) 2021   Sees nephrology  Start Jardiance if covered    No follow-ups on file. SUBJECTIVE/OBJECTIVE:  3 weeks junky cough, productive yellow and now thick  No fever  Mild SOB and SANDOVAL    Off DM meds for 2+ months, off metformin d/t diarrhea and Rybelsus d/t cost.    BP up- Losartan off plan.  Needs different medication    Current Outpatient Medications Medication Sig Dispense Refill    azithromycin (ZITHROMAX) 250 MG tablet Take 1 tablet by mouth See Admin Instructions for 5 days 500mg on day 1 followed by 250mg on days 2 - 5 6 tablet 0    predniSONE (DELTASONE) 20 MG tablet Take 1 pill twice daily for 5 days then 1 pill daily for 5 days 15 tablet 0    lisinopril (PRINIVIL;ZESTRIL) 20 MG tablet Take 1 tablet by mouth daily 90 tablet 0    pioglitazone (ACTOS) 15 MG tablet Take 1 tablet by mouth daily 30 tablet 3    empagliflozin (JARDIANCE) 10 MG tablet Take 1 tablet by mouth daily 30 tablet 3    amLODIPine (NORVASC) 5 MG tablet Take 1 tablet by mouth 2 times daily 180 tablet 1    carvedilol (COREG) 6.25 MG tablet TAKE 1 TABLET BY MOUTH TWICE A  tablet 1    acetaminophen (TYLENOL) 500 MG tablet Take 1 tablet by mouth 4 times daily as needed for Pain 28 tablet 0    atorvastatin (LIPITOR) 40 MG tablet Take 1 tablet by mouth daily 90 tablet 3    albuterol sulfate HFA (PROVENTIL HFA) 108 (90 Base) MCG/ACT inhaler INHALE 2 PUFFS INTO THE LUNGS EVERY 6 HOURS AS NEEDED. 1 Inhaler 3    desloratadine (CLARINEX) 5 MG tablet TAKE 1 TABLET BY MOUTH EVERY DAY 90 tablet 0    budesonide-formoterol (SYMBICORT) 160-4.5 MCG/ACT AERO TAKE 2 PUFFS BY MOUTH TWICE A DAY 30.6 Inhaler 1    blood glucose monitor kit and supplies Dispense sufficient amount for indicated testing frequency plus additional to accommodate PRN testing needs. Dispense all needed supplies to include: monitor, strips, lancing device, lancets, control solutions, alcohol swabs. 1 kit 0    montelukast (SINGULAIR) 10 MG tablet TAKE 1 TABLET BY MOUTH EVERY DAY EVERY NIGHT 90 tablet 3    fluticasone (FLONASE) 50 MCG/ACT nasal spray SPRAY 1 SPRAY INTO EACH NOSTRIL EVERY DAY 1 Bottle 2    calcium carbonate (OSCAL) 500 MG TABS tablet Take 500 mg by mouth daily. Multiple Vitamin (MULTIVITAMIN PO) Take  by mouth daily.       losartan (COZAAR) 100 MG tablet TAKE 2 TABLETS BY MOUTH EVERY DAY (Patient not taking: Reported on 3/1/2023) 60 tablet 5     No current facility-administered medications for this visit. Review of Systems   All other systems reviewed and are negative. Vitals:    03/01/23 1047 03/01/23 1050   BP: (!) 150/90 139/84   Pulse: 88    SpO2: 97%    Weight: 195 lb 12.8 oz (88.8 kg)    Height: 5' 4\" (1.626 m)        Physical Exam  Constitutional:       Appearance: Normal appearance. She is well-developed and normal weight. HENT:      Head: Normocephalic. Mouth/Throat:      Mouth: Mucous membranes are moist.   Eyes:      Pupils: Pupils are equal, round, and reactive to light. Cardiovascular:      Rate and Rhythm: Normal rate and regular rhythm. Heart sounds: Normal heart sounds. Pulmonary:      Effort: Pulmonary effort is normal.      Breath sounds: Normal breath sounds. Comments: Decreased, wet deep cough  Musculoskeletal:         General: Normal range of motion. Cervical back: Normal range of motion. Skin:     General: Skin is warm and dry. Neurological:      Mental Status: She is alert and oriented to person, place, and time. An electronic signature was used to authenticate this note.     --Kiara Frias, APRN - CNP

## 2023-03-01 NOTE — ASSESSMENT & PLAN NOTE
Lab Results   Component Value Date    LABGLOM 26 (A) 01/19/2023    LABGLOM 37 (A) 11/04/2022    LABGLOM 30 (A) 09/13/2022    LABGLOM 44 (A) 05/10/2022    LABGLOM 41 (A) 06/18/2021   Sees nephrology  Start Jardiance if covered

## 2023-03-03 DIAGNOSIS — N18.30 TYPE 2 DIABETES MELLITUS WITH STAGE 3 CHRONIC KIDNEY DISEASE, WITHOUT LONG-TERM CURRENT USE OF INSULIN, UNSPECIFIED WHETHER STAGE 3A OR 3B CKD (HCC): ICD-10-CM

## 2023-03-03 DIAGNOSIS — E11.22 TYPE 2 DIABETES MELLITUS WITH STAGE 3 CHRONIC KIDNEY DISEASE, WITHOUT LONG-TERM CURRENT USE OF INSULIN, UNSPECIFIED WHETHER STAGE 3A OR 3B CKD (HCC): ICD-10-CM

## 2023-03-03 RX ORDER — PIOGLITAZONEHYDROCHLORIDE 15 MG/1
15 TABLET ORAL DAILY
Qty: 90 TABLET | Refills: 1 | Status: SHIPPED | OUTPATIENT
Start: 2023-03-03

## 2023-03-17 ENCOUNTER — TELEPHONE (OUTPATIENT)
Dept: FAMILY MEDICINE CLINIC | Age: 73
End: 2023-03-17

## 2023-03-17 DIAGNOSIS — N89.8 VAGINAL ITCHING: Primary | ICD-10-CM

## 2023-03-17 RX ORDER — FLUCONAZOLE 150 MG/1
150 TABLET ORAL ONCE
Qty: 1 TABLET | Refills: 0 | Status: SHIPPED | OUTPATIENT
Start: 2023-03-17 | End: 2023-03-17

## 2023-03-17 NOTE — TELEPHONE ENCOUNTER
Pt thinks she's getting a yeast infection  She started 3 new medications but yeast infection is a side effect of the Jardiance  Irritation, itching  Please advise

## 2023-03-17 NOTE — TELEPHONE ENCOUNTER
I sent in for a diflucan. She needs to wash well after every bathroom use. The urine can not remain in the hair or on the skin. Follow up with me in office if no relief after Diflucan and excellent hygiene.

## 2023-03-20 ENCOUNTER — CARE COORDINATION (OUTPATIENT)
Dept: CARE COORDINATION | Age: 73
End: 2023-03-20

## 2023-03-20 NOTE — CARE COORDINATION
I agree with the Simón Salinas
blood sugar Fasting blood sugar and random blood sugars  I will notify my provider of any trends of increasing or decreasing blood sugars over a 1 month period. I will notify my provider if I have any blood sugar readings less than 70 more than 2 times a month. Blood Pressure - I will take my blood pressure as directed - Daily  I will notify my provider of any trends of increasing or decreasing blood pressures over a month period of time. I will notify my provider of any changes in blood pressure associated with symptoms of dizziness, falls, passing out, headache, confusion/change in mental status.     Barriers: stress  Plan for overcoming my barriers: diversion, relaxation techniques reviewed (breathing, rest to help w/elevated BP) and engaging in Care Coordination for added care team support as needed  Confidence: 10/10  Anticipated Goal Completion Date: 7.23.23                Future Appointments   Date Time Provider Kirby Ivey   5/4/2023 12:15 PM MD CHRISTINE Montiel NEPH ALEXEI CHRISTINE Nephrolo

## 2023-04-07 ENCOUNTER — CARE COORDINATION (OUTPATIENT)
Dept: CARE COORDINATION | Age: 73
End: 2023-04-07

## 2023-04-07 NOTE — CARE COORDINATION
Primary Care Provider: No  Suggested Interventions and Community Resources  Diabetes Education: Completed  Fall Risk Prevention: Completed  Medi Set or Pill Pack: Completed (Comment: uses a pill organizer to self manage)  Registered Dietician: Completed (Comment: 1.23.23 pt will consider if needed  (concluded w/Shiela Sharp RD beginning of Dec 2022))  Specialty Services Referral: Completed (Comment: Dr Michelle Valladares (Ortho); Dr Fabi Baldwin (nephrology))  Zone Management Tools: Completed (Comment: DM)  Other Services or Interventions: review of self mgmt concepts; scheduling; availability of ACM          Goals Addressed                      This Visit's Progress      Conditions and Symptoms   On track      I will notify my provider of any symptoms that indicate a worsening of my condition. Barriers: lack of support and lack of education  Plan for overcoming my barriers: will work with ACM  Confidence: 9/10  Anticipated Goal Completion Date: 1/25/23          increase activity (pt-stated)   On track      eg walking    Barriers: time constraints  Plan for overcoming my barriers: engage in Care Coordination for added support/health coaching as needed  Confidence: 10/10  Anticipated Goal Completion Date: 7.23.23        Self Monitoring   On track      Self-Monitored Blood Glucose - I will check my blood sugar Fasting blood sugar and random blood sugars  I will notify my provider of any trends of increasing or decreasing blood sugars over a 1 month period. I will notify my provider if I have any blood sugar readings less than 70 more than 2 times a month. Blood Pressure - I will take my blood pressure as directed - Daily  I will notify my provider of any trends of increasing or decreasing blood pressures over a month period of time. I will notify my provider of any changes in blood pressure associated with symptoms of dizziness, falls, passing out, headache, confusion/change in mental status.     Barriers: stress  Plan for

## 2023-04-20 ENCOUNTER — CARE COORDINATION (OUTPATIENT)
Dept: CARE COORDINATION | Age: 73
End: 2023-04-20

## 2023-04-20 NOTE — CARE COORDINATION
Routine follow up outreach for review pt status/progress towards goals. Left vm inviting return callback today, if any newly presenting concerns of which ACM may be of help. Provided & repeated direct callback to reach ACM    Encouraged pt to be in direct contact w/PCP during ACM time away Fri April 21 through Mon May 1 - name of a fellow Breanne Maldonado will be available to provider care team if any newly presenting concerns requiring ACC support during this writer time away.

## 2023-04-26 DIAGNOSIS — N18.31 STAGE 3A CHRONIC KIDNEY DISEASE (HCC): ICD-10-CM

## 2023-04-26 LAB
ALBUMIN SERPL-MCNC: 4 G/DL (ref 3.4–5)
ANION GAP SERPL CALCULATED.3IONS-SCNC: 11 MMOL/L (ref 3–16)
BASOPHILS # BLD: 0 K/UL (ref 0–0.2)
BASOPHILS NFR BLD: 0 %
BUN SERPL-MCNC: 11 MG/DL (ref 7–20)
BURR CELLS BLD QL SMEAR: ABNORMAL
CALCIUM SERPL-MCNC: 9.5 MG/DL (ref 8.3–10.6)
CHLORIDE SERPL-SCNC: 106 MMOL/L (ref 99–110)
CO2 SERPL-SCNC: 27 MMOL/L (ref 21–32)
CREAT SERPL-MCNC: 1.1 MG/DL (ref 0.6–1.2)
CREAT UR-MCNC: 95.6 MG/DL (ref 28–259)
DEPRECATED RDW RBC AUTO: 14.8 % (ref 12.4–15.4)
EOSINOPHIL # BLD: 0 K/UL (ref 0–0.6)
EOSINOPHIL NFR BLD: 0 %
GFR SERPLBLD CREATININE-BSD FMLA CKD-EPI: 54 ML/MIN/{1.73_M2}
GLUCOSE SERPL-MCNC: 112 MG/DL (ref 70–99)
HCT VFR BLD AUTO: 36.9 % (ref 36–48)
HGB BLD-MCNC: 11.8 G/DL (ref 12–16)
LYMPHOCYTES # BLD: 1.8 K/UL (ref 1–5.1)
LYMPHOCYTES NFR BLD: 12 %
MCH RBC QN AUTO: 30.5 PG (ref 26–34)
MCHC RBC AUTO-ENTMCNC: 32.1 G/DL (ref 31–36)
MCV RBC AUTO: 95 FL (ref 80–100)
MICROALBUMIN UR DL<=1MG/L-MCNC: 2.1 MG/DL
MICROALBUMIN/CREAT UR: 22 MG/G (ref 0–30)
MONOCYTES # BLD: 1.5 K/UL (ref 0–1.3)
MONOCYTES NFR BLD: 10 %
NEUTROPHILS # BLD: 11.9 K/UL (ref 1.7–7.7)
NEUTROPHILS NFR BLD: 78 %
OVALOCYTES BLD QL SMEAR: ABNORMAL
PATH INTERP BLD-IMP: NO
PHOSPHATE SERPL-MCNC: 3 MG/DL (ref 2.5–4.9)
PLATELET # BLD AUTO: 330 K/UL (ref 135–450)
PLATELET BLD QL SMEAR: ADEQUATE
PMV BLD AUTO: 7.1 FL (ref 5–10.5)
POLYCHROMASIA BLD QL SMEAR: ABNORMAL
POTASSIUM SERPL-SCNC: 4.1 MMOL/L (ref 3.5–5.1)
RBC # BLD AUTO: 3.89 M/UL (ref 4–5.2)
SLIDE REVIEW: ABNORMAL
SODIUM SERPL-SCNC: 144 MMOL/L (ref 136–145)
WBC # BLD AUTO: 15.3 K/UL (ref 4–11)

## 2023-04-28 ENCOUNTER — OFFICE VISIT (OUTPATIENT)
Dept: FAMILY MEDICINE CLINIC | Age: 73
End: 2023-04-28

## 2023-04-28 VITALS
HEART RATE: 88 BPM | BODY MASS INDEX: 33.03 KG/M2 | DIASTOLIC BLOOD PRESSURE: 76 MMHG | TEMPERATURE: 97.8 F | RESPIRATION RATE: 15 BRPM | SYSTOLIC BLOOD PRESSURE: 118 MMHG | WEIGHT: 192.4 LBS | OXYGEN SATURATION: 95 %

## 2023-04-28 DIAGNOSIS — N18.30 TYPE 2 DIABETES MELLITUS WITH STAGE 3 CHRONIC KIDNEY DISEASE, WITHOUT LONG-TERM CURRENT USE OF INSULIN, UNSPECIFIED WHETHER STAGE 3A OR 3B CKD (HCC): ICD-10-CM

## 2023-04-28 DIAGNOSIS — J01.90 ACUTE BACTERIAL SINUSITIS: Primary | ICD-10-CM

## 2023-04-28 DIAGNOSIS — E11.22 TYPE 2 DIABETES MELLITUS WITH STAGE 3 CHRONIC KIDNEY DISEASE, WITHOUT LONG-TERM CURRENT USE OF INSULIN, UNSPECIFIED WHETHER STAGE 3A OR 3B CKD (HCC): ICD-10-CM

## 2023-04-28 DIAGNOSIS — B96.89 ACUTE BACTERIAL SINUSITIS: Primary | ICD-10-CM

## 2023-04-28 LAB — HBA1C MFR BLD: 8.4 %

## 2023-04-28 RX ORDER — GLIPIZIDE 5 MG/1
5 TABLET ORAL 2 TIMES DAILY
Qty: 60 TABLET | Refills: 3 | Status: SHIPPED | OUTPATIENT
Start: 2023-04-28

## 2023-04-28 RX ORDER — AMOXICILLIN AND CLAVULANATE POTASSIUM 875; 125 MG/1; MG/1
1 TABLET, FILM COATED ORAL 2 TIMES DAILY
Qty: 14 TABLET | Refills: 0 | Status: SHIPPED | OUTPATIENT
Start: 2023-04-28 | End: 2023-05-05

## 2023-04-28 NOTE — PROGRESS NOTES
Padmini Torres (:  1950) is a 81 y.o. female,Established patient, here for evaluation of the following chief complaint(s):  Sinus Problem      ASSESSMENT/PLAN:  1. Acute bacterial sinusitis  -     amoxicillin-clavulanate (AUGMENTIN) 875-125 MG per tablet; Take 1 tablet by mouth 2 times daily for 7 days, Disp-14 tablet, R-0Normal  2. Type 2 diabetes mellitus with stage 3 chronic kidney disease, without long-term current use of insulin, unspecified whether stage 3a or 3b CKD ([facility] [address]  Assessment & Plan:  Lab Results   Component Value Date    LABA1C 8.4 2023    LABA1C 8.1 2023    LABA1C 6.8 2022    LABA1C 6.6 2021    LABA1C 8.4 2021   Continues to increase despite her SGLT2. We will stop the Jardiance as it is giving her recurrent yeast infections and start her on Actos twice daily. I do not believe she be a good candidate for basal dose insulin as her morning sugars run between 90 and 112. Encouraged her to monitor her glycemic intake. She needs to decrease the sugar high glycemic foods at mealtime. This is likely causing spikes resulting in the higher A1c. Continue checking sugars in the morning. Follow-up with me in 1 month. Orders:  -     POCT glycosylated hemoglobin (Hb A1C)      No follow-ups on file. SUBJECTIVE/OBJECTIVE:  Complains of 2 weeks ongoing sinus congestion, cough, headache. Productive dark yellow sputum. No fever although feels very congested and miserable. Knows that she has allergic rhinitis however daily Flonase is not helping her.     Current Outpatient Medications   Medication Sig Dispense Refill    amoxicillin-clavulanate (AUGMENTIN) 875-125 MG per tablet Take 1 tablet by mouth 2 times daily for 7 days 14 tablet 0    glipiZIDE (GLUCOTROL) 5 MG tablet Take 1 tablet by mouth 2 times daily 60 tablet 3    carvedilol (COREG) 6.25 MG tablet TAKE 1 TABLET BY MOUTH TWICE A  tablet 1    pioglitazone (ACTOS) 15 MG tablet Take 1 tablet by mouth

## 2023-04-28 NOTE — ASSESSMENT & PLAN NOTE
Lab Results   Component Value Date    LABA1C 8.4 04/28/2023    LABA1C 8.1 03/01/2023    LABA1C 6.8 05/31/2022    LABA1C 6.6 07/01/2021    LABA1C 8.4 04/09/2021   Continues to increase despite her SGLT2. We will stop the Jardiance as it is giving her recurrent yeast infections and start her on Actos twice daily. I do not believe she be a good candidate for basal dose insulin as her morning sugars run between 90 and 112. Continue checking sugars in the morning. Follow-up with me in 1 month.

## 2023-05-13 DIAGNOSIS — E78.00 PURE HYPERCHOLESTEROLEMIA: ICD-10-CM

## 2023-05-16 RX ORDER — ATORVASTATIN CALCIUM 40 MG/1
TABLET, FILM COATED ORAL
Qty: 90 TABLET | Refills: 1 | Status: SHIPPED | OUTPATIENT
Start: 2023-05-16

## 2023-05-17 ENCOUNTER — CARE COORDINATION (OUTPATIENT)
Dept: CARE COORDINATION | Age: 73
End: 2023-05-17

## 2023-05-17 NOTE — CARE COORDINATION
Ambulatory Care Coordination Note  2023    Patient Current Location:  Home: [address] [address]     ACM contacted the patient by telephone. Verified name and  with patient as identifiers. Provided introduction to self, and explanation of the ACM role. Challenges to be reviewed by the provider   Additional needs identified to be addressed with provider: No  none               Method of communication with provider: none. ACM: Kendra Aguirre RN    Review of self mgmt concepts- DM & ACP information previously sent. Pt states she will review. Denies need to resend ACP info. Offered patient enrollment in the Remote Patient Monitoring (RPM) program for in-home monitoring: Patient declined 23 still not interested  Pt verbalized understanding of above and agreement wth the following  Plan: continue [facility] support for health coaching, care collaboration, support w/community resources, and help with any newly presenting concerns as needed. Pt will continue direct self reporting for any concerning symptoms - new onset or worsening of any pre-existing concerns.   Pt will review ACP information previously sent - will consider to decide/complete ACP  Pt agrees to outreach direct to [facility], as needed, between routine follow up outreach initiated by [facility]     Care Coordination Interventions    Referral from Primary Care Provider: No  Suggested Interventions and Community Resources  Diabetes Education: Completed  Fall Risk Prevention: Completed  Medi Set or Pill Pack: Completed (Comment: uses a pill organizer to self manage)  Registered Dietician: Completed (Comment: 23 pt will consider if needed  (concluded w/Shiela Sharp RD beginning of Dec 2022))  Specialty Services Referral: Completed (Comment: Dr Harry Vasquez (Ortho); Dr Radha Ray (nephrology))  Zone Management Tools: Completed (Comment: DM)  Other Services or Interventions: review of self mgmt concepts; scheduling; availability of ACM

## 2023-05-22 RX ORDER — GLIPIZIDE 5 MG/1
TABLET ORAL
Qty: 60 TABLET | Refills: 3 | Status: SHIPPED | OUTPATIENT
Start: 2023-05-22

## 2023-05-24 DIAGNOSIS — I10 ESSENTIAL HYPERTENSION, BENIGN: ICD-10-CM

## 2023-05-24 NOTE — TELEPHONE ENCOUNTER
Requested Prescriptions     Pending Prescriptions Disp Refills    lisinopril (PRINIVIL;ZESTRIL) 20 MG tablet [Pharmacy Med Name: LISINOPRIL 20 MG TABLET] 90 tablet 0     Sig: TAKE 1 TABLET BY MOUTH EVERY DAY          Last Office Visit: 3/1/2023     Next Office Visit: Visit date not found     Last Labs:  5/4/23

## 2023-05-25 RX ORDER — LISINOPRIL 20 MG/1
TABLET ORAL
Qty: 90 TABLET | Refills: 0 | Status: SHIPPED | OUTPATIENT
Start: 2023-05-25

## 2023-06-08 ENCOUNTER — CARE COORDINATION (OUTPATIENT)
Dept: CARE COORDINATION | Age: 73
End: 2023-06-08

## 2023-06-08 NOTE — CARE COORDINATION
Ambulatory Care Coordination (ACC)    Eloisa Miller RN  [address] (ACM)    [facility] outreach for follow up wk # 19 ACC   plans to review  1) pt status  2) duration of current ACC episode; discuss readiness for graduating [facility] as appropriate  3) need to schedule future/return visit w/PCP    Left vm requesting return callback at pt's soonest convenience. Provided & repeated direct callback to reach AC    Plan: review of above noted items.     Note conditions eligible for RPM - HTN, DM, asthma; however, pt declined 1.23.23, throughout ACC episode, and more recently again

## 2023-06-21 ENCOUNTER — CARE COORDINATION (OUTPATIENT)
Dept: CARE COORDINATION | Age: 73
End: 2023-06-21

## 2023-06-21 NOTE — CARE COORDINATION
Per Lilian Parker or Machelle - could either of you research for pt on possible DM supplies (glucometer/test strips) that might be covered by her 2023 plan network     Made call to [facility] to confirm that patient DM supplies would be considered DME   Confirmed all DM supplies are covered 100% no deductible. Should be submitted to in network DME provider for fulfillment   Call Ref f# [id number]      DME Options    [facility] [phone] [facility]  Phone [phone]    [place] [address], [address] [facility],[address]  [phone]  Spoke with pharm tech who confirmed can fill DME orders for DM supplies. Will submit to insurance.     Routed to [employer]

## 2023-06-27 ENCOUNTER — CARE COORDINATION (OUTPATIENT)
Dept: CARE COORDINATION | Age: 73
End: 2023-06-27

## 2023-06-27 RX ORDER — GLIPIZIDE 5 MG/1
TABLET ORAL
Qty: 180 TABLET | Refills: 1 | Status: SHIPPED | OUTPATIENT
Start: 2023-06-27

## 2023-06-27 RX ORDER — BLOOD-GLUCOSE METER
1 KIT MISCELLANEOUS 3 TIMES DAILY
Qty: 1 KIT | Refills: 0 | Status: SHIPPED | OUTPATIENT
Start: 2023-06-27

## 2023-06-29 ENCOUNTER — CARE COORDINATION (OUTPATIENT)
Dept: CARE COORDINATION | Age: 73
End: 2023-06-29

## 2023-06-29 DIAGNOSIS — E11.22 TYPE 2 DIABETES MELLITUS WITH STAGE 3 CHRONIC KIDNEY DISEASE, WITHOUT LONG-TERM CURRENT USE OF INSULIN, UNSPECIFIED WHETHER STAGE 3A OR 3B CKD (HCC): Primary | ICD-10-CM

## 2023-06-29 DIAGNOSIS — N18.30 TYPE 2 DIABETES MELLITUS WITH STAGE 3 CHRONIC KIDNEY DISEASE, WITHOUT LONG-TERM CURRENT USE OF INSULIN, UNSPECIFIED WHETHER STAGE 3A OR 3B CKD (HCC): Primary | ICD-10-CM

## 2023-06-29 RX ORDER — GLUCOSAMINE HCL/CHONDROITIN SU 500-400 MG
CAPSULE ORAL
Qty: 100 STRIP | Refills: 3 | Status: SHIPPED | OUTPATIENT
Start: 2023-06-29

## 2023-06-29 RX ORDER — LANCETS 30 GAUGE
1 EACH MISCELLANEOUS DAILY
Qty: 100 EACH | Refills: 5 | Status: SHIPPED | OUTPATIENT
Start: 2023-06-29

## 2023-07-17 ENCOUNTER — CARE COORDINATION (OUTPATIENT)
Dept: CARE COORDINATION | Age: 73
End: 2023-07-17

## 2023-07-17 ENCOUNTER — HOSPITAL ENCOUNTER (OUTPATIENT)
Dept: ULTRASOUND IMAGING | Age: 73
Discharge: HOME | End: 2023-07-17
Attending: INTERNAL MEDICINE
Payer: MEDICARE

## 2023-07-17 DIAGNOSIS — K75.4 CHRONIC AGGRESSIVE HEPATITIS (HCC): ICD-10-CM

## 2023-07-17 PROCEDURE — 76705 ECHO EXAM OF ABDOMEN: CPT

## 2023-07-17 NOTE — CARE COORDINATION
Ambulatory Care Coordination Note  2023    Patient Current Location:  Home: [address] [place] [id number]     ACM contacted the patient by telephone. Verified name and  with patient as identifiers. Provided introduction to self, and explanation of the ACM role. Challenges to be reviewed by the provider   Additional needs identified to be addressed with provider: No  none               Method of communication with provider: chart routing. ACM: Sriram Kraft RN    Reviewed progress to date  Discussed readiness to conclude [facility] at this time. She was able to get DM supplies. Now striving to resume active lifestyle. Considering engaging w/RD in near future- states willingness to outreach to [facility] in future to engage with [facility] RD, if she so decides. Offered patient enrollment in the Remote Patient Monitoring (RPM) program for in-home monitoring: Patient declined.   Pt verbalized understanding of above and agreement with the following  Plan: join [address] or some activity in community in effort to continue   Pt will call ACM in future for support, as needed  Graduate ACC d/t goals met  Care Coordination Interventions    Referral from Primary Care Provider: No  Suggested Interventions and Community Resources  Diabetes Education: Completed  Fall Risk Prevention: Completed  Medi Set or Pill Pack: Completed (Comment: uses a pill organizer to self manage)  Registered Dietician: Completed (Comment: 23 pt will consider if needed  (concluded amanda/Shiela Sharp RD beginning of Dec 2022))  Specialty Services Referral: Completed (Comment: Dr Gilda Bowling (Ortho); Dr Nam Alvarado (nephrology))  Zone Management Tools: Completed (Comment: DM)  Other Services or Interventions: review of self mgmt concepts; scheduling; availability of ACM; general health maintenance          Goals Addressed                      This Visit's Progress      COMPLETED: Conditions and Symptoms   On track      I will notify my provider of

## 2023-07-21 DIAGNOSIS — I10 ESSENTIAL HYPERTENSION, BENIGN: Chronic | ICD-10-CM

## 2023-07-21 NOTE — TELEPHONE ENCOUNTER
Requested Prescriptions     Pending Prescriptions Disp Refills    amLODIPine (NORVASC) 5 MG tablet [Pharmacy Med Name: AMLODIPINE BESYLATE 5 MG TAB] 180 tablet 1     Sig: TAKE 1 TABLET BY MOUTH TWICE A DAY          Last Office Visit: 4/28/23    Next Office Visit: Visit date not found     Last Labs:  5/4/23

## 2023-07-24 RX ORDER — AMLODIPINE BESYLATE 5 MG/1
TABLET ORAL
Qty: 180 TABLET | Refills: 1 | Status: SHIPPED | OUTPATIENT
Start: 2023-07-24

## 2023-08-01 RX ORDER — PREDNISONE 10 MG/1
TABLET ORAL
Qty: 90 TABLET | Refills: 2 | Status: SHIPPED | OUTPATIENT
Start: 2023-08-01

## 2023-08-01 NOTE — TELEPHONE ENCOUNTER
Requested Prescriptions     Pending Prescriptions Disp Refills    predniSONE (DELTASONE) 10 MG tablet [Pharmacy Med Name: PREDNISONE 10 MG TABLET] 90 tablet 2     Sig: TAKE 1 TABLET BY MOUTH EVERY DAY         Last OV: 4/28/2023     Last labs: 4/26/2023     F/u: N/A

## 2023-08-14 DIAGNOSIS — I10 ESSENTIAL HYPERTENSION, BENIGN: ICD-10-CM

## 2023-08-14 RX ORDER — LISINOPRIL 20 MG/1
TABLET ORAL
Qty: 90 TABLET | Refills: 0 | Status: SHIPPED | OUTPATIENT
Start: 2023-08-14

## 2023-09-27 DIAGNOSIS — N18.30 TYPE 2 DIABETES MELLITUS WITH STAGE 3 CHRONIC KIDNEY DISEASE, WITHOUT LONG-TERM CURRENT USE OF INSULIN, UNSPECIFIED WHETHER STAGE 3A OR 3B CKD (HCC): ICD-10-CM

## 2023-09-27 DIAGNOSIS — E11.22 TYPE 2 DIABETES MELLITUS WITH STAGE 3 CHRONIC KIDNEY DISEASE, WITHOUT LONG-TERM CURRENT USE OF INSULIN, UNSPECIFIED WHETHER STAGE 3A OR 3B CKD (HCC): ICD-10-CM

## 2023-09-28 NOTE — TELEPHONE ENCOUNTER
Requested Prescriptions     Pending Prescriptions Disp Refills    pioglitazone (ACTOS) 15 MG tablet [Pharmacy Med Name: PIOGLITAZONE HCL 15 MG TABLET] 90 tablet 1     Sig: TAKE 1 TABLET BY MOUTH EVERY DAY          Last Office Visit: 4/28/2023     Next Office Visit: Visit date not found     Last Labs: 4/26/23

## 2023-09-29 RX ORDER — PIOGLITAZONEHYDROCHLORIDE 15 MG/1
15 TABLET ORAL DAILY
Qty: 90 TABLET | Refills: 1 | Status: SHIPPED | OUTPATIENT
Start: 2023-09-29

## 2023-10-09 DIAGNOSIS — J45.20 MILD INTERMITTENT ASTHMA WITHOUT COMPLICATION: ICD-10-CM

## 2023-10-09 RX ORDER — BUDESONIDE AND FORMOTEROL FUMARATE DIHYDRATE 160; 4.5 UG/1; UG/1
AEROSOL RESPIRATORY (INHALATION)
Qty: 30.6 EACH | Refills: 1 | Status: SHIPPED | OUTPATIENT
Start: 2023-10-09

## 2023-10-09 RX ORDER — DESLORATADINE 5 MG/1
5 TABLET ORAL DAILY
Qty: 90 TABLET | Refills: 0 | Status: SHIPPED | OUTPATIENT
Start: 2023-10-09

## 2023-10-09 NOTE — TELEPHONE ENCOUNTER
From: Puneet Garcia  To: Office of Dr. Blanche Millan  Sent: 10/9/2023 3:13 PM EDT  Subject: Medication Renewal Request    Refills have been requested for the following medications:     budesonide-formoterol (SYMBICORT) 160-4.5 MCG/ACT AERO [Dr. Blanche Millan MD]    Preferred pharmacy: [address] [place] [address].  [facility] [phone] - F [phone]

## 2023-10-09 NOTE — TELEPHONE ENCOUNTER
From: Bert Castaneda  To: Office of Dr. Niki León  Sent: 10/9/2023 3:16 PM EDT  Subject: Medication Renewal Request    Refills have been requested for the following medications:     desloratadine (CLARINEX) 5 MG tablet [Dr. Niki León MD]    Preferred pharmacy: [address] [place] [address].  [facility] [phone] - F [phone]

## 2023-10-10 RX ORDER — ALBUTEROL SULFATE 90 UG/1
AEROSOL, METERED RESPIRATORY (INHALATION)
Qty: 1 EACH | Refills: 2 | Status: SHIPPED | OUTPATIENT
Start: 2023-10-10

## 2023-10-10 NOTE — TELEPHONE ENCOUNTER
Last ov 4-28-23  No future appt    Requested Prescriptions     Pending Prescriptions Disp Refills    albuterol sulfate HFA (PROVENTIL HFA) 108 (90 Base) MCG/ACT inhaler 1 each 2     Sig: INHALE 2 PUFFS INTO THE LUNGS EVERY 6 HOURS AS NEEDED.

## 2023-10-27 DIAGNOSIS — E11.22 TYPE 2 DIABETES MELLITUS WITH STAGE 3 CHRONIC KIDNEY DISEASE, WITHOUT LONG-TERM CURRENT USE OF INSULIN, UNSPECIFIED WHETHER STAGE 3A OR 3B CKD (HCC): ICD-10-CM

## 2023-10-27 DIAGNOSIS — N18.30 TYPE 2 DIABETES MELLITUS WITH STAGE 3 CHRONIC KIDNEY DISEASE, WITHOUT LONG-TERM CURRENT USE OF INSULIN, UNSPECIFIED WHETHER STAGE 3A OR 3B CKD (HCC): ICD-10-CM

## 2023-10-27 RX ORDER — BLOOD-GLUCOSE METER
KIT MISCELLANEOUS
Qty: 100 STRIP | Refills: 3 | Status: SHIPPED | OUTPATIENT
Start: 2023-10-27

## 2023-10-27 NOTE — TELEPHONE ENCOUNTER
Requested Prescriptions     Pending Prescriptions Disp Refills    blood glucose test strips (FREESTYLE LITE) strip [Pharmacy Med Name: FREESTYLE LITE TEST STRIP] 100 strip 3     Sig: TEST TWICE A DAY AND AS NEEDED FOR IRREGULAR BLOOD SUGAR SYMPTOMS          Last Office Visit: 4/28/2023     Next Office Visit: Visit date not found     Last Labs: 5/4/2023

## 2023-11-01 ENCOUNTER — HOSPITAL ENCOUNTER (OUTPATIENT)
Dept: WOMENS IMAGING | Age: 73
Discharge: HOME | End: 2023-11-01
Payer: MEDICARE

## 2023-11-01 ENCOUNTER — OFFICE VISIT (OUTPATIENT)
Dept: FAMILY MEDICINE CLINIC | Age: 73
End: 2023-11-01

## 2023-11-01 VITALS
DIASTOLIC BLOOD PRESSURE: 84 MMHG | OXYGEN SATURATION: 97 % | SYSTOLIC BLOOD PRESSURE: 126 MMHG | HEART RATE: 64 BPM | TEMPERATURE: 97.7 F | WEIGHT: 196 LBS | HEIGHT: 64 IN | BODY MASS INDEX: 33.46 KG/M2

## 2023-11-01 VITALS — WEIGHT: 194 LBS | HEIGHT: 64 IN | BODY MASS INDEX: 33.12 KG/M2

## 2023-11-01 DIAGNOSIS — N18.32 STAGE 3B CHRONIC KIDNEY DISEASE (HCC): ICD-10-CM

## 2023-11-01 DIAGNOSIS — K75.4 AUTOIMMUNE HEPATITIS (HCC): ICD-10-CM

## 2023-11-01 DIAGNOSIS — N18.30 TYPE 2 DIABETES MELLITUS WITH STAGE 3 CHRONIC KIDNEY DISEASE, WITHOUT LONG-TERM CURRENT USE OF INSULIN, UNSPECIFIED WHETHER STAGE 3A OR 3B CKD (HCC): Primary | ICD-10-CM

## 2023-11-01 DIAGNOSIS — J45.20 MILD INTERMITTENT ASTHMA WITHOUT COMPLICATION: ICD-10-CM

## 2023-11-01 DIAGNOSIS — I10 ESSENTIAL HYPERTENSION, BENIGN: Chronic | ICD-10-CM

## 2023-11-01 DIAGNOSIS — Z00.00 ROUTINE GENERAL MEDICAL EXAMINATION AT A HEALTH CARE FACILITY: ICD-10-CM

## 2023-11-01 DIAGNOSIS — E66.01 MORBIDLY OBESE (HCC): ICD-10-CM

## 2023-11-01 DIAGNOSIS — Z00.00 MEDICARE ANNUAL WELLNESS VISIT, SUBSEQUENT: ICD-10-CM

## 2023-11-01 DIAGNOSIS — M25.561 ACUTE PAIN OF RIGHT KNEE: ICD-10-CM

## 2023-11-01 DIAGNOSIS — E11.22 TYPE 2 DIABETES MELLITUS WITH STAGE 3 CHRONIC KIDNEY DISEASE, WITHOUT LONG-TERM CURRENT USE OF INSULIN, UNSPECIFIED WHETHER STAGE 3A OR 3B CKD (HCC): Primary | ICD-10-CM

## 2023-11-01 DIAGNOSIS — I85.00 ESOPHAGEAL VARICES WITHOUT BLEEDING, UNSPECIFIED ESOPHAGEAL VARICES TYPE (HCC): ICD-10-CM

## 2023-11-01 DIAGNOSIS — Z12.31 VISIT FOR SCREENING MAMMOGRAM: ICD-10-CM

## 2023-11-01 LAB — HBA1C MFR BLD: 7.9 %

## 2023-11-01 PROCEDURE — 77063 BREAST TOMOSYNTHESIS BI: CPT

## 2023-11-01 RX ORDER — BUDESONIDE AND FORMOTEROL FUMARATE DIHYDRATE 160; 4.5 UG/1; UG/1
AEROSOL RESPIRATORY (INHALATION)
Qty: 30.6 EACH | Refills: 1 | Status: SHIPPED | OUTPATIENT
Start: 2023-11-01

## 2023-11-01 RX ORDER — ACETAMINOPHEN 500 MG
500 TABLET ORAL 4 TIMES DAILY PRN
Qty: 28 TABLET | Refills: 0 | Status: SHIPPED | OUTPATIENT
Start: 2023-11-01 | End: 2023-11-08

## 2023-11-01 RX ORDER — MONTELUKAST SODIUM 10 MG/1
TABLET ORAL
Qty: 90 TABLET | Refills: 3 | Status: SHIPPED | OUTPATIENT
Start: 2023-11-01

## 2023-11-01 ASSESSMENT — PATIENT HEALTH QUESTIONNAIRE - PHQ9
1. LITTLE INTEREST OR PLEASURE IN DOING THINGS: 0
SUM OF ALL RESPONSES TO PHQ QUESTIONS 1-9: 0
SUM OF ALL RESPONSES TO PHQ QUESTIONS 1-9: 0
2. FEELING DOWN, DEPRESSED OR HOPELESS: 0
SUM OF ALL RESPONSES TO PHQ9 QUESTIONS 1 & 2: 0
SUM OF ALL RESPONSES TO PHQ QUESTIONS 1-9: 0
SUM OF ALL RESPONSES TO PHQ QUESTIONS 1-9: 0

## 2023-11-01 ASSESSMENT — LIFESTYLE VARIABLES
HOW OFTEN DO YOU HAVE A DRINK CONTAINING ALCOHOL: MONTHLY OR LESS
HOW MANY STANDARD DRINKS CONTAINING ALCOHOL DO YOU HAVE ON A TYPICAL DAY: 1 OR 2

## 2023-11-01 NOTE — ASSESSMENT & PLAN NOTE
Meghan Galindo  Lab Results   Component Value Date    LABGLOM 54 (A) 04/26/2023    LABGLOM 26 (A) 01/19/2023    LABGLOM 37 (A) 11/04/2022    LABGLOM 30 (A) 09/13/2022    LABGLOM 44 (A) 05/10/2022

## 2023-11-01 NOTE — PROGRESS NOTES
Medicare Annual Wellness Visit    Keiry Monson is here for Medicare AWV, Diabetes, and Asthma    Assessment & Plan   Type 2 diabetes mellitus with stage 3 chronic kidney disease, without long-term current use of insulin, unspecified whether stage 3a or 3b CKD ([facility])  Assessment & Plan:   Well-controlled, continue current medications   Lab Results   Component Value Date    LABA1C 7.9 11/01/2023    LABA1C 8.4 04/28/2023    LABA1C 8.1 03/01/2023    LABA1C 6.8 05/31/2022    LABA1C 6.6 07/01/2021     A1C down trending after dietary modification with nutritionist counseling. Patient notes difficulty exercising r/t osteoarthritic changes in R knee, orthopedic referral placed to ensure ADLs and aerobic exercise can continue  Orders:  -     POCT glycosylated hemoglobin (Hb A1C)  -     LIPID PANEL; Future  -     CBC with Auto Differential; Future  -     Comprehensive Metabolic Panel; Future  Medicare annual wellness visit, subsequent  Routine general medical examination at a health care facility  Acute pain of right knee  -     Mena Robin MD, Orthopedic Surgery (Shoulder; Hip; Knee), [facility]  Mild intermittent asthma without complication  Assessment & Plan:   Unclear control, continue current medications and medication adherence emphasized     Patient notes increased use of rescue inhaler r/t not having Symbicort for 4 weeks. Patient notes no change in SOB/SANDOVAL over past year with mild seasonal increase in s/s. Refill sent and adherence emphasized.     Patient to get Stress Test to rule out cardiac pathology  Orders:  -     budesonide-formoterol (SYMBICORT) 160-4.5 MCG/ACT AERO; TAKE 2 PUFFS BY MOUTH TWICE A DAY, Disp-30.6 each, R-1Normal  Autoimmune hepatitis ([facility])  Assessment & Plan:   Monitored by specialist- no acute findings meriting change in the plan  Esophageal varices without bleeding, unspecified esophageal varices type ([facility])  Assessment & Plan:   Monitored by specialist- no acute findings

## 2023-11-01 NOTE — ASSESSMENT & PLAN NOTE
Well-controlled, continue current medications   Lab Results   Component Value Date    LABA1C 7.9 11/01/2023    LABA1C 8.4 04/28/2023    LABA1C 8.1 03/01/2023    LABA1C 6.8 05/31/2022    LABA1C 6.6 07/01/2021     A1C down trending after dietary modification with nutritionist counseling.    Patient notes difficulty exercising r/t osteoarthritic changes in R knee, orthopedic referral placed to ensure ADLs and aerobic exercise can continue

## 2023-11-01 NOTE — ASSESSMENT & PLAN NOTE
Unclear control, continue current medications and medication adherence emphasized     Patient notes increased use of rescue inhaler r/t not having Symbicort for 4 weeks. Patient notes no change in SOB/SANDOVAL over past year with mild seasonal increase in s/s. Refill sent and adherence emphasized.     Patient to get Stress Test to rule out cardiac pathology

## 2023-11-02 DIAGNOSIS — N63.10 MASS OF RIGHT BREAST ON MAMMOGRAM: Primary | ICD-10-CM

## 2023-11-02 DIAGNOSIS — N18.31 STAGE 3A CHRONIC KIDNEY DISEASE (HCC): ICD-10-CM

## 2023-11-02 DIAGNOSIS — D72.9 NEUTROPHILIA: Primary | ICD-10-CM

## 2023-11-02 DIAGNOSIS — R92.2 INCONCLUSIVE MAMMOGRAM: ICD-10-CM

## 2023-11-02 DIAGNOSIS — N18.30 TYPE 2 DIABETES MELLITUS WITH STAGE 3 CHRONIC KIDNEY DISEASE, WITHOUT LONG-TERM CURRENT USE OF INSULIN, UNSPECIFIED WHETHER STAGE 3A OR 3B CKD (HCC): ICD-10-CM

## 2023-11-02 DIAGNOSIS — E11.22 TYPE 2 DIABETES MELLITUS WITH STAGE 3 CHRONIC KIDNEY DISEASE, WITHOUT LONG-TERM CURRENT USE OF INSULIN, UNSPECIFIED WHETHER STAGE 3A OR 3B CKD (HCC): ICD-10-CM

## 2023-11-02 LAB
ALBUMIN SERPL-MCNC: 4.4 G/DL (ref 3.4–5)
ALBUMIN/GLOB SERPL: 1.9 {RATIO} (ref 1.1–2.2)
ALP SERPL-CCNC: 60 U/L (ref 40–129)
ALT SERPL-CCNC: 21 U/L (ref 10–40)
ANION GAP SERPL CALCULATED.3IONS-SCNC: 10 MMOL/L (ref 3–16)
AST SERPL-CCNC: 16 U/L (ref 15–37)
BASOPHILS # BLD: 0.1 K/UL (ref 0–0.2)
BASOPHILS NFR BLD: 0.6 %
BILIRUB SERPL-MCNC: 0.8 MG/DL (ref 0–1)
BUN SERPL-MCNC: 17 MG/DL (ref 7–20)
CALCIUM SERPL-MCNC: 9.9 MG/DL (ref 8.3–10.6)
CHLORIDE SERPL-SCNC: 104 MMOL/L (ref 99–110)
CHOLEST SERPL-MCNC: 169 MG/DL (ref 0–199)
CO2 SERPL-SCNC: 29 MMOL/L (ref 21–32)
CREAT SERPL-MCNC: 1.2 MG/DL (ref 0.6–1.2)
CREAT UR-MCNC: 91.8 MG/DL (ref 28–259)
DEPRECATED RDW RBC AUTO: 14.3 % (ref 12.4–15.4)
EOSINOPHIL # BLD: 0.4 K/UL (ref 0–0.6)
EOSINOPHIL NFR BLD: 2.3 %
GFR SERPLBLD CREATININE-BSD FMLA CKD-EPI: 48 ML/MIN/{1.73_M2}
GLUCOSE SERPL-MCNC: 123 MG/DL (ref 70–99)
HCT VFR BLD AUTO: 40.3 % (ref 36–48)
HDLC SERPL-MCNC: 76 MG/DL (ref 40–60)
HGB BLD-MCNC: 13.4 G/DL (ref 12–16)
LDLC SERPL CALC-MCNC: 73 MG/DL
LYMPHOCYTES # BLD: 2.9 K/UL (ref 1–5.1)
LYMPHOCYTES NFR BLD: 15.7 %
MCH RBC QN AUTO: 30.4 PG (ref 26–34)
MCHC RBC AUTO-ENTMCNC: 33.2 G/DL (ref 31–36)
MCV RBC AUTO: 91.8 FL (ref 80–100)
MICROALBUMIN UR DL<=1MG/L-MCNC: <1.2 MG/DL
MICROALBUMIN/CREAT UR: NORMAL MG/G (ref 0–30)
MONOCYTES # BLD: 1.4 K/UL (ref 0–1.3)
MONOCYTES NFR BLD: 7.5 %
NEUTROPHILS # BLD: 13.9 K/UL (ref 1.7–7.7)
NEUTROPHILS NFR BLD: 73.9 %
PHOSPHATE SERPL-MCNC: 3.5 MG/DL (ref 2.5–4.9)
PLATELET # BLD AUTO: 336 K/UL (ref 135–450)
PMV BLD AUTO: 7.4 FL (ref 5–10.5)
POTASSIUM SERPL-SCNC: 4.3 MMOL/L (ref 3.5–5.1)
PROT SERPL-MCNC: 6.7 G/DL (ref 6.4–8.2)
RBC # BLD AUTO: 4.39 M/UL (ref 4–5.2)
SODIUM SERPL-SCNC: 143 MMOL/L (ref 136–145)
TRIGL SERPL-MCNC: 102 MG/DL (ref 0–150)
VLDLC SERPL CALC-MCNC: 20 MG/DL
WBC # BLD AUTO: 18.8 K/UL (ref 4–11)

## 2023-11-15 NOTE — CARE COORDINATION
Pt ret'd callback to AC  (Late entry note from phone review w/pt after hours 11.8. 22)  Ambulatory Care Coordination Note  11/9/2022    ACC: Berna Rico RN  General Assessment    Do you have any symptoms that are causing concern?: No        pt reports baseline health. No newly presenting concerns. Reviewed again RPM. Pt reports she self monitors and promptly reports any concerns to provider(s) involved in her care. Does not see value in potential to add to out of pocket, even for short term participation. Adds that she would be willing to receive brochure for future reference/consideration for RPM, but at this time, respectfully declines. Offered patient enrollment in the Remote Patient Monitoring (RPM) program for in-home monitoring: Patient declined. [facility] also mentioned potential for transition to [facility] in future, if pt willing. Pt does endorse outreach by [facility] team member - once program is available for 'go-live'. Pt agrees to continue to engage in current [facility] episode and following [employer] guidelines for self monitoring/reporting symptoms. Pt verbalizes understanding of above and agreement with the following  Plan: pt will self outreach direct to PCP to schedule future/return visit w/PCP   Continue [facility] support for health coaching, care collaboration, support w/community resources, and help with any newly presenting concerns as needed. Pt agrees to outreach direct to AC, as needed, between routine follow up outreach initiated by [facility]   Diabetes Assessment      Meal Planning: Avoidance of concentrated sweets, Plate Method   How often do you test your blood sugar?: Daily   Do you have barriers with adherence to non-pharmacologic self-management interventions?  (Nutrition/Exercise/Self-Monitoring): No   Have you ever had to go to the ED for symptoms of low blood sugar?: No       No patient-reported symptoms             Care Coordination Interventions    Referral from Primary Care Provider: No  Suggested Interventions and Community Resources          Goals Addressed                   This Visit's Progress     Conditions and Symptoms   On track     I will notify my provider of any symptoms that indicate a worsening of my condition. Barriers: lack of support and lack of education  Plan for overcoming my barriers: will work with [facility]  Confidence: 9/10  Anticipated Goal Completion Date: 1/25/23         Control Hypertension   On track     Self- Management Goals for the Patient with Hypertension: It is important to have goals to work towards when you have Hypertension. Below is a list of goals your doctor would like you to work towards to help control your hypertension and also maintain and improve your overall health. Please select one of these goals to try before your next follow up visit:    Goal: I will increase physical activity level, as follows: Exercise 15-20 min 3 days per week. Guess your barriers before they happen. Everyone runs into barriers to their goals. You may already know what's going to get in your way. Write down these problems (cost? time? stress? fear?), and think of ways to get around them. Barriers to success: impairment:  physical: foot pain    Plan for overcoming my barriers: Start with 15 min of exercise and work my way up to 30min     Confidence: 8/10  Date goal set: 08/08/2014  Date goal attained:  Patient given educational materials on Exercise    I have instructed Judith Fleming to complete a self tracking handout on Daily Exercise and to bring it with them to her next appointment. Prior to Admission medications    Medication Sig Start Date End Date Taking?  Authorizing Provider   Semaglutide 3 MG TABS Take 3 mg by mouth daily 11/1/22   Erum Millard, APRN - CNP   carvedilol (COREG) 6.25 MG tablet TAKE 1 TABLET BY MOUTH TWICE A DAY 8/22/22   Gokul Cardona MD   acetaminophen (TYLENOL) 500 MG tablet Take 1 tablet by mouth 4 times daily as needed Pt does not need OT at this time. Pt. independent. for Pain 7/21/22 8/29/22  Chichi Lerma, APRN - CNP   losartan (COZAAR) 100 MG tablet TAKE 2 TABLETS BY MOUTH EVERY DAY 7/14/22   Lexus Bush MD   atorvastatin (LIPITOR) 40 MG tablet Take 1 tablet by mouth daily 2/22/22   Drucella Counter, APRN - CNP   albuterol sulfate HFA (PROVENTIL HFA) 108 (90 Base) MCG/ACT inhaler INHALE 2 PUFFS INTO THE LUNGS EVERY 6 HOURS AS NEEDED. 8/24/21   Drucella Counter, APRN - CNP   desloratadine (CLARINEX) 5 MG tablet TAKE 1 TABLET BY MOUTH EVERY DAY 5/24/21   Richard Montes MD   budesonide-formoterol (SYMBICORT) 160-4.5 MCG/ACT AERO TAKE 2 PUFFS BY MOUTH TWICE A DAY 5/10/21   Richard Montes MD   amLODIPine (NORVASC) 5 MG tablet Take 1 tablet by mouth 2 times daily 4/13/21   Richard Montes MD   blood glucose monitor kit and supplies Dispense sufficient amount for indicated testing frequency plus additional to accommodate PRN testing needs. Dispense all needed supplies to include: monitor, strips, lancing device, lancets, control solutions, alcohol swabs. 3/17/21   Richard Montes MD   metFORMIN (GLUMETZA) 1000 MG extended release tablet Take 2 tablets by mouth daily (with breakfast)  Patient not taking: Reported on 11/1/2022 12/31/20   Richard Montes MD   montelukast (SINGULAIR) 10 MG tablet TAKE 1 TABLET BY MOUTH EVERY DAY EVERY NIGHT 12/17/20   Richard Montes MD   fluticasone (FLONASE) 50 MCG/ACT nasal spray SPRAY 1 SPRAY INTO EACH NOSTRIL EVERY DAY 10/19/20   Richard Montes MD   calcium carbonate (OSCAL) 500 MG TABS tablet Take 500 mg by mouth daily. Historical Provider, MD   Multiple Vitamin (MULTIVITAMIN PO) Take  by mouth daily.  8/6/10   Historical Provider, MD       Future Appointments   Date Time Provider Kirby Ivey   11/29/2022  9:30 AM Elli Mcwilliams, RD, LD [facility] [place] [facility]   1/3/2023  1:30 PM Lexus Bush MD [facility]

## 2023-11-17 ENCOUNTER — ANCILLARY ORDERS (OUTPATIENT)
Dept: FAMILY MEDICINE CLINIC | Age: 73
End: 2023-11-17

## 2023-11-17 ENCOUNTER — HOSPITAL ENCOUNTER (OUTPATIENT)
Dept: ULTRASOUND IMAGING | Age: 73
Discharge: HOME | End: 2023-11-17
Payer: MEDICARE

## 2023-11-17 DIAGNOSIS — N63.10 MASS OF RIGHT BREAST ON MAMMOGRAM: ICD-10-CM

## 2023-11-17 DIAGNOSIS — R92.2 INCONCLUSIVE MAMMOGRAM: ICD-10-CM

## 2023-11-17 PROCEDURE — 76882 US LMTD JT/FCL EVL NVASC XTR: CPT

## 2023-11-18 DIAGNOSIS — E78.00 PURE HYPERCHOLESTEROLEMIA: ICD-10-CM

## 2023-11-20 RX ORDER — ATORVASTATIN CALCIUM 40 MG/1
TABLET, FILM COATED ORAL
Qty: 90 TABLET | Refills: 1 | Status: SHIPPED | OUTPATIENT
Start: 2023-11-20

## 2023-11-26 SDOH — HEALTH STABILITY: PHYSICAL HEALTH: ON AVERAGE, HOW MANY DAYS PER WEEK DO YOU ENGAGE IN MODERATE TO STRENUOUS EXERCISE (LIKE A BRISK WALK)?: 0 DAYS

## 2023-11-26 ASSESSMENT — SOCIAL DETERMINANTS OF HEALTH (SDOH)
WITHIN THE LAST YEAR, HAVE YOU BEEN AFRAID OF YOUR PARTNER OR EX-PARTNER?: NO
WITHIN THE LAST YEAR, HAVE YOU BEEN KICKED, HIT, SLAPPED, OR OTHERWISE PHYSICALLY HURT BY YOUR PARTNER OR EX-PARTNER?: NO
WITHIN THE LAST YEAR, HAVE YOU BEEN HUMILIATED OR EMOTIONALLY ABUSED IN OTHER WAYS BY YOUR PARTNER OR EX-PARTNER?: NO
WITHIN THE LAST YEAR, HAVE TO BEEN RAPED OR FORCED TO HAVE ANY KIND OF SEXUAL ACTIVITY BY YOUR PARTNER OR EX-PARTNER?: NO

## 2023-11-27 ENCOUNTER — OFFICE VISIT (OUTPATIENT)
Dept: ORTHOPEDIC SURGERY | Age: 73
End: 2023-11-27
Payer: MEDICARE

## 2023-11-27 VITALS — WEIGHT: 196 LBS | BODY MASS INDEX: 33.46 KG/M2 | HEIGHT: 64 IN

## 2023-11-27 DIAGNOSIS — M25.561 ACUTE PAIN OF RIGHT KNEE: ICD-10-CM

## 2023-11-27 DIAGNOSIS — M17.11 PRIMARY OSTEOARTHRITIS OF RIGHT KNEE: Primary | ICD-10-CM

## 2023-11-27 PROCEDURE — 99204 OFFICE O/P NEW MOD 45 MIN: CPT | Performed by: STUDENT IN AN ORGANIZED HEALTH CARE EDUCATION/TRAINING PROGRAM

## 2023-11-27 PROCEDURE — 20610 DRAIN/INJ JOINT/BURSA W/O US: CPT | Performed by: STUDENT IN AN ORGANIZED HEALTH CARE EDUCATION/TRAINING PROGRAM

## 2023-11-27 RX ORDER — LIDOCAINE HYDROCHLORIDE 10 MG/ML
4 INJECTION, SOLUTION INFILTRATION; PERINEURAL ONCE
Status: COMPLETED | OUTPATIENT
Start: 2023-11-27 | End: 2023-11-27

## 2023-11-27 RX ORDER — TRIAMCINOLONE ACETONIDE 40 MG/ML
40 INJECTION, SUSPENSION INTRA-ARTICULAR; INTRAMUSCULAR ONCE
Status: COMPLETED | OUTPATIENT
Start: 2023-11-27 | End: 2023-11-27

## 2023-11-27 RX ADMIN — LIDOCAINE HYDROCHLORIDE 4 ML: 10 INJECTION, SOLUTION INFILTRATION; PERINEURAL at 11:04

## 2023-11-27 RX ADMIN — TRIAMCINOLONE ACETONIDE 40 MG: 40 INJECTION, SUSPENSION INTRA-ARTICULAR; INTRAMUSCULAR at 11:05

## 2023-11-27 NOTE — PROGRESS NOTES
Dr Simran Matute      Date /Time 11/27/2023       10:21 AM EST  Name Dee Dee Doan             1950   Location  [facility]  MRN [id number]                Chief Complaint   Patient presents with    New Patient     NP Rt knee ref pcp  No recent imaging        History of Present Illness  Dee Dee Doan is a 72 y.o. female who presents with right knee pain. She had a fall about a year ago landing on the right knee; She complains today of medial pain and extension pain, weakness and swelling. Weakness is most prominent with long periods of standing or walking. Sent in consultation by ROSINA Brown - CNP. Past History  Past Medical History:   Diagnosis Date    Allergic rhinitis     Asthma     COPD (chronic obstructive pulmonary disease) ([address])     pt denies    Hepatitis     autoimmune    Hyperlipidemia     Hypertension     Influenza A 1/10/2015    Osteoporosis      Past Surgical History:   Procedure Laterality Date    BREAST BIOPSY  10/2005    Dr. Sofia Nguyen - right , benign     CATARACT REMOVAL Bilateral     COLONOSCOPY      Dr. Shanice Ortiz  - Thalia Pro 10 yrs     COLONOSCOPY  07/08/13    Dr. Shanice Ortiz- internal hemorrhoids, adenomatous and hyperplastic polyps.  Repeat in 3 yrs    COLONOSCOPY  07/17/2016    Dr. Shanice Ortiz, No evidence of any colorectal neoplasia ~5 years    COLONOSCOPY  6/21/2021    COLONOSCOPY POLYPECTOMY SNARE/COLD BIOPSY performed by Ant Price MD at [address] [place] [address]  8/2006     autoimmune hepatitis     UPPER GASTROINTESTINAL ENDOSCOPY N/A 7/11/2022    ESOPHAGOGASTRODUODENOSCOPY performed by Ant Price MD at [facility] History     Tobacco Use    Smoking status: Never    Smokeless tobacco: Never   Substance Use Topics    Alcohol use: Not Currently     Comment: rare - few times a year      Current Outpatient Medications on File Prior to Visit   Medication Sig Dispense Refill    atorvastatin (LIPITOR) 40 MG

## 2023-11-28 ENCOUNTER — TELEPHONE (OUTPATIENT)
Dept: SURGERY | Age: 73
End: 2023-11-28

## 2023-11-28 NOTE — TELEPHONE ENCOUNTER
Breast History:  History of Previous Breast Biopsy: Right breast  benign detail unknown to patient, bilateral screening mammogram 23  Family History of Breast Cancer: mother dx 80,  at 80  Family History of Other Cancers: father prostate , brother (3) prostate, brother (2) prostate age of dx unknown for father and brothers  Ashkenazi Adventism Decent: No  Bra Size: 43 D  LAW Risk Assessment __9.1___ %    Gyne History:  : N/A  Para:N/A   Age of Menarche: 15  Age of Menopause: 56  Age of first live Birth: N/A  History of Hysterectomy / LITZY-BSO: N/A  History of OCP's: 40 years  HRT: None  Family History or personal history of Ovarian Cancer: mother dx at 80    Lifestyle:  Smoker ( Current / Former ):None  ETOH ( alcohol consumption ): sometimes  Exercise: not since patient fell on knee in   Caffeine: coffee or tea 3x week  Drug use ( including THC/ Sydell Mary ): None

## 2023-11-29 ENCOUNTER — OFFICE VISIT (OUTPATIENT)
Dept: BREAST CENTER | Age: 73
End: 2023-11-29
Payer: MEDICARE

## 2023-11-29 VITALS
SYSTOLIC BLOOD PRESSURE: 130 MMHG | HEIGHT: 64 IN | DIASTOLIC BLOOD PRESSURE: 87 MMHG | HEART RATE: 71 BPM | RESPIRATION RATE: 16 BRPM | WEIGHT: 202 LBS | OXYGEN SATURATION: 97 % | BODY MASS INDEX: 34.49 KG/M2

## 2023-11-29 DIAGNOSIS — R22.31 AXILLARY MASS, RIGHT: Primary | ICD-10-CM

## 2023-11-29 PROCEDURE — 3075F SYST BP GE 130 - 139MM HG: CPT | Performed by: SURGERY

## 2023-11-29 PROCEDURE — 1123F ACP DISCUSS/DSCN MKR DOCD: CPT | Performed by: SURGERY

## 2023-11-29 PROCEDURE — 3079F DIAST BP 80-89 MM HG: CPT | Performed by: SURGERY

## 2023-11-29 PROCEDURE — 99204 OFFICE O/P NEW MOD 45 MIN: CPT | Performed by: SURGERY

## 2023-11-29 NOTE — PATIENT INSTRUCTIONS
Right breast mass    Sebaceus cyst  Outpatient surgery  Mac anaesthesia  Discussed risk vs benefits of surgery  Consent obtained  Patient received pink folder

## 2023-11-29 NOTE — PROGRESS NOTES
2023    Chief Complaint   Patient presents with    Surgical Consult     Ref by ROSINA Ambrocio CNP, Right breast mass        HPI Patient is here for evaluation of a right axillary mass. It has been present for years and has been growing some recently. It has not been inflamed, no drainage. No pain. She has not felt any breast masses, no breast pain or nipple discharge. Bilateral screening mammogram 2023 showed a 16 mm mass in the right axilla, increased in size compared to .      Right axillary ultrasound 2023 15 mm subdermal lesion c/w large inclusion cyst or sebaceous cyst.     Breast History:  History of Previous Breast Biopsy: Right breast  benign detail unknown to patient, bilateral screening mammogram 25  Family History of Breast Cancer: mother dx 80,  at 80  Family History of Other Cancers: father prostate , brother (3) prostate, brother (2) prostate age of dx unknown for father and brothers  Ashkenazi Jain Decent: No  Bra Size: 43 D  LAW Risk Assessment __9.1___ %     Gyne History:  : N/A  Para:N/A   Age of Menarche: 15  Age of Menopause: 56  Age of first live Birth: N/A  History of Hysterectomy / LITZY-BSO: N/A  History of OCP's: 40 years  HRT: None  Family History or personal history of Ovarian Cancer: mother dx at 80     Lifestyle:  Smoker ( Current / Former ):None  ETOH ( alcohol consumption ): sometimes  Exercise: not since patient fell on knee in   Caffeine: coffee or tea 3x week  Drug use ( including THC/ [place] ): None         Current Outpatient Medications:     atorvastatin (LIPITOR) 40 MG tablet, TAKE 1 TABLET BY MOUTH EVERY DAY, Disp: 90 tablet, Rfl: 1    montelukast (SINGULAIR) 10 MG tablet, TAKE 1 TABLET BY MOUTH EVERY DAY EVERY NIGHT, Disp: 90 tablet, Rfl: 3    budesonide-formoterol (SYMBICORT) 160-4.5 MCG/ACT AERO, TAKE 2 PUFFS BY MOUTH TWICE A DAY, Disp: 30.6 each, Rfl: 1    blood glucose test strips (FREESTYLE LITE)

## 2023-12-06 ENCOUNTER — OFFICE VISIT (OUTPATIENT)
Dept: FAMILY MEDICINE CLINIC | Age: 73
End: 2023-12-06

## 2023-12-06 ENCOUNTER — HOSPITAL ENCOUNTER (OUTPATIENT)
Dept: PHYSICAL THERAPY | Age: 73
Setting detail: THERAPIES SERIES
Discharge: HOME | End: 2023-12-06
Payer: MEDICARE

## 2023-12-06 VITALS
SYSTOLIC BLOOD PRESSURE: 132 MMHG | WEIGHT: 200 LBS | BODY MASS INDEX: 34.33 KG/M2 | TEMPERATURE: 97.4 F | OXYGEN SATURATION: 97 % | HEART RATE: 70 BPM | DIASTOLIC BLOOD PRESSURE: 82 MMHG

## 2023-12-06 DIAGNOSIS — N18.31 STAGE 3A CHRONIC KIDNEY DISEASE (HCC): ICD-10-CM

## 2023-12-06 DIAGNOSIS — R53.1 WEAKNESS: ICD-10-CM

## 2023-12-06 DIAGNOSIS — N18.31 TYPE 2 DIABETES MELLITUS WITH STAGE 3A CHRONIC KIDNEY DISEASE, WITHOUT LONG-TERM CURRENT USE OF INSULIN (HCC): ICD-10-CM

## 2023-12-06 DIAGNOSIS — M25.561 ACUTE PAIN OF RIGHT KNEE: Primary | ICD-10-CM

## 2023-12-06 DIAGNOSIS — R22.31 MASS OF RIGHT AXILLA: ICD-10-CM

## 2023-12-06 DIAGNOSIS — Z01.818 PREOPERATIVE EXAMINATION: Primary | ICD-10-CM

## 2023-12-06 DIAGNOSIS — E11.22 TYPE 2 DIABETES MELLITUS WITH STAGE 3A CHRONIC KIDNEY DISEASE, WITHOUT LONG-TERM CURRENT USE OF INSULIN (HCC): ICD-10-CM

## 2023-12-06 PROBLEM — I10 ESSENTIAL HYPERTENSION, BENIGN: Status: ACTIVE | Noted: 2017-10-09

## 2023-12-06 PROCEDURE — 97161 PT EVAL LOW COMPLEX 20 MIN: CPT

## 2023-12-06 PROCEDURE — 97110 THERAPEUTIC EXERCISES: CPT

## 2023-12-06 NOTE — FLOWSHEET NOTE
minutes face-to-face) 1    Neuromusc. Re-ed (87136)    Re-Eval (57916)     Manual ([id number])    Estim Unattended (29830)     Ther. Act (47408)    [facility]. Traction ([id number])     Gait (55494)    Dry Needle 1-2 muscle (55994)     Aquatic Therex (71613)    Dry Needle 3+ muscle (89800)     Iontophoresis (55846)    VASO (61623)     Ultrasound (61075)    Group Therapy (29850)     Estim Attended (81313)    Canalith Repositioning (89338)     Other:    Other: Total Timed Code Tx Minutes 25' 2  1     Total Treatment Minutes 45'        Charge Justification:  (86000) THERAPEUTIC EXERCISE - Provided verbal/tactile cueing for activities related to strengthening, flexibility, endurance, ROM performed to prevent loss of range of motion, maintain or improve muscular strength or increase flexibility, following either an injury or surgery. (99413) [facility] - Reviewed/Progressed HEP activities related to strengthening, flexibility, endurance, ROM performed to prevent loss of range of motion, maintain or improve muscular strength or increase flexibility, following either an injury or surgery. TREATMENT PLAN   Plan: POC Initiated today- see eval for details  Next visit:  Beth BASHIRE  SLS  Minisquats  LAQs  Leg press    Electronically Signed by Jessica Avendaño PT              Date: 12/06/2023     Note: If patient does not return for scheduled/recommended follow up visits, this note will serve as a discharge from care along with the most recent update on progress.

## 2023-12-06 NOTE — ASSESSMENT & PLAN NOTE
Lab Results   Component Value Date    LABA1C 7.9 11/01/2023    LABA1C 8.4 04/28/2023    LABA1C 8.1 03/01/2023    LABA1C 6.8 05/31/2022    LABA1C 6.6 07/01/2021     Improved

## 2023-12-06 NOTE — PLAN OF CARE
- 1 x daily - 7 x weekly - 1-3 sets - 10 reps  - Seated Hamstring Curls with Resistance  - 1 x daily - 7 x weekly - 1-3 sets - 10 reps    Electronically Signed by Teresa Garcia, PT  Date: 12/06/2023

## 2023-12-06 NOTE — PROGRESS NOTES
Requesting surgeon: Dr. Noah King MD   Reason for Consult: Preoperative Evaluation of Risk  Surgery location: [facility]   Surgery date: 12/18/23    HPI:   Zachariah Garvin is a 72 y.o. female with history of Allergic Rhinitis, Asthmna, COPD, DM, Hepatitis, HLD, HTN, Influenza A, Osteoporosis   Presents for pre op evaluation for EXCISION OF RIGHT AXILLARY MASS   Denies fever, chills, or current illness. Denies HA/dizziness. Denies changes in vision/hearing/smell/taste. Denies personal or family history of anesthesia complications. Medications:  Current Outpatient Medications   Medication Sig Dispense Refill    atorvastatin (LIPITOR) 40 MG tablet TAKE 1 TABLET BY MOUTH EVERY DAY 90 tablet 1    acetaminophen (TYLENOL) 500 MG tablet Take 1 tablet by mouth 4 times daily as needed for Pain 28 tablet 0    montelukast (SINGULAIR) 10 MG tablet TAKE 1 TABLET BY MOUTH EVERY DAY EVERY NIGHT 90 tablet 3    budesonide-formoterol (SYMBICORT) 160-4.5 MCG/ACT AERO TAKE 2 PUFFS BY MOUTH TWICE A DAY 30.6 each 1    carvedilol (COREG) 6.25 MG tablet TAKE 1 TABLET BY MOUTH TWICE A  tablet 1    albuterol sulfate HFA (PROVENTIL HFA) 108 (90 Base) MCG/ACT inhaler INHALE 2 PUFFS INTO THE LUNGS EVERY 6 HOURS AS NEEDED.  1 each 2    desloratadine (CLARINEX) 5 MG tablet Take 1 tablet by mouth daily 90 tablet 0    pioglitazone (ACTOS) 15 MG tablet TAKE 1 TABLET BY MOUTH EVERY DAY 90 tablet 1    lisinopril (PRINIVIL;ZESTRIL) 20 MG tablet TAKE 1 TABLET BY MOUTH EVERY DAY 90 tablet 0    predniSONE (DELTASONE) 10 MG tablet TAKE 1 TABLET BY MOUTH EVERY DAY 90 tablet 2    amLODIPine (NORVASC) 5 MG tablet TAKE 1 TABLET BY MOUTH TWICE A  tablet 1    glipiZIDE (GLUCOTROL) 5 MG tablet TAKE 1 TABLET BY MOUTH TWICE A  tablet 1    fluticasone (FLONASE) 50 MCG/ACT nasal spray SPRAY 1 SPRAY INTO EACH NOSTRIL EVERY DAY 1 Bottle 2    calcium carbonate (OSCAL) 500 MG TABS tablet Take 1 tablet by mouth daily      Multiple Vitamin

## 2023-12-06 NOTE — ASSESSMENT & PLAN NOTE
Lab Results   Component Value Date    LABGLOM 48 (A) 11/02/2023    LABGLOM 54 (A) 04/26/2023    LABGLOM 26 (A) 01/19/2023    LABGLOM 37 (A) 11/04/2022    LABGLOM 30 (A) 09/13/2022     Colleen, active with nephrology

## 2023-12-13 ENCOUNTER — HOSPITAL ENCOUNTER (OUTPATIENT)
Dept: PHYSICAL THERAPY | Age: 73
Setting detail: THERAPIES SERIES
Discharge: HOME | End: 2023-12-13
Payer: MEDICARE

## 2023-12-13 PROCEDURE — 97112 NEUROMUSCULAR REEDUCATION: CPT

## 2023-12-13 PROCEDURE — 97110 THERAPEUTIC EXERCISES: CPT

## 2023-12-13 NOTE — FLOWSHEET NOTE
[address] [place] [address] and Therapy [facility] [address], [address] [place] [address] office: [phone] fax: [phone]      Physical Therapy: TREATMENT/PROGRESS NOTE   Patient: Romelia Burnett (89 y.o. female)   Treatment Date: 2023   :  1950 MRN: [id number]   Visit #: 2   Insurance Allowable Auth Needed   BMN []Yes    [x]No    Insurance: Payor: Amanda Quintero / Plan: Jose Cruz Caputo PPO / Product Type: Medicare /   Insurance ID: [id number] - (Medicare Managed)  Secondary Insurance (if applicable):    Treatment Diagnosis:     ICD-10-CM    1. Acute pain of right knee  M25.561       2. Weakness  R53.1          Medical Diagnosis:    Acute pain of right knee [M25.561]  Primary osteoarthritis of right knee [M17.11]   Referring Physician: Kathrin Thakkar MD  PCP: ROSINA Ness CNP                             Plan of care signed (Y/N):     Date of Patient follow up with Physician: as needed     Progress Report/POC: NO  POC update due: (10 visits /OR [address], whichever is less)  2024     none    Preferred Language for Healthcare:   [x]English       []other:    SUBJECTIVE EXAMINATION     Patient Report/Comments: Patient reports compliance with HEP (unsure if she is doing the HEP correctly); reports R leg feels a little stronger and no instance of her R leg feeling like it will give out. Test used Initial score  2023   Pain Summary VAS 1-4/10 0/10 R knee   Functional questionnaire LEFS 50%    Other:                OBJECTIVE EXAMINATION     Observation:     Test measurements: see eval    Exercises/Interventions:   Access Code: [email or web address]  URL: [email or web address]  Date: 2023  Prepared by: Maia Franks    Exercises  - Sidelying Hip Abduction  - 1 x daily - 7 x weekly - 1-3 sets - 10 reps  - Supine Active Straight Leg Raise  - 1 x daily - 7 x weekly - 1-3 sets - 10 reps  - Supine Knee Extension Strengthening  - 1 x

## 2023-12-22 ENCOUNTER — APPOINTMENT (OUTPATIENT)
Dept: PHYSICAL THERAPY | Age: 73
End: 2023-12-22
Payer: MEDICARE

## 2023-12-24 DIAGNOSIS — I10 ESSENTIAL HYPERTENSION, BENIGN: ICD-10-CM

## 2023-12-26 NOTE — TELEPHONE ENCOUNTER
Requested Prescriptions     Pending Prescriptions Disp Refills    lisinopril (PRINIVIL;ZESTRIL) 20 MG tablet 90 tablet 0     Sig: Take 1 tablet by mouth daily          Last Office Visit: 12/6/2023     Next Office Visit: Visit date not found     Last Labs:

## 2023-12-27 ENCOUNTER — HOSPITAL ENCOUNTER (OUTPATIENT)
Dept: PHYSICAL THERAPY | Age: 73
Setting detail: THERAPIES SERIES
Discharge: HOME | End: 2023-12-27
Payer: MEDICARE

## 2023-12-27 PROCEDURE — 97530 THERAPEUTIC ACTIVITIES: CPT

## 2023-12-27 PROCEDURE — 97112 NEUROMUSCULAR REEDUCATION: CPT

## 2023-12-27 RX ORDER — LISINOPRIL 20 MG/1
20 TABLET ORAL DAILY
Qty: 90 TABLET | Refills: 0 | Status: SHIPPED | OUTPATIENT
Start: 2023-12-27

## 2023-12-27 NOTE — FLOWSHEET NOTE
[x] Progressing: [] Met: [] Not Met: [] Adjusted       Overall Progression Towards Functional goals/ Treatment Progress Update:  [] Patient is progressing as expected towards functional goals listed. [] Progression is slowed due to complexities/Impairments listed. [] Progression has been slowed due to co-morbidities. [x] Plan just implemented, too soon (<30days) to assess goals progression   [] Goals require adjustment due to lack of progress  [] Patient is not progressing as expected and requires additional follow up with physician  [] Other:     CHARGE CAPTURE     PT CHARGE GRID   CPT Code (TIMED) minutes # CPT Code (UNTIMED) #     Therex [phone])  30' 2  EVAL:LOW (18163 - Typically 20 minutes face-to-face)     Neuromusc. Re-ed (69934) 10' 1  Re-Eval (59780)     Manual ([id number])    Estim Unattended (98602)     Ther. Act (84188)    [facility]. Traction ([id number])     Gait (72861)    Dry Needle 1-2 muscle (93555)     Aquatic Therex (42927)    Dry Needle 3+ muscle (22303)     Iontophoresis (81594)    VASO (70428)     Ultrasound (01235)    Group Therapy (94321)     Estim Attended (98138)    Canalith Repositioning (56691)     Other:    Other: Total Timed Code Tx Minutes 40' 3       Total Treatment Minutes 50'        Charge Justification:  (18380) THERAPEUTIC EXERCISE - Provided verbal/tactile cueing for activities related to strengthening, flexibility, endurance, ROM performed to prevent loss of range of motion, maintain or improve muscular strength or increase flexibility, following either an injury or surgery. (74803) [facility] - Reviewed/Progressed HEP activities related to strengthening, flexibility, endurance, ROM performed to prevent loss of range of motion, maintain or improve muscular strength or increase flexibility, following either an injury or surgery.   (48701) NEUROMUSCULAR RE-EDUCATION - Therapeutic procedure, 1 or more areas, each 15 minutes; neuromuscular reeducation of movement, balance,

## 2024-01-02 ENCOUNTER — HOSPITAL ENCOUNTER (OUTPATIENT)
Dept: PHYSICAL THERAPY | Age: 74
Setting detail: THERAPIES SERIES
Discharge: HOME | End: 2024-01-02
Payer: MEDICARE

## 2024-01-02 PROCEDURE — 97110 THERAPEUTIC EXERCISES: CPT

## 2024-01-02 PROCEDURE — 97112 NEUROMUSCULAR REEDUCATION: CPT

## 2024-01-02 NOTE — FLOWSHEET NOTE
[facility]- Outpatient Rehabilitation and Therapy [address], [address], [place] [address] office: [phone] fax: [phone]      Physical Therapy: TREATMENT/PROGRESS NOTE   Patient: Fidleia Torres (73 y.o. female)   Treatment Date: 2024   :  1950 MRN: [id number]   Visit #: 4   Insurance Allowable Auth Needed   BMN []Yes    [x]No    Insurance: Payor: [employer] MEDICARE / Plan: A[employer] MEDICARE-ADVANTAGE PPO / Product Type: Medicare /   Insurance ID: [id number] - (Medicare Managed)  Secondary Insurance (if applicable):    Treatment Diagnosis:     ICD-10-CM    1. Acute pain of right knee  M25.561       2. Weakness  R53.1          Medical Diagnosis:    Acute pain of right knee [M25.561]  Primary osteoarthritis of right knee [M17.11]   Referring Physician: Bruno Young MD  PCP: Albina Valdes APRN - CNP                             Plan of care signed (Y/N):     Date of Patient follow up with Physician: as needed     Progress Report/POC: NO  POC update due: (10 visits /OR AUTH LIMITS, whichever is less)  2024     none    Preferred Language for Healthcare:   [x]English       []other:    SUBJECTIVE EXAMINATION     Patient Report/Comments: Patient reports she has been doing well. States she has been consistent with her HEP and has been doing well with everything other than some soreness.      Test used Initial score  2024   Pain Summary VAS 1-4/10 0/10 R knee   Functional questionnaire LEFS 50%    Other:                OBJECTIVE EXAMINATION     Observation:     Test measurements: see eval    Exercises/Interventions:   Access Code: [email or web address]  URL: [email or web address]  Date: 2023  Prepared by: Sirena Fan    Exercises  - Sidelying Hip Abduction  - 1 x daily - 7 x weekly - 1-3 sets - 10 reps  - Supine Active Straight Leg Raise  - 1 x daily - 7 x weekly - 1-3 sets - 10 reps  - Supine Knee Extension Strengthening  - 1 x daily - 7 x weekly - 1-3

## 2024-01-03 ENCOUNTER — OFFICE VISIT (OUTPATIENT)
Dept: BREAST CENTER | Age: 74
End: 2024-01-03

## 2024-01-03 VITALS
SYSTOLIC BLOOD PRESSURE: 157 MMHG | WEIGHT: 204 LBS | RESPIRATION RATE: 16 BRPM | HEIGHT: 64 IN | OXYGEN SATURATION: 97 % | BODY MASS INDEX: 34.83 KG/M2 | HEART RATE: 101 BPM | DIASTOLIC BLOOD PRESSURE: 95 MMHG

## 2024-01-03 DIAGNOSIS — R22.31 AXILLARY MASS, RIGHT: Primary | ICD-10-CM

## 2024-01-03 PROCEDURE — 99024 POSTOP FOLLOW-UP VISIT: CPT | Performed by: SURGERY

## 2024-01-03 NOTE — PATIENT INSTRUCTIONS
Breast exam performed, no palpable masses.  Surgical Site healing well.    OK TO BEGIN DAILY GENTLE MASSAGE WITH MILD, UNSCENTED LOTION TO BREAK UP SCAR TISSUE AND DISBURSE SWELLING    Continue self breast exams    Healthy Lifestyle Recommendations: healthy diet (decrease consumption of red meat, increase fresh fruits and vegetables), decreased alcohol consumption (less than 4 drinks/week), adequate sleep (goal 6-8 hours), routine exercise (goal 150 minutes/week or greater), weight control.     Return: as needed, continue yearly mammogram and breast check with OBGYN or PCP

## 2024-01-03 NOTE — PROGRESS NOTES
1/3/2024    Chief Complaint   Patient presents with    Post-Op Check     Post op visit, no concerns        HPI Patient is s/p excision of right axillary mass 12/18/2023, path showed a benign skin cyst. Wound healing well, instructed on wound care. Continue monthly self breast exam, yearly mammogram. Follow up as needed.       Current Outpatient Medications:     lisinopril (PRINIVIL;ZESTRIL) 20 MG tablet, Take 1 tablet by mouth daily, Disp: 90 tablet, Rfl: 0    albuterol sulfate HFA (PROVENTIL;VENTOLIN;PROAIR) 108 (90 Base) MCG/ACT inhaler, INHALE 2 PUFFS INTO THE LUNGS EVERY 6 HOURS AS NEEDED., Disp: 6.7 each, Rfl: 2    atorvastatin (LIPITOR) 40 MG tablet, TAKE 1 TABLET BY MOUTH EVERY DAY, Disp: 90 tablet, Rfl: 1    acetaminophen (TYLENOL) 500 MG tablet, Take 1 tablet by mouth 4 times daily as needed for Pain, Disp: 28 tablet, Rfl: 0    montelukast (SINGULAIR) 10 MG tablet, TAKE 1 TABLET BY MOUTH EVERY DAY EVERY NIGHT, Disp: 90 tablet, Rfl: 3    budesonide-formoterol (SYMBICORT) 160-4.5 MCG/ACT AERO, TAKE 2 PUFFS BY MOUTH TWICE A DAY, Disp: 30.6 each, Rfl: 1    carvedilol (COREG) 6.25 MG tablet, TAKE 1 TABLET BY MOUTH TWICE A DAY, Disp: 180 tablet, Rfl: 1    desloratadine (CLARINEX) 5 MG tablet, Take 1 tablet by mouth daily, Disp: 90 tablet, Rfl: 0    pioglitazone (ACTOS) 15 MG tablet, TAKE 1 TABLET BY MOUTH EVERY DAY, Disp: 90 tablet, Rfl: 1    predniSONE (DELTASONE) 10 MG tablet, TAKE 1 TABLET BY MOUTH EVERY DAY, Disp: 90 tablet, Rfl: 2    amLODIPine (NORVASC) 5 MG tablet, TAKE 1 TABLET BY MOUTH TWICE A DAY, Disp: 180 tablet, Rfl: 1    glipiZIDE (GLUCOTROL) 5 MG tablet, TAKE 1 TABLET BY MOUTH TWICE A DAY, Disp: 180 tablet, Rfl: 1    fluticasone (FLONASE) 50 MCG/ACT nasal spray, SPRAY 1 SPRAY INTO EACH NOSTRIL EVERY DAY, Disp: 1 Bottle, Rfl: 2    calcium carbonate (OSCAL) 500 MG TABS tablet, Take 1 tablet by mouth daily, Disp: , Rfl:     Multiple Vitamin (MULTIVITAMIN PO), Take  by mouth daily., Disp: , Rfl:

## 2024-01-08 DIAGNOSIS — J45.20 MILD INTERMITTENT ASTHMA WITHOUT COMPLICATION: ICD-10-CM

## 2024-01-09 ENCOUNTER — HOSPITAL ENCOUNTER (OUTPATIENT)
Dept: PHYSICAL THERAPY | Age: 74
Setting detail: THERAPIES SERIES
Discharge: HOME | End: 2024-01-09
Payer: MEDICARE

## 2024-01-09 PROCEDURE — 97110 THERAPEUTIC EXERCISES: CPT

## 2024-01-09 PROCEDURE — 97112 NEUROMUSCULAR REEDUCATION: CPT

## 2024-01-09 RX ORDER — DESLORATADINE 5 MG/1
5 TABLET ORAL DAILY
Qty: 90 TABLET | Refills: 0 | OUTPATIENT
Start: 2024-01-09

## 2024-01-09 NOTE — FLOWSHEET NOTE
flexibility, following either an injury or surgery.  (01295) NEUROMUSCULAR RE-EDUCATION - Therapeutic procedure, 1 or more areas, each 15 minutes; neuromuscular reeducation of movement, balance, coordination, kinesthetic sense, posture, and/or proprioception for sitting and/or standing activities    TREATMENT PLAN   Plan: Cont POC- Continue emphasis/focus on exercise progression, improving proper muscle recruitment and activation/motor control patterns, improving soft tissue extensibility, and static and dynamic balance. Next visit plan to progress weights, add new exercises, and progress balance         Electronically Signed by Levon Lopes PT              Date: 01/09/2024     Note: If patient does not return for scheduled/recommended follow up visits, this note will serve as a discharge from care along with the most recent update on progress.

## 2024-01-16 ENCOUNTER — HOSPITAL ENCOUNTER (OUTPATIENT)
Dept: PHYSICAL THERAPY | Age: 74
Setting detail: THERAPIES SERIES
Discharge: HOME | End: 2024-01-16
Payer: MEDICARE

## 2024-01-16 PROCEDURE — 97112 NEUROMUSCULAR REEDUCATION: CPT

## 2024-01-16 PROCEDURE — 97110 THERAPEUTIC EXERCISES: CPT

## 2024-01-16 RX ORDER — DESLORATADINE 5 MG/1
5 TABLET ORAL DAILY
Qty: 90 TABLET | Refills: 3 | Status: SHIPPED | OUTPATIENT
Start: 2024-01-16

## 2024-01-16 NOTE — PLAN OF CARE
Physician: as needed     Progress Report/POC: YES  POC update due: (10 visits /OR AUTH LIMITS, whichever is less)  PN: 1/16/24    none    Preferred Language for Healthcare:   [x]English       []other:    SUBJECTIVE EXAMINATION     Patient Report/Comments: Patient reports feeling ok following PT tx.     Test used Initial score  12/6/23 01/16/2024   Pain Summary VAS 1-4/10 0/10 R knee   Functional questionnaire LEFS 50% 50%   Other:                OBJECTIVE EXAMINATION     Observation: 1/16/24: MMT: Hip Flex: R: 9.8 lb, L: 9.6 lb ;  Hip Abd: R: 12.1 lb L: 12.4 lb    Test measurements: see eval    Exercises/Interventions:   Access Code: [email or web address]  URL: [email or web address]  Date: 12/06/2023  Prepared by: Sirena Fan    Exercises  - Sidelying Hip Abduction  - 1 x daily - 7 x weekly - 1-3 sets - 10 reps  - Supine Active Straight Leg Raise  - 1 x daily - 7 x weekly - 1-3 sets - 10 reps  - Supine Knee Extension Strengthening  - 1 x daily - 7 x weekly - 1-3 sets - 10 reps  - Supine Bridge with Mini Swiss Ball Between Knees  - 1 x daily - 7 x weekly - 1-3 sets - 10 reps  - Seated Hamstring Curls with Resistance  - 1 x daily - 7 x weekly - 1-3 sets - 10 reps  Therapeutic Ex (80810)  resistance Sets/time Reps Notes/Cues/Progressions   HS S  30\" 3 NV   Gastroc SB s  30\" 3    SL hip abd: R  3 5 3 lb   Supine SLR: R  2 10 3 lb   Bridge with ball squeeze     Step up  2 10 4 inch   Hoist H/S curl  2 10 45 lb   Bike Level 6 5'  recumbent   Hoist knee ext  2 10 10 lb   Leg press: DL red loop at knees  1 27 65 lb/seat 4/plate 5   Sit to stand    JOANNE hip abd and flex   2 10 ea side 25#    SS along wall  1 1 Red tb          Manual Intervention (21864)  TIME                                        NMR re-education (37491) resistance Sets/time Reps CUES NEEDED   SLS: R  10\" 10 airex   TKE: R 5 sec hold 2 10 55 lb   Airex mini squat   2 10                  Therapeutic Activity (42330)  Sets/time

## 2024-01-17 DIAGNOSIS — I10 ESSENTIAL HYPERTENSION, BENIGN: Chronic | ICD-10-CM

## 2024-01-17 RX ORDER — AMLODIPINE BESYLATE 5 MG/1
TABLET ORAL
Qty: 180 TABLET | Refills: 1 | Status: SHIPPED | OUTPATIENT
Start: 2024-01-17

## 2024-01-18 ENCOUNTER — APPOINTMENT (OUTPATIENT)
Dept: PHYSICAL THERAPY | Age: 74
End: 2024-01-18
Payer: MEDICARE

## 2024-01-22 ENCOUNTER — HOSPITAL ENCOUNTER (OUTPATIENT)
Dept: PHYSICAL THERAPY | Age: 74
Setting detail: THERAPIES SERIES
End: 2024-01-22
Payer: MEDICARE

## 2024-01-25 ENCOUNTER — APPOINTMENT (OUTPATIENT)
Dept: PHYSICAL THERAPY | Age: 74
End: 2024-01-25
Payer: MEDICARE

## 2024-01-29 ENCOUNTER — HOSPITAL ENCOUNTER (OUTPATIENT)
Dept: PHYSICAL THERAPY | Age: 74
Setting detail: THERAPIES SERIES
Discharge: HOME | End: 2024-01-29
Payer: MEDICARE

## 2024-01-29 PROCEDURE — 97112 NEUROMUSCULAR REEDUCATION: CPT

## 2024-01-29 PROCEDURE — 97110 THERAPEUTIC EXERCISES: CPT

## 2024-01-29 NOTE — FLOWSHEET NOTE
[facility]- Outpatient Rehabilitation and Therapy [address], [place] [address] office: [phone] fax: [phone]    Physical Therapy: TREATMENT/PROGRESS NOTE   Patient: Fidelia Torres (73 y.o. female)   Treatment Date: 2024   :  1950 MRN: [id number]   Visit #: 7   Insurance Allowable Auth Needed   BMN []Yes    [x]No    Insurance: Payor: AETNA MEDICARE / Plan: AETNA MEDICARE-ADVANTAGE PPO / Product Type: Medicare /   Insurance ID: [id number] - (Medicare Managed)  Secondary Insurance (if applicable):    Treatment Diagnosis:     ICD-10-CM    1. Acute pain of right knee  M25.561       2. Weakness  R53.1          Medical Diagnosis:    Acute pain of right knee [M25.561]  Primary osteoarthritis of right knee [M17.11]   Referring Physician: Bruno Young MD  PCP: Albina Valdes APRN - CNP                             Plan of care signed (Y/N):     Date of Patient follow up with Physician: as needed     Progress Report/POC: YES  POC update due: (10 visits /OR AUTH LIMITS, whichever is less)  PN: 24    none    Preferred Language for Healthcare:   [x]English       []other:    SUBJECTIVE EXAMINATION     Patient Report/Comments: Patient reports was sick last week, so feeling weak today. Reports R knee is feeling ok, but L calf is stiff.      Test used Initial score  2024   Pain Summary VAS 1-4/10 0/10 R knee   Functional questionnaire LEFS 50%    Other:                OBJECTIVE EXAMINATION     Observation: 24: MMT: Hip Flex: R: 9.8 lb, L: 9.6 lb ;  Hip Abd: R: 12.1 lb L: 12.4 lb    Test measurements: see eval    Exercises/Interventions:   Access Code: [email or web address]  URL: [email or web address]  Date: 2023  Prepared by: Sirena Fan    Exercises  - Sidelying Hip Abduction  - 1 x daily - 7 x weekly - 1-3 sets - 10 reps  - Supine Active Straight Leg Raise  - 1 x daily - 7 x weekly - 1-3 sets - 10 reps  - Supine Knee Extension

## 2024-02-01 ENCOUNTER — HOSPITAL ENCOUNTER (OUTPATIENT)
Dept: PHYSICAL THERAPY | Age: 74
Setting detail: THERAPIES SERIES
Discharge: HOME | End: 2024-02-01
Payer: MEDICARE

## 2024-02-01 ENCOUNTER — TELEPHONE (OUTPATIENT)
Dept: FAMILY MEDICINE CLINIC | Age: 74
End: 2024-02-01

## 2024-02-01 DIAGNOSIS — R06.09 DYSPNEA ON EXERTION: Primary | ICD-10-CM

## 2024-02-01 PROCEDURE — 97110 THERAPEUTIC EXERCISES: CPT

## 2024-02-01 PROCEDURE — 97112 NEUROMUSCULAR REEDUCATION: CPT

## 2024-02-01 NOTE — FLOWSHEET NOTE
[facility]- Outpatient Rehabilitation and Therapy [address], [place] [address] office: [phone] fax: [phone]    Physical Therapy: TREATMENT/PROGRESS NOTE   Patient: Fidelia Torres (73 y.o. female)   Treatment Date: 2024   :  1950 MRN: [id number]   Visit #: 8   Insurance Allowable Auth Needed   BMN []Yes    [x]No    Insurance: Payor: AETNA MEDICARE / Plan: AETNA MEDICARE-ADVANTAGE PPO / Product Type: Medicare /   Insurance ID: [id number] - (Medicare Managed)  Secondary Insurance (if applicable):    Treatment Diagnosis:     ICD-10-CM    1. Acute pain of right knee  M25.561       2. Weakness  R53.1          Medical Diagnosis:    Acute pain of right knee [M25.561]  Primary osteoarthritis of right knee [M17.11]   Referring Physician: Bruno Young MD  PCP: Albina Valdes APRN - CNP                             Plan of care signed (Y/N):     Date of Patient follow up with Physician: as needed     Progress Report/POC: YES  POC update due: (10 visits /OR AUTH LIMITS, whichever is less)  PN: 24    none    Preferred Language for Healthcare:   [x]English       []other:    SUBJECTIVE EXAMINATION     Patient Report/Comments: Patient reports feeling muscle soreness following last PT visit, but overall felt fine.     Test used Initial score  2024   Pain Summary VAS 1-4/10 0/10 R knee   Functional questionnaire LEFS 50%    Other:                OBJECTIVE EXAMINATION     Observation: 24: MMT: Hip Flex: R: 9.8 lb, L: 9.6 lb ;  Hip Abd: R: 12.1 lb L: 12.4 lb    Test measurements: see eval    Exercises/Interventions:   Access Code: [email or web address]  URL: [email or web address]  Date: 2023  Prepared by: Sirena Fan    Exercises  - Sidelying Hip Abduction  - 1 x daily - 7 x weekly - 1-3 sets - 10 reps  - Supine Active Straight Leg Raise  - 1 x daily - 7 x weekly - 1-3 sets - 10 reps  - Supine Knee Extension Strengthening  - 1 x daily - 7 x

## 2024-02-05 ENCOUNTER — APPOINTMENT (OUTPATIENT)
Dept: PHYSICAL THERAPY | Age: 74
End: 2024-02-05
Payer: MEDICARE

## 2024-02-07 ENCOUNTER — HOSPITAL ENCOUNTER (OUTPATIENT)
Dept: PHYSICAL THERAPY | Age: 74
Setting detail: THERAPIES SERIES
Discharge: HOME | End: 2024-02-07
Payer: MEDICARE

## 2024-02-07 PROCEDURE — 97112 NEUROMUSCULAR REEDUCATION: CPT

## 2024-02-07 PROCEDURE — 97110 THERAPEUTIC EXERCISES: CPT

## 2024-02-07 NOTE — FLOWSHEET NOTE
[facility]- Outpatient Rehabilitation and Therapy [address], [place] [address] office: [phone] fax: [phone]    Physical Therapy: TREATMENT/PROGRESS NOTE   Patient: Fidelia Torres (73 y.o. female)   Treatment Date: 2024   :  1950 MRN: [id number]   Visit #: 9   Insurance Allowable Auth Needed   BMN []Yes    [x]No    Insurance: Payor: AETNA MEDICARE / Plan: AETNA MEDICARE-ADVANTAGE PPO / Product Type: Medicare /   Insurance ID: [id number] - (Medicare Managed)  Secondary Insurance (if applicable):    Treatment Diagnosis:     ICD-10-CM    1. Acute pain of right knee  M25.561       2. Weakness  R53.1          Medical Diagnosis:    Acute pain of right knee [M25.561]  Primary osteoarthritis of right knee [M17.11]   Referring Physician: Bruno Young MD  PCP: Albina Valdes APRN - CNP                             Plan of care signed (Y/N):     Date of Patient follow up with Physician: as needed     Progress Report/POC: YES  POC update due: (10 visits /OR AUTH LIMITS, whichever is less)  PN: 24    none    Preferred Language for Healthcare:   [x]English       []other:    SUBJECTIVE EXAMINATION     Patient Report/Comments:  Pt states she continues to have soreness after exercises, but feels she is improving.     Test used Initial score  2024   Pain Summary VAS 1-4/10 0/10 R knee   Functional questionnaire LEFS 50%    Other:                OBJECTIVE EXAMINATION     Observation: 24: MMT: Hip Flex: R: 9.8 lb, L: 9.6 lb ;  Hip Abd: R: 12.1 lb L: 12.4 lb    Test measurements: see eval    Exercises/Interventions:   Access Code: [email or web address]  URL: [email or web address]  Date: 2023  Prepared by: Sirena Fan    Exercises  - Sidelying Hip Abduction  - 1 x daily - 7 x weekly - 1-3 sets - 10 reps  - Supine Active Straight Leg Raise  - 1 x daily - 7 x weekly - 1-3 sets - 10 reps  - Supine Knee Extension Strengthening  - 1 x daily - 7 x

## 2024-02-08 ENCOUNTER — APPOINTMENT (OUTPATIENT)
Dept: PHYSICAL THERAPY | Age: 74
End: 2024-02-08
Payer: MEDICARE

## 2024-02-12 ENCOUNTER — HOSPITAL ENCOUNTER (OUTPATIENT)
Dept: PHYSICAL THERAPY | Age: 74
Setting detail: THERAPIES SERIES
Discharge: HOME | End: 2024-02-12
Payer: MEDICARE

## 2024-02-12 ENCOUNTER — APPOINTMENT (OUTPATIENT)
Dept: PHYSICAL THERAPY | Age: 74
End: 2024-02-12
Payer: MEDICARE

## 2024-02-12 PROCEDURE — 97110 THERAPEUTIC EXERCISES: CPT | Performed by: SPECIALIST/TECHNOLOGIST

## 2024-02-12 PROCEDURE — 97112 NEUROMUSCULAR REEDUCATION: CPT | Performed by: SPECIALIST/TECHNOLOGIST

## 2024-02-12 NOTE — FLOWSHEET NOTE
goals    Treatment/Activity Tolerance:  [x] Patient tolerated treatment well [] Patient limited by fatique  [] Patient limited by pain  [] Patient limited by other medical complications  [] Other: Patient demonstrating improved R LE control with step up; still slightly more fatigued today secondary to recent illness, but able to complete reps and activities.  Less trunk leaning with  SS with band.      Return to Play: NA    Prognosis for POC: [x] Good [] Fair  [] Poor    Patient requires continued skilled intervention: [x] Yes  [] No      GOALS     Patient stated goal: Patient to report ability to walk 10 minutes or longer with R knee feeling stronger and no feelings of her knee giving out.  Status: [] Progressing: [] Met: [x] Not Met: [] Adjusted     Therapist goals for Patient:   Short Term Goals: To be achieved in: 2 weeks  Independent in HEP and progression per patient tolerance, in order to progress toward full function and prevent re-injury.               Status: [] Progressing: [x] Met: [] Not Met: [] Adjusted  Patient will have a decrease in pain to 0/10 to help  facilitate improvement in movement, function, and ADLs as indicated by functional deficits.              Status: [x] Progressing: [] Met: [] Not Met: [] Adjusted     Long Term Goals: To be achieved in: 8-10 weeks  Disability index score of 40% or less for the LEFS to assist with return top prior level of function.                  Status: [x] Progressing: [] Met: [] Not Met: [] Adjusted  Patient to report ability to perform sit to stand 70% of the time without UE A.  Status: [x] Progressing: [] Met: [] Not Met: [] Adjusted  Pt to improve strength by 2 muscle grades or better of proximal hip, quadriceps, and hamstrings to work toward proper functional mobility and improving technique of ADLs.   Status: [x] Progressing: [] Met: [] Not Met: [] Adjusted  Patient will return to Usual work, housework or activities and Usual recreational activities without

## 2024-02-14 ENCOUNTER — APPOINTMENT (OUTPATIENT)
Dept: PHYSICAL THERAPY | Age: 74
End: 2024-02-14
Payer: MEDICARE

## 2024-02-15 ENCOUNTER — APPOINTMENT (OUTPATIENT)
Dept: PHYSICAL THERAPY | Age: 74
End: 2024-02-15
Payer: MEDICARE

## 2024-02-19 ENCOUNTER — HOSPITAL ENCOUNTER (OUTPATIENT)
Dept: NON INVASIVE DIAGNOSTICS | Age: 74
Discharge: HOME | End: 2024-02-19
Payer: MEDICARE

## 2024-02-19 DIAGNOSIS — R06.09 DYSPNEA ON EXERTION: ICD-10-CM

## 2024-02-19 PROCEDURE — 93017 CV STRESS TEST TRACING ONLY: CPT

## 2024-02-20 ENCOUNTER — HOSPITAL ENCOUNTER (OUTPATIENT)
Dept: PHYSICAL THERAPY | Age: 74
Setting detail: THERAPIES SERIES
Discharge: HOME | End: 2024-02-20
Payer: MEDICARE

## 2024-02-20 ENCOUNTER — APPOINTMENT (OUTPATIENT)
Dept: PHYSICAL THERAPY | Age: 74
End: 2024-02-20
Payer: MEDICARE

## 2024-02-20 DIAGNOSIS — R06.09 DYSPNEA ON EXERTION: Primary | ICD-10-CM

## 2024-02-20 PROCEDURE — 97112 NEUROMUSCULAR REEDUCATION: CPT | Performed by: PHYSICAL THERAPIST

## 2024-02-20 PROCEDURE — 97110 THERAPEUTIC EXERCISES: CPT | Performed by: PHYSICAL THERAPIST

## 2024-02-20 NOTE — PLAN OF CARE
Met: [x] Not Met: [] Adjusted       Overall Progression Towards Functional goals/ Treatment Progress Update:  [x] Patient is progressing as expected towards functional goals listed.    [] Progression is slowed due to complexities/Impairments listed.  [] Progression has been slowed due to co-morbidities.  [] Plan just implemented, too soon (<30days) to assess goals progression   [] Goals require adjustment due to lack of progress  [] Patient is not progressing as expected and requires additional follow up with physician  [] Other:     CHARGE CAPTURE     PT CHARGE GRID: overlapped with other patient   CPT Code (TIMED) minutes # CPT Code (UNTIMED) #     Therex (25100)  30' 2  EVAL:LOW (98406 - Typically 20 minutes face-to-face)     Neuromusc. Re-ed (65590) 10' 1  Re-Eval (52679)     Manual (44894)    Estim Unattended (39200)     Ther. Act (04148)    Mech. Traction (89275)     Gait (79777)    Dry Needle 1-2 muscle (97227)     Aquatic Therex (60614)    Dry Needle 3+ muscle (20561)     Iontophoresis (33670)    VASO (01868)     Ultrasound (99587)    Group Therapy (04070)     Estim Attended (77931)    Canalith Repositioning (37385)     Other:    Other:    Total Timed Code Tx Minutes 40' 3       Total Treatment Minutes 40'        Charge Justification:  (03680) THERAPEUTIC EXERCISE - Provided verbal/tactile cueing for activities related to strengthening, flexibility, endurance, ROM performed to prevent loss of range of motion, maintain or improve muscular strength or increase flexibility, following either an injury or surgery.   (35898) HOME EXERCISE PROGRAM - Reviewed/Progressed HEP activities related to strengthening, flexibility, endurance, ROM performed to prevent loss of range of motion, maintain or improve muscular strength or increase flexibility, following either an injury or surgery.  (72373) NEUROMUSCULAR RE-EDUCATION - Therapeutic procedure, 1 or more areas, each 15 minutes; neuromuscular reeducation of movement,

## 2024-02-22 ENCOUNTER — HOSPITAL ENCOUNTER (OUTPATIENT)
Dept: PHYSICAL THERAPY | Age: 74
Setting detail: THERAPIES SERIES
End: 2024-02-22
Payer: MEDICARE

## 2024-02-23 ENCOUNTER — HOSPITAL ENCOUNTER (OUTPATIENT)
Dept: PHYSICAL THERAPY | Age: 74
Setting detail: THERAPIES SERIES
Discharge: HOME | End: 2024-02-23
Payer: MEDICARE

## 2024-02-23 PROCEDURE — 97530 THERAPEUTIC ACTIVITIES: CPT | Performed by: PHYSICAL THERAPIST

## 2024-02-23 PROCEDURE — 97110 THERAPEUTIC EXERCISES: CPT | Performed by: PHYSICAL THERAPIST

## 2024-02-23 NOTE — FLOWSHEET NOTE
achieved in: 8-10 weeks  Disability index score of 40% or less for the LEFS to assist with return top prior level of function.                  Status: [x] Progressing: [] Met: [x] Not Met: [] Adjusted  Patient to report ability to perform sit to stand 70% of the time without UE A.  Status: [] Progressing: [x] Met: [] Not Met: [] Adjusted  Pt to improve strength by 2 muscle grades or better of proximal hip, quadriceps, and hamstrings to work toward proper functional mobility and improving technique of ADLs.   Status: [x] Progressing: [] Met: [x] Not Met: [] Adjusted  Patient will return to Usual work, housework or activities and Usual recreational activities without increased symptoms or restriction to work towards return to prior level of function.  Status: [x] Progressing: [] Met: [x] Not Met: [] Adjusted  Patient to report a 60-70% overall improvement with her R knee.                                                                                                            Status: [x] Progressing: [] Met: [x] Not Met: [] Adjusted       Overall Progression Towards Functional goals/ Treatment Progress Update:  [x] Patient is progressing as expected towards functional goals listed.    [] Progression is slowed due to complexities/Impairments listed.  [] Progression has been slowed due to co-morbidities.  [] Plan just implemented, too soon (<30days) to assess goals progression   [] Goals require adjustment due to lack of progress  [] Patient is not progressing as expected and requires additional follow up with physician  [] Other:     CHARGE CAPTURE     PT CHARGE GRID: overlapped with other patient   CPT Code (TIMED) minutes # CPT Code (UNTIMED) #     Therex (84709)  30' 2  EVAL:LOW (15640 - Typically 20 minutes face-to-face)     Neuromusc. Re-ed (31800) 10' 1  Re-Eval (99810)     Manual (09371)    Estim Unattended (74168)     Ther. Act (94882)    Mech. Traction (38117)     Gait (98230)    Dry Needle 1-2 muscle

## 2024-02-26 ENCOUNTER — APPOINTMENT (OUTPATIENT)
Dept: PHYSICAL THERAPY | Age: 74
End: 2024-02-26
Payer: MEDICARE

## 2024-02-26 ENCOUNTER — HOSPITAL ENCOUNTER (OUTPATIENT)
Dept: PHYSICAL THERAPY | Age: 74
Setting detail: THERAPIES SERIES
Discharge: HOME | End: 2024-02-26
Payer: MEDICARE

## 2024-02-26 PROCEDURE — 97110 THERAPEUTIC EXERCISES: CPT | Performed by: PHYSICAL THERAPIST

## 2024-02-26 PROCEDURE — 97140 MANUAL THERAPY 1/> REGIONS: CPT | Performed by: PHYSICAL THERAPIST

## 2024-02-27 NOTE — FLOWSHEET NOTE
function.                  Status: [x] Progressing: [] Met: [x] Not Met: [] Adjusted  Patient to report ability to perform sit to stand 70% of the time without UE A.  Status: [] Progressing: [x] Met: [] Not Met: [] Adjusted  Pt to improve strength by 2 muscle grades or better of proximal hip, quadriceps, and hamstrings to work toward proper functional mobility and improving technique of ADLs.   Status: [x] Progressing: [] Met: [x] Not Met: [] Adjusted  Patient will return to Usual work, housework or activities and Usual recreational activities without increased symptoms or restriction to work towards return to prior level of function.  Status: [x] Progressing: [] Met: [x] Not Met: [] Adjusted  Patient to report a 60-70% overall improvement with her R knee.                                                                                                            Status: [x] Progressing: [] Met: [x] Not Met: [] Adjusted       Overall Progression Towards Functional goals/ Treatment Progress Update:  [x] Patient is progressing as expected towards functional goals listed.    [] Progression is slowed due to complexities/Impairments listed.  [] Progression has been slowed due to co-morbidities.  [] Plan just implemented, too soon (<30days) to assess goals progression   [] Goals require adjustment due to lack of progress  [] Patient is not progressing as expected and requires additional follow up with physician  [] Other:     CHARGE CAPTURE     PT CHARGE GRID: overlapped with other patient   CPT Code (TIMED) minutes # CPT Code (UNTIMED) #     Therex (58438)  34' 2  EVAL:LOW (64341 - Typically 20 minutes face-to-face)     Neuromusc. Re-ed (62429) 10' 1  Re-Eval (43721)     Manual (94971)    Estim Unattended (12704)     Ther. Act (92881)    [facility]. Traction (09945)     Gait (13509)    Dry Needle 1-2 muscle (93440)     Aquatic Therex (00222)    Dry Needle 3+ muscle (51345)     Iontophoresis (92293)    VASO (56820)     Ultrasound

## 2024-02-29 ENCOUNTER — HOSPITAL ENCOUNTER (OUTPATIENT)
Dept: PHYSICAL THERAPY | Age: 74
Setting detail: THERAPIES SERIES
Discharge: HOME | End: 2024-02-29
Payer: MEDICARE

## 2024-02-29 ENCOUNTER — APPOINTMENT (OUTPATIENT)
Dept: PHYSICAL THERAPY | Age: 74
End: 2024-02-29
Payer: MEDICARE

## 2024-02-29 PROCEDURE — 97110 THERAPEUTIC EXERCISES: CPT | Performed by: PHYSICAL THERAPIST

## 2024-02-29 PROCEDURE — 97140 MANUAL THERAPY 1/> REGIONS: CPT | Performed by: PHYSICAL THERAPIST

## 2024-03-01 ENCOUNTER — HOSPITAL ENCOUNTER (OUTPATIENT)
Dept: CT IMAGING | Age: 74
Discharge: HOME | End: 2024-03-01
Payer: MEDICARE

## 2024-03-01 DIAGNOSIS — R06.09 DYSPNEA ON EXERTION: ICD-10-CM

## 2024-03-01 PROCEDURE — 75571 CT HRT W/O DYE W/CA TEST: CPT

## 2024-03-04 ENCOUNTER — HOSPITAL ENCOUNTER (OUTPATIENT)
Dept: PHYSICAL THERAPY | Age: 74
Setting detail: THERAPIES SERIES
Discharge: HOME | End: 2024-03-04
Payer: MEDICARE

## 2024-03-04 DIAGNOSIS — N18.30 TYPE 2 DIABETES MELLITUS WITH STAGE 3 CHRONIC KIDNEY DISEASE, WITHOUT LONG-TERM CURRENT USE OF INSULIN, UNSPECIFIED WHETHER STAGE 3A OR 3B CKD (HCC): ICD-10-CM

## 2024-03-04 DIAGNOSIS — E11.22 TYPE 2 DIABETES MELLITUS WITH STAGE 3 CHRONIC KIDNEY DISEASE, WITHOUT LONG-TERM CURRENT USE OF INSULIN, UNSPECIFIED WHETHER STAGE 3A OR 3B CKD (HCC): ICD-10-CM

## 2024-03-04 PROCEDURE — 97110 THERAPEUTIC EXERCISES: CPT | Performed by: SPECIALIST/TECHNOLOGIST

## 2024-03-04 PROCEDURE — 97140 MANUAL THERAPY 1/> REGIONS: CPT | Performed by: SPECIALIST/TECHNOLOGIST

## 2024-03-04 RX ORDER — BLOOD-GLUCOSE METER
KIT MISCELLANEOUS
Qty: 100 STRIP | Refills: 3 | Status: SHIPPED | OUTPATIENT
Start: 2024-03-04

## 2024-03-04 NOTE — TELEPHONE ENCOUNTER
Requested Prescriptions     Pending Prescriptions Disp Refills    FREESTYLE LITE strip [Pharmacy Med Name: FREESTYLE LITE TEST STRIP] 100 strip 3     Sig: TEST TWICE A DAY AND AS NEEDED FOR IRREGULAR BLOOD SUGAR SYMPTOMS          Last Office Visit: 12/6/2023     Next Office Visit: Visit date not found

## 2024-03-04 NOTE — FLOWSHEET NOTE
increase flexibility, following either an injury or surgery.  (50673) NEUROMUSCULAR RE-EDUCATION - Therapeutic procedure, 1 or more areas, each 15 minutes; neuromuscular reeducation of movement, balance, coordination, kinesthetic sense, posture, and/or proprioception for sitting and/or standing activities    TREATMENT PLAN   Plan: Cont POC- Continue emphasis/focus on exercise progression, improving proper muscle recruitment and activation/motor control patterns, improving soft tissue extensibility, and static and dynamic balance. Next visit plan to progress weights, add new exercises, and progress balance         Electronically Signed by Estuardo Angel, PTA  [id number], yola harrington            Date: 03/04/2024     Note: If patient does not return for scheduled/recommended follow up visits, this note will serve as a discharge from care along with the most recent update on progress.

## 2024-03-08 ENCOUNTER — HOSPITAL ENCOUNTER (OUTPATIENT)
Dept: PHYSICAL THERAPY | Age: 74
Setting detail: THERAPIES SERIES
Discharge: HOME | End: 2024-03-08
Payer: MEDICARE

## 2024-03-08 PROCEDURE — 97110 THERAPEUTIC EXERCISES: CPT | Performed by: SPECIALIST/TECHNOLOGIST

## 2024-03-08 PROCEDURE — 97112 NEUROMUSCULAR REEDUCATION: CPT | Performed by: SPECIALIST/TECHNOLOGIST

## 2024-03-08 PROCEDURE — 97140 MANUAL THERAPY 1/> REGIONS: CPT | Performed by: SPECIALIST/TECHNOLOGIST

## 2024-03-08 NOTE — FLOWSHEET NOTE
[facility]- Outpatient Rehabilitation and Therapy [address], [place] [address] office: [phone] fax: [phone]          Physical Therapy: TREATMENT/PROGRESS NOTE   Patient: Fidelia Torres (73 y.o. female)   Treatment Date: 2024   :  1950 MRN: [id number]   Visit #: 16   Insurance Allowable Auth Needed   BMN []Yes    [x]No    Insurance: Payor: AETNA MEDICARE / Plan: AETNA MEDICARE-ADVANTAGE PPO / Product Type: Medicare /   Insurance ID: [id number] - (Medicare Managed)  Secondary Insurance (if applicable):    Treatment Diagnosis:     ICD-10-CM    1. Acute pain of right knee  M25.561       2. Weakness  R53.1          Medical Diagnosis:    Acute pain of right knee [M25.561]  Primary osteoarthritis of right knee [M17.11]   Referring Physician: Bruno Young MD  PCP: Albina Valdes APRN - CNP                             Plan of care signed (Y/N):     Date of Patient follow up with Physician: as needed     Progress Report/POC: NO  POC update due: (21 visits /OR AUTH LIMITS, whichever is less)  PN: 3/20/24    none    Preferred Language for Healthcare:   [x]English       []other:    SUBJECTIVE EXAMINATION     Patient Report/Comments  Pt. Reports she has not had the feeling her right leg will give out on her in a long time.       Test used Initial score  2024   Pain Summary VAS 1-4/10 210 R knee   Functional questionnaire LEFS 50% 48% deficit   Other:                OBJECTIVE EXAMINATION     Observation: 24: MMT: Hip Flex: R: 9.8 lb, L: 9.6 lb ;  Hip Abd: R: 12.1 lb L: 12.4 lb                        24: MMT: hip flex R 15.2, L 14.9;  hip abd R: 14.9, L 13.6;   Knee ext R 17.9, L 15.8    Test measurements:     Exercises/Interventions:   Access Code: [email or web address]  URL: [email or web address]  Date: 2023  Prepared by: Sirena Fan    Exercises  - Sidelying Hip Abduction  - 1 x daily - 7 x weekly - 1-3 sets - 10 reps  - Supine

## 2024-03-11 ENCOUNTER — HOSPITAL ENCOUNTER (OUTPATIENT)
Dept: PHYSICAL THERAPY | Age: 74
Setting detail: THERAPIES SERIES
Discharge: HOME | End: 2024-03-11
Payer: MEDICARE

## 2024-03-11 PROCEDURE — 97110 THERAPEUTIC EXERCISES: CPT | Performed by: SPECIALIST/TECHNOLOGIST

## 2024-03-11 PROCEDURE — 97112 NEUROMUSCULAR REEDUCATION: CPT | Performed by: SPECIALIST/TECHNOLOGIST

## 2024-03-11 NOTE — FLOWSHEET NOTE
[facility]- Outpatient Rehabilitation and Therapy [address], [place] [address] office: [phone] fax: [phone]          Physical Therapy: TREATMENT/PROGRESS NOTE   Patient: Fidelia Torres (73 y.o. female)   Treatment Date: 2024   :  1950 MRN: [id number]   Visit #: 17   Insurance Allowable Auth Needed   BMN []Yes    [x]No    Insurance: Payor: AETNA MEDICARE / Plan: AETNA MEDICARE-ADVANTAGE PPO / Product Type: Medicare /   Insurance ID: [id number] - (Medicare Managed)  Secondary Insurance (if applicable):    Treatment Diagnosis:     ICD-10-CM    1. Acute pain of right knee  M25.561       2. Weakness  R53.1          Medical Diagnosis:    Acute pain of right knee [M25.561]  Primary osteoarthritis of right knee [M17.11]   Referring Physician: Bruno Young MD  PCP: Albina Valdes APRN - CNP                             Plan of care signed (Y/N):     Date of Patient follow up with Physician: as needed     Progress Report/POC: NO  POC update due: (21 visits /OR AUTH LIMITS, whichever is less)  PN: 3/20/24    none    Preferred Language for Healthcare:   [x]English       []other:    SUBJECTIVE EXAMINATION     Patient Report/Comments  Pt. Reports stairs continue to improve and she is having less knee pain throughout the day.       Test used Initial score  2024   Pain Summary VAS 1-4/10 2/10 R knee   Functional questionnaire LEFS 50% 48% deficit   Other:                OBJECTIVE EXAMINATION     Observation: 24: MMT: Hip Flex: R: 9.8 lb, L: 9.6 lb ;  Hip Abd: R: 12.1 lb L: 12.4 lb                        24: MMT: hip flex R 15.2, L 14.9;  hip abd R: 14.9, L 13.6;   Knee ext R 17.9, L 15.8    Test measurements:     Exercises/Interventions:   Access Code: [email or web address]  URL: [email or web address]  Date: 2023  Prepared by: Sirena Fan    Exercises  - Sidelying Hip Abduction  - 1 x daily - 7 x weekly - 1-3 sets - 10 reps  - Supine

## 2024-03-18 ENCOUNTER — APPOINTMENT (OUTPATIENT)
Dept: PHYSICAL THERAPY | Age: 74
End: 2024-03-18
Payer: MEDICARE

## 2024-03-22 ENCOUNTER — HOSPITAL ENCOUNTER (OUTPATIENT)
Dept: PHYSICAL THERAPY | Age: 74
Setting detail: THERAPIES SERIES
Discharge: HOME | End: 2024-03-22
Payer: MEDICARE

## 2024-03-22 PROCEDURE — 97110 THERAPEUTIC EXERCISES: CPT | Performed by: SPECIALIST/TECHNOLOGIST

## 2024-03-22 PROCEDURE — 97112 NEUROMUSCULAR REEDUCATION: CPT | Performed by: SPECIALIST/TECHNOLOGIST

## 2024-03-22 NOTE — FLOWSHEET NOTE
need for therapy. (Significantly impacts the rate of recovery.)                   []  The patient has a complexity identified by an ICD-9 code that has a direct and significant impact on the need for therapy.  (Significantly impacts the rate of recovery and is associated with a primary condition.)         []  The patient has associated variables that influence the amount of treatment to include:  Social support, self-efficacy/motivation, prognosis, time since onset/acuity.         []  The patient has generalized musculoskeletal conditions or a condition affecting multiple sites that will have a direct impact on the rate of recovery.    []  The patient had a prior episode of outpatient therapy during this calendar year for a different condition.           []  The patient has a mental or cognitive disorder in addition to the condition being treated that will have a direct and significant impact on the rate of recovery.              Charge Justification:  (80007) THERAPEUTIC EXERCISE - Provided verbal/tactile cueing for activities related to strengthening, flexibility, endurance, ROM performed to prevent loss of range of motion, maintain or improve muscular strength or increase flexibility, following either an injury or surgery.   (19006) HOME EXERCISE PROGRAM - Reviewed/Progressed HEP activities related to strengthening, flexibility, endurance, ROM performed to prevent loss of range of motion, maintain or improve muscular strength or increase flexibility, following either an injury or surgery.  (62062) NEUROMUSCULAR RE-EDUCATION - Therapeutic procedure, 1 or more areas, each 15 minutes; neuromuscular reeducation of movement, balance, coordination, kinesthetic sense, posture, and/or proprioception for sitting and/or standing activities    TREATMENT PLAN   Plan: Cont POC- Continue emphasis/focus on exercise progression, improving proper muscle recruitment and activation/motor control patterns, improving soft tissue

## 2024-03-25 ENCOUNTER — HOSPITAL ENCOUNTER (OUTPATIENT)
Dept: PHYSICAL THERAPY | Age: 74
Setting detail: THERAPIES SERIES
Discharge: HOME | End: 2024-03-25
Payer: MEDICARE

## 2024-03-25 ENCOUNTER — APPOINTMENT (OUTPATIENT)
Dept: PHYSICAL THERAPY | Age: 74
End: 2024-03-25
Payer: MEDICARE

## 2024-03-25 PROCEDURE — 97112 NEUROMUSCULAR REEDUCATION: CPT | Performed by: SPECIALIST/TECHNOLOGIST

## 2024-03-25 PROCEDURE — 97110 THERAPEUTIC EXERCISES: CPT | Performed by: SPECIALIST/TECHNOLOGIST

## 2024-03-25 NOTE — FLOWSHEET NOTE
services:    [x]  The patient has a condition identified by an ICD-10 code that has a direct and significant impact on the need for therapy. (Significantly impacts the rate of recovery.)                   []  The patient has a complexity identified by an ICD-9 code that has a direct and significant impact on the need for therapy.  (Significantly impacts the rate of recovery and is associated with a primary condition.)         []  The patient has associated variables that influence the amount of treatment to include:  Social support, self-efficacy/motivation, prognosis, time since onset/acuity.         []  The patient has generalized musculoskeletal conditions or a condition affecting multiple sites that will have a direct impact on the rate of recovery.    []  The patient had a prior episode of outpatient therapy during this calendar year for a different condition.           []  The patient has a mental or cognitive disorder in addition to the condition being treated that will have a direct and significant impact on the rate of recovery.              Charge Justification:  (85603) THERAPEUTIC EXERCISE - Provided verbal/tactile cueing for activities related to strengthening, flexibility, endurance, ROM performed to prevent loss of range of motion, maintain or improve muscular strength or increase flexibility, following either an injury or surgery.   (85778) HOME EXERCISE PROGRAM - Reviewed/Progressed HEP activities related to strengthening, flexibility, endurance, ROM performed to prevent loss of range of motion, maintain or improve muscular strength or increase flexibility, following either an injury or surgery.  (67198) NEUROMUSCULAR RE-EDUCATION - Therapeutic procedure, 1 or more areas, each 15 minutes; neuromuscular reeducation of movement, balance, coordination, kinesthetic sense, posture, and/or proprioception for sitting and/or standing activities    TREATMENT PLAN   Plan: Cont POC- Continue emphasis/focus on

## 2024-03-28 DIAGNOSIS — N18.30 TYPE 2 DIABETES MELLITUS WITH STAGE 3 CHRONIC KIDNEY DISEASE, WITHOUT LONG-TERM CURRENT USE OF INSULIN, UNSPECIFIED WHETHER STAGE 3A OR 3B CKD (HCC): ICD-10-CM

## 2024-03-28 DIAGNOSIS — E11.22 TYPE 2 DIABETES MELLITUS WITH STAGE 3 CHRONIC KIDNEY DISEASE, WITHOUT LONG-TERM CURRENT USE OF INSULIN, UNSPECIFIED WHETHER STAGE 3A OR 3B CKD (HCC): ICD-10-CM

## 2024-03-28 RX ORDER — PIOGLITAZONEHYDROCHLORIDE 15 MG/1
15 TABLET ORAL DAILY
Qty: 90 TABLET | Refills: 1 | Status: SHIPPED | OUTPATIENT
Start: 2024-03-28

## 2024-03-29 ENCOUNTER — APPOINTMENT (OUTPATIENT)
Dept: PHYSICAL THERAPY | Age: 74
End: 2024-03-29
Payer: MEDICARE

## 2024-03-29 DIAGNOSIS — I10 ESSENTIAL HYPERTENSION, BENIGN: ICD-10-CM

## 2024-03-29 RX ORDER — LISINOPRIL 20 MG/1
20 TABLET ORAL DAILY
Qty: 90 TABLET | Refills: 0 | Status: SHIPPED | OUTPATIENT
Start: 2024-03-29

## 2024-04-01 ENCOUNTER — HOSPITAL ENCOUNTER (OUTPATIENT)
Dept: PHYSICAL THERAPY | Age: 74
Setting detail: THERAPIES SERIES
Discharge: HOME | End: 2024-04-01
Payer: MEDICARE

## 2024-04-01 PROCEDURE — 97110 THERAPEUTIC EXERCISES: CPT | Performed by: SPECIALIST/TECHNOLOGIST

## 2024-04-01 PROCEDURE — 97112 NEUROMUSCULAR REEDUCATION: CPT | Performed by: SPECIALIST/TECHNOLOGIST

## 2024-04-01 NOTE — PLAN OF CARE
loss of range of motion, maintain or improve muscular strength or increase flexibility, following either an injury or surgery.  (57586) NEUROMUSCULAR RE-EDUCATION - Therapeutic procedure, 1 or more areas, each 15 minutes; neuromuscular reeducation of movement, balance, coordination, kinesthetic sense, posture, and/or proprioception for sitting and/or standing activities    TREATMENT PLAN   Plan: Cont POC- Continue emphasis/focus on exercise progression, improving proper muscle recruitment and activation/motor control patterns, improving soft tissue extensibility, and static and dynamic balance. Next visit plan to progress weights, add new exercises, and progress balance         Electronically Signed by Estuardo Angel, PTA  [id number], [place]            Date: 04/01/2024     Note: If patient does not return for scheduled/recommended follow up visits, this note will serve as a discharge from care along with the most recent update on progress.

## 2024-04-08 ENCOUNTER — HOSPITAL ENCOUNTER (OUTPATIENT)
Dept: PHYSICAL THERAPY | Age: 74
Setting detail: THERAPIES SERIES
Discharge: HOME | End: 2024-04-08
Payer: MEDICARE

## 2024-04-08 PROCEDURE — 97112 NEUROMUSCULAR REEDUCATION: CPT | Performed by: SPECIALIST/TECHNOLOGIST

## 2024-04-08 PROCEDURE — 97110 THERAPEUTIC EXERCISES: CPT | Performed by: SPECIALIST/TECHNOLOGIST

## 2024-04-08 NOTE — FLOWSHEET NOTE
ADLs.   Status: [x] Progressing: [] Met: [x] Not Met: [] Adjusted  Patient will return to Usual work, housework or activities and Usual recreational activities without increased symptoms or restriction to work towards return to prior level of function.  Status: [x] Progressing: [] Met: [x] Not Met: [] Adjusted  Patient to report a 60-70% overall improvement with her R knee.                                                                                                            Status: [x] Progressing: [] Met: [x] Not Met: [] Adjusted       Overall Progression Towards Functional goals/ Treatment Progress Update:  [x] Patient is progressing as expected towards functional goals listed.    [] Progression is slowed due to complexities/Impairments listed.  [] Progression has been slowed due to co-morbidities.  [] Plan just implemented, too soon (<30days) to assess goals progression   [] Goals require adjustment due to lack of progress  [] Patient is not progressing as expected and requires additional follow up with physician  [] Other:     CHARGE CAPTURE     PT CHARGE GRID: overlapped with other patient   CPT Code (TIMED) minutes # CPT Code (UNTIMED) #     Therex (40045)  30' 2  EVAL:LOW (21175 - Typically 20 minutes face-to-face)     Neuromusc. Re-ed (84834) 10' 1  Re-Eval (41999)     Manual (11084)    Estim Unattended (94916)     Ther. Act (64548)    Mech. Traction (15475)     Gait (81174)    Dry Needle 1-2 muscle (91259)     Aquatic Therex (71847)    Dry Needle 3+ muscle (20561)     Iontophoresis (72257)    VASO (25000)     Ultrasound (24281)    Group Therapy (13959)     Estim Attended (60231)    Canalith Repositioning (13809)     Other:    Other:    Total Timed Code Tx Minutes 40' 3       Total Treatment Minutes 40'    Add KX modifier        MEDICARE CAP EXCEPTION DOCUMENTATION      I certify that this patient meets one of the below criteria necessary for becoming an exception to the Medicare cap on therapy

## 2024-04-12 ENCOUNTER — APPOINTMENT (OUTPATIENT)
Dept: PHYSICAL THERAPY | Age: 74
End: 2024-04-12
Payer: MEDICARE

## 2024-04-15 ENCOUNTER — APPOINTMENT (OUTPATIENT)
Dept: PHYSICAL THERAPY | Age: 74
End: 2024-04-15
Payer: MEDICARE

## 2024-04-19 ENCOUNTER — APPOINTMENT (OUTPATIENT)
Dept: PHYSICAL THERAPY | Age: 74
End: 2024-04-19
Payer: MEDICARE

## 2024-04-22 ENCOUNTER — HOSPITAL ENCOUNTER (OUTPATIENT)
Dept: PHYSICAL THERAPY | Age: 74
Setting detail: THERAPIES SERIES
Discharge: HOME | End: 2024-04-22
Payer: MEDICARE

## 2024-04-22 PROCEDURE — 97110 THERAPEUTIC EXERCISES: CPT | Performed by: SPECIALIST/TECHNOLOGIST

## 2024-04-22 PROCEDURE — 97112 NEUROMUSCULAR REEDUCATION: CPT | Performed by: SPECIALIST/TECHNOLOGIST

## 2024-04-22 NOTE — FLOWSHEET NOTE
services:    [x]  The patient has a condition identified by an ICD-10 code that has a direct and significant impact on the need for therapy. (Significantly impacts the rate of recovery.)                   []  The patient has a complexity identified by an ICD-9 code that has a direct and significant impact on the need for therapy.  (Significantly impacts the rate of recovery and is associated with a primary condition.)         []  The patient has associated variables that influence the amount of treatment to include:  Social support, self-efficacy/motivation, prognosis, time since onset/acuity.         []  The patient has generalized musculoskeletal conditions or a condition affecting multiple sites that will have a direct impact on the rate of recovery.    []  The patient had a prior episode of outpatient therapy during this calendar year for a different condition.           []  The patient has a mental or cognitive disorder in addition to the condition being treated that will have a direct and significant impact on the rate of recovery.          Charge Justification:  (54978) THERAPEUTIC EXERCISE - Provided verbal/tactile cueing for activities related to strengthening, flexibility, endurance, ROM performed to prevent loss of range of motion, maintain or improve muscular strength or increase flexibility, following either an injury or surgery.   (00168) HOME EXERCISE PROGRAM - Reviewed/Progressed HEP activities related to strengthening, flexibility, endurance, ROM performed to prevent loss of range of motion, maintain or improve muscular strength or increase flexibility, following either an injury or surgery.  (53388) NEUROMUSCULAR RE-EDUCATION - Therapeutic procedure, 1 or more areas, each 15 minutes; neuromuscular reeducation of movement, balance, coordination, kinesthetic sense, posture, and/or proprioception for sitting and/or standing activities    TREATMENT PLAN   Plan: Cont POC- Continue emphasis/focus on

## 2024-04-26 ENCOUNTER — APPOINTMENT (OUTPATIENT)
Dept: PHYSICAL THERAPY | Age: 74
End: 2024-04-26
Payer: MEDICARE

## 2024-04-29 ENCOUNTER — HOSPITAL ENCOUNTER (OUTPATIENT)
Dept: PHYSICAL THERAPY | Age: 74
Setting detail: THERAPIES SERIES
Discharge: HOME | End: 2024-04-29
Payer: MEDICARE

## 2024-04-29 PROCEDURE — 97112 NEUROMUSCULAR REEDUCATION: CPT | Performed by: SPECIALIST/TECHNOLOGIST

## 2024-04-29 PROCEDURE — 97110 THERAPEUTIC EXERCISES: CPT | Performed by: SPECIALIST/TECHNOLOGIST

## 2024-04-29 RX ORDER — PREDNISONE 10 MG/1
TABLET ORAL
Qty: 90 TABLET | Refills: 2 | Status: SHIPPED | OUTPATIENT
Start: 2024-04-29

## 2024-04-29 NOTE — TELEPHONE ENCOUNTER
12/06/2023 LOV  Scheduled FOV Today 04/29/2024    Requested Prescriptions     Pending Prescriptions Disp Refills    predniSONE (DELTASONE) 10 MG tablet [Pharmacy Med Name: PREDNISONE 10 MG TABLET] 90 tablet 2     Sig: TAKE 1 TABLET BY MOUTH EVERY DAY

## 2024-04-29 NOTE — FLOWSHEET NOTE
2 muscle grades or better of proximal hip, quadriceps, and hamstrings to work toward proper functional mobility and improving technique of ADLs.   Status: [x] Progressing: [] Met: [x] Not Met: [] Adjusted  Patient will return to Usual work, housework or activities and Usual recreational activities without increased symptoms or restriction to work towards return to prior level of function.  Status: [x] Progressing: [] Met: [x] Not Met: [] Adjusted  Patient to report a 60-70% overall improvement with her R knee.                                                                                                            Status: [x] Progressing: [] Met: [x] Not Met: [] Adjusted       Overall Progression Towards Functional goals/ Treatment Progress Update:  [x] Patient is progressing as expected towards functional goals listed.    [] Progression is slowed due to complexities/Impairments listed.  [] Progression has been slowed due to co-morbidities.  [] Plan just implemented, too soon (<30days) to assess goals progression   [] Goals require adjustment due to lack of progress  [] Patient is not progressing as expected and requires additional follow up with physician  [] Other:     CHARGE CAPTURE     PT CHARGE GRID: overlapped with other patient   CPT Code (TIMED) minutes # CPT Code (UNTIMED) #     Therex (74589)  30' 2  EVAL:LOW (95337 - Typically 20 minutes face-to-face)     Neuromusc. Re-ed (22911) 10' 1  Re-Eval (80062)     Manual (53724)    Estim Unattended (58139)     Ther. Act (25254)    [facility]. Traction (70532)     Gait (87057)    Dry Needle 1-2 muscle (46092)     Aquatic Therex (97887)    Dry Needle 3+ muscle (20561)     Iontophoresis (07753)    VASO (89651)     Ultrasound (73850)    Group Therapy (20222)     Estim Attended (20529)    Canalith Repositioning (56156)     Other:    Other:    Total Timed Code Tx Minutes 40' 3       Total Treatment Minutes 40'    Add KX modifier        MEDICARE CAP EXCEPTION

## 2024-05-01 ENCOUNTER — OFFICE VISIT (OUTPATIENT)
Dept: FAMILY MEDICINE CLINIC | Age: 74
End: 2024-05-01

## 2024-05-01 VITALS
HEART RATE: 92 BPM | DIASTOLIC BLOOD PRESSURE: 92 MMHG | HEIGHT: 64 IN | BODY MASS INDEX: 34.49 KG/M2 | SYSTOLIC BLOOD PRESSURE: 140 MMHG | OXYGEN SATURATION: 97 % | WEIGHT: 202 LBS

## 2024-05-01 DIAGNOSIS — L72.0 EIC (EPIDERMAL INCLUSION CYST): ICD-10-CM

## 2024-05-01 DIAGNOSIS — E78.00 PURE HYPERCHOLESTEROLEMIA: ICD-10-CM

## 2024-05-01 DIAGNOSIS — N18.31 TYPE 2 DIABETES MELLITUS WITH STAGE 3A CHRONIC KIDNEY DISEASE, WITHOUT LONG-TERM CURRENT USE OF INSULIN (HCC): ICD-10-CM

## 2024-05-01 DIAGNOSIS — J45.20 MILD INTERMITTENT ASTHMATIC BRONCHITIS WITHOUT COMPLICATION: ICD-10-CM

## 2024-05-01 DIAGNOSIS — I10 ESSENTIAL HYPERTENSION, BENIGN: ICD-10-CM

## 2024-05-01 DIAGNOSIS — J01.10 ACUTE NON-RECURRENT FRONTAL SINUSITIS: Primary | ICD-10-CM

## 2024-05-01 DIAGNOSIS — K75.4 AUTOIMMUNE HEPATITIS (HCC): ICD-10-CM

## 2024-05-01 DIAGNOSIS — I85.00 ESOPHAGEAL VARICES WITHOUT BLEEDING, UNSPECIFIED ESOPHAGEAL VARICES TYPE (HCC): ICD-10-CM

## 2024-05-01 DIAGNOSIS — E11.22 TYPE 2 DIABETES MELLITUS WITH STAGE 3A CHRONIC KIDNEY DISEASE, WITHOUT LONG-TERM CURRENT USE OF INSULIN (HCC): ICD-10-CM

## 2024-05-01 DIAGNOSIS — E66.01 MORBIDLY OBESE (HCC): ICD-10-CM

## 2024-05-01 DIAGNOSIS — N18.31 STAGE 3A CHRONIC KIDNEY DISEASE (HCC): ICD-10-CM

## 2024-05-01 PROBLEM — N18.32 STAGE 3B CHRONIC KIDNEY DISEASE (HCC): Status: RESOLVED | Noted: 2019-09-27 | Resolved: 2024-05-01

## 2024-05-01 PROBLEM — N18.32 STAGE 3B CHRONIC KIDNEY DISEASE (HCC): Status: ACTIVE | Noted: 2019-09-27

## 2024-05-01 PROBLEM — R22.31 MASS OF RIGHT AXILLA: Status: RESOLVED | Noted: 2022-06-01 | Resolved: 2024-05-01

## 2024-05-01 RX ORDER — AMOXICILLIN AND CLAVULANATE POTASSIUM 875; 125 MG/1; MG/1
1 TABLET, FILM COATED ORAL 2 TIMES DAILY
Qty: 20 TABLET | Refills: 0 | Status: SHIPPED | OUTPATIENT
Start: 2024-05-01 | End: 2024-05-11

## 2024-05-01 SDOH — ECONOMIC STABILITY: FOOD INSECURITY: WITHIN THE PAST 12 MONTHS, YOU WORRIED THAT YOUR FOOD WOULD RUN OUT BEFORE YOU GOT MONEY TO BUY MORE.: NEVER TRUE

## 2024-05-01 SDOH — ECONOMIC STABILITY: FOOD INSECURITY: WITHIN THE PAST 12 MONTHS, THE FOOD YOU BOUGHT JUST DIDN'T LAST AND YOU DIDN'T HAVE MONEY TO GET MORE.: NEVER TRUE

## 2024-05-01 SDOH — ECONOMIC STABILITY: INCOME INSECURITY: HOW HARD IS IT FOR YOU TO PAY FOR THE VERY BASICS LIKE FOOD, HOUSING, MEDICAL CARE, AND HEATING?: NOT HARD AT ALL

## 2024-05-01 ASSESSMENT — PATIENT HEALTH QUESTIONNAIRE - PHQ9: DEPRESSION UNABLE TO ASSESS: PT REFUSES

## 2024-05-01 NOTE — ASSESSMENT & PLAN NOTE
Still not well-controlled however she has lost a few pounds since her last visit.  Encouraged her to continue with increasing her exercise.

## 2024-05-01 NOTE — ASSESSMENT & PLAN NOTE
Typically well-controlled when not sick.  Continue meds Coreg, lisinopril, Norvasc  Ambulatory pressures at home please notify office if remains over 140.

## 2024-05-01 NOTE — ASSESSMENT & PLAN NOTE
To take antibiotic course through completion  Antihistamine of choice- Allegra, Zyrtec, Claritin  Mucinex may provide symptom relief  Sinus Flushing 2x day as able followed by nasal steroid inhalation as prescribed-Flonase  Push fluids

## 2024-05-01 NOTE — PROGRESS NOTES
Plan:  Stable, continue prednisone daily.  She follows with GI for this.  9. Morbidly obese (HCC)  Assessment & Plan:  Still not well-controlled however she has lost a few pounds since her last visit.  Encouraged her to continue with increasing her exercise.  10. Pure hypercholesterolemia  Assessment & Plan:  Lab Results   Component Value Date    CHOL 169 11/02/2023    CHOL 173 06/14/2022    CHOL 177 04/09/2021     Lab Results   Component Value Date    TRIG 102 11/02/2023    TRIG 133 06/14/2022    TRIG 110 04/09/2021     Lab Results   Component Value Date    HDL 76 (H) 11/02/2023    HDL 76 (H) 06/14/2022    HDL 68 (H) 04/09/2021     Previous well-controlled continue Lipitor.        No follow-ups on file.    SUBJECTIVE/OBJECTIVE:  Fidelia presents today with complaints of 3 weeks sinus congestion, worsening past 3 days OTC not helping.   No fevers  Productive clear  Drips down throat making her cough  Hearing feels dulled.  Feels like she has fluid in her ears  Has tried over-the-counter antihistamines without adequate relief.  Of note patient takes prednisone daily for autoimmune hepatitis, Actos    Current Outpatient Medications   Medication Sig Dispense Refill    amoxicillin-clavulanate (AUGMENTIN) 875-125 MG per tablet Take 1 tablet by mouth 2 times daily for 10 days 20 tablet 0    predniSONE (DELTASONE) 10 MG tablet TAKE 1 TABLET BY MOUTH EVERY DAY 90 tablet 2    carvedilol (COREG) 6.25 MG tablet TAKE 1 TABLET BY MOUTH TWICE A  tablet 1    lisinopril (PRINIVIL;ZESTRIL) 20 MG tablet TAKE 1 TABLET BY MOUTH EVERY DAY 90 tablet 0    pioglitazone (ACTOS) 15 MG tablet TAKE 1 TABLET BY MOUTH EVERY DAY 90 tablet 1    FREESTYLE LITE strip TEST TWICE A DAY AND AS NEEDED FOR IRREGULAR BLOOD SUGAR SYMPTOMS 100 strip 3    amLODIPine (NORVASC) 5 MG tablet TAKE 1 TABLET BY MOUTH TWICE A  tablet 1    desloratadine (CLARINEX) 5 MG tablet Take 1 tablet by mouth daily 90 tablet 3    albuterol sulfate HFA

## 2024-05-01 NOTE — ASSESSMENT & PLAN NOTE
Lab Results   Component Value Date    CHOL 169 11/02/2023    CHOL 173 06/14/2022    CHOL 177 04/09/2021     Lab Results   Component Value Date    TRIG 102 11/02/2023    TRIG 133 06/14/2022    TRIG 110 04/09/2021     Lab Results   Component Value Date    HDL 76 (H) 11/02/2023    HDL 76 (H) 06/14/2022    HDL 68 (H) 04/09/2021     Previous well-controlled continue Lipitor.

## 2024-05-01 NOTE — ASSESSMENT & PLAN NOTE
Lab Results   Component Value Date    LABA1C 7.9 11/01/2023    LABA1C 8.4 04/28/2023    LABA1C 8.1 03/01/2023    LABA1C 6.8 05/31/2022    LABA1C 6.6 07/01/2021   Patient understands will have difficulty getting to goal that she takes daily prednisone  Improving continue Glucotrol, Actos  Encourage low-carb, low sugar diet increase protein

## 2024-05-25 DIAGNOSIS — E78.00 PURE HYPERCHOLESTEROLEMIA: ICD-10-CM

## 2024-05-28 NOTE — TELEPHONE ENCOUNTER
Refill Request     Last Seen: 5/1/2024    Last Written: 11/20/23      Next Appointment:   Future Appointments   Date Time Provider Department Center   7/1/2024 12:00 PM Kevin Schuler MD PLASTICS/REC [facility]   11/19/2024 11:30 AM Rafiq Galindo MD [facility]             Requested Prescriptions     Pending Prescriptions Disp Refills    atorvastatin (LIPITOR) 40 MG tablet [Pharmacy Med Name: ATORVASTATIN 40 MG TABLET] 90 tablet 1     Sig: TAKE 1 TABLET BY MOUTH EVERY DAY

## 2024-05-30 RX ORDER — ATORVASTATIN CALCIUM 40 MG/1
TABLET, FILM COATED ORAL
Qty: 90 TABLET | Refills: 1 | Status: SHIPPED | OUTPATIENT
Start: 2024-05-30

## 2024-06-04 ENCOUNTER — HOSPITAL ENCOUNTER (OUTPATIENT)
Dept: PULMONOLOGY | Age: 74
Discharge: HOME | End: 2024-06-04
Payer: MEDICARE

## 2024-06-04 DIAGNOSIS — J45.20 MILD INTERMITTENT ASTHMATIC BRONCHITIS WITHOUT COMPLICATION: ICD-10-CM

## 2024-06-04 LAB
DLCO %PRED: 77 %
DLCO PRED: NORMAL
DLCO/VA %PRED: 107 %
DLCO/VA PRED: NORMAL
DLCO/VA: 4.46 ML/MIN/MMHG
DLCO: NORMAL
EXPIRATORY TIME-POST: NORMAL
EXPIRATORY TIME: NORMAL
FEF 25-75 %CHNG: NORMAL
FEF 25-75 POST %PRED: NORMAL
FEF 25-75% %PRED-PRE: NORMAL
FEF 25-75% PRED: NORMAL
FEF 25-75-POST: NORMAL
FEF 25-75-PRE: NORMAL
FEV1 %PRED-POST: 52 %
FEV1 %PRED-PRE: 43 %
FEV1 PRED: NORMAL
FEV1-POST: NORMAL
FEV1-PRE: NORMAL
FEV1/FVC %PRED-POST: NORMAL
FEV1/FVC %PRED-PRE: NORMAL
FEV1/FVC PRED: NORMAL
FEV1/FVC-POST: 69 %
FEV1/FVC-PRE: 66 %
FVC %PRED-POST: 59 L
FVC %PRED-PRE: 51 %
FVC PRED: NORMAL
FVC-POST: 1.42 L
FVC-PRE: 1.22 L
GAW %PRED: NORMAL
GAW PRED: NORMAL
GAW: NORMAL
IC PRE %PRED: NORMAL
IC PRED: NORMAL
IC: NORMAL
MEP: NORMAL
MIP: NORMAL
MVV %PRED-PRE: NORMAL
MVV PRED: NORMAL
MVV-PRE: NORMAL
PEF %PRED-POST: NORMAL
PEF %PRED-PRE: NORMAL
PEF PRED: NORMAL
PEF%CHNG: NORMAL
PEF-POST: NORMAL
PEF-PRE: NORMAL
RAW %PRED: NORMAL
RAW PRED: NORMAL
RAW: NORMAL
RV PRE %PRED: NORMAL
RV PRED: NORMAL
RV: NORMAL
SVC %PRED: NORMAL
SVC PRED: NORMAL
SVC: NORMAL
TLC PRE %PRED: 78 %
TLC PRED: NORMAL
TLC: NORMAL
VA %PRED: NORMAL
VA PRED: NORMAL
VA: NORMAL
VTG %PRED: NORMAL
VTG PRED: NORMAL
VTG: NORMAL

## 2024-06-04 PROCEDURE — 6370000000 HC RX 637 (ALT 250 FOR IP): Performed by: NURSE PRACTITIONER

## 2024-06-04 PROCEDURE — 94729 DIFFUSING CAPACITY: CPT

## 2024-06-04 PROCEDURE — 94664 DEMO&/EVAL PT USE INHALER: CPT

## 2024-06-04 PROCEDURE — 94640 AIRWAY INHALATION TREATMENT: CPT

## 2024-06-04 PROCEDURE — 94060 EVALUATION OF WHEEZING: CPT

## 2024-06-04 PROCEDURE — 94726 PLETHYSMOGRAPHY LUNG VOLUMES: CPT

## 2024-06-04 RX ORDER — ALBUTEROL SULFATE 90 UG/1
4 AEROSOL, METERED RESPIRATORY (INHALATION) ONCE
Status: COMPLETED | OUTPATIENT
Start: 2024-06-04 | End: 2024-06-04

## 2024-06-04 RX ADMIN — ALBUTEROL SULFATE 4 PUFF: 90 AEROSOL, METERED RESPIRATORY (INHALATION) at 10:43

## 2024-06-04 ASSESSMENT — PULMONARY FUNCTION TESTS
FEV1/FVC_PRE: 66
FEV1/FVC_POST: 69
FEV1_PERCENT_PREDICTED_PRE: 43
FVC_PERCENT_PREDICTED_PRE: 51
FEV1_PERCENT_PREDICTED_POST: 52
FVC_PRE: 1.22
FVC_PERCENT_PREDICTED_POST: 59
FVC_POST: 1.42

## 2024-06-05 NOTE — PROCEDURES
spirometry was acceptable and reproducible by ATS standards      Spirometry/Flow volume loop:  There is severe airflow obstruction with no statistically significant postbronchodilator response    Lung volumes:  Normal lung volumes    Diffusing capacity:  Normal diffusion capacity    Impression:  Severe airflow obstruction with no statistically significant postbronchodilator response,     FEV1 %Pred-Post   Date Value Ref Range Status   06/04/2024 52 % Final     FEV1/FVC-Post   Date Value Ref Range Status   06/04/2024 69 % Final     TLC Pre %Pred   Date Value Ref Range Status   06/04/2024 78 % Final     DLCO %Pred   Date Value Ref Range Status   06/04/2024 77 % Final     DLCO/VA %Pred   Date Value Ref Range Status   06/04/2024 107 % Final           OBSTRUCTION % Predicted FEV1   MILD >70%   MODERATE 60-69%   MODERATELY-SEVERE 50-59%   SEVERE 35-49%   VERY SEVERE <35%         RESTRICTION % Predicted TLC   MILD 66-80%   MODERATE 54-65%   MODERATELY-SEVERE <54%                 DIFFUSION CAPACITY DLCO % Pred   MILD >60% AND < LLN   MODERATE 40-60%   SEVERE <40%       PFT data will be scanned into the media tab under this encounter. Please see the scanned data for numerical values.     Sandra Johnson MD  [facility] Pulmonary, Sleep and Critical Care Medicine

## 2024-06-28 DIAGNOSIS — I10 ESSENTIAL HYPERTENSION, BENIGN: ICD-10-CM

## 2024-06-28 RX ORDER — LISINOPRIL 20 MG/1
20 TABLET ORAL DAILY
Qty: 90 TABLET | Refills: 0 | Status: SHIPPED | OUTPATIENT
Start: 2024-06-28

## 2024-06-28 NOTE — TELEPHONE ENCOUNTER
Requested Prescriptions     Pending Prescriptions Disp Refills    lisinopril (PRINIVIL;ZESTRIL) 20 MG tablet [Pharmacy Med Name: LISINOPRIL 20 MG TABLET] 90 tablet 0     Sig: TAKE 1 TABLET BY MOUTH EVERY DAY          Last Office Visit: 5/1/2024     Next Office Visit: Visit date not found

## 2024-06-30 NOTE — PROGRESS NOTES
[facility]    CC: Skin lesions    Referring Physician: Albina Jensen MD    HPI: This is an 73 y.o.female with a PMHx as delineated below who presents to clinic in consultation for a left forehead lesion that has been present for many years. She notes that it has grown in size and given the location, she was seen by her PCP who recommended excision.     PMHx:   Past Medical History:   Diagnosis Date    Allergic rhinitis     Asthma     COPD (chronic obstructive pulmonary disease) (HCC)     pt denies    Diabetes mellitus (HCC)     Hepatitis     autoimmune    Hyperlipidemia     Hypertension     Influenza A 01/10/2015    Osteoporosis    HgbA1c - PENDING    PSHx:   Past Surgical History:   Procedure Laterality Date    BREAST BIOPSY  10/2005    Dr. Cameron reid , benign     BREAST SURGERY Right 12/18/2023    EXCISION OF RIGHT AXILLARY MASS performed by Lillie Sandoval MD at [facility] OR    CATARACT REMOVAL Bilateral     COLONOSCOPY      Dr. Mcleod  - WNL,repeat 10 yrs     COLONOSCOPY  07/08/13    Dr. Mcleod- internal hemorrhoids, adenomatous and hyperplastic polyps. Repeat in 3 yrs    COLONOSCOPY  07/17/2016    Dr. Mcleod, No evidence of any colorectal neoplasia ~5 years    COLONOSCOPY  6/21/2021    COLONOSCOPY POLYPECTOMY SNARE/COLD BIOPSY performed by Chance SPRAGUE MD at [facility] ENDOSCOPY    LIVER BIOPSY  8/2006     autoimmune hepatitis     UPPER GASTROINTESTINAL ENDOSCOPY N/A 7/11/2022    ESOPHAGOGASTRODUODENOSCOPY performed by Chance SPRAGUE MD at [facility] ENDOSCOPY     Allergy:   Allergies   Allergen Reactions    Iodine Shortness Of Breath    Shellfish-Derived Products Rash     Shortness of breath    Other      Steroid (injection) taken for hepatitis elevated liver enzymes, pt. Uncertain of name       SHx:   Social History     Socioeconomic History    Marital status:      Spouse name: Kingston    Number of children: 0    Years of education: 14    Highest education

## 2024-07-01 ENCOUNTER — OFFICE VISIT (OUTPATIENT)
Dept: SURGERY | Age: 74
End: 2024-07-01
Payer: MEDICARE

## 2024-07-01 VITALS
OXYGEN SATURATION: 97 % | DIASTOLIC BLOOD PRESSURE: 110 MMHG | TEMPERATURE: 97.1 F | HEART RATE: 84 BPM | RESPIRATION RATE: 16 BRPM | WEIGHT: 202.2 LBS | SYSTOLIC BLOOD PRESSURE: 176 MMHG | BODY MASS INDEX: 34.71 KG/M2

## 2024-07-01 DIAGNOSIS — E11.69 TYPE 2 DIABETES MELLITUS WITH OTHER SPECIFIED COMPLICATION, UNSPECIFIED WHETHER LONG TERM INSULIN USE (HCC): ICD-10-CM

## 2024-07-01 DIAGNOSIS — L98.9 FACIAL SKIN LESION: Primary | ICD-10-CM

## 2024-07-01 PROCEDURE — 1124F ACP DISCUSS-NO DSCNMKR DOCD: CPT | Performed by: SURGERY

## 2024-07-01 PROCEDURE — 3077F SYST BP >= 140 MM HG: CPT | Performed by: SURGERY

## 2024-07-01 PROCEDURE — 99204 OFFICE O/P NEW MOD 45 MIN: CPT | Performed by: SURGERY

## 2024-07-01 PROCEDURE — 3080F DIAST BP >= 90 MM HG: CPT | Performed by: SURGERY

## 2024-07-02 LAB
EST. AVERAGE GLUCOSE BLD GHB EST-MCNC: 182.9 MG/DL
HBA1C MFR BLD: 8 %

## 2024-07-03 DIAGNOSIS — E11.22 TYPE 2 DIABETES MELLITUS WITH STAGE 3 CHRONIC KIDNEY DISEASE, WITHOUT LONG-TERM CURRENT USE OF INSULIN, UNSPECIFIED WHETHER STAGE 3A OR 3B CKD (HCC): ICD-10-CM

## 2024-07-03 DIAGNOSIS — N18.30 TYPE 2 DIABETES MELLITUS WITH STAGE 3 CHRONIC KIDNEY DISEASE, WITHOUT LONG-TERM CURRENT USE OF INSULIN, UNSPECIFIED WHETHER STAGE 3A OR 3B CKD (HCC): ICD-10-CM

## 2024-07-03 RX ORDER — PIOGLITAZONEHYDROCHLORIDE 15 MG/1
15 TABLET ORAL DAILY
Qty: 90 TABLET | Refills: 1 | Status: SHIPPED | OUTPATIENT
Start: 2024-07-03

## 2024-07-03 NOTE — TELEPHONE ENCOUNTER
Requested Prescriptions     Pending Prescriptions Disp Refills    pioglitazone (ACTOS) 15 MG tablet [Pharmacy Med Name: PIOGLITAZONE HCL 15 MG TABLET] 90 tablet 1     Sig: TAKE 1 TABLET BY MOUTH EVERY DAY     LOV 5.1.24  No FOV scheduled

## 2024-07-09 ENCOUNTER — OFFICE VISIT (OUTPATIENT)
Dept: FAMILY MEDICINE CLINIC | Age: 74
End: 2024-07-09
Payer: MEDICARE

## 2024-07-09 VITALS
DIASTOLIC BLOOD PRESSURE: 78 MMHG | WEIGHT: 205 LBS | TEMPERATURE: 96.9 F | SYSTOLIC BLOOD PRESSURE: 116 MMHG | BODY MASS INDEX: 35.19 KG/M2 | HEART RATE: 70 BPM | OXYGEN SATURATION: 97 %

## 2024-07-09 DIAGNOSIS — E11.22 TYPE 2 DIABETES MELLITUS WITH STAGE 3 CHRONIC KIDNEY DISEASE, WITHOUT LONG-TERM CURRENT USE OF INSULIN, UNSPECIFIED WHETHER STAGE 3A OR 3B CKD (HCC): ICD-10-CM

## 2024-07-09 DIAGNOSIS — K75.4 AUTOIMMUNE HEPATITIS (HCC): ICD-10-CM

## 2024-07-09 DIAGNOSIS — I10 ESSENTIAL HYPERTENSION, BENIGN: ICD-10-CM

## 2024-07-09 DIAGNOSIS — J41.0 SIMPLE CHRONIC BRONCHITIS (HCC): Primary | ICD-10-CM

## 2024-07-09 DIAGNOSIS — N18.30 TYPE 2 DIABETES MELLITUS WITH STAGE 3 CHRONIC KIDNEY DISEASE, WITHOUT LONG-TERM CURRENT USE OF INSULIN, UNSPECIFIED WHETHER STAGE 3A OR 3B CKD (HCC): ICD-10-CM

## 2024-07-09 PROBLEM — J01.10 ACUTE NON-RECURRENT FRONTAL SINUSITIS: Status: RESOLVED | Noted: 2024-05-01 | Resolved: 2024-07-09

## 2024-07-09 PROCEDURE — 1124F ACP DISCUSS-NO DSCNMKR DOCD: CPT | Performed by: NURSE PRACTITIONER

## 2024-07-09 PROCEDURE — 3078F DIAST BP <80 MM HG: CPT | Performed by: NURSE PRACTITIONER

## 2024-07-09 PROCEDURE — 99214 OFFICE O/P EST MOD 30 MIN: CPT | Performed by: NURSE PRACTITIONER

## 2024-07-09 PROCEDURE — 3074F SYST BP LT 130 MM HG: CPT | Performed by: NURSE PRACTITIONER

## 2024-07-09 PROCEDURE — 3052F HG A1C>EQUAL 8.0%<EQUAL 9.0%: CPT | Performed by: NURSE PRACTITIONER

## 2024-07-09 PROCEDURE — G2211 COMPLEX E/M VISIT ADD ON: HCPCS | Performed by: NURSE PRACTITIONER

## 2024-07-09 RX ORDER — ROFLUMILAST 250 UG/1
250 TABLET ORAL DAILY
Qty: 28 TABLET | Refills: 3 | Status: SHIPPED | OUTPATIENT
Start: 2024-07-09

## 2024-07-09 ASSESSMENT — PATIENT HEALTH QUESTIONNAIRE - PHQ9
SUM OF ALL RESPONSES TO PHQ QUESTIONS 1-9: 0
SUM OF ALL RESPONSES TO PHQ9 QUESTIONS 1 & 2: 0
SUM OF ALL RESPONSES TO PHQ QUESTIONS 1-9: 0
SUM OF ALL RESPONSES TO PHQ QUESTIONS 1-9: 0
1. LITTLE INTEREST OR PLEASURE IN DOING THINGS: NOT AT ALL
SUM OF ALL RESPONSES TO PHQ QUESTIONS 1-9: 0
2. FEELING DOWN, DEPRESSED OR HOPELESS: NOT AT ALL

## 2024-07-09 NOTE — ASSESSMENT & PLAN NOTE
Chronic-not well-controlled slightly worse.  This is good to be difficult to control given her daily prednisone use.  She would prefer to not use any injectable medications.  Referral given for Machelle Delaney dietitian to help patient with food choices.

## 2024-07-09 NOTE — PROGRESS NOTES
tablet 0     No current facility-administered medications for this visit.     MEDICATION LIST REVIEWED AND UPDATED AT TIME OF VISIT    Review of Systems   All other systems reviewed and are negative.      Vitals:    07/09/24 1319   BP: 116/78   Pulse: 70   Temp: 96.9 °F (36.1 °C)   SpO2: 97%   Weight: 93 kg (205 lb)       Physical Exam  Vitals reviewed.   Constitutional:       General: She is not in acute distress.     Appearance: She is well-developed. She is not ill-appearing, toxic-appearing or diaphoretic.   HENT:      Head: Normocephalic and atraumatic.      Right Ear: Tympanic membrane and external ear normal.      Left Ear: Tympanic membrane and external ear normal.      Nose: Nose normal.      Mouth/Throat:      Mouth: Mucous membranes are moist.   Eyes:      General: No scleral icterus.        Right eye: No discharge.         Left eye: No discharge.      Extraocular Movements: Extraocular movements intact.      Conjunctiva/sclera: Conjunctivae normal.      Pupils: Pupils are equal, round, and reactive to light.   Neck:      Thyroid: No thyromegaly.   Cardiovascular:      Rate and Rhythm: Normal rate and regular rhythm.      Heart sounds: Normal heart sounds. No murmur heard.     No friction rub. No gallop.   Pulmonary:      Effort: Pulmonary effort is normal. No respiratory distress.      Breath sounds: Normal breath sounds. No stridor. No wheezing or rales.   Chest:      Chest wall: No tenderness.   Abdominal:      General: Bowel sounds are normal. There is no distension.      Palpations: Abdomen is soft. There is no mass.      Tenderness: There is no abdominal tenderness. There is no guarding or rebound.      Hernia: No hernia is present.   Musculoskeletal:         General: No tenderness or deformity. Normal range of motion.      Cervical back: Normal range of motion and neck supple.   Lymphadenopathy:      Cervical: No cervical adenopathy.   Skin:     General: Skin is warm and dry.      Capillary Refill:

## 2024-07-09 NOTE — ASSESSMENT & PLAN NOTE
Chronic but not well-controlled.  Reviewed pulmonary function test indicating significant obstructive disease.  Patient states she is using her inhalers as directed however still feels very tight and often like she is unable to get a deep breath.  She is very sensitive to smoke and irritants in the air.  No wheezing or shortness of breath.  Will try Daliresp 250 mg daily to see if she gets some improvement.  To follow-up with me in 4 weeks.

## 2024-07-09 NOTE — ASSESSMENT & PLAN NOTE
Chronic-well-controlled today.  Continue Norvasc, carvedilol, lisinopril.  Tolerating medications without side effects.

## 2024-07-10 DIAGNOSIS — I10 ESSENTIAL HYPERTENSION, BENIGN: Chronic | ICD-10-CM

## 2024-07-10 RX ORDER — AMLODIPINE BESYLATE 5 MG/1
TABLET ORAL
Qty: 180 TABLET | Refills: 1 | Status: SHIPPED | OUTPATIENT
Start: 2024-07-10

## 2024-07-10 NOTE — TELEPHONE ENCOUNTER
Requested Prescriptions     Pending Prescriptions Disp Refills    amLODIPine (NORVASC) 5 MG tablet [Pharmacy Med Name: AMLODIPINE BESYLATE 5 MG TAB] 180 tablet 1     Sig: TAKE 1 TABLET BY MOUTH TWICE A DAY          Last OV: 7/9/2024     Last labs: 12/6/2024     F/u: 10/8/2024

## 2024-07-11 ENCOUNTER — OFFICE VISIT (OUTPATIENT)
Dept: ENDOCRINOLOGY | Age: 74
End: 2024-07-11

## 2024-07-11 DIAGNOSIS — N18.30 TYPE 2 DIABETES MELLITUS WITH STAGE 3 CHRONIC KIDNEY DISEASE, WITHOUT LONG-TERM CURRENT USE OF INSULIN, UNSPECIFIED WHETHER STAGE 3A OR 3B CKD (HCC): Primary | ICD-10-CM

## 2024-07-11 DIAGNOSIS — E11.22 TYPE 2 DIABETES MELLITUS WITH STAGE 3 CHRONIC KIDNEY DISEASE, WITHOUT LONG-TERM CURRENT USE OF INSULIN, UNSPECIFIED WHETHER STAGE 3A OR 3B CKD (HCC): Primary | ICD-10-CM

## 2024-07-11 NOTE — PROGRESS NOTES
trending     Main focuses: reduce or stop sugar sweetened beverages, est 3 meals per day, try to pair carb foods with non carbs each meal.    Handouts Provided:  Planning Healthy Meals- [employer]  Healthy Eating Guidelines for Diabetes- [facility]  Low carb snack ideas list- [employer]    Interventions:  Control Carbohydrate Intake using Plate Guide  Increase intake of vegetables  Decrease intake of high sodium foods  Discussed Benefit of starting 3 meals per day, try not to skip meals  Increase activity level by walking     Reduce sugar sweetened beverages  Increase water into up to 64oz  Promote blood glucose testing 2 times per day, morning fasting, 2hr post prandial   Encouraged to contact PCP to discuss sleep         NUTRITION MONITORING AND EVALUATION    3 Meal per day   30-45 gm CHO per meal, 15 gm CHO per snack   Increase water intake  Increase activity level  Take Medications per order  Pair meals/snacks with lean proteins, healthy fats, and non-starchy vegetables         Patient Active Problem List   Diagnosis    Autoimmune hepatitis (HCC)    Essential hypertension, benign    Pure hypercholesterolemia    Murmur, cardiac    Type 2 diabetes mellitus with stage 3 chronic kidney disease, without long-term current use of insulin (HCC)    Morbidly obese (HCC)    Post menopausal problems    Esophageal varices without bleeding, unspecified esophageal varices type (HCC)    Stage 3a chronic kidney disease (HCC)    EIC (epidermal inclusion cyst)    Simple chronic bronchitis (HCC)       Current Outpatient Medications   Medication Sig Dispense Refill    amLODIPine (NORVASC) 5 MG tablet TAKE 1 TABLET BY MOUTH TWICE A  tablet 1    Roflumilast (DALIRESP) 250 MCG tablet Take 1 tablet by mouth daily 28 tablet 3    pioglitazone (ACTOS) 15 MG tablet TAKE 1 TABLET BY MOUTH EVERY DAY 90 tablet 1    lisinopril (PRINIVIL;ZESTRIL) 20 MG tablet TAKE 1 TABLET BY MOUTH EVERY DAY 90 tablet 0    atorvastatin (LIPITOR) 40

## 2024-08-19 ENCOUNTER — HOSPITAL ENCOUNTER (OUTPATIENT)
Dept: GENERAL RADIOLOGY | Age: 74
Discharge: HOME | End: 2024-08-19
Payer: MEDICARE

## 2024-08-19 ENCOUNTER — OFFICE VISIT (OUTPATIENT)
Dept: FAMILY MEDICINE CLINIC | Age: 74
End: 2024-08-19

## 2024-08-19 VITALS
TEMPERATURE: 96.9 F | HEART RATE: 132 BPM | OXYGEN SATURATION: 94 % | DIASTOLIC BLOOD PRESSURE: 70 MMHG | WEIGHT: 183 LBS | BODY MASS INDEX: 31.24 KG/M2 | HEIGHT: 64 IN | SYSTOLIC BLOOD PRESSURE: 120 MMHG

## 2024-08-19 DIAGNOSIS — J41.0 SIMPLE CHRONIC BRONCHITIS (HCC): ICD-10-CM

## 2024-08-19 DIAGNOSIS — R00.0 TACHYCARDIA: Primary | ICD-10-CM

## 2024-08-19 DIAGNOSIS — R05.1 ACUTE COUGH: ICD-10-CM

## 2024-08-19 DIAGNOSIS — I10 ESSENTIAL HYPERTENSION, BENIGN: ICD-10-CM

## 2024-08-19 PROCEDURE — 71046 X-RAY EXAM CHEST 2 VIEWS: CPT

## 2024-08-19 RX ORDER — AZITHROMYCIN 250 MG/1
TABLET, FILM COATED ORAL
Qty: 6 TABLET | Refills: 0 | Status: ON HOLD | OUTPATIENT
Start: 2024-08-19 | End: 2024-08-29

## 2024-08-19 RX ORDER — DEXTROMETHORPHAN POLISTIREX 30 MG/5ML
60 SUSPENSION ORAL 2 TIMES DAILY PRN
Qty: 148 ML | Refills: 0 | Status: ON HOLD | OUTPATIENT
Start: 2024-08-19 | End: 2024-08-29

## 2024-08-19 NOTE — PROGRESS NOTES
Fidelia Torres (:  1950) is a 73 y.o. female,Established patient, here for evaluation of the following chief complaint(s):  URI (X  2 weeks)         Assessment & Plan  Tachycardia    Likely due to dehydration and illness.  EKG ruled out A-fib.    Orders:    EKG 12 lead; Future    EKG 12 lead    Acute cough    Suspect right lower lobe pneumonia, will cover with azithromycin.  Chest x-ray today.  Patient is continue with her albuterol inhaler, to call me in 48 hours with update.    Orders:    XR CHEST (2 VW); Future      No follow-ups on file.       Subjective   Presents with complaints of cough wheeze and shortness of breath x 2 weeks.  States had been feeling ill 2 weeks ago and had family staying with her for a family reunion and they became sick with similar.  They have improved she has not.  Complains of significant shortness of breath with any activity  Complains of strong cough unable to bring it up  No complains of sinus congestion  Has a difficulty sleeping because of the coughing  Using her albuterol inhaler 3-4 times a day.  No fever      Allergies   Allergen Reactions    Iodine Shortness Of Breath    Shellfish-Derived Products Rash     Shortness of breath    Other      Steroid (injection) taken for hepatitis elevated liver enzymes, pt. Uncertain of name       Current Outpatient Medications   Medication Sig Dispense Refill    azithromycin (ZITHROMAX) 250 MG tablet 500mg on day 1 followed by 250mg on days 2 - 5 6 tablet 0    dextromethorphan (DELSYM) 30 MG/5ML extended release liquid Take 10 mLs by mouth 2 times daily as needed for Cough 148 mL 0    amLODIPine (NORVASC) 5 MG tablet TAKE 1 TABLET BY MOUTH TWICE A  tablet 1    Roflumilast (DALIRESP) 250 MCG tablet Take 1 tablet by mouth daily 28 tablet 3    pioglitazone (ACTOS) 15 MG tablet TAKE 1 TABLET BY MOUTH EVERY DAY 90 tablet 1    lisinopril (PRINIVIL;ZESTRIL) 20 MG tablet TAKE 1 TABLET BY MOUTH EVERY DAY 90 tablet 0    atorvastatin

## 2024-08-22 ENCOUNTER — TELEPHONE (OUTPATIENT)
Dept: FAMILY MEDICINE CLINIC | Age: 74
End: 2024-08-22

## 2024-08-24 ENCOUNTER — HOSPITAL ENCOUNTER (INPATIENT)
Age: 74
LOS: 6 days | Discharge: HOME | End: 2024-08-30
Attending: STUDENT IN AN ORGANIZED HEALTH CARE EDUCATION/TRAINING PROGRAM | Admitting: HOSPITALIST
Payer: MEDICARE

## 2024-08-24 ENCOUNTER — APPOINTMENT (OUTPATIENT)
Dept: CT IMAGING | Age: 74
End: 2024-08-24
Payer: MEDICARE

## 2024-08-24 ENCOUNTER — APPOINTMENT (OUTPATIENT)
Dept: GENERAL RADIOLOGY | Age: 74
End: 2024-08-24
Payer: MEDICARE

## 2024-08-24 DIAGNOSIS — R06.02 SHORTNESS OF BREATH: ICD-10-CM

## 2024-08-24 DIAGNOSIS — R65.20 SEVERE SEPSIS (HCC): Primary | ICD-10-CM

## 2024-08-24 DIAGNOSIS — N17.9 AKI (ACUTE KIDNEY INJURY) (HCC): ICD-10-CM

## 2024-08-24 DIAGNOSIS — A41.9 SEVERE SEPSIS (HCC): Primary | ICD-10-CM

## 2024-08-24 DIAGNOSIS — R94.31 PROLONGED Q-T INTERVAL ON ECG: ICD-10-CM

## 2024-08-24 DIAGNOSIS — I21.4 NSTEMI (NON-ST ELEVATED MYOCARDIAL INFARCTION) (HCC): ICD-10-CM

## 2024-08-24 LAB
ALBUMIN SERPL-MCNC: 3.8 G/DL (ref 3.4–5)
ALBUMIN/GLOB SERPL: 1.1 {RATIO} (ref 1.1–2.2)
ALP SERPL-CCNC: 62 U/L (ref 40–129)
ALT SERPL-CCNC: 8 U/L (ref 10–40)
ANION GAP SERPL CALCULATED.3IONS-SCNC: 22 MMOL/L (ref 3–16)
ANISOCYTOSIS BLD QL SMEAR: ABNORMAL
AST SERPL-CCNC: 22 U/L (ref 15–37)
BASOPHILS # BLD: 0.2 K/UL (ref 0–0.2)
BASOPHILS NFR BLD: 1 %
BILIRUB SERPL-MCNC: 0.9 MG/DL (ref 0–1)
BUN SERPL-MCNC: 31 MG/DL (ref 7–20)
CALCIUM SERPL-MCNC: 9.5 MG/DL (ref 8.3–10.6)
CHLORIDE SERPL-SCNC: 101 MMOL/L (ref 99–110)
CO2 SERPL-SCNC: 19 MMOL/L (ref 21–32)
CREAT SERPL-MCNC: 2.1 MG/DL (ref 0.6–1.2)
DEPRECATED RDW RBC AUTO: 14.1 % (ref 12.4–15.4)
EOSINOPHIL # BLD: 0.6 K/UL (ref 0–0.6)
EOSINOPHIL NFR BLD: 4 %
GFR SERPLBLD CREATININE-BSD FMLA CKD-EPI: 25 ML/MIN/{1.73_M2}
GLUCOSE SERPL-MCNC: 198 MG/DL (ref 70–99)
HCT VFR BLD AUTO: 45.6 % (ref 36–48)
HGB BLD-MCNC: 14.8 G/DL (ref 12–16)
LACTATE BLDV-SCNC: 2.3 MMOL/L (ref 0.4–1.9)
LACTATE BLDV-SCNC: 3.3 MMOL/L (ref 0.4–1.9)
LYMPHOCYTES # BLD: 2.4 K/UL (ref 1–5.1)
LYMPHOCYTES NFR BLD: 16 %
MACROCYTES BLD QL SMEAR: ABNORMAL
MAGNESIUM SERPL-MCNC: 2.06 MG/DL (ref 1.8–2.4)
MCH RBC QN AUTO: 29.6 PG (ref 26–34)
MCHC RBC AUTO-ENTMCNC: 32.5 G/DL (ref 31–36)
MCV RBC AUTO: 91.1 FL (ref 80–100)
MONOCYTES # BLD: 1.8 K/UL (ref 0–1.3)
MONOCYTES NFR BLD: 12 %
NEUTROPHILS # BLD: 10.3 K/UL (ref 1.7–7.7)
NEUTROPHILS NFR BLD: 67 %
NT-PROBNP SERPL-MCNC: ABNORMAL PG/ML (ref 0–124)
PATH INTERP BLD-IMP: YES
PLATELET # BLD AUTO: 206 K/UL (ref 135–450)
PMV BLD AUTO: 8.3 FL (ref 5–10.5)
POTASSIUM SERPL-SCNC: 3.6 MMOL/L (ref 3.5–5.1)
PROCALCITONIN SERPL IA-MCNC: 0.21 NG/ML (ref 0–0.15)
PROT SERPL-MCNC: 7.3 G/DL (ref 6.4–8.2)
RBC # BLD AUTO: 5.01 M/UL (ref 4–5.2)
SARS-COV-2 RDRP RESP QL NAA+PROBE: NOT DETECTED
SODIUM SERPL-SCNC: 142 MMOL/L (ref 136–145)
TROPONIN, HIGH SENSITIVITY: 127 NG/L (ref 0–14)
TROPONIN, HIGH SENSITIVITY: 150 NG/L (ref 0–14)
WBC # BLD AUTO: 15.3 K/UL (ref 4–11)

## 2024-08-24 PROCEDURE — 6360000002 HC RX W HCPCS: Performed by: STUDENT IN AN ORGANIZED HEALTH CARE EDUCATION/TRAINING PROGRAM

## 2024-08-24 PROCEDURE — 71045 X-RAY EXAM CHEST 1 VIEW: CPT

## 2024-08-24 PROCEDURE — 84145 PROCALCITONIN (PCT): CPT

## 2024-08-24 PROCEDURE — 74176 CT ABD & PELVIS W/O CONTRAST: CPT

## 2024-08-24 PROCEDURE — 96374 THER/PROPH/DIAG INJ IV PUSH: CPT

## 2024-08-24 PROCEDURE — 87635 SARS-COV-2 COVID-19 AMP PRB: CPT

## 2024-08-24 PROCEDURE — 2060000000 HC ICU INTERMEDIATE R&B

## 2024-08-24 PROCEDURE — 93005 ELECTROCARDIOGRAM TRACING: CPT | Performed by: STUDENT IN AN ORGANIZED HEALTH CARE EDUCATION/TRAINING PROGRAM

## 2024-08-24 PROCEDURE — 85025 COMPLETE CBC W/AUTO DIFF WBC: CPT

## 2024-08-24 PROCEDURE — 84484 ASSAY OF TROPONIN QUANT: CPT

## 2024-08-24 PROCEDURE — 99285 EMERGENCY DEPT VISIT HI MDM: CPT

## 2024-08-24 PROCEDURE — 83735 ASSAY OF MAGNESIUM: CPT

## 2024-08-24 PROCEDURE — 96365 THER/PROPH/DIAG IV INF INIT: CPT

## 2024-08-24 PROCEDURE — 80053 COMPREHEN METABOLIC PANEL: CPT

## 2024-08-24 PROCEDURE — 96375 TX/PRO/DX INJ NEW DRUG ADDON: CPT

## 2024-08-24 PROCEDURE — 2580000003 HC RX 258: Performed by: STUDENT IN AN ORGANIZED HEALTH CARE EDUCATION/TRAINING PROGRAM

## 2024-08-24 PROCEDURE — 83605 ASSAY OF LACTIC ACID: CPT

## 2024-08-24 PROCEDURE — 83880 ASSAY OF NATRIURETIC PEPTIDE: CPT

## 2024-08-24 RX ORDER — ONDANSETRON 2 MG/ML
4 INJECTION INTRAMUSCULAR; INTRAVENOUS ONCE
Status: COMPLETED | OUTPATIENT
Start: 2024-08-24 | End: 2024-08-24

## 2024-08-24 RX ORDER — HEPARIN SODIUM 1000 [USP'U]/ML
2000 INJECTION, SOLUTION INTRAVENOUS; SUBCUTANEOUS PRN
Status: DISCONTINUED | OUTPATIENT
Start: 2024-08-24 | End: 2024-08-24

## 2024-08-24 RX ORDER — 0.9 % SODIUM CHLORIDE 0.9 %
500 INTRAVENOUS SOLUTION INTRAVENOUS ONCE
Status: COMPLETED | OUTPATIENT
Start: 2024-08-24 | End: 2024-08-24

## 2024-08-24 RX ORDER — 0.9 % SODIUM CHLORIDE 0.9 %
1000 INTRAVENOUS SOLUTION INTRAVENOUS ONCE
Status: COMPLETED | OUTPATIENT
Start: 2024-08-24 | End: 2024-08-24

## 2024-08-24 RX ORDER — HEPARIN SODIUM 1000 [USP'U]/ML
4000 INJECTION, SOLUTION INTRAVENOUS; SUBCUTANEOUS PRN
Status: DISCONTINUED | OUTPATIENT
Start: 2024-08-24 | End: 2024-08-24

## 2024-08-24 RX ORDER — HEPARIN SODIUM 10000 [USP'U]/100ML
0-4000 INJECTION, SOLUTION INTRAVENOUS CONTINUOUS
Status: DISCONTINUED | OUTPATIENT
Start: 2024-08-24 | End: 2024-08-28

## 2024-08-24 RX ORDER — HEPARIN SODIUM 1000 [USP'U]/ML
4000 INJECTION, SOLUTION INTRAVENOUS; SUBCUTANEOUS ONCE
Status: COMPLETED | OUTPATIENT
Start: 2024-08-24 | End: 2024-08-24

## 2024-08-24 RX ORDER — MAGNESIUM SULFATE IN WATER 40 MG/ML
2000 INJECTION, SOLUTION INTRAVENOUS ONCE
Status: COMPLETED | OUTPATIENT
Start: 2024-08-24 | End: 2024-08-24

## 2024-08-24 RX ADMIN — HEPARIN SODIUM 1000 UNITS/HR: 10000 INJECTION, SOLUTION INTRAVENOUS at 20:56

## 2024-08-24 RX ADMIN — ONDANSETRON 4 MG: 2 INJECTION INTRAMUSCULAR; INTRAVENOUS at 19:59

## 2024-08-24 RX ADMIN — SODIUM CHLORIDE 1000 ML: 9 INJECTION, SOLUTION INTRAVENOUS at 18:46

## 2024-08-24 RX ADMIN — SODIUM CHLORIDE 1000 ML: 9 INJECTION, SOLUTION INTRAVENOUS at 20:08

## 2024-08-24 RX ADMIN — CEFEPIME 2000 MG: 2 INJECTION, POWDER, FOR SOLUTION INTRAVENOUS at 20:47

## 2024-08-24 RX ADMIN — VANCOMYCIN HYDROCHLORIDE 1500 MG: 1.5 INJECTION, POWDER, LYOPHILIZED, FOR SOLUTION INTRAVENOUS at 22:08

## 2024-08-24 RX ADMIN — SODIUM CHLORIDE 500 ML: 9 INJECTION, SOLUTION INTRAVENOUS at 21:55

## 2024-08-24 RX ADMIN — MAGNESIUM SULFATE HEPTAHYDRATE 2000 MG: 40 INJECTION, SOLUTION INTRAVENOUS at 20:05

## 2024-08-24 RX ADMIN — HEPARIN SODIUM 4000 UNITS: 1000 INJECTION INTRAVENOUS; SUBCUTANEOUS at 20:53

## 2024-08-24 ASSESSMENT — LIFESTYLE VARIABLES
HOW OFTEN DO YOU HAVE A DRINK CONTAINING ALCOHOL: NEVER
HOW MANY STANDARD DRINKS CONTAINING ALCOHOL DO YOU HAVE ON A TYPICAL DAY: PATIENT DOES NOT DRINK

## 2024-08-24 ASSESSMENT — PAIN - FUNCTIONAL ASSESSMENT: PAIN_FUNCTIONAL_ASSESSMENT: NONE - DENIES PAIN

## 2024-08-24 NOTE — ED NOTES
During triage patient kept saying she was going to pass out. Bp 71/52. RN tried to cycle another pressure but machine could not read bp. Patient began vomiting and was moved to an ED exam room

## 2024-08-24 NOTE — CONSULTS
Clinical Pharmacy Note  Vancomycin Consult    Pharmacy consult received for one-time dose of vancomycin in the Emergency Department per Dr. Yuen.    Ht Readings from Last 1 Encounters:   08/24/24 1.626 m (5' 4\")        Wt Readings from Last 1 Encounters:   08/24/24 83 kg (182 lb 15.7 oz)         Assessment/Plan:  Vancomycin 1500 mg x 1 in ED.  If vancomycin is to continue on admission and pharmacy is to manage dosing, please re-consult with admission orders.      Placido Leone RPH  8/24/2024 7:43 PM

## 2024-08-25 PROBLEM — N18.31 TYPE 2 DIABETES MELLITUS WITH STAGE 3A CHRONIC KIDNEY DISEASE, WITHOUT LONG-TERM CURRENT USE OF INSULIN (HCC): Status: ACTIVE | Noted: 2019-12-16

## 2024-08-25 PROBLEM — N18.9 ACUTE KIDNEY INJURY SUPERIMPOSED ON CHRONIC KIDNEY DISEASE (HCC): Status: ACTIVE | Noted: 2024-08-25

## 2024-08-25 PROBLEM — N17.9 ACUTE KIDNEY INJURY SUPERIMPOSED ON CHRONIC KIDNEY DISEASE (HCC): Status: ACTIVE | Noted: 2024-08-25

## 2024-08-25 PROBLEM — R79.89 ELEVATED TROPONIN I LEVEL: Status: ACTIVE | Noted: 2024-08-25

## 2024-08-25 PROBLEM — R94.31 ABNORMAL EKG: Status: ACTIVE | Noted: 2024-08-25

## 2024-08-25 LAB
ANION GAP SERPL CALCULATED.3IONS-SCNC: 14 MMOL/L (ref 3–16)
ANTI-XA UNFRAC HEPARIN: 0.57 IU/ML (ref 0.3–0.7)
ANTI-XA UNFRAC HEPARIN: 0.61 IU/ML (ref 0.3–0.7)
ANTI-XA UNFRAC HEPARIN: 1.02 IU/ML (ref 0.3–0.7)
BASOPHILS # BLD: 0.2 K/UL (ref 0–0.2)
BASOPHILS NFR BLD: 1.3 %
BUN SERPL-MCNC: 30 MG/DL (ref 7–20)
CALCIUM SERPL-MCNC: 8.1 MG/DL (ref 8.3–10.6)
CHLORIDE SERPL-SCNC: 112 MMOL/L (ref 99–110)
CO2 SERPL-SCNC: 18 MMOL/L (ref 21–32)
CREAT SERPL-MCNC: 1.9 MG/DL (ref 0.6–1.2)
DEPRECATED RDW RBC AUTO: 14.1 % (ref 12.4–15.4)
EKG ATRIAL RATE: 105 BPM
EKG DIAGNOSIS: NORMAL
EKG P AXIS: 56 DEGREES
EKG P-R INTERVAL: 126 MS
EKG Q-T INTERVAL: 452 MS
EKG QRS DURATION: 82 MS
EKG QTC CALCULATION (BAZETT): 597 MS
EKG R AXIS: -27 DEGREES
EKG T AXIS: 188 DEGREES
EKG VENTRICULAR RATE: 105 BPM
EOSINOPHIL # BLD: 0.7 K/UL (ref 0–0.6)
EOSINOPHIL NFR BLD: 4.8 %
GFR SERPLBLD CREATININE-BSD FMLA CKD-EPI: 28 ML/MIN/{1.73_M2}
GLUCOSE BLD-MCNC: 142 MG/DL (ref 70–99)
GLUCOSE BLD-MCNC: 172 MG/DL (ref 70–99)
GLUCOSE BLD-MCNC: 231 MG/DL (ref 70–99)
GLUCOSE SERPL-MCNC: 118 MG/DL (ref 70–99)
HCT VFR BLD AUTO: 37.7 % (ref 36–48)
HGB BLD-MCNC: 12.3 G/DL (ref 12–16)
LACTATE BLDV-SCNC: 1.4 MMOL/L (ref 0.4–2)
LYMPHOCYTES # BLD: 2.5 K/UL (ref 1–5.1)
LYMPHOCYTES NFR BLD: 18.2 %
MCH RBC QN AUTO: 29.5 PG (ref 26–34)
MCHC RBC AUTO-ENTMCNC: 32.7 G/DL (ref 31–36)
MCV RBC AUTO: 90.2 FL (ref 80–100)
MONOCYTES # BLD: 2.1 K/UL (ref 0–1.3)
MONOCYTES NFR BLD: 15.2 %
NEUTROPHILS # BLD: 8.3 K/UL (ref 1.7–7.7)
NEUTROPHILS NFR BLD: 60.5 %
PATH INTERP BLD-IMP: NO
PERFORMED ON: ABNORMAL
PLATELET # BLD AUTO: 178 K/UL (ref 135–450)
PMV BLD AUTO: 8.4 FL (ref 5–10.5)
POTASSIUM SERPL-SCNC: 3.8 MMOL/L (ref 3.5–5.1)
PROCALCITONIN SERPL IA-MCNC: 0.29 NG/ML (ref 0–0.15)
RBC # BLD AUTO: 4.18 M/UL (ref 4–5.2)
REASON FOR REJECTION: NORMAL
REJECTED TEST: NORMAL
SODIUM SERPL-SCNC: 144 MMOL/L (ref 136–145)
TROPONIN, HIGH SENSITIVITY: 136 NG/L (ref 0–14)
VANCOMYCIN SERPL-MCNC: 21.6 UG/ML
WBC # BLD AUTO: 13.7 K/UL (ref 4–11)

## 2024-08-25 PROCEDURE — 6360000002 HC RX W HCPCS: Performed by: HOSPITALIST

## 2024-08-25 PROCEDURE — 94640 AIRWAY INHALATION TREATMENT: CPT

## 2024-08-25 PROCEDURE — 94760 N-INVAS EAR/PLS OXIMETRY 1: CPT

## 2024-08-25 PROCEDURE — 80202 ASSAY OF VANCOMYCIN: CPT

## 2024-08-25 PROCEDURE — 80048 BASIC METABOLIC PNL TOTAL CA: CPT

## 2024-08-25 PROCEDURE — 85025 COMPLETE CBC W/AUTO DIFF WBC: CPT

## 2024-08-25 PROCEDURE — 84484 ASSAY OF TROPONIN QUANT: CPT

## 2024-08-25 PROCEDURE — 85520 HEPARIN ASSAY: CPT

## 2024-08-25 PROCEDURE — 6370000000 HC RX 637 (ALT 250 FOR IP): Performed by: STUDENT IN AN ORGANIZED HEALTH CARE EDUCATION/TRAINING PROGRAM

## 2024-08-25 PROCEDURE — 84145 PROCALCITONIN (PCT): CPT

## 2024-08-25 PROCEDURE — 6370000000 HC RX 637 (ALT 250 FOR IP): Performed by: FAMILY MEDICINE

## 2024-08-25 PROCEDURE — 6370000000 HC RX 637 (ALT 250 FOR IP): Performed by: HOSPITALIST

## 2024-08-25 PROCEDURE — 6360000002 HC RX W HCPCS: Performed by: FAMILY MEDICINE

## 2024-08-25 PROCEDURE — 83605 ASSAY OF LACTIC ACID: CPT

## 2024-08-25 PROCEDURE — 87040 BLOOD CULTURE FOR BACTERIA: CPT

## 2024-08-25 PROCEDURE — 2060000000 HC ICU INTERMEDIATE R&B

## 2024-08-25 PROCEDURE — 93010 ELECTROCARDIOGRAM REPORT: CPT | Performed by: INTERNAL MEDICINE

## 2024-08-25 PROCEDURE — 2580000003 HC RX 258: Performed by: HOSPITALIST

## 2024-08-25 PROCEDURE — 99223 1ST HOSP IP/OBS HIGH 75: CPT | Performed by: STUDENT IN AN ORGANIZED HEALTH CARE EDUCATION/TRAINING PROGRAM

## 2024-08-25 PROCEDURE — 36415 COLL VENOUS BLD VENIPUNCTURE: CPT

## 2024-08-25 PROCEDURE — 2580000003 HC RX 258: Performed by: FAMILY MEDICINE

## 2024-08-25 RX ORDER — ALBUTEROL SULFATE 90 UG/1
2 AEROSOL, METERED RESPIRATORY (INHALATION) EVERY 6 HOURS PRN
Status: DISCONTINUED | OUTPATIENT
Start: 2024-08-25 | End: 2024-08-30 | Stop reason: HOSPADM

## 2024-08-25 RX ORDER — CARVEDILOL 6.25 MG/1
6.25 TABLET ORAL 2 TIMES DAILY WITH MEALS
Status: DISCONTINUED | OUTPATIENT
Start: 2024-08-25 | End: 2024-08-25

## 2024-08-25 RX ORDER — SODIUM CHLORIDE 0.9 % (FLUSH) 0.9 %
5-40 SYRINGE (ML) INJECTION EVERY 12 HOURS SCHEDULED
Status: DISCONTINUED | OUTPATIENT
Start: 2024-08-25 | End: 2024-08-30 | Stop reason: HOSPADM

## 2024-08-25 RX ORDER — ACETAMINOPHEN 325 MG/1
650 TABLET ORAL EVERY 6 HOURS PRN
Status: DISCONTINUED | OUTPATIENT
Start: 2024-08-25 | End: 2024-08-30 | Stop reason: HOSPADM

## 2024-08-25 RX ORDER — ACETAMINOPHEN 650 MG/1
650 SUPPOSITORY RECTAL EVERY 6 HOURS PRN
Status: DISCONTINUED | OUTPATIENT
Start: 2024-08-25 | End: 2024-08-30 | Stop reason: HOSPADM

## 2024-08-25 RX ORDER — ASPIRIN 325 MG
325 TABLET, DELAYED RELEASE (ENTERIC COATED) ORAL ONCE
Status: COMPLETED | OUTPATIENT
Start: 2024-08-25 | End: 2024-08-25

## 2024-08-25 RX ORDER — BUDESONIDE AND FORMOTEROL FUMARATE DIHYDRATE 160; 4.5 UG/1; UG/1
2 AEROSOL RESPIRATORY (INHALATION)
Status: DISCONTINUED | OUTPATIENT
Start: 2024-08-25 | End: 2024-08-30 | Stop reason: HOSPADM

## 2024-08-25 RX ORDER — SODIUM CHLORIDE 0.9 % (FLUSH) 0.9 %
5-40 SYRINGE (ML) INJECTION PRN
Status: DISCONTINUED | OUTPATIENT
Start: 2024-08-25 | End: 2024-08-30 | Stop reason: HOSPADM

## 2024-08-25 RX ORDER — ASPIRIN 81 MG/1
81 TABLET ORAL DAILY
Status: DISCONTINUED | OUTPATIENT
Start: 2024-08-25 | End: 2024-08-30 | Stop reason: HOSPADM

## 2024-08-25 RX ORDER — SODIUM CHLORIDE 9 MG/ML
INJECTION, SOLUTION INTRAVENOUS CONTINUOUS
Status: DISCONTINUED | OUTPATIENT
Start: 2024-08-25 | End: 2024-08-28

## 2024-08-25 RX ORDER — ROFLUMILAST 500 UG/1
250 TABLET ORAL DAILY
Status: DISCONTINUED | OUTPATIENT
Start: 2024-08-25 | End: 2024-08-30 | Stop reason: HOSPADM

## 2024-08-25 RX ORDER — PREDNISONE 20 MG/1
20 TABLET ORAL DAILY
Status: DISCONTINUED | OUTPATIENT
Start: 2024-08-25 | End: 2024-08-30 | Stop reason: HOSPADM

## 2024-08-25 RX ORDER — CARVEDILOL 12.5 MG/1
12.5 TABLET ORAL 2 TIMES DAILY WITH MEALS
Status: DISCONTINUED | OUTPATIENT
Start: 2024-08-25 | End: 2024-08-27

## 2024-08-25 RX ORDER — SODIUM CHLORIDE 9 MG/ML
INJECTION, SOLUTION INTRAVENOUS PRN
Status: DISCONTINUED | OUTPATIENT
Start: 2024-08-25 | End: 2024-08-30 | Stop reason: HOSPADM

## 2024-08-25 RX ORDER — VANCOMYCIN 1.75 G/350ML
15 INJECTION, SOLUTION INTRAVENOUS EVERY 12 HOURS
Status: DISCONTINUED | OUTPATIENT
Start: 2024-08-25 | End: 2024-08-25

## 2024-08-25 RX ORDER — AMLODIPINE BESYLATE 5 MG/1
5 TABLET ORAL 2 TIMES DAILY
Status: DISCONTINUED | OUTPATIENT
Start: 2024-08-25 | End: 2024-08-27

## 2024-08-25 RX ORDER — MONTELUKAST SODIUM 10 MG/1
10 TABLET ORAL NIGHTLY
Status: DISCONTINUED | OUTPATIENT
Start: 2024-08-25 | End: 2024-08-30 | Stop reason: HOSPADM

## 2024-08-25 RX ADMIN — BUDESONIDE AND FORMOTEROL FUMARATE DIHYDRATE 2 PUFF: 160; 4.5 AEROSOL RESPIRATORY (INHALATION) at 20:52

## 2024-08-25 RX ADMIN — CEFEPIME 1000 MG: 1 INJECTION, POWDER, FOR SOLUTION INTRAMUSCULAR; INTRAVENOUS at 20:32

## 2024-08-25 RX ADMIN — BUDESONIDE AND FORMOTEROL FUMARATE DIHYDRATE 2 PUFF: 160; 4.5 AEROSOL RESPIRATORY (INHALATION) at 08:55

## 2024-08-25 RX ADMIN — CARVEDILOL 12.5 MG: 12.5 TABLET, FILM COATED ORAL at 16:11

## 2024-08-25 RX ADMIN — PREDNISONE 20 MG: 20 TABLET ORAL at 09:45

## 2024-08-25 RX ADMIN — ASPIRIN 81 MG: 81 TABLET, COATED ORAL at 09:45

## 2024-08-25 RX ADMIN — ASPIRIN 325 MG: 325 TABLET, COATED ORAL at 01:22

## 2024-08-25 RX ADMIN — ALBUTEROL SULFATE 2 PUFF: 90 AEROSOL, METERED RESPIRATORY (INHALATION) at 20:57

## 2024-08-25 RX ADMIN — AMLODIPINE BESYLATE 5 MG: 5 TABLET ORAL at 20:28

## 2024-08-25 RX ADMIN — SODIUM CHLORIDE: 9 INJECTION, SOLUTION INTRAVENOUS at 16:05

## 2024-08-25 RX ADMIN — VANCOMYCIN HYDROCHLORIDE 1000 MG: 1 INJECTION, POWDER, LYOPHILIZED, FOR SOLUTION INTRAVENOUS at 16:10

## 2024-08-25 RX ADMIN — ROFLUMILAST 250 MCG: 500 TABLET ORAL at 09:45

## 2024-08-25 RX ADMIN — SODIUM CHLORIDE: 9 INJECTION, SOLUTION INTRAVENOUS at 01:21

## 2024-08-25 RX ADMIN — HEPARIN SODIUM 750 UNITS/HR: 10000 INJECTION, SOLUTION INTRAVENOUS at 10:04

## 2024-08-25 RX ADMIN — SODIUM CHLORIDE, PRESERVATIVE FREE 10 ML: 5 INJECTION INTRAVENOUS at 09:45

## 2024-08-25 RX ADMIN — AMLODIPINE BESYLATE 5 MG: 5 TABLET ORAL at 09:44

## 2024-08-25 RX ADMIN — ALBUTEROL SULFATE 2 PUFF: 90 AEROSOL, METERED RESPIRATORY (INHALATION) at 08:57

## 2024-08-25 RX ADMIN — MONTELUKAST 10 MG: 10 TABLET, FILM COATED ORAL at 20:28

## 2024-08-25 RX ADMIN — CEFEPIME 1000 MG: 1 INJECTION, POWDER, FOR SOLUTION INTRAMUSCULAR; INTRAVENOUS at 09:55

## 2024-08-25 NOTE — PROGRESS NOTES
V2.0    [facility] [place]      Name:  Fidelia Torres /Age/Sex: 1950  (73 y.o. female)   MRN & CSN:  [id number] & [id number] Encounter Date/Time: 2024 11:48 AM EDT   Location:  [id number] PCP: Albina Valdes, APRN - CNP     Attending:Sunni Ruffin MD       Hospital Day: 2    Assessment and Recommendations   Fidelia Torres is a 73 y.o. female with pmh of COPD, CAD, HTN, diabetes, autoimmune hepatitis who presents with Severe sepsis with acute organ dysfunction (HCC)      Plan:   Sepsis  Still unclear etiology most likely a urinary tract infection  IV hydration with normal saline  Patient was started on cefepime and vancomycin will continue current dose  Urine culture   Blood culture   White blood cell count continues to improve  Lactic acid continues to improve    2.  CAD, hypertension  Patient is currently on Coreg  Patient is currently on amlodipine    3.  Congestive heart failure  proBNP was checked to be 38,000  Troponin elevated at 150, 127  Cardiology consulted for further management evaluation and monitoring    4.  VIOLA on CKD  Elevated creatinine  Gentle IV hydration with normal saline due to patient's congestive heart failure with proBNP of 38,000  We will continue to monitor trend and follow    5. Diabetes  Patient currently takes Actos at home  Will continue to follow and monitor her glucose measurements for further management          Diet ADULT DIET; Regular; 3 carb choices (45 gm/meal); Low Fat/Low Chol/High Fiber/2 gm Na  ADULT ORAL NUTRITION SUPPLEMENT; Breakfast; Standard High Calorie/High Protein Oral Supplement   DVT Prophylaxis [] Lovenox, [x]  Heparin, [] SCDs, [x] Ambulation,  [] Eliquis, [] Xarelto  [] Coumadin   Code Status Full Code   Disposition From: Home  Expected Disposition: Home  Estimated Date of Discharge: 2 to 3 days  Patient requires continued admission due to upon clinical improvement   Surrogate Decision Maker/ POA       Personally reviewed Lab Studies  reasonably achievable. COMPARISON: None. HISTORY: ORDERING SYSTEM PROVIDED HISTORY: nause decreased appetitie. TECHNOLOGIST PROVIDED HISTORY: Additional Contrast?->None Reason for exam:->nause decreased appetitie. Decision Support Exception - unselect if not a suspected or confirmed emergency medical condition->Emergency Medical Condition (MA) Reason for Exam: nause decreased appetitie. FINDINGS: There is motion artifact throughout the exam. Lower Chest:   The lung bases are clear.  There is a small pericardial effusion. Organs: There is mild hypertrophy of the caudate and left lobes of the liver. No focal lesion is seen.  The gallbladder, pancreas, and bile ducts are normal.  The spleen is unremarkable.  The adrenals are normal.  The kidneys are normal size and no hydronephrosis, renal stone, or mass is seen.  The ureters are normal caliber. GI/Bowel:   The appendix is normal.  The bowel gas pattern is unremarkable. The mesentery is unremarkable. Pelvis:   The bladder is unremarkable.  There is mild fat density along the inguinal regions which is unchanged with no bowel loops in the area.  The uterus is atrophic and there is a 3 cm rounded solid-appearing mass along the left side of the body of the uterus.  There questionable small inguinal hernias bilaterally with no bowel loops in the area unchanged.  No adenopathy or ascites is seen.  The mesentery is unremarkable. Peritoneum/Retroperitoneum:   There is a 2.8 cm abdominal umbilical hernia with a small loop of colon partially extending into the orifice of the hernia.  The colon is normal caliber with no wall thickening or inflammation and no bowel dilatation.  The aorta is normal caliber with no aneurysm and no retroperitoneal mass or adenopathy is seen. Bones/Soft Tissues:   The bones are intact.  No aggressive osseous lesion is seen.     Motion artifact throughout the exam which limits the study.  Within the limitations of the exam, no obvious acute abnormality

## 2024-08-25 NOTE — PROGRESS NOTES
Clinical Pharmacy Note  Heparin Dosing Consult    Fidelia Torres is a 73 y.o. female ordered heparin per CAD/STEMI/NSTEMI/UA/AFIB nomogram by Dr. Yuen.     No results found for: \"ANTIXAUHEP\", \"LABHEPA\"   Lab Results   Component Value Date/Time    HGB 14.8 08/24/2024 06:43 PM    HCT 45.6 08/24/2024 06:43 PM     08/24/2024 06:43 PM       Ht Readings from Last 1 Encounters:   08/24/24 1.626 m (5' 4\")        Wt Readings from Last 1 Encounters:   08/24/24 83 kg (182 lb 15.7 oz)        Assessment/Plan:  Initial bolus: 4000 units  Initial infusion rate: 1000 units/hr  Next anti-Xa:  0300  8/25/24    Pharmacy will continue to monitor adjust heparin based on anti-Xa results using nomogram below:     CAD/STEMI/NSTEMI/UA/AFIB Heparin Nomogram     Initial Bolus: 60 units/kg Max Bolus: 4,000 units       Initial Rate: 12 units/kg/hr Max Initial Rate: 1,000 units/hr     anti-Xa Bolus Titration   < 0.1 Heparin 60 units/kg bolus Increase drip by 4 units/kg/hr   0.1 - 0.29 Heparin 30 units/kg bolus Increase drip by 2 units/kg/hr   0.3 - 0.7 No Bolus No Change   0.71 - 0.8 No Bolus Decrease drip by 1 units/kg/hr   0.81 - 0.99 No Bolus Decrease drip by 2 units/kg/hr   > 1 Hold Heparin for 1 hour Decrease drip by 3 units/kg/hr     Obtain anti-Xa 6 hours after initial bolus and 6 hours after any dose change until two consecutive therapeutic anti-Xa levels are achieved - then daily.    Placido Leone, [facility]  8/24/2024 9:27 PM

## 2024-08-25 NOTE — CONSULTS
[facility]   CONSULTATION        Chief Complaint   Patient presents with    Fatigue     C/o generalized weakness/fatigue since being sick about 3 weeks ago. She states her cough and viral symptoms improved but overall feels worse, can't take more than a few steps without needing to sit down from being too lightheaded for the last several days.             History of Present Illness:  Fidelia Torres is a 73 y.o. patient who presented to the hospital with complaints of fatigue. I have been asked to provide consultation regarding further management and testing.    This is a very pleasant 73-year-old female with past medical history of asthma, COPD, diabetes, hyperlipidemia, hypertension.  Presents with a combination of fatigue and shortness of breath with exertion.  Currently being evaluated and treated for sepsis cardiology consulted for mild to moderate biomarker elevation    Past Medical History:   has a past medical history of Allergic rhinitis, Asthma, COPD (chronic obstructive pulmonary disease) (HCC), Diabetes mellitus (HCC), Hepatitis, Hyperlipidemia, Hypertension, Influenza A, and Osteoporosis.    Surgical History:   has a past surgical history that includes Colonoscopy; Breast biopsy (10/2005); liver biopsy (8/2006 ); Colonoscopy (07/08/13); Colonoscopy (07/17/2016); Cataract removal (Bilateral); Colonoscopy (6/21/2021); Upper gastrointestinal endoscopy (N/A, 7/11/2022); and Breast surgery (Right, 12/18/2023).     Social History:   reports that she has never smoked. She has never used smokeless tobacco. She reports that she does not currently use alcohol. She reports that she does not use drugs.     Family History:  No family history of premature coronary artery disease, aortic disease, or valve disease.    Home Medications:  Were reviewed and are listed in nursing record. and/or listed below  Prior to Admission medications    Medication Sig Start Date End Date Taking? Authorizing Provider  without long-term current use of insulin (HCC)  Plan:   - Per primary team      Elevated troponin I level    Abnormal EKG  Plan:   - EKG and biomarkers concerning for underlying coronary disease, initially had planned for potential outpatient stress however may have to perform either cor CT vs Cath likely still as an outpatient  - Plan for TTE tomorrow, if there is systolic dysfunction will perform cath as an inpatient.   - Does have an VIOLA at this time, will wait for this to resolve regardless prior to cath      Acute kidney injury superimposed on chronic kidney disease (HCC)  Plan:   - Follows with Dr. Galindo  - Agree with nephrology input      ---    I will address the patient's cardiac risk factors and adjusted pharmacologic treatment as needed. In addition, I have reinforced the need for patient directed risk factor modification.    Tobacco use was discussed with the patient and educated on the negative effects. I have asked the patient to not utilize these agents.    Thank you for allowing to us to participate in the care or Fidelia Torres. Further evaluation will be based upon the patient's clinical course and testing results.    All questions and concerns were addressed to the patient/family. Alternatives to my treatment were discussed. The note was completed using EMR. Every effort was made to ensure accuracy; however, inadvertent computerized transcription errors may be present.    Stephen Flores MD  Interventional Cardiology  8/25/2024  9:30 AM    [facility] [place] [id number], [address]   [place] [address]  Ph: [phone]  Fax: [phone]    NOTE: This report was transcribed using voice recognition software. Every effort was made to ensure accuracy, however, inadvertent computerized transcription errors may be present.

## 2024-08-25 NOTE — PROGRESS NOTES
Medication Reconciliation    List of medications patient is currently taking is complete.     Source of information:   Conversation with patient  EPIC records/Dispense history     Allergies  Iodine, Shellfish-derived products, and Other     Marlene Rojas PharmD  8/25/2024 10:38 AM

## 2024-08-25 NOTE — PROGRESS NOTES
Clinical Pharmacy Note  Heparin Dosing       Lab Results   Component Value Date/Time    ANTIXAUHEP 1.02 08/25/2024 06:51 AM      Lab Results   Component Value Date/Time    HGB 12.3 08/25/2024 05:39 AM    HCT 37.7 08/25/2024 05:39 AM     08/25/2024 05:39 AM       Current Infusion Rate: 1000 units/hr    Plan:  HOLD for 1 hour  Rate: Restart at 750 units/hr  Next anti-Xa level: 1530   8/25    Pharmacy will continue to monitor and adjust based on anti-Xa results.    Placido Leone, [facility]  8/25/2024 8:14 AM

## 2024-08-25 NOTE — H&P
V2.0  History and Physical      Name:  Fidelia Torres /Age/Sex: 1950  (73 y.o. female)   MRN & CSN:  [id number] & [id number] Encounter Date/Time: 2024 9:02 PM EDT   Location:  [address] [place] [address] PCP: Albina Valdes APRN - CNP       Hospital Day: 1    Assessment and Plan:   Fidelia Torres is a 73 y.o. female with a pmh of COPD, hypertension, diabetes mellitus, autoimmune hepatitis who presents with Severe sepsis with acute organ dysfunction (HCC)      Hospital Problems             Last Modified POA    * (Principal) Severe sepsis with acute organ dysfunction (HCC) 2024 Yes    Autoimmune hepatitis (HCC) 2024 Yes    Overview Addendum 2023 11:53 AM by Albina Valdes APRN - CNP     dx - 2006 - initial CT-scan of abdomen and pelvis  - fatty liver , ast/alt 1354/822 with alk-phos 341 and + MARIXA , started prednisone 2006 and has been well controlled since then   due to elevated liver function tests had ultrasound and MRI 2012, repeat  stable  - showed fibrosis , no masses , repeat in 3-6 months   Dr. Mcleod  Formatting of this note might be different from the original. dx - 2006 - initial CT-scan of abdomen and pelvis  - fatty liver , ast/alt 1354/822 with alk-phos 341 and + MARIXA , started prednisone 2006 and has been well controlled since then due to elevated liver function tests had ultrasound and MRI 2012, repeat  stable  - showed fibrosis , no masses , repeat in 3-6 months Dr. Mcleod Last Assessment & Plan: Formatting of this note might be different from the original.  Monitored by specialist- no acute findings meriting change in the plan         Essential hypertension, benign 2024 Yes    Overview Addendum 2023 11:53 AM by Albina Valdes APRN - CNP     controlled , no side effects  on medications   echo - 2006 - unremarkable   echo - 2010 - LVEF55% , mild diastolic dysfunction , mild MR   chest X-ray 2010 - within normal limits  unremarkable. The mesentery is unremarkable. Pelvis:   The bladder is unremarkable.  There is mild fat density along the inguinal regions which is unchanged with no bowel loops in the area.  The uterus is atrophic and there is a 3 cm rounded solid-appearing mass along the left side of the body of the uterus.  There questionable small inguinal hernias bilaterally with no bowel loops in the area unchanged.  No adenopathy or ascites is seen.  The mesentery is unremarkable. Peritoneum/Retroperitoneum:   There is a 2.8 cm abdominal umbilical hernia with a small loop of colon partially extending into the orifice of the hernia.  The colon is normal caliber with no wall thickening or inflammation and no bowel dilatation.  The aorta is normal caliber with no aneurysm and no retroperitoneal mass or adenopathy is seen. Bones/Soft Tissues:   The bones are intact.  No aggressive osseous lesion is seen.     Motion artifact throughout the exam which limits the study.  Within the limitations of the exam, no obvious acute abnormality is seen No hydronephrosis or renal stones and no urinary obstruction. 2.9 cm umbilical hernia with a small loop of colon partially extending into the orifice of the hernia which is normal caliber.  Recommend clinical follow-up the hernia. Atrophic uterus with and exophytic rounded solid appearing mass along the left side of the body of the uterus measuring 3 cm which could represent an exophytic fibroid or less likely an adnexal mass.  Recommend nonemergent pelvic ultrasound for follow-up. Normal appendix. Small pericardial effusion.     XR CHEST (2 VW)    Result Date: 8/19/2024  EXAM: XR CHEST (2 VW) INDICATION: Acute cough TECHNIQUE: 2 views of the chest. COMPARISON: 3/1/2023 FINDINGS: Medical Devices: None. Heart and Mediastinum: Cardiomediastinal silhouette is within normal limits. Lungs and Pleura: Lungs are clear with no pleural effusions or evidence of pneumothorax. Bones and soft tissues: No

## 2024-08-25 NOTE — ED PROVIDER NOTES
[facility] EMERGENCY DEPARTMENT      EMERGENCY MEDICINE     Pt Name: Fidelia Torres  MRN: [id number]  Birthdate 1950  Date of evaluation: 8/24/2024  Provider: Luis Yuen MD    CHIEF COMPLAINT       Chief Complaint   Patient presents with    Fatigue     C/o generalized weakness/fatigue since being sick about 3 weeks ago. She states her cough and viral symptoms improved but overall feels worse, can't take more than a few steps without needing to sit down from being too lightheaded for the last several days.      HISTORY OF PRESENT ILLNESS   Fidelia Torres is a 73 y.o. female who presents to the emergency department for generalized weakness fatigue and last weeks is progressively worsening with worsening cough congestion nausea decreased appetite.  The last few days became more lightheaded and wanted to come in for evaluation.   PASTMEDICAL HISTORY     Past Medical History:   Diagnosis Date    Allergic rhinitis     Asthma     COPD (chronic obstructive pulmonary disease) (HCC)     pt denies    Diabetes mellitus (HCC)     Hepatitis     autoimmune    Hyperlipidemia     Hypertension     Influenza A 01/10/2015    Osteoporosis        Patient Active Problem List   Diagnosis Code    Autoimmune hepatitis (HCC) K75.4    Essential hypertension, benign I10    Pure hypercholesterolemia E78.00    Murmur, cardiac R01.1    Type 2 diabetes mellitus with stage 3 chronic kidney disease, without long-term current use of insulin (HCC) E11.22, N18.30    Morbidly obese (HCC) E66.01    Post menopausal problems N95.9    Esophageal varices without bleeding, unspecified esophageal varices type (HCC) I85.00    Stage 3a chronic kidney disease (HCC) N18.31    EIC (epidermal inclusion cyst) L72.0    Simple chronic bronchitis (HCC) J41.0    Severe sepsis with acute organ dysfunction (HCC) A41.9, R65.20     SURGICAL HISTORY       Past Surgical History:   Procedure Laterality Date    BREAST BIOPSY  10/2005     GI/Bowel:   The appendix is normal.  The bowel gas pattern is unremarkable. The mesentery is unremarkable. Pelvis:   The bladder is unremarkable.  There is mild fat density along the inguinal regions which is unchanged with no bowel loops in the area.  The uterus is atrophic and there is a 3 cm rounded solid-appearing mass along the left side of the body of the uterus.  There questionable small inguinal hernias bilaterally with no bowel loops in the area unchanged.  No adenopathy or ascites is seen.  The mesentery is unremarkable. Peritoneum/Retroperitoneum:   There is a 2.8 cm abdominal umbilical hernia with a small loop of colon partially extending into the orifice of the hernia.  The colon is normal caliber with no wall thickening or inflammation and no bowel dilatation.  The aorta is normal caliber with no aneurysm and no retroperitoneal mass or adenopathy is seen. Bones/Soft Tissues:   The bones are intact.  No aggressive osseous lesion is seen.     Motion artifact throughout the exam which limits the study.  Within the limitations of the exam, no obvious acute abnormality is seen No hydronephrosis or renal stones and no urinary obstruction. 2.9 cm umbilical hernia with a small loop of colon partially extending into the orifice of the hernia which is normal caliber.  Recommend clinical follow-up the hernia. Atrophic uterus with and exophytic rounded solid appearing mass along the left side of the body of the uterus measuring 3 cm which could represent an exophytic fibroid or less likely an adnexal mass.  Recommend nonemergent pelvic ultrasound for follow-up. Normal appendix. Small pericardial effusion.     XR CHEST (2 VW)    Result Date: 8/19/2024  EXAM: XR CHEST (2 VW) INDICATION: Acute cough TECHNIQUE: 2 views of the chest. COMPARISON: 3/1/2023 FINDINGS: Medical Devices: None. Heart and Mediastinum: Cardiomediastinal silhouette is within normal limits. Lungs and Pleura: Lungs are clear with no pleural

## 2024-08-25 NOTE — PROGRESS NOTES
4 Eyes Skin Assessment     NAME:  Fidelia Torres  YOB: 1950  MEDICAL RECORD NUMBER:  [id number]    The patient is being assessed for  Admission    I agree that at least one RN has performed a thorough Head to Toe Skin Assessment on the patient. ALL assessment sites listed below have been assessed.      Areas assessed by both nurses:    Head, Face, Ears, Shoulders, Back, Chest, Arms, Elbows, Hands, Sacrum. Buttock, Coccyx, Ischium, and Legs. Feet and Heels        Does the Patient have a Wound? No noted wound(s)       Mike Prevention initiated by RN: No  Wound Care Orders initiated by RN: No    Pressure Injury (Stage 3,4, Unstageable, DTI, NWPT, and Complex wounds) if present, place Wound referral order by RN under : No    New Ostomies, if present place, Ostomy referral order under : No     Nurse 1 eSignature: Electronically signed by Carmencita Thomas RN on 8/25/24 at 3:34 AM EDT    **SHARE this note so that the co-signing nurse can place an eSignature**    Nurse 2 eSignature: Electronically signed by Phil Mcguire RN on 8/25/24 at 5:02 AM EDT

## 2024-08-25 NOTE — PROGRESS NOTES
Clinical Pharmacy Note  Vancomycin Consult    Fidelia Torres is a 73 y.o. female ordered vancomycin for sepsis; consult received from Dr. Dahl to manage therapy. Also receiving Cefepime.    Allergies:  Iodine, Shellfish-derived products, and Other     Temp max:  Temp (24hrs), Av.8 °F (36.6 °C), Min:97.7 °F (36.5 °C), Max:97.9 °F (36.6 °C)      Recent Labs     243 24  0539   WBC 15.3* 13.7*       Recent Labs     24  0539   BUN 31* 30*   CREATININE 2.1* 1.9*         Intake/Output Summary (Last 24 hours) at 2024 0719  Last data filed at 2024 2148  Gross per 24 hour   Intake 1100 ml   Output --   Net 1100 ml       Culture Results:  pending    Ht Readings from Last 1 Encounters:   24 1.626 m (5' 4\")        Wt Readings from Last 1 Encounters:   24 83 kg (182 lb 15.7 oz)         Estimated Creatinine Clearance: 28 mL/min (A) (based on SCr of 1.9 mg/dL (H)).    Assessment:  Day # 2 of vancomycin.  Patient receiving intermittent dosing due to renal function.  Patient received vancomycin 1500 mg x 1  Vanc level: 21.6     Plan:  Give vancomycin 1000 mg x 1 (13 mg/kg)  Vanc level ordered for tomorrow with AM labs    Thank you for the consult.     Marlene Rojas, PharmD  2024 7:21 AM

## 2024-08-25 NOTE — PROGRESS NOTES
Clinical Pharmacy Note  Heparin Dosing       Lab Results   Component Value Date/Time    ANTIXAUHEP 0.57 08/25/2024 04:11 PM      Lab Results   Component Value Date/Time    HGB 12.3 08/25/2024 05:39 AM    HCT 37.7 08/25/2024 05:39 AM     08/25/2024 05:39 AM       Current Infusion Rate: 750 units/hr    Plan:  Rate: 750 units/hr  Next anti-Xa level: 2200   8/25    Pharmacy will continue to monitor and adjust based on anti-Xa results.    Colt Moses RPH  8/25/2024 5:08 PM

## 2024-08-26 ENCOUNTER — APPOINTMENT (OUTPATIENT)
Age: 74
End: 2024-08-26
Attending: STUDENT IN AN ORGANIZED HEALTH CARE EDUCATION/TRAINING PROGRAM
Payer: MEDICARE

## 2024-08-26 LAB
ANION GAP SERPL CALCULATED.3IONS-SCNC: 14 MMOL/L (ref 3–16)
ANTI-XA UNFRAC HEPARIN: 0.68 IU/ML (ref 0.3–0.7)
BUN SERPL-MCNC: 33 MG/DL (ref 7–20)
CALCIUM SERPL-MCNC: 8.4 MG/DL (ref 8.3–10.6)
CHLORIDE SERPL-SCNC: 110 MMOL/L (ref 99–110)
CO2 SERPL-SCNC: 18 MMOL/L (ref 21–32)
CREAT SERPL-MCNC: 2.3 MG/DL (ref 0.6–1.2)
DEPRECATED RDW RBC AUTO: 14.3 % (ref 12.4–15.4)
ECHO AO ROOT DIAM: 2.8 CM
ECHO AO ROOT INDEX: 1.47 CM/M2
ECHO AV AREA PEAK VELOCITY: 1.7 CM2
ECHO AV AREA VTI: 1.8 CM2
ECHO AV AREA/BSA PEAK VELOCITY: 0.9 CM2/M2
ECHO AV AREA/BSA VTI: 0.9 CM2/M2
ECHO AV MEAN GRADIENT: 5 MMHG
ECHO AV MEAN VELOCITY: 1 M/S
ECHO AV PEAK GRADIENT: 8 MMHG
ECHO AV PEAK VELOCITY: 1.5 M/S
ECHO AV VELOCITY RATIO: 0.6
ECHO AV VTI: 24.2 CM
ECHO BSA: 1.97 M2
ECHO EST RA PRESSURE: 8 MMHG
ECHO IVC EXP: 1.9 CM
ECHO LA AREA 2C: 11.5 CM2
ECHO LA AREA 4C: 10.7 CM2
ECHO LA MAJOR AXIS: 4.1 CM
ECHO LA MINOR AXIS: 3.9 CM
ECHO LA VOL BP: 25 ML (ref 22–52)
ECHO LA VOL MOD A2C: 27 ML (ref 22–52)
ECHO LA VOL MOD A4C: 21 ML (ref 22–52)
ECHO LA VOL/BSA BIPLANE: 13 ML/M2 (ref 16–34)
ECHO LA VOLUME INDEX MOD A2C: 14 ML/M2 (ref 16–34)
ECHO LA VOLUME INDEX MOD A4C: 11 ML/M2 (ref 16–34)
ECHO LV E' LATERAL VELOCITY: 5 CM/S
ECHO LV E' SEPTAL VELOCITY: 4 CM/S
ECHO LV EF PHYSICIAN: 43 %
ECHO LV FRACTIONAL SHORTENING: 29 % (ref 28–44)
ECHO LV INTERNAL DIMENSION DIASTOLE INDEX: 2.36 CM/M2
ECHO LV INTERNAL DIMENSION DIASTOLIC: 4.5 CM (ref 3.9–5.3)
ECHO LV INTERNAL DIMENSION SYSTOLIC INDEX: 1.68 CM/M2
ECHO LV INTERNAL DIMENSION SYSTOLIC: 3.2 CM
ECHO LV IVSD: 1.2 CM (ref 0.6–0.9)
ECHO LV MASS 2D: 186.4 G (ref 67–162)
ECHO LV MASS INDEX 2D: 97.6 G/M2 (ref 43–95)
ECHO LV POSTERIOR WALL DIASTOLIC: 1.1 CM (ref 0.6–0.9)
ECHO LV RELATIVE WALL THICKNESS RATIO: 0.49
ECHO LVOT AREA: 2.8 CM2
ECHO LVOT AV VTI INDEX: 0.63
ECHO LVOT DIAM: 1.9 CM
ECHO LVOT MEAN GRADIENT: 2 MMHG
ECHO LVOT PEAK GRADIENT: 3 MMHG
ECHO LVOT PEAK VELOCITY: 0.9 M/S
ECHO LVOT STROKE VOLUME INDEX: 22.6 ML/M2
ECHO LVOT SV: 43.1 ML
ECHO LVOT VTI: 15.2 CM
ECHO MV A VELOCITY: 1.18 M/S
ECHO MV AREA VTI: 1.8 CM2
ECHO MV E DECELERATION TIME (DT): 309 MS
ECHO MV E VELOCITY: 0.67 M/S
ECHO MV E/A RATIO: 0.57
ECHO MV E/E' LATERAL: 13.4
ECHO MV E/E' RATIO (AVERAGED): 15.08
ECHO MV E/E' SEPTAL: 16.75
ECHO MV LVOT VTI INDEX: 1.53
ECHO MV MAX VELOCITY: 1.4 M/S
ECHO MV MEAN GRADIENT: 3 MMHG
ECHO MV MEAN VELOCITY: 0.8 M/S
ECHO MV PEAK GRADIENT: 8 MMHG
ECHO MV VTI: 23.3 CM
ECHO PV MAX VELOCITY: 0.9 M/S
ECHO PV PEAK GRADIENT: 3 MMHG
ECHO RA AREA 4C: 11.6 CM2
ECHO RA END SYSTOLIC VOLUME APICAL 4 CHAMBER INDEX BSA: 15 ML/M2
ECHO RA VOLUME: 28 ML
ECHO RIGHT VENTRICULAR SYSTOLIC PRESSURE (RVSP): 36 MMHG
ECHO RV BASAL DIMENSION: 3.5 CM
ECHO RV FREE WALL PEAK S': 14 CM/S
ECHO RV MID DIMENSION: 2.4 CM
ECHO RV TAPSE: 2.2 CM (ref 1.7–?)
ECHO TV REGURGITANT MAX VELOCITY: 2.66 M/S
ECHO TV REGURGITANT PEAK GRADIENT: 28 MMHG
GFR SERPLBLD CREATININE-BSD FMLA CKD-EPI: 22 ML/MIN/{1.73_M2}
GLUCOSE BLD-MCNC: 153 MG/DL (ref 70–99)
GLUCOSE BLD-MCNC: 176 MG/DL (ref 70–99)
GLUCOSE BLD-MCNC: 194 MG/DL (ref 70–99)
GLUCOSE BLD-MCNC: 232 MG/DL (ref 70–99)
GLUCOSE SERPL-MCNC: 138 MG/DL (ref 70–99)
HCT VFR BLD AUTO: 35.4 % (ref 36–48)
HGB BLD-MCNC: 11.5 G/DL (ref 12–16)
MCH RBC QN AUTO: 29.9 PG (ref 26–34)
MCHC RBC AUTO-ENTMCNC: 32.6 G/DL (ref 31–36)
MCV RBC AUTO: 91.5 FL (ref 80–100)
PATH INTERP BLD-IMP: NORMAL
PERFORMED ON: ABNORMAL
PLATELET # BLD AUTO: 160 K/UL (ref 135–450)
PMV BLD AUTO: 8.4 FL (ref 5–10.5)
POTASSIUM SERPL-SCNC: 4 MMOL/L (ref 3.5–5.1)
RBC # BLD AUTO: 3.86 M/UL (ref 4–5.2)
SODIUM SERPL-SCNC: 142 MMOL/L (ref 136–145)
VANCOMYCIN SERPL-MCNC: 22.8 UG/ML
WBC # BLD AUTO: 14.5 K/UL (ref 4–11)

## 2024-08-26 PROCEDURE — 80202 ASSAY OF VANCOMYCIN: CPT

## 2024-08-26 PROCEDURE — 2580000003 HC RX 258: Performed by: HOSPITALIST

## 2024-08-26 PROCEDURE — 6360000002 HC RX W HCPCS: Performed by: HOSPITALIST

## 2024-08-26 PROCEDURE — 6370000000 HC RX 637 (ALT 250 FOR IP): Performed by: STUDENT IN AN ORGANIZED HEALTH CARE EDUCATION/TRAINING PROGRAM

## 2024-08-26 PROCEDURE — 97535 SELF CARE MNGMENT TRAINING: CPT

## 2024-08-26 PROCEDURE — 94760 N-INVAS EAR/PLS OXIMETRY 1: CPT

## 2024-08-26 PROCEDURE — 6370000000 HC RX 637 (ALT 250 FOR IP): Performed by: FAMILY MEDICINE

## 2024-08-26 PROCEDURE — 6370000000 HC RX 637 (ALT 250 FOR IP): Performed by: NURSE PRACTITIONER

## 2024-08-26 PROCEDURE — 36415 COLL VENOUS BLD VENIPUNCTURE: CPT

## 2024-08-26 PROCEDURE — 2060000000 HC ICU INTERMEDIATE R&B

## 2024-08-26 PROCEDURE — 93306 TTE W/DOPPLER COMPLETE: CPT | Performed by: STUDENT IN AN ORGANIZED HEALTH CARE EDUCATION/TRAINING PROGRAM

## 2024-08-26 PROCEDURE — C8929 TTE W OR WO FOL WCON,DOPPLER: HCPCS

## 2024-08-26 PROCEDURE — 85520 HEPARIN ASSAY: CPT

## 2024-08-26 PROCEDURE — 85027 COMPLETE CBC AUTOMATED: CPT

## 2024-08-26 PROCEDURE — 97530 THERAPEUTIC ACTIVITIES: CPT | Performed by: PHYSICAL THERAPIST

## 2024-08-26 PROCEDURE — 6370000000 HC RX 637 (ALT 250 FOR IP): Performed by: HOSPITALIST

## 2024-08-26 PROCEDURE — 80048 BASIC METABOLIC PNL TOTAL CA: CPT

## 2024-08-26 PROCEDURE — 97162 PT EVAL MOD COMPLEX 30 MIN: CPT | Performed by: PHYSICAL THERAPIST

## 2024-08-26 PROCEDURE — 97166 OT EVAL MOD COMPLEX 45 MIN: CPT

## 2024-08-26 PROCEDURE — 6360000004 HC RX CONTRAST MEDICATION: Performed by: STUDENT IN AN ORGANIZED HEALTH CARE EDUCATION/TRAINING PROGRAM

## 2024-08-26 PROCEDURE — 94640 AIRWAY INHALATION TREATMENT: CPT

## 2024-08-26 PROCEDURE — 97116 GAIT TRAINING THERAPY: CPT | Performed by: PHYSICAL THERAPIST

## 2024-08-26 RX ADMIN — ALBUTEROL SULFATE 2 PUFF: 90 AEROSOL, METERED RESPIRATORY (INHALATION) at 21:17

## 2024-08-26 RX ADMIN — ROFLUMILAST 250 MCG: 500 TABLET ORAL at 10:59

## 2024-08-26 RX ADMIN — BUDESONIDE AND FORMOTEROL FUMARATE DIHYDRATE 2 PUFF: 160; 4.5 AEROSOL RESPIRATORY (INHALATION) at 21:17

## 2024-08-26 RX ADMIN — BUDESONIDE AND FORMOTEROL FUMARATE DIHYDRATE 2 PUFF: 160; 4.5 AEROSOL RESPIRATORY (INHALATION) at 08:22

## 2024-08-26 RX ADMIN — ASPIRIN 81 MG: 81 TABLET, COATED ORAL at 09:26

## 2024-08-26 RX ADMIN — MONTELUKAST 10 MG: 10 TABLET, FILM COATED ORAL at 20:45

## 2024-08-26 RX ADMIN — AMLODIPINE BESYLATE 5 MG: 5 TABLET ORAL at 09:26

## 2024-08-26 RX ADMIN — CEFEPIME 1000 MG: 1 INJECTION, POWDER, FOR SOLUTION INTRAMUSCULAR; INTRAVENOUS at 20:45

## 2024-08-26 RX ADMIN — CARVEDILOL 12.5 MG: 12.5 TABLET, FILM COATED ORAL at 18:12

## 2024-08-26 RX ADMIN — CEFEPIME 1000 MG: 1 INJECTION, POWDER, FOR SOLUTION INTRAMUSCULAR; INTRAVENOUS at 11:05

## 2024-08-26 RX ADMIN — PERFLUTREN 1.5 ML: 6.52 INJECTION, SUSPENSION INTRAVENOUS at 10:16

## 2024-08-26 RX ADMIN — SODIUM CHLORIDE, PRESERVATIVE FREE 10 ML: 5 INJECTION INTRAVENOUS at 09:00

## 2024-08-26 RX ADMIN — CARVEDILOL 12.5 MG: 12.5 TABLET, FILM COATED ORAL at 09:26

## 2024-08-26 RX ADMIN — SODIUM CHLORIDE: 9 INJECTION, SOLUTION INTRAVENOUS at 16:03

## 2024-08-26 RX ADMIN — HEPARIN SODIUM 750 UNITS/HR: 10000 INJECTION, SOLUTION INTRAVENOUS at 06:23

## 2024-08-26 RX ADMIN — ALBUTEROL SULFATE 2 PUFF: 90 AEROSOL, METERED RESPIRATORY (INHALATION) at 08:24

## 2024-08-26 RX ADMIN — AMLODIPINE BESYLATE 5 MG: 5 TABLET ORAL at 20:45

## 2024-08-26 RX ADMIN — PREDNISONE 20 MG: 20 TABLET ORAL at 09:26

## 2024-08-26 NOTE — PROGRESS NOTES
Cardiology - PROGRESS NOTE    Admit Date: 8/24/2024     Reason for follow up:     73-year-old female with past medical history of asthma, COPD, diabetes, hyperlipidemia, hypertension. Presents with a combination of fatigue and shortness of breath with exertion. Currently being evaluated and treated for sepsis cardiology consulted for mild to moderate biomarker elevation       Social History:   reports that she has never smoked. She has never used smokeless tobacco. She reports that she does not currently use alcohol. She reports that she does not use drugs.   Family History: family history includes Breast Cancer in her mother; Cancer in her father and mother; Heart Disease in her mother; Kidney Disease in her brother; Ovarian Cancer in her mother.       Interval History:   Patient seen and examined and notes reviewed   Laying in bed  Sleepy  BP soft     All other systems reviewed and negative except as above.        Diet: ADULT DIET; Regular; 3 carb choices (45 gm/meal); Low Fat/Low Chol/High Fiber/2 gm Na  ADULT ORAL NUTRITION SUPPLEMENT; Breakfast; Standard High Calorie/High Protein Oral Supplement    In: 480 [P.O.:480]  Out: -    Patient Vitals for the past 96 hrs (Last 3 readings):   Weight   08/26/24 0951 86.2 kg (190 lb)   08/26/24 0331 86.2 kg (190 lb 0.6 oz)   08/25/24 0013 83 kg (182 lb 15.7 oz)           Data:   Scheduled Meds:   Scheduled Meds:   sodium chloride flush  5-40 mL IntraVENous 2 times per day    cefepime  1,000 mg IntraVENous Q12H    predniSONE  20 mg Oral Daily    aspirin  81 mg Oral Daily    amLODIPine  5 mg Oral BID    budesonide-formoterol  2 puff Inhalation BID RT    montelukast  10 mg Oral Nightly    Roflumilast  250 mcg Oral Daily    carvedilol  12.5 mg Oral BID WC     Continuous Infusions:   sodium chloride      sodium chloride 75 mL/hr at 08/25/24 1605    heparin (PORCINE) Infusion 750 Units/hr (08/26/24 0716)     PRN

## 2024-08-26 NOTE — PROGRESS NOTES
Occupational Therapy  Facility/Department: [address] PROGRESSIVE CARE  Occupational Therapy Initial Assessment    Name: Fidelia Torres  : 1950  MRN: [id number]  Date of Service: 2024    Discharge Recommendations:  Home with assist PRN, Home with Home health OT  OT Equipment Recommendations  Other: may benefit from shower chair- defer to home OT to ensure proper fit     Fidelia Torres scored a 19/24 on the AM-PAC ADL Inpatient form. Current research shows that an AM-PAC score of 18 or greater is typically associated with a discharge to the patient's home setting. Based on the patient's AM-PAC score, and their current ADL deficits, it is recommended that the patient have 2-3 sessions per week of Occupational Therapy at d/c to increase the patient's independence.  At this time, this patient demonstrates the endurance and safety to discharge home with home therapy and a follow up treatment frequency of 2-3x/wk.   Please see assessment section for further patient specific details.    If patient discharges prior to next session this note will serve as a discharge summary.  Please see below for the latest assessment towards goals.      Patient Diagnosis(es): The primary encounter diagnosis was Severe sepsis (HCC). Diagnoses of NSTEMI (non-ST elevated myocardial infarction) (HCC), VIOLA (acute kidney injury) (HCC), Prolonged Q-T interval on ECG, and Shortness of breath were also pertinent to this visit.  Past Medical History:  has a past medical history of Allergic rhinitis, Asthma, COPD (chronic obstructive pulmonary disease) (HCC), Diabetes mellitus (HCC), Hepatitis, Hyperlipidemia, Hypertension, Influenza A, and Osteoporosis.  Past Surgical History:  has a past surgical history that includes Colonoscopy; Breast biopsy (10/2005); liver biopsy (2006 ); Colonoscopy (13); Colonoscopy (2016); Cataract removal (Bilateral); Colonoscopy (2021); Upper gastrointestinal endoscopy (N/A, 2022); and  up in chair at end of session    Transfers  Sit to stand: Stand by assistance  Stand to sit: Stand by assistance    Vision  Vision: Impaired  Vision Exceptions: Wears glasses for reading  Hearing  Hearing: Within functional limits    Cognition  Overall Cognitive Status: WFL  Orientation  Overall Orientation Status: Within Functional Limits       Education Given To: Patient  Education Provided: Role of Therapy;Plan of Care;Transfer Training  Education Method: Demonstration;Verbal  Barriers to Learning: None  Education Outcome: Verbalized understanding;Demonstrated understanding    LUE AROM (degrees)  LUE AROM : WFL  Left Hand AROM (degrees)  Left Hand AROM: WFL  RUE AROM (degrees)  RUE AROM : WFL  Right Hand AROM (degrees)  Right Hand AROM: WFL       AM-PAC - ADL  AM-PAC Daily Activity - Inpatient   How much help is needed for putting on and taking off regular lower body clothing?: A Little  How much help is needed for bathing (which includes washing, rinsing, drying)?: A Little  How much help is needed for toileting (which includes using toilet, bedpan, or urinal)?: A Little  How much help is needed for putting on and taking off regular upper body clothing?: A Little  How much help is needed for taking care of personal grooming?: A Little  How much help for eating meals?: None  AM-PAC Inpatient Daily Activity Raw Score: 19  AM-PAC Inpatient ADL T-Scale Score : 40.22  ADL Inpatient CMS 0-100% Score: 42.8  ADL Inpatient CMS G-Code Modifier : CK    Goals  Short Term Goals  Time Frame for Short Term Goals: Prior to DC:  Short Term Goal 1: Pt will complete ADL transfers/mobility with supervision  Short Term Goal 2: Pt will tolerate standing > 5 min for functional task with supervision  Short Term Goal 3: Pt will complete UE exercises in all planes to inc strength/endurance for ADLs  Short Term Goal 4: Pt will complete LB Dressing with supervision  Patient Goals   Patient goals : to return home      Therapy Time

## 2024-08-26 NOTE — PROGRESS NOTES
Pt alert and oriented  x4, calm and cooperative. No complaints of pain.  Compliant with medications. . Pt resting comfortably in bed, call light in reach, bed locked and in lowest position, all other standard safety measures in place.

## 2024-08-26 NOTE — PROGRESS NOTES
Physical Therapy  Facility/Department: [address] PROGRESSIVE CARE  Physical Therapy Initial Assessment    Name: Fidelia Torres  : 1950  MRN: [id number]  Date of Service: 2024    Discharge Recommendations:  Home with assist PRN, Home with Home health PT        Fidelia Torres scored a 19/24 on the AM-PAC short mobility form. Current research shows that an AM-PAC score of 18 or greater is typically associated with a discharge to the patient's home setting. Based on the patient's AM-PAC score and their current functional mobility deficits, it is recommended that the patient have 2-3 sessions per week of Physical Therapy at d/c to increase the patient's independence.  At this time, this patient demonstrates the endurance and safety to discharge home with Home PTand a follow up treatment frequency of 2-3x/wk.  Please see assessment section for further patient specific details.    If patient discharges prior to next session this note will serve as a discharge summary.  Please see below for the latest assessment towards goals.       Patient Diagnosis(es): The primary encounter diagnosis was Severe sepsis (HCC). Diagnoses of NSTEMI (non-ST elevated myocardial infarction) (HCC), VIOLA (acute kidney injury) (HCC), Prolonged Q-T interval on ECG, and Shortness of breath were also pertinent to this visit.  Past Medical History:  has a past medical history of Allergic rhinitis, Asthma, COPD (chronic obstructive pulmonary disease) (HCC), Diabetes mellitus (HCC), Hepatitis, Hyperlipidemia, Hypertension, Influenza A, and Osteoporosis.  Past Surgical History:  has a past surgical history that includes Colonoscopy; Breast biopsy (10/2005); liver biopsy (2006 ); Colonoscopy (13); Colonoscopy (2016); Cataract removal (Bilateral); Colonoscopy (2021); Upper gastrointestinal endoscopy (N/A, 2022); and Breast surgery (Right, 2023).    Assessment  Body Structures, Functions, Activity Limitations  Standard  Home Equipment: Cane, Walker - 4-Wheeled  Has the patient had two or more falls in the past year or any fall with injury in the past year?: No  ADL Assistance: Independent  Homemaking Assistance: Independent  Ambulation Assistance: Independent (no AD)  Transfer Assistance: Independent  Active : Yes  Vision/Hearing  Vision  Vision: Impaired  Vision Exceptions: Wears glasses for reading  Hearing  Hearing: Within functional limits    Cognition   Orientation  Overall Orientation Status: Within Functional Limits  Cognition  Overall Cognitive Status: WFL    Objective       AROM RLE (degrees)  RLE AROM: WFL  AROM LLE (degrees)  LLE AROM : WFL  Strength RLE  Strength RLE: WFL  Strength LLE  Strength LLE: WFL           Bed mobility  Supine to Sit: Stand by assistance  Bed Mobility Comments: up in chair at end of session  Transfers  Sit to Stand: Stand by assistance  Stand to Sit: Stand by assistance  Ambulation  Surface: Level tile  Device: No Device  Assistance: Contact guard assistance;Stand by assistance  Quality of Gait: slow pace but no LOB  Distance: 25' x2 and 3'  Comments: pt used commode and washed up/completed grooming tasks sitting in front of sink; positioned in chair for comfort with all needs in reach, LEs elevated and waffle cushion pillows placed for support     Balance  Sitting - Static: Good  Sitting - Dynamic: Good  Standing - Static: Fair;+  Standing - Dynamic: Fair;+        AM-PAC - Mobility    AM-PAC Basic Mobility - Inpatient   How much help is needed turning from your back to your side while in a flat bed without using bedrails?: None  How much help is needed moving from lying on your back to sitting on the side of a flat bed without using bedrails?: A Little  How much help is needed moving to and from a bed to a chair?: A Little  How much help is needed standing up from a chair using your arms?: A Little  How much help is needed walking in hospital room?: A Little  How much help is

## 2024-08-26 NOTE — PROGRESS NOTES
Clinical Pharmacy Note  Vancomycin Consult    Fidelia Torres is a 73 y.o. female ordered vancomycin for sepsis; consult received from Dr. Dahl to manage therapy. Also receiving Cefepime.    Allergies:  Iodine, Shellfish-derived products, and Other     Temp max:  Temp (24hrs), Av.2 °F (36.8 °C), Min:97.6 °F (36.4 °C), Max:98.7 °F (37.1 °C)      Recent Labs     24  1843 24  0539 24  0605   WBC 15.3* 13.7* 14.5*       Recent Labs     24  0539 24  0638   BUN 31* 30* 33*   CREATININE 2.1* 1.9* 2.3*         Intake/Output Summary (Last 24 hours) at 2024 0734  Last data filed at 2024 1129  Gross per 24 hour   Intake 240 ml   Output --   Net 240 ml       Culture Results:  pending    Ht Readings from Last 1 Encounters:   24 1.626 m (5' 4\")        Wt Readings from Last 1 Encounters:   24 86.2 kg (190 lb 0.6 oz)         Estimated Creatinine Clearance: 24 mL/min (A) (based on SCr of 2.3 mg/dL (H)).    Assessment:  Day #2/7  of vancomycin.  Patient receiving intermittent dosing due to renal function.  Patient received vancomycin 1000 mg x 1  Vanc level: 22.8 (~14 hours)  sCr not improved 2.1 > 1.9 > 2.3    Plan:  Give vancomycin 750 mg x 1 (~8 mg/kg)  Vanc level ordered for tomorrow with AM labs    Thank you for the consult.     Marlene Rojas, PharmD  2024 7:34 AM

## 2024-08-26 NOTE — PROGRESS NOTES
Clinical Pharmacy Note  Heparin Dosing       Lab Results   Component Value Date/Time    ANTIXAUHEP 0.61 08/25/2024 09:53 PM      Lab Results   Component Value Date/Time    HGB 12.3 08/25/2024 05:39 AM    HCT 37.7 08/25/2024 05:39 AM     08/25/2024 05:39 AM       Current Infusion Rate: 750 units/hr    Plan:  Rate: continue at 750 units/hr  Next anti-Xa level: 0600 8/26/24    Pharmacy will continue to monitor and adjust based on anti-Xa results.    Tc Avila RP  8/25/2024 10:34 PM

## 2024-08-26 NOTE — CARE COORDINATION
Case Management Assessment  Initial Evaluation    Date/Time of Evaluation: 8/26/2024 3:58 PM  Assessment Completed by: Katie Traylor RN    If patient is discharged prior to next notation, then this note serves as note for discharge by case management.    Patient Name: Fidelia Torres                   YOB: 1950  Diagnosis: Prolonged Q-T interval on ECG [R94.31]  NSTEMI (non-ST elevated myocardial infarction) ([facility]) [I21.4]  VIOLA (acute kidney injury) ([facility]) [N17.9]  Severe sepsis (HCC) [A41.9, R65.20]  Severe sepsis with acute organ dysfunction (HCC) [A41.9, R65.20]                   Date / Time: 8/24/2024  6:25 PM    Patient Admission Status: Inpatient   Readmission Risk (Low < 19, Mod (19-27), High > 27): Readmission Risk Score: 12.4    Current PCP: Albina Valdes APRN - CNP  PCP verified by CM? Yes    Chart Reviewed: Yes      History Provided by: Medical Record  Patient Orientation: Alert and Oriented    Patient Cognition: Alert    Hospitalization in the last 30 days (Readmission):  No    If yes, Readmission Assessment in CM Navigator will be completed.    Advance Directives:      Code Status: Full Code   Patient's Primary Decision Maker is: Legal Next of Kin    Primary Decision Maker: Jack Cruz - Brother/Sister - [phone]    Discharge Planning:    Patient lives with: Alone Type of Home: House  Primary Care Giver: Self  Patient Support Systems include: Children, Family Members   Current Financial resources: Medicare  Current community resources: None  Current services prior to admission: Durable Medical Equipment            Current DME: Cane, Walker            Type of Home Care services:  None    ADLS  Prior functional level: Assistance with the following:, Mobility  Current functional level: Assistance with the following:, Mobility    PT AM-PAC: 19 /24  OT AM-PAC: 19 /24    Family can provide assistance at DC: Yes  Would you like Case Management to discuss the discharge plan with any  other family members/significant others, and if so, who? No  Plans to Return to Present Housing: Unknown at present  Other Identified Issues/Barriers to RETURNING to current housing: generalized weakness and deconditioning  Potential Assistance needed at discharge: Home Care, Skilled Nursing Facility            Potential DME:  None  Patient expects to discharge to: Unknown  Plan for transportation at discharge: Family    Financial    Payor: [employer] MEDICARE / Plan: [employer] MEDICARE-ADVANTAGE PPO / Product Type: Medicare /     Does insurance require precert for SNF: Yes    Potential assistance Purchasing Medications: No  Meds-to-Beds request: Yes      [id number] - [facility] [address] - P [phone] - F [phone]  [address]  [place] [address]  Phone: [phone] Fax: [phone]    [id number] [place], [place] - [address] - P [phone] - F [phone]  [address]  [place] [address]  Phone: [phone] Fax: [phone]      Notes:    Factors facilitating achievement of predicted outcomes: Family support, Cooperative, Pleasant, and Good insight into deficits    Barriers to discharge: Decreased endurance, Lower extremity weakness, Long standing deficits, and Medical complications    Additional Case Management Notes:   DC plan pending clinical improvement.  Patient is from home alone with family support.  Likely need home care at discharge.  Echo pending, possible heart cath based on echo, on heparin drip.      The Plan for Transition of Care is related to the following treatment goals of Prolonged Q-T interval on ECG [R94.31]  NSTEMI (non-ST elevated myocardial infarction) (HCC) [I21.4]  VIOLA (acute kidney injury) (HCC) [N17.9]  Severe sepsis (HCC) [A41.9, R65.20]  Severe sepsis with acute organ dysfunction (HCC) [A41.9, R65.20]    IF APPLICABLE: The Patient and/or patient representative Fidelia and her family were provided with a choice of

## 2024-08-26 NOTE — PROGRESS NOTES
Vital signs stable no acute events throughout the shift. Patient denied feelings of chest pain or dizziness. HR improved from sinus tachy low 100s to sinus rhythm 80s to 90s. Patient was up to the chair in the evening via contact guard assist. POC reviewed with patient  who denied additional questions. Call light within reach care ongoing.     Electronically signed by Eusebio Sloan RN on 8/25/2024 at 8:15 PM

## 2024-08-26 NOTE — PROGRESS NOTES
V2.0    [facility] [place] Note      Name:  Fidelia Torres /Age/Sex: 1950  (73 y.o. female)   MRN & CSN:  [id number] & [id number] Encounter Date/Time: 2024 11:48 AM EDT   Location:  [id number] PCP: Albina Valdes, APRN - CNP     Attending:Sunni Ruffin MD       Hospital Day: 3    Assessment and Recommendations   Fidelia Torres is a 73 y.o. female with pmh of COPD, CAD, HTN, diabetes, autoimmune hepatitis who presents with Severe sepsis with acute organ dysfunction (HCC)    Patient was examined this morning.  Patient is scheduled to go undergo a TTE later today.  Cardiology is following for possible cath depending on the results of the TTE        Plan:   Sepsis  Still unclear etiology most likely a urinary tract infection  IV hydration with normal saline  Patient was started on cefepime and vancomycin will continue current dose  Urine culture   Blood culture   White blood cell count continues to improve  Lactic acid continues to improve    2.  CAD, hypertension  Patient is currently on Coreg  Patient is currently on amlodipine    3.  Congestive heart failure  proBNP was checked to be 38,000  Troponin elevated at 150, 127  Cardiology consulted for further management evaluation and monitoring    4.  VIOLA on CKD  Elevated creatinine  Gentle IV hydration with normal saline due to patient's congestive heart failure with proBNP of 38,000  We will continue to monitor trend and follow    5. Diabetes  Patient currently takes Actos at home  Will continue to follow and monitor her glucose measurements for further management          Diet ADULT DIET; Regular; 3 carb choices (45 gm/meal); Low Fat/Low Chol/High Fiber/2 gm Na  ADULT ORAL NUTRITION SUPPLEMENT; Breakfast; Standard High Calorie/High Protein Oral Supplement   DVT Prophylaxis [] Lovenox, [x]  Heparin, [] SCDs, [x] Ambulation,  [] Eliquis, [] Xarelto  [] Coumadin   Code Status Full Code   Disposition From: Home  Expected Disposition:      BNP:   Recent Labs     08/24/24  1843   PROBNP 38,080*     UA:  Lab Results   Component Value Date/Time    NITRU Negative 02/06/2023 08:42 AM    COLORU Yellow 02/06/2023 08:42 AM    PHUR 6.5 02/06/2023 08:42 AM    WBCUA 10 02/06/2023 08:42 AM    RBCUA 0 02/06/2023 08:42 AM    MUCUS 1+ 01/10/2015 07:47 AM    BACTERIA Rare 02/06/2023 08:42 AM    CLARITYU CLOUDY 02/06/2023 08:42 AM    SPECGRAV 1.020 02/20/2014 10:41 AM    LEUKOCYTESUR MODERATE 02/06/2023 08:42 AM    UROBILINOGEN 0.2 02/06/2023 08:42 AM    BILIRUBINUR Negative 02/06/2023 08:42 AM    BILIRUBINUR neg 02/20/2014 10:41 AM    BLOODU Negative 02/06/2023 08:42 AM    GLUCOSEU Negative 02/06/2023 08:42 AM    KETUA Negative 02/06/2023 08:42 AM     Urine Cultures:   Lab Results   Component Value Date/Time    LABURIN No growth at 18 to 36 hours 02/06/2023 08:42 AM     Blood Cultures: No results found for: \"BC\"  Lab Results   Component Value Date/Time    BLOODCULT2  08/25/2024 05:39 AM     No Growth to date.  Any change in status will be called.     Organism: No results found for: \"ORG\"      Electronically signed by Sunni Ruffin MD on 8/26/2024 at 11:24 AM  Comment: Please note this report has been produced using speech recognition software and may contain errors related to that system including errors in grammar, punctuation, and spelling, as well as words and phrases that may be inappropriate. If there are any questions or concerns, please feel free to contact the dictating provider for clarification.

## 2024-08-27 LAB
AMORPH SED URNS QL MICRO: ABNORMAL /HPF
ANION GAP SERPL CALCULATED.3IONS-SCNC: 11 MMOL/L (ref 3–16)
ANTI-XA UNFRAC HEPARIN: 0.53 IU/ML (ref 0.3–0.7)
ANTI-XA UNFRAC HEPARIN: 0.56 IU/ML (ref 0.3–0.7)
ANTI-XA UNFRAC HEPARIN: 0.72 IU/ML (ref 0.3–0.7)
BACTERIA URNS QL MICRO: ABNORMAL /HPF
BILIRUB UR QL STRIP.AUTO: NEGATIVE
BUN SERPL-MCNC: 37 MG/DL (ref 7–20)
CALCIUM SERPL-MCNC: 8.4 MG/DL (ref 8.3–10.6)
CHLORIDE SERPL-SCNC: 112 MMOL/L (ref 99–110)
CLARITY UR: ABNORMAL
CO2 SERPL-SCNC: 17 MMOL/L (ref 21–32)
COARSE GRAN CASTS #/AREA URNS LPF: ABNORMAL /LPF (ref 0–2)
COLOR UR: YELLOW
CREAT SERPL-MCNC: 2.2 MG/DL (ref 0.6–1.2)
EKG ATRIAL RATE: 100 BPM
EKG ATRIAL RATE: 76 BPM
EKG DIAGNOSIS: NORMAL
EKG DIAGNOSIS: NORMAL
EKG P AXIS: 110 DEGREES
EKG P AXIS: 70 DEGREES
EKG P-R INTERVAL: 114 MS
EKG P-R INTERVAL: 118 MS
EKG Q-T INTERVAL: 388 MS
EKG Q-T INTERVAL: 464 MS
EKG QRS DURATION: 78 MS
EKG QRS DURATION: 98 MS
EKG QTC CALCULATION (BAZETT): 500 MS
EKG QTC CALCULATION (BAZETT): 522 MS
EKG R AXIS: -12 DEGREES
EKG R AXIS: 174 DEGREES
EKG T AXIS: -34 DEGREES
EKG T AXIS: 178 DEGREES
EKG VENTRICULAR RATE: 100 BPM
EKG VENTRICULAR RATE: 76 BPM
EPI CELLS #/AREA URNS HPF: ABNORMAL /HPF (ref 0–5)
GFR SERPLBLD CREATININE-BSD FMLA CKD-EPI: 23 ML/MIN/{1.73_M2}
GLUCOSE BLD-MCNC: 107 MG/DL (ref 70–99)
GLUCOSE BLD-MCNC: 151 MG/DL (ref 70–99)
GLUCOSE BLD-MCNC: 217 MG/DL (ref 70–99)
GLUCOSE BLD-MCNC: 243 MG/DL (ref 70–99)
GLUCOSE SERPL-MCNC: 129 MG/DL (ref 70–99)
GLUCOSE UR STRIP.AUTO-MCNC: NEGATIVE MG/DL
HGB UR QL STRIP.AUTO: NEGATIVE
HYALINE CASTS #/AREA URNS LPF: ABNORMAL /LPF (ref 0–2)
KETONES UR STRIP.AUTO-MCNC: 15 MG/DL
LEUKOCYTE ESTERASE UR QL STRIP.AUTO: ABNORMAL
NITRITE UR QL STRIP.AUTO: NEGATIVE
PERFORMED ON: ABNORMAL
PH UR STRIP.AUTO: 5.5 [PH] (ref 5–8)
POTASSIUM SERPL-SCNC: 3.8 MMOL/L (ref 3.5–5.1)
PROCALCITONIN SERPL IA-MCNC: 0.17 NG/ML (ref 0–0.15)
PROCALCITONIN SERPL IA-MCNC: 0.23 NG/ML (ref 0–0.15)
PROT UR STRIP.AUTO-MCNC: 30 MG/DL
RBC #/AREA URNS HPF: ABNORMAL /HPF (ref 0–4)
REPORT: NORMAL
RESP PATH DNA+RNA PNL NPH NAA+NON-PROBE: NORMAL
SODIUM SERPL-SCNC: 140 MMOL/L (ref 136–145)
SP GR UR STRIP.AUTO: 1.02 (ref 1–1.03)
UA COMPLETE W REFLEX CULTURE PNL UR: ABNORMAL
UA DIPSTICK W REFLEX MICRO PNL UR: YES
URN SPEC COLLECT METH UR: ABNORMAL
UROBILINOGEN UR STRIP-ACNC: 0.2 E.U./DL
WBC #/AREA URNS HPF: ABNORMAL /HPF (ref 0–5)
YEAST URNS QL MICRO: PRESENT /HPF

## 2024-08-27 PROCEDURE — 2580000003 HC RX 258: Performed by: HOSPITALIST

## 2024-08-27 PROCEDURE — 6360000002 HC RX W HCPCS: Performed by: HOSPITALIST

## 2024-08-27 PROCEDURE — 6370000000 HC RX 637 (ALT 250 FOR IP): Performed by: STUDENT IN AN ORGANIZED HEALTH CARE EDUCATION/TRAINING PROGRAM

## 2024-08-27 PROCEDURE — 93010 ELECTROCARDIOGRAM REPORT: CPT | Performed by: INTERNAL MEDICINE

## 2024-08-27 PROCEDURE — 80048 BASIC METABOLIC PNL TOTAL CA: CPT

## 2024-08-27 PROCEDURE — 94760 N-INVAS EAR/PLS OXIMETRY 1: CPT

## 2024-08-27 PROCEDURE — 6370000000 HC RX 637 (ALT 250 FOR IP): Performed by: FAMILY MEDICINE

## 2024-08-27 PROCEDURE — 6370000000 HC RX 637 (ALT 250 FOR IP): Performed by: HOSPITALIST

## 2024-08-27 PROCEDURE — 2060000000 HC ICU INTERMEDIATE R&B

## 2024-08-27 PROCEDURE — 51798 US URINE CAPACITY MEASURE: CPT

## 2024-08-27 PROCEDURE — 99233 SBSQ HOSP IP/OBS HIGH 50: CPT | Performed by: STUDENT IN AN ORGANIZED HEALTH CARE EDUCATION/TRAINING PROGRAM

## 2024-08-27 PROCEDURE — 36415 COLL VENOUS BLD VENIPUNCTURE: CPT

## 2024-08-27 PROCEDURE — 85520 HEPARIN ASSAY: CPT

## 2024-08-27 PROCEDURE — 93005 ELECTROCARDIOGRAM TRACING: CPT | Performed by: FAMILY MEDICINE

## 2024-08-27 PROCEDURE — 94640 AIRWAY INHALATION TREATMENT: CPT

## 2024-08-27 PROCEDURE — 84145 PROCALCITONIN (PCT): CPT

## 2024-08-27 PROCEDURE — 81001 URINALYSIS AUTO W/SCOPE: CPT

## 2024-08-27 PROCEDURE — 6370000000 HC RX 637 (ALT 250 FOR IP): Performed by: NURSE PRACTITIONER

## 2024-08-27 PROCEDURE — 0202U NFCT DS 22 TRGT SARS-COV-2: CPT

## 2024-08-27 RX ORDER — CARVEDILOL 6.25 MG/1
6.25 TABLET ORAL 2 TIMES DAILY WITH MEALS
Status: DISCONTINUED | OUTPATIENT
Start: 2024-08-27 | End: 2024-08-30 | Stop reason: HOSPADM

## 2024-08-27 RX ADMIN — MONTELUKAST 10 MG: 10 TABLET, FILM COATED ORAL at 20:45

## 2024-08-27 RX ADMIN — ROFLUMILAST 250 MCG: 500 TABLET ORAL at 09:28

## 2024-08-27 RX ADMIN — ALBUTEROL SULFATE 2 PUFF: 90 AEROSOL, METERED RESPIRATORY (INHALATION) at 09:21

## 2024-08-27 RX ADMIN — PREDNISONE 20 MG: 20 TABLET ORAL at 09:27

## 2024-08-27 RX ADMIN — HEPARIN SODIUM 670 UNITS/HR: 10000 INJECTION, SOLUTION INTRAVENOUS at 17:38

## 2024-08-27 RX ADMIN — CEFEPIME 1000 MG: 1 INJECTION, POWDER, FOR SOLUTION INTRAMUSCULAR; INTRAVENOUS at 20:48

## 2024-08-27 RX ADMIN — SODIUM CHLORIDE, PRESERVATIVE FREE 10 ML: 5 INJECTION INTRAVENOUS at 09:31

## 2024-08-27 RX ADMIN — SODIUM CHLORIDE: 9 INJECTION, SOLUTION INTRAVENOUS at 22:04

## 2024-08-27 RX ADMIN — CARVEDILOL 6.25 MG: 6.25 TABLET, FILM COATED ORAL at 20:44

## 2024-08-27 RX ADMIN — BUDESONIDE AND FORMOTEROL FUMARATE DIHYDRATE 2 PUFF: 160; 4.5 AEROSOL RESPIRATORY (INHALATION) at 09:19

## 2024-08-27 RX ADMIN — ALBUTEROL SULFATE 2 PUFF: 90 AEROSOL, METERED RESPIRATORY (INHALATION) at 20:58

## 2024-08-27 RX ADMIN — ASPIRIN 81 MG: 81 TABLET, COATED ORAL at 09:27

## 2024-08-27 RX ADMIN — CARVEDILOL 12.5 MG: 12.5 TABLET, FILM COATED ORAL at 09:27

## 2024-08-27 RX ADMIN — SODIUM CHLORIDE: 9 INJECTION, SOLUTION INTRAVENOUS at 06:54

## 2024-08-27 RX ADMIN — AMLODIPINE BESYLATE 5 MG: 5 TABLET ORAL at 09:27

## 2024-08-27 RX ADMIN — CEFEPIME 1000 MG: 1 INJECTION, POWDER, FOR SOLUTION INTRAMUSCULAR; INTRAVENOUS at 09:31

## 2024-08-27 RX ADMIN — BUDESONIDE AND FORMOTEROL FUMARATE DIHYDRATE 2 PUFF: 160; 4.5 AEROSOL RESPIRATORY (INHALATION) at 20:58

## 2024-08-27 NOTE — PROGRESS NOTES
During midnight's rounding, patient had not had bladder movement since beginning of shift, and pt verbalized she usually doesn't have lots of urine frequency.. Bladder scanned was performed and resulted 167 cc. Pt assisted to restroom, and had 150 cc urine output, PVR was 0. Attending notified and no new orders.    Electronically signed by Mtaeusz Villarreal RN on 8/27/2024 at 1:08 AM

## 2024-08-27 NOTE — FLOWSHEET NOTE
Patient states she is feeling lightheaded and nauseous   Blood pressure is significantly lower from morning vitals and baseline.   Received Coreg this morning.    Attending MD notified at 1030  No new orders at this time     08/27/24 1025   Vitals   Pulse 59   Respirations 20   BP (!) 77/55   MAP (Calculated) 62   MAP (mmHg) (!) 62

## 2024-08-27 NOTE — PROGRESS NOTES
Clinical Pharmacy Note  Heparin Dosing       Lab Results   Component Value Date/Time    ANTIXAUHEP 0.56 08/27/2024 12:41 PM      Lab Results   Component Value Date/Time    HGB 11.5 08/26/2024 06:05 AM    HCT 35.4 08/26/2024 06:05 AM     08/26/2024 06:05 AM       Current Infusion Rate: 670 units/hr    Plan:  Rate: continue 670 units/hr  Next anti-Xa level: 1900 8/27/24    Pharmacy will continue to monitor and adjust based on anti-Xa results.  Opal Qiu [facility],8/27/2024,2:09 PM

## 2024-08-27 NOTE — PROGRESS NOTES
Clinical Pharmacy Note  Heparin Dosing       Lab Results   Component Value Date/Time    ANTIXAUHEP 0.72 08/27/2024 05:30 AM      Lab Results   Component Value Date/Time    HGB 11.5 08/26/2024 06:05 AM    HCT 35.4 08/26/2024 06:05 AM     08/26/2024 06:05 AM       Current Infusion Rate: 750 units/hr    Plan:  Rate: decrease to 670 units/hr  Next anti-Xa level: 1300 8/27/24    Pharmacy will continue to monitor and adjust based on anti-Xa results.  Tc Avila, PharmD

## 2024-08-27 NOTE — DISCHARGE INSTRUCTIONS
Extra Heart Failure Education/ Tools/ Resources:     https://[employer].com/publication/?w=204999   --- this is American Heart Association interactive Healthier Living with Heart Failure guidebook.  Please click hyperlink or copy / paste link into search bar. The QR Code is also available below. Use your mouse to scroll through the pages.  Lots of information about weight monitoring, diet tips, activity, meds, etc    Heart Failure Tools and Resources QR Code is below. It includes multiple resources to include symptom tracker, med tracker, further HF info, and access to a HF Support Network online Community    HF Taloga Nishi  -- this is a free smart phone nishi available for iPhone and Android download.  Use your phone to track sodium / fluid intake, zone tool symptom tracking, weights, medications, etc. Click on this hyperlink  HF [employer]ishi   for QR code for easy download or the link is also found in the below HF Tools and Resources.      DASH (Dietary Approach to Stop Hypertension) diet --  https://www.nhlbi.nih.gov/education/dash-eating-plan -- this diet is a flexible eating plan that promotes heart healthy eating style.  Click on hyperlink or copy / paste link into search bar.  Lots of low sodium recipes and tips.    https://www.[employer] [place]/recipes  -- more free recipes         Follow up with heart doctor  Hold lisinopri  Check BP 3 times a day for a week and call PCP with numbers

## 2024-08-27 NOTE — PROGRESS NOTES
Patient only had one unmeasured UOP this shift.  It was unmeasured due to being mixed with liquid stool.  Bladder scan: 100 ml.    Continues on IVF  Attending MD notified.

## 2024-08-27 NOTE — PROGRESS NOTES
MD Rounding Communication    With Stephen Flores MD    Situation/ Action  Hypotension after morning coreg.     Plan: decrease coreg/ see order     08/27/24 0925 08/27/24 1025   Vitals   BP (!) 141/83 (!) 77/55   MAP (Calculated) 102 62   MAP (mmHg) 101 (!) 62     2:08 PM

## 2024-08-27 NOTE — PROGRESS NOTES
Nursing Shift Summary 7 AM- 7 PM     Shift Events  Hypotensive after morning medications.    SBP went from 140 to 70's/ symptomatic.  Resolved with time.  No acute intervention  Decreased Coreg dose  Low UOP/ Unmeasured  MD notified  Patient is having watery stool  Remains on IV heparin gtt.  Therapeutic X1.   Respiratory Panel completed    Neuro  OX:4     I/O & Drains  I/O this shift:  In: 1443.4 [P.O.:660; I.V.:734.2; IV Piggyback:49.2]  Out: 100 [Urine:100]      Continuous Medications    sodium chloride    sodium chloride Last Rate: 75 mL/hr at 08/27/24 1600    heparin (PORCINE) Infusion Last Rate: 670 Units/hr (08/27/24 1738)     Bedside Shift Handoff given to: Mateusz Villarreal, WILDER Baker RN

## 2024-08-27 NOTE — PROGRESS NOTES
function.   Tricuspid Valve: Upper limits of normal RVSP. The estimated RVSP is 36 mmHg.     XR CHEST PORTABLE    Result Date: 8/24/2024  EXAMINATION: ONE XRAY VIEW OF THE CHEST 8/24/2024 7:15 pm COMPARISON: 08/19/2024 HISTORY: ORDERING SYSTEM PROVIDED HISTORY: CP TECHNOLOGIST PROVIDED HISTORY: Reason for exam:->CP Reason for Exam: chest pain FINDINGS: The heart is top normal.  The pulmonary vessels are normal.  The lungs are hypoinflated.  No consolidation or effusion is seen.  The bones are intact.     Hypoinflation limiting the exam otherwise, stable with no obvious acute abnormality seen     CT ABDOMEN PELVIS WO CONTRAST Additional Contrast? None    Result Date: 8/24/2024  EXAMINATION: CT OF THE ABDOMEN AND PELVIS WITHOUT CONTRAST 8/24/2024 7:04 pm TECHNIQUE: CT of the abdomen and pelvis was performed without the administration of intravenous contrast. Multiplanar reformatted images are provided for review. Automated exposure control, iterative reconstruction, and/or weight based adjustment of the mA/kV was utilized to reduce the radiation dose to as low as reasonably achievable. COMPARISON: None. HISTORY: ORDERING SYSTEM PROVIDED HISTORY: nause decreased appetitie. TECHNOLOGIST PROVIDED HISTORY: Additional Contrast?->None Reason for exam:->nause decreased appetitie. Decision Support Exception - unselect if not a suspected or confirmed emergency medical condition->Emergency Medical Condition (MA) Reason for Exam: nause decreased appetitie. FINDINGS: There is motion artifact throughout the exam. Lower Chest:   The lung bases are clear.  There is a small pericardial effusion. Organs: There is mild hypertrophy of the caudate and left lobes of the liver. No focal lesion is seen.  The gallbladder, pancreas, and bile ducts are normal.  The spleen is unremarkable.  The adrenals are normal.  The kidneys are normal size and no hydronephrosis, renal stone, or mass is seen.  The ureters are normal caliber. GI/Bowel:   The

## 2024-08-27 NOTE — PROGRESS NOTES
Cardiology - PROGRESS NOTE    Admit Date: 8/24/2024     Reason for follow up:     73-year-old female with past medical history of asthma, COPD, diabetes, hyperlipidemia, hypertension. Presents with a combination of fatigue and shortness of breath with exertion. Currently being evaluated and treated for sepsis cardiology consulted for mild to moderate biomarker elevation       Social History:   reports that she has never smoked. She has never used smokeless tobacco. She reports that she does not currently use alcohol. She reports that she does not use drugs.   Family History: family history includes Breast Cancer in her mother; Cancer in her father and mother; Heart Disease in her mother; Kidney Disease in her brother; Ovarian Cancer in her mother.       Interval History:   Patient seen and examined and notes reviewed   Laying in bed  Sleepy  BP soft     All other systems reviewed and negative except as above.        Diet: ADULT DIET; Regular; 3 carb choices (45 gm/meal); Low Fat/Low Chol/High Fiber/2 gm Na    In: 3839.4 [P.O.:780; I.V.:2879.3]  Out: 150    Patient Vitals for the past 96 hrs (Last 3 readings):   Weight   08/27/24 0456 87.9 kg (193 lb 12.6 oz)   08/26/24 0951 86.2 kg (190 lb)   08/26/24 0331 86.2 kg (190 lb 0.6 oz)           Data:   Scheduled Meds:   Scheduled Meds:   sodium chloride flush  5-40 mL IntraVENous 2 times per day    cefepime  1,000 mg IntraVENous Q12H    predniSONE  20 mg Oral Daily    aspirin  81 mg Oral Daily    amLODIPine  5 mg Oral BID    budesonide-formoterol  2 puff Inhalation BID RT    montelukast  10 mg Oral Nightly    Roflumilast  250 mcg Oral Daily    carvedilol  12.5 mg Oral BID WC     Continuous Infusions:   sodium chloride      sodium chloride 75 mL/hr at 08/27/24 0659    heparin (PORCINE) Infusion 670 Units/hr (08/27/24 1410)     PRN Meds:.sodium chloride flush, sodium chloride, acetaminophen **OR** acetaminophen,

## 2024-08-27 NOTE — CONSULTS
[facility]  HEART FAILURE PROGRAM      NAME:  Fidelia Torres  MEDICAL RECORD NUMBER:  [id number]  AGE: 73 y.o.   GENDER: female  : 1950  ADMIT DATE: 2024  TODAY'S DATE:  2024    Subjective:     VISIT TYPE: evaluation     ADMITTING PHYSICIAN:  Jm Dahl MD    Code Status: Full Code    PAST MEDICAL HISTORY        Diagnosis Date    Allergic rhinitis     Asthma     COPD (chronic obstructive pulmonary disease) ([facility])     pt denies    Diabetes mellitus ([facility])     Hepatitis     autoimmune    Hyperlipidemia     Hypertension     Influenza A 01/10/2015    Osteoporosis        SOCIAL HISTORY    Social History     Tobacco Use    Smoking status: Never    Smokeless tobacco: Never   Vaping Use    Vaping status: Never Used   Substance Use Topics    Alcohol use: Not Currently     Comment: rare - few times a year    Drug use: No         MEDICATIONS  Scheduled Meds:   sodium chloride flush  5-40 mL IntraVENous 2 times per day    cefepime  1,000 mg IntraVENous Q12H    predniSONE  20 mg Oral Daily    aspirin  81 mg Oral Daily    amLODIPine  5 mg Oral BID    budesonide-formoterol  2 puff Inhalation BID RT    montelukast  10 mg Oral Nightly    Roflumilast  250 mcg Oral Daily    carvedilol  12.5 mg Oral BID WC         Objective:     ADMISSION DIAGNOSIS:   Prolonged Q-T interval on ECG [R94.31]  NSTEMI (non-ST elevated myocardial infarction) ([facility]) [I21.4]  VIOLA (acute kidney injury) ([facility]) [N17.9]  Severe sepsis ([facility]) [A41.9, R65.20]  Severe sepsis with acute organ dysfunction ([facility]) [A41.9, R65.20]     BP (!) 147/91   Pulse 83   Temp 98 °F (36.7 °C) (Oral)   Resp 20   Ht 1.626 m (5' 4\")   Wt 87.9 kg (193 lb 12.6 oz)   LMP 2005   SpO2 95%   BMI 33.26 kg/m²     ADMIT:  Weight - Scale: 83 kg (182 lb 15.7 oz)    TODAY: Weight - Scale: 87.9 kg (193 lb 12.6 oz)    Wt Readings from Last 5 Encounters:   24 87.9 kg (193 lb 12.6 oz)   24 83 kg (183 lb)   24 93 kg (205 lb)  education   [x]  If EF </=40%, ensure GDMT has been met at time of dc (if not on GDMT ensure contraindication is documented)  []  [facility] Referral placed  []  Cardiac Rehab Phase 1 referral placed and/or contact info given  [x]  Continue to support pt's present goal of: figuring out if I have an infection or not and thank you for explaining the heart stuff to me          Electronically signed by Huong Turner, RN, BSN CHFN  on 8/27/2024 at 3:07 PM

## 2024-08-27 NOTE — PLAN OF CARE
Problem: Discharge Planning  Goal: Discharge to home or other facility with appropriate resources  8/27/2024 0259 by Mateusz Villarreal, RN  Outcome: Progressing  Flowsheets (Taken 8/27/2024 0259)  Discharge to home or other facility with appropriate resources: Identify barriers to discharge with patient and caregiver     Problem: Safety - Adult  Goal: Free from fall injury  8/27/2024 0259 by Mateusz Villarreal, RN  Outcome: Progressing  Flowsheets (Taken 8/27/2024 0259)  Free From Fall Injury: Instruct family/caregiver on patient safety

## 2024-08-27 NOTE — PROGRESS NOTES
Comprehensive Nutrition Assessment    Type and Reason for Visit:  Initial, Positive Nutrition Screen    Nutrition Recommendations/Plan:   Continue 3 carb diet w/ low fat/low chol/high fiber/2 g Na restrictions, dc ONS     Malnutrition Assessment:  Malnutrition Status:  No malnutrition (08/27/24 1446)    Context:  Acute Illness       Nutrition Assessment:    MST 2. Pt presenting w/ severe sepsis with acute organ dysfunction. Pt w/ mild wt loss pta, reported decreased intake. Pt's intake improved recently, finishing meals. High mallory/high protein ONS ordered daily. Pt currently w/ carb restriction. Will dc ONS, order glucerna if pt intake declines.    Nutrition Related Findings:    151 glucose Wound Type: None       Current Nutrition Intake & Therapies:    Average Meal Intake: %     ADULT DIET; Regular; 3 carb choices (45 gm/meal); Low Fat/Low Chol/High Fiber/2 gm Na  ADULT ORAL NUTRITION SUPPLEMENT; Breakfast; Standard High Calorie/High Protein Oral Supplement    Anthropometric Measures:  Height: 162.6 cm (5' 4\")  Ideal Body Weight (IBW): 120 lbs (55 kg)       Current Body Weight: 87.9 kg (193 lb 12.6 oz),   IBW.    Current BMI (kg/m2): 33.2                               Estimated Daily Nutrient Needs:  Energy Requirements Based On: Kcal/kg  Weight Used for Energy Requirements: Current  Energy (kcal/day): 1320 - 1580 (15 - 18 kcal/kg)  Weight Used for Protein Requirements: Ideal  Protein (g/day): 65 - 82 (1.2 - 1.5 g/kg IBW)  Method Used for Fluid Requirements: 1 ml/kcal  Fluid (ml/day): or per provider    Nutrition Diagnosis:   Unintended weight loss related to early satiety as evidenced by poor intake prior to admission    Nutrition Interventions:   Food and/or Nutrient Delivery: Continue Current Diet  Nutrition Education/Counseling: Education not indicated  Coordination of Nutrition Care: Continue to monitor while inpatient       Goals:     Goals: Meet at least 75% of estimated needs, by next RD assessment

## 2024-08-27 NOTE — DISCHARGE INSTR - COC
Last documented pain score (0-10 scale):    Last Weight:   Wt Readings from Last 1 Encounters:   24 87.9 kg (193 lb 12.6 oz)     Mental Status:  {IP PT MENTAL STATUS:}    IV Access:  { CRISTAL IV ACCESS:842450807}    Nursing Mobility/ADLs:  Walking   {CHP DME ADLs:711998532}  Transfer  {CHP DME ADLs:603022559}  Bathing  {CHP DME ADLs:544953882}  Dressing  {CHP DME ADLs:749881164}  Toileting  {CHP DME ADLs:177348927}  Feeding  {CHP DME ADLs:404028409}  Med Admin  {CHP DME ADLs:960738234}  Med Delivery   { CRISTAL MED Delivery:670959561}    Wound Care Documentation and Therapy:  Incision 23 Axilla Right (Active)   Number of days: 253        Elimination:  Continence:   Bowel: {YES / NO:}  Bladder: {YES / NO:}  Urinary Catheter: {Urinary Catheter:444770516}   Colostomy/Ileostomy/Ileal Conduit: {YES / NO:}       Date of Last BM: ***    Intake/Output Summary (Last 24 hours) at 2024 1149  Last data filed at 2024 0659  Gross per 24 hour   Intake 3599.4 ml   Output 150 ml   Net 3449.4 ml     I/O last 3 completed shifts:  In: 3839.4 [P.O.:780; I.V.:2879.3; IV Piggyback:180.1]  Out: 150 [Urine:150]    Safety Concerns:     { CRISTAL Safety Concerns:045578958}    Impairments/Disabilities:      { CRISTAL Impairments/Disabilities:578118079}    Nutrition Therapy:  Current Nutrition Therapy:   { CRISTAL Diet List:923087774}    Routes of Feeding: {[facility] DME Other Feedings:030249932}  Liquids: {Slp liquid thickness:81440}  Daily Fluid Restriction: {CHP DME Yes amt example:376253896}  Last Modified Barium Swallow with Video (Video Swallowing Test): {Done Not Done Date:}    Treatments at the Time of Hospital Discharge:   Respiratory Treatments: ***  Oxygen Therapy:  {Therapy; copd oxygen:32219}  Ventilator:    { CC Vent List:307835034}    Rehab Therapies: {THERAPEUTIC INTERVENTION:9408160412}  Weight Bearing Status/Restrictions: { TIM Weight Bearin}  Other Medical Equipment (for  information only, NOT a DME order):  {EQUIPMENT:204250214}  Other Treatments: ***    Heart Failure Instructions for Daily Management  Patient was treated for acute combined systolic and diastolic heart failure.  she  will require the following:    Please weigh daily on the same scale and approximately the same time of day.  Report weight gain of 3 pounds/day or 5 pounds/week to : Albina Valdes, APRN - [facility] [phone] and [facility] [phone].  Please use hospital discharge weight as baseline reference.   Please monitor for signs and symptoms of and report to MD:  Worsening Heart Failure: sudden weight gain, shortness of breath, lower extremity or general edema/swelling, abdominal bloating/swelling, inability to lie flat, intolerance to usual activity, or cough (especially at night). Report these finding even if no increase in weight.  Dehydration:  having difficulty or a decrease in urination, dizziness, worsening fatigue, or new onset/worsening of generalized weakness.     Please continue a LOW SODIUM diet and LIMIT fluid intake to 48 - 64 ounces ( 1.5 - 2 liters) per day.     Call Albina Vlades, APRN - [facility] [phone] and [facility] [phone] and/or [facility] Heart Failure Resource Line @ [phone] with any questions or concerns.   Please continue heart failure education to patient and family/support system.  See After Visit Summary for hospital follow up appointment details.  Consider spiritual care referral for support and/or completion of advance directives [phone].  Consider: Home [facility] telehealth program if patient agreeable and able to participate, palliative care consult for ongoing goals of care, end of life, and/or chronic disease management discussions, and referral to [facility] [phone]) once SNF/HHC complete.      Patient's personal belongings (please select all that are sent with patient):  {CHP DME Belongings:621693047}    WILDER

## 2024-08-27 NOTE — FLOWSHEET NOTE
Orthostatic BP from sitting to standing       08/27/24 1205   Vitals   Orthostatic B/P and Pulse? Yes   Blood Pressure Sitting 122/74   Pulse Sitting 90 PER MINUTE   Blood Pressure Standing 121/84   Pulse Standing 96 PER MINUTE   Cardiac Rhythm Sinus rhythm

## 2024-08-28 ENCOUNTER — TELEPHONE (OUTPATIENT)
Dept: CARDIOLOGY CLINIC | Age: 74
End: 2024-08-28

## 2024-08-28 DIAGNOSIS — R79.89 ELEVATED TROPONIN I LEVEL: Primary | ICD-10-CM

## 2024-08-28 DIAGNOSIS — R93.1 ABNORMAL FINDINGS ON DIAGNOSTIC IMAGING OF HEART AND CORONARY CIRCULATION: ICD-10-CM

## 2024-08-28 LAB
ANION GAP SERPL CALCULATED.3IONS-SCNC: 9 MMOL/L (ref 3–16)
ANTI-XA UNFRAC HEPARIN: 0.32 IU/ML (ref 0.3–0.7)
BUN SERPL-MCNC: 33 MG/DL (ref 7–20)
CALCIUM SERPL-MCNC: 8.4 MG/DL (ref 8.3–10.6)
CHLORIDE SERPL-SCNC: 111 MMOL/L (ref 99–110)
CO2 SERPL-SCNC: 18 MMOL/L (ref 21–32)
CREAT SERPL-MCNC: 1.9 MG/DL (ref 0.6–1.2)
DEPRECATED RDW RBC AUTO: 13.9 % (ref 12.4–15.4)
GFR SERPLBLD CREATININE-BSD FMLA CKD-EPI: 28 ML/MIN/{1.73_M2}
GLUCOSE BLD-MCNC: 107 MG/DL (ref 70–99)
GLUCOSE BLD-MCNC: 131 MG/DL (ref 70–99)
GLUCOSE BLD-MCNC: 166 MG/DL (ref 70–99)
GLUCOSE BLD-MCNC: 189 MG/DL (ref 70–99)
GLUCOSE SERPL-MCNC: 127 MG/DL (ref 70–99)
HCT VFR BLD AUTO: 30.4 % (ref 36–48)
HGB BLD-MCNC: 10.3 G/DL (ref 12–16)
MCH RBC QN AUTO: 30.4 PG (ref 26–34)
MCHC RBC AUTO-ENTMCNC: 33.9 G/DL (ref 31–36)
MCV RBC AUTO: 89.7 FL (ref 80–100)
PERFORMED ON: ABNORMAL
PLATELET # BLD AUTO: 209 K/UL (ref 135–450)
PMV BLD AUTO: 8.1 FL (ref 5–10.5)
POTASSIUM SERPL-SCNC: 3.5 MMOL/L (ref 3.5–5.1)
RBC # BLD AUTO: 3.39 M/UL (ref 4–5.2)
SODIUM SERPL-SCNC: 138 MMOL/L (ref 136–145)
WBC # BLD AUTO: 14.9 K/UL (ref 4–11)

## 2024-08-28 PROCEDURE — 94640 AIRWAY INHALATION TREATMENT: CPT

## 2024-08-28 PROCEDURE — 36415 COLL VENOUS BLD VENIPUNCTURE: CPT

## 2024-08-28 PROCEDURE — 2060000000 HC ICU INTERMEDIATE R&B

## 2024-08-28 PROCEDURE — 97116 GAIT TRAINING THERAPY: CPT

## 2024-08-28 PROCEDURE — 97530 THERAPEUTIC ACTIVITIES: CPT

## 2024-08-28 PROCEDURE — 94760 N-INVAS EAR/PLS OXIMETRY 1: CPT

## 2024-08-28 PROCEDURE — 6370000000 HC RX 637 (ALT 250 FOR IP): Performed by: FAMILY MEDICINE

## 2024-08-28 PROCEDURE — 85520 HEPARIN ASSAY: CPT

## 2024-08-28 PROCEDURE — 2580000003 HC RX 258: Performed by: HOSPITALIST

## 2024-08-28 PROCEDURE — 6360000002 HC RX W HCPCS: Performed by: HOSPITALIST

## 2024-08-28 PROCEDURE — 6370000000 HC RX 637 (ALT 250 FOR IP): Performed by: HOSPITALIST

## 2024-08-28 PROCEDURE — 6370000000 HC RX 637 (ALT 250 FOR IP): Performed by: STUDENT IN AN ORGANIZED HEALTH CARE EDUCATION/TRAINING PROGRAM

## 2024-08-28 PROCEDURE — 80048 BASIC METABOLIC PNL TOTAL CA: CPT

## 2024-08-28 PROCEDURE — 85027 COMPLETE CBC AUTOMATED: CPT

## 2024-08-28 PROCEDURE — 97535 SELF CARE MNGMENT TRAINING: CPT

## 2024-08-28 RX ADMIN — BUDESONIDE AND FORMOTEROL FUMARATE DIHYDRATE 2 PUFF: 160; 4.5 AEROSOL RESPIRATORY (INHALATION) at 09:06

## 2024-08-28 RX ADMIN — CEFEPIME 1000 MG: 1 INJECTION, POWDER, FOR SOLUTION INTRAMUSCULAR; INTRAVENOUS at 11:11

## 2024-08-28 RX ADMIN — CARVEDILOL 6.25 MG: 6.25 TABLET, FILM COATED ORAL at 09:03

## 2024-08-28 RX ADMIN — MONTELUKAST 10 MG: 10 TABLET, FILM COATED ORAL at 20:28

## 2024-08-28 RX ADMIN — CARVEDILOL 6.25 MG: 6.25 TABLET, FILM COATED ORAL at 18:23

## 2024-08-28 RX ADMIN — PREDNISONE 20 MG: 20 TABLET ORAL at 09:03

## 2024-08-28 RX ADMIN — SODIUM CHLORIDE, PRESERVATIVE FREE 10 ML: 5 INJECTION INTRAVENOUS at 09:04

## 2024-08-28 RX ADMIN — ALBUTEROL SULFATE 2 PUFF: 90 AEROSOL, METERED RESPIRATORY (INHALATION) at 09:06

## 2024-08-28 RX ADMIN — CEFEPIME 1000 MG: 1 INJECTION, POWDER, FOR SOLUTION INTRAMUSCULAR; INTRAVENOUS at 20:31

## 2024-08-28 RX ADMIN — BUDESONIDE AND FORMOTEROL FUMARATE DIHYDRATE 2 PUFF: 160; 4.5 AEROSOL RESPIRATORY (INHALATION) at 21:03

## 2024-08-28 RX ADMIN — ALBUTEROL SULFATE 2 PUFF: 90 AEROSOL, METERED RESPIRATORY (INHALATION) at 21:03

## 2024-08-28 RX ADMIN — SODIUM CHLORIDE, PRESERVATIVE FREE 10 ML: 5 INJECTION INTRAVENOUS at 20:33

## 2024-08-28 RX ADMIN — ASPIRIN 81 MG: 81 TABLET, COATED ORAL at 09:03

## 2024-08-28 RX ADMIN — ROFLUMILAST 250 MCG: 500 TABLET ORAL at 09:16

## 2024-08-28 NOTE — PROGRESS NOTES
Clinical Pharmacy Note  Heparin Dosing       Lab Results   Component Value Date/Time    ANTIXAUHEP 0.53 08/27/2024 07:12 PM      Lab Results   Component Value Date/Time    HGB 11.5 08/26/2024 06:05 AM    HCT 35.4 08/26/2024 06:05 AM     08/26/2024 06:05 AM       Current Infusion Rate: 670 units/hr    Plan:  Rate: continue 670 units/hr  Next anti-Xa level: 0600 8/28/24    Pharmacy will continue to monitor and adjust based on anti-Xa results.  Bethany Angel RPH,8/27/2024,8:18 PM

## 2024-08-28 NOTE — PROGRESS NOTES
Clinical Pharmacy Note  Heparin Dosing       Lab Results   Component Value Date/Time    ANTIXAUHEP 0.32 08/28/2024 07:11 AM      Lab Results   Component Value Date/Time    HGB 10.3 08/28/2024 07:11 AM    HCT 30.4 08/28/2024 07:11 AM     08/28/2024 07:11 AM       Current Infusion Rate: 670 units/hr    Plan:  Rate: continue 670 units/hr  Next anti-Xa level: 0600 8/29/24    Pharmacy will continue to monitor and adjust based on anti-Xa results.  Colt Moses RPH,8/28/2024,8:20 AM

## 2024-08-28 NOTE — PLAN OF CARE
Problem: Discharge Planning  Goal: Discharge to home or other facility with appropriate resources  8/28/2024 1214 by Diane Joshua RN  Outcome: Progressing  8/28/2024 0330 by Mateusz Villarreal RN  Outcome: Progressing  Flowsheets (Taken 8/28/2024 0330)  Discharge to home or other facility with appropriate resources: Identify barriers to discharge with patient and caregiver     Problem: Safety - Adult  Goal: Free from fall injury  8/28/2024 1214 by Diane Joshua RN  Outcome: Progressing  8/28/2024 0330 by Mateusz Villarreal RN  Outcome: Progressing  Flowsheets (Taken 8/28/2024 0330)  Free From Fall Injury: Instruct family/caregiver on patient safety     Problem: Cardiovascular - Adult  Goal: Maintains optimal cardiac output and hemodynamic stability  8/28/2024 1214 by Diane Joshua RN  Outcome: Progressing  8/28/2024 0330 by Mateusz Villarreal RN  Outcome: Progressing  Flowsheets (Taken 8/28/2024 0330)  Maintains optimal cardiac output and hemodynamic stability: Monitor blood pressure and heart rate  Goal: Absence of cardiac dysrhythmias or at baseline  8/28/2024 1214 by Diane Joshua RN  Outcome: Progressing  8/28/2024 0330 by Mateusz Villarreal RN  Outcome: Progressing  Flowsheets (Taken 8/28/2024 0330)  Absence of cardiac dysrhythmias or at baseline: Monitor cardiac rate and rhythm     Problem: Musculoskeletal - Adult  Goal: Return mobility to safest level of function  8/28/2024 1214 by Diane Joshua RN  Outcome: Progressing  8/28/2024 0330 by Mateusz Villarreal RN  Outcome: Progressing  Flowsheets (Taken 8/28/2024 0330)  Return Mobility to Safest Level of Function: Assist with transfers and ambulation using safe patient handling equipment as needed  Goal: Maintain proper alignment of affected body part  8/28/2024 1214 by Diane Joshua RN  Outcome: Progressing  8/28/2024 0330 by Mateusz Villarreal RN  Outcome: Progressing  Goal: Return ADL status to a safe level of function  8/28/2024 1214 by  Whittier, Diane, RN  Outcome: Progressing  8/28/2024 0330 by Mateusz Villarreal RN  Outcome: Progressing     Problem: Chronic Conditions and Co-morbidities  Goal: Patient's chronic conditions and co-morbidity symptoms are monitored and maintained or improved  8/28/2024 1214 by Diane Joshua RN  Outcome: Progressing  8/28/2024 0330 by Mateusz Villarreal RN  Outcome: Progressing     Problem: Respiratory - Adult  Goal: Achieves optimal ventilation and oxygenation  8/28/2024 1214 by Diane Joshua RN  Outcome: Progressing  8/28/2024 0330 by Mateusz Villarreal RN  Outcome: Progressing  Flowsheets (Taken 8/28/2024 0330)  Achieves optimal ventilation and oxygenation:   Assess for changes in respiratory status   Position to facilitate oxygenation and minimize respiratory effort     Problem: Metabolic/Fluid and Electrolytes - Adult  Goal: Electrolytes maintained within normal limits  8/28/2024 1214 by Diane Joshua RN  Outcome: Progressing  8/28/2024 0330 by Mateusz Villarreal RN  Outcome: Progressing  Flowsheets (Taken 8/28/2024 0330)  Electrolytes maintained within normal limits: Monitor labs and assess patient for signs and symptoms of electrolyte imbalances  Goal: Hemodynamic stability and optimal renal function maintained  8/28/2024 1214 by Diane Joshua RN  Outcome: Progressing  8/28/2024 0330 by Mateusz Villarreal RN  Outcome: Progressing  Goal: Glucose maintained within prescribed range  8/28/2024 1214 by Diane Joshua RN  Outcome: Progressing  8/28/2024 0330 by Mateusz Villarreal RN  Outcome: Progressing     Problem: Hematologic - Adult  Goal: Maintains hematologic stability  8/28/2024 1214 by Diane Joshua RN  Outcome: Progressing  8/28/2024 0330 by Mateusz Villarreal RN  Outcome: Progressing  Flowsheets (Taken 8/28/2024 0330)  Maintains hematologic stability: Assess for signs and symptoms of bleeding or hemorrhage     Problem: Nutrition Deficit:  Goal: Optimize nutritional status  8/28/2024 1214 by

## 2024-08-28 NOTE — PLAN OF CARE
Problem: Discharge Planning  Goal: Discharge to home or other facility with appropriate resources  8/28/2024 0330 by Mateusz Villarreal RN  Outcome: Progressing  Flowsheets (Taken 8/28/2024 0330)  Discharge to home or other facility with appropriate resources: Identify barriers to discharge with patient and caregiver     Problem: Safety - Adult  Goal: Free from fall injury  Outcome: Progressing  Flowsheets (Taken 8/28/2024 0330)  Free From Fall Injury: Instruct family/caregiver on patient safety     Problem: Cardiovascular - Adult  Goal: Maintains optimal cardiac output and hemodynamic stability  8/28/2024 0330 by Mateusz Villarreal RN  Outcome: Progressing  Flowsheets (Taken 8/28/2024 0330)  Maintains optimal cardiac output and hemodynamic stability: Monitor blood pressure and heart rate     Problem: Cardiovascular - Adult  Goal: Absence of cardiac dysrhythmias or at baseline  8/28/2024 0330 by Mateusz Villarreal RN  Outcome: Progressing  Flowsheets (Taken 8/28/2024 0330)  Absence of cardiac dysrhythmias or at baseline: Monitor cardiac rate and rhythm     Problem: Musculoskeletal - Adult  Goal: Return mobility to safest level of function  Outcome: Progressing  Flowsheets (Taken 8/28/2024 0330)  Return Mobility to Safest Level of Function: Assist with transfers and ambulation using safe patient handling equipment as needed     Problem: Respiratory - Adult  Goal: Achieves optimal ventilation and oxygenation  8/28/2024 0330 by Mateusz Villarreal RN  Outcome: Progressing  Flowsheets (Taken 8/28/2024 0330)  Achieves optimal ventilation and oxygenation:   Assess for changes in respiratory status   Position to facilitate oxygenation and minimize respiratory effort     Problem: Metabolic/Fluid and Electrolytes - Adult  Goal: Electrolytes maintained within normal limits  8/28/2024 0330 by Mateusz Villarreal RN  Outcome: Progressing  Flowsheets (Taken 8/28/2024 0330)  Electrolytes maintained within normal limits: Monitor labs

## 2024-08-28 NOTE — TELEPHONE ENCOUNTER
Tierra,     Can you please schedule for [facility] with possible intervention.    Patient is in the hospital at this time.      Procedure and labs ordered.      Please go for blood work one week prior to your procedure.       The morning of your procedure you will park in the hospital parking lot and report directly to the registration desk for check in.    Pre-Procedure Instructions   You will need to fast for at least 8 hours prior to procedure. No caffeine the morning of.   Hold all diabetic medications including, Metfomin.  If you take Lantus/Levemir only take ½ your normal dose the evening before.  All other medications can be taken in the morning with sips of water.   You will need to take 325 mg aspirin the morning of.  If you are currently taking 81 mg please take 4 tablets that morning.   Do not use any lotions, creams or perfume the morning of procedure.   Pre-procedure lab work will need to be completed 5-7 days prior to procedure.   Please have a responsible adult to drive you home after procedure. We advise you have someone to stay with you for 24 hours following procedure for precautionary measures. Depending on procedure you may require an overnight stay.   Cath lab will provide you with all post procedure instructions.     If you have any questions regarding the procedure itself or medications, please call [phone] and ask to speak with a nurse.

## 2024-08-28 NOTE — PROGRESS NOTES
Physical Therapy  Facility/Department: [address] PROGRESSIVE CARE  Physical Therapy Treatment Note    Name: Fidelia Torres  : 1950  MRN: [id number]  Date of Service: 2024    Discharge Recommendations:  Home with assist PRN, Home with Home health PT     Fidelia Torres scored a 19/24 on the AM-PAC short mobility form. Current research shows that an AM-PAC score of 18 or greater is typically associated with a discharge to the patient's home setting. Based on the patient's AM-PAC score and their current functional mobility deficits, it is recommended that the patient have 2-3 sessions per week of Physical Therapy at d/c to increase the patient's independence.  At this time, this patient demonstrates the endurance and safety to discharge home with assist as needed and  PT services and a follow up treatment frequency of 2-3x/wk.  Please see assessment section for further patient specific details.    If patient discharges prior to next session this note will serve as a discharge summary.  Please see below for the latest assessment towards goals.            Patient Diagnosis(es): The primary encounter diagnosis was Severe sepsis (HCC). Diagnoses of NSTEMI (non-ST elevated myocardial infarction) (HCC), VIOLA (acute kidney injury) (HCC), Prolonged Q-T interval on ECG, and Shortness of breath were also pertinent to this visit.  Past Medical History:  has a past medical history of Allergic rhinitis, Asthma, COPD (chronic obstructive pulmonary disease) (HCC), Diabetes mellitus (HCC), Hepatitis, Hyperlipidemia, Hypertension, Influenza A, and Osteoporosis.  Past Surgical History:  has a past surgical history that includes Colonoscopy; Breast biopsy (10/2005); liver biopsy (2006 ); Colonoscopy (13); Colonoscopy (2016); Cataract removal (Bilateral); Colonoscopy (2021); Upper gastrointestinal endoscopy (N/A, 2022); and Breast surgery (Right, 2023).    Assessment  Body Structures, Functions,  Independent (no AD)  Transfer Assistance: Independent  Active : Yes       Cognition   Orientation  Overall Orientation Status: Within Functional Limits  Cognition  Overall Cognitive Status: WFL    Objective  /82 mmHg in L UE after first ambulation      Bed mobility  Supine to Sit: Unable to assess  Sit to Supine: Unable to assess  Bed Mobility Comments: up in chair at beginning and end of session  Transfers  Sit to Stand: Stand by assistance  Stand to Sit: Stand by assistance  Ambulation  Surface: Level tile  Device: No Device  Assistance: Contact guard assistance;Stand by assistance  Gait Deviations: Slow Corinne  Distance: 60' x2  Comments: pt used commode and washed up seated in front of sink     Balance  Sitting - Static: Good  Sitting - Dynamic: Good  Standing - Static: Fair;+  Standing - Dynamic: Fair;+      AM-PAC - Mobility    AM-PAC Basic Mobility - Inpatient   How much help is needed turning from your back to your side while in a flat bed without using bedrails?: None  How much help is needed moving from lying on your back to sitting on the side of a flat bed without using bedrails?: A Little  How much help is needed moving to and from a bed to a chair?: A Little  How much help is needed standing up from a chair using your arms?: A Little  How much help is needed walking in hospital room?: A Little  How much help is needed climbing 3-5 steps with a railing?: A Little  AM-PAC Inpatient Mobility Raw Score : 19  AM-PAC Inpatient T-Scale Score : 45.44  Mobility Inpatient CMS 0-100% Score: 41.77  Mobility Inpatient CMS G-Code Modifier : CK      Goals  Short Term Goals  Time Frame for Short Term Goals: by discharge (all goals ongoing as of 8/28/24)  Short Term Goal 1: bed mob MI  Short Term Goal 2: transfers MI  Short Term Goal 3: amb 100' without AD SBA  Patient Goals   Patient Goals : to return home       Education  Patient Education  Education Given To: Patient  Education Provided: Role of

## 2024-08-28 NOTE — PROGRESS NOTES
V2.0    Patient:  Fidelia Torres 73 y.o. female MRN: [id number]     Date of Service: 8/28/2024    Patient with history of COPD, coronary artery disease, hypertension presented with possible sepsis with acute organ dysfunction.     Sepsis present on admission.  Resolving.  Continue with current antibiotics 1 more days.   Lactic acid is improving.  PCT 0.17    Elevated troponin/NSTEMI.  Cardiology on board.  Echo reviewed, coronary CT versus angiogram as OP.      Hypertension.  -adjusting meds.   Stop amlodipine, keep on coreg.      History of VIOLA on CKD, improving     History of congestive heart failure.  Does not look in acute exacerbation.  -Will monitor for now.     Reactive airway disease.  -Continue with steroids/monitor.      Diet ADULT DIET; Regular; 3 carb choices (45 gm/meal); Low Fat/Low Chol/High Fiber/2 gm Na   DVT Prophylaxis [] Lovenox, []  Heparin, [x] SCDs, [] Ambulation,  [] Eliquis, [] Xarelto   Code Status Full Code   Disposition From: Home  Expected Disposition: Home/ ECF to be decided  Estimated Date of Discharge: tbd     Surrogate Decision Maker/ POA             Barrier for Discharge:      Heparin drip    Cardizem drip/Amiodarone drip    Pressors gtt    Protonix gtt    IV Lasix/Bumex    Pending Consult Eval    Pending Imaging/CT/MRI/Nuclear scan/Stress Test    IV Abx      Pending Cultures    Requiring BIPAP    Intubated/Mechanical Ventilation    Pending Placement    Pending Safe discharge plan    Pending Ongoing evaluation and improvement    Pending Clearance by Consult Service for discharge           Subjective:     Patient seen and examined this morning no acute overnight events, resting comfortably, out of bed to chair, feels relatively better.  Objective:     Intake/Output Summary (Last 24 hours) at 8/28/2024 1241  Last data filed at 8/28/2024 1007  Gross per 24 hour   Intake 2383.49 ml   Output 600 ml   Net 1783.49 ml      Vitals:   Vitals:    08/28/24 0440 08/28/24 0735 08/28/24 0907  for Exam: chest pain FINDINGS: The heart is top normal.  The pulmonary vessels are normal.  The lungs are hypoinflated.  No consolidation or effusion is seen.  The bones are intact.     Hypoinflation limiting the exam otherwise, stable with no obvious acute abnormality seen     CT ABDOMEN PELVIS WO CONTRAST Additional Contrast? None    Result Date: 8/24/2024  EXAMINATION: CT OF THE ABDOMEN AND PELVIS WITHOUT CONTRAST 8/24/2024 7:04 pm TECHNIQUE: CT of the abdomen and pelvis was performed without the administration of intravenous contrast. Multiplanar reformatted images are provided for review. Automated exposure control, iterative reconstruction, and/or weight based adjustment of the mA/kV was utilized to reduce the radiation dose to as low as reasonably achievable. COMPARISON: None. HISTORY: ORDERING SYSTEM PROVIDED HISTORY: nause decreased appetitie. TECHNOLOGIST PROVIDED HISTORY: Additional Contrast?->None Reason for exam:->nause decreased appetitie. Decision Support Exception - unselect if not a suspected or confirmed emergency medical condition->Emergency Medical Condition (MA) Reason for Exam: nause decreased appetitie. FINDINGS: There is motion artifact throughout the exam. Lower Chest:   The lung bases are clear.  There is a small pericardial effusion. Organs: There is mild hypertrophy of the caudate and left lobes of the liver. No focal lesion is seen.  The gallbladder, pancreas, and bile ducts are normal.  The spleen is unremarkable.  The adrenals are normal.  The kidneys are normal size and no hydronephrosis, renal stone, or mass is seen.  The ureters are normal caliber. GI/Bowel:   The appendix is normal.  The bowel gas pattern is unremarkable. The mesentery is unremarkable. Pelvis:   The bladder is unremarkable.  There is mild fat density along the inguinal regions which is unchanged with no bowel loops in the area.  The uterus is atrophic and there is a 3 cm rounded solid-appearing mass along the left

## 2024-08-28 NOTE — PROGRESS NOTES
Occupational Therapy  Facility/Department: [address] PROGRESSIVE CARE  Occupational Therapy Daily Treatment    Name: Fidelia Torres  : 1950  MRN: [id number]  Date of Service: 2024    Discharge Recommendations:  Home with assist PRN, Home with Home health OT  OT Equipment Recommendations  Other: may benefit from shower chair- defer to home OT to ensure proper fit  Fidelia Torres scored a 20/24 on the AM-PAC ADL Inpatient form. Current research shows that an AM-PAC score of 18 or greater is typically associated with a discharge to the patient's home setting. Based on the patient's AM-PAC score, and their current ADL deficits, it is recommended that the patient have 2-3 sessions per week of Occupational Therapy at d/c to increase the patient's independence.  At this time, this patient demonstrates the endurance and safety to discharge home with HHOT and a follow up treatment frequency of 2-3x/wk.   Please see assessment section for further patient specific details.    If patient discharges prior to next session this note will serve as a discharge summary.  Please see below for the latest assessment towards goals.       Patient Diagnosis(es): The primary encounter diagnosis was Severe sepsis (HCC). Diagnoses of NSTEMI (non-ST elevated myocardial infarction) (HCC), IVOLA (acute kidney injury) (HCC), Prolonged Q-T interval on ECG, and Shortness of breath were also pertinent to this visit.  Past Medical History:  has a past medical history of Allergic rhinitis, Asthma, COPD (chronic obstructive pulmonary disease) (HCC), Diabetes mellitus (HCC), Hepatitis, Hyperlipidemia, Hypertension, Influenza A, and Osteoporosis.  Past Surgical History:  has a past surgical history that includes Colonoscopy; Breast biopsy (10/2005); liver biopsy (2006 ); Colonoscopy (13); Colonoscopy (2016); Cataract removal (Bilateral); Colonoscopy (2021); Upper gastrointestinal endoscopy (N/A, 2022); and Breast surgery

## 2024-08-28 NOTE — PROGRESS NOTES
Cardiology - PROGRESS NOTE    Admit Date: 8/24/2024     Reason for follow up:     73-year-old female with past medical history of asthma, COPD, diabetes, hyperlipidemia, hypertension. Presents with a combination of fatigue and shortness of breath with exertion. Currently being evaluated and treated for sepsis cardiology consulted for mild to moderate biomarker elevation       Social History:   reports that she has never smoked. She has never used smokeless tobacco. She reports that she does not currently use alcohol. She reports that she does not use drugs.   Family History: family history includes Breast Cancer in her mother; Cancer in her father and mother; Heart Disease in her mother; Kidney Disease in her brother; Ovarian Cancer in her mother.       Interval History:   Patient seen and examined and notes reviewed   Cr 1.9   Sitting in chair  Feels better today   No shortness of breath or CP   All other systems reviewed and negative except as above.        Diet: ADULT DIET; Regular; 3 carb choices (45 gm/meal); Low Fat/Low Chol/High Fiber/2 gm Na    In: 2503.5 [P.O.:660; I.V.:1744.3]  Out: 600    Patient Vitals for the past 96 hrs (Last 3 readings):   Weight   08/28/24 0440 90.4 kg (199 lb 4.7 oz)   08/27/24 0456 87.9 kg (193 lb 12.6 oz)   08/26/24 0951 86.2 kg (190 lb)           Data:   Scheduled Meds:   Scheduled Meds:   carvedilol  6.25 mg Oral BID WC    sodium chloride flush  5-40 mL IntraVENous 2 times per day    cefepime  1,000 mg IntraVENous Q12H    predniSONE  20 mg Oral Daily    aspirin  81 mg Oral Daily    budesonide-formoterol  2 puff Inhalation BID RT    montelukast  10 mg Oral Nightly    Roflumilast  250 mcg Oral Daily     Continuous Infusions:   sodium chloride      heparin (PORCINE) Infusion 670 Units/hr (08/28/24 0720)     PRN Meds:.sodium chloride flush, sodium chloride, acetaminophen **OR**

## 2024-08-29 LAB
ANION GAP SERPL CALCULATED.3IONS-SCNC: 12 MMOL/L (ref 3–16)
BACTERIA BLD CULT ORG #2: NORMAL
BUN SERPL-MCNC: 26 MG/DL (ref 7–20)
CALCIUM SERPL-MCNC: 9.4 MG/DL (ref 8.3–10.6)
CHLORIDE SERPL-SCNC: 109 MMOL/L (ref 99–110)
CO2 SERPL-SCNC: 19 MMOL/L (ref 21–32)
CREAT SERPL-MCNC: 1.5 MG/DL (ref 0.6–1.2)
DEPRECATED RDW RBC AUTO: 14.2 % (ref 12.4–15.4)
GFR SERPLBLD CREATININE-BSD FMLA CKD-EPI: 37 ML/MIN/{1.73_M2}
GLUCOSE BLD-MCNC: 175 MG/DL (ref 70–99)
GLUCOSE BLD-MCNC: 181 MG/DL (ref 70–99)
GLUCOSE SERPL-MCNC: 84 MG/DL (ref 70–99)
HCT VFR BLD AUTO: 35.6 % (ref 36–48)
HGB BLD-MCNC: 11.6 G/DL (ref 12–16)
MAGNESIUM SERPL-MCNC: 2.01 MG/DL (ref 1.8–2.4)
MCH RBC QN AUTO: 29.4 PG (ref 26–34)
MCHC RBC AUTO-ENTMCNC: 32.6 G/DL (ref 31–36)
MCV RBC AUTO: 90.2 FL (ref 80–100)
NT-PROBNP SERPL-MCNC: ABNORMAL PG/ML (ref 0–124)
PERFORMED ON: ABNORMAL
PERFORMED ON: ABNORMAL
PLATELET # BLD AUTO: 220 K/UL (ref 135–450)
PMV BLD AUTO: 8 FL (ref 5–10.5)
POTASSIUM SERPL-SCNC: 3.1 MMOL/L (ref 3.5–5.1)
PROCALCITONIN SERPL IA-MCNC: 0.12 NG/ML (ref 0–0.15)
RBC # BLD AUTO: 3.95 M/UL (ref 4–5.2)
SODIUM SERPL-SCNC: 140 MMOL/L (ref 136–145)
WBC # BLD AUTO: 16.7 K/UL (ref 4–11)

## 2024-08-29 PROCEDURE — 2580000003 HC RX 258: Performed by: INTERNAL MEDICINE

## 2024-08-29 PROCEDURE — 97530 THERAPEUTIC ACTIVITIES: CPT

## 2024-08-29 PROCEDURE — 97116 GAIT TRAINING THERAPY: CPT | Performed by: PHYSICAL THERAPIST

## 2024-08-29 PROCEDURE — 83735 ASSAY OF MAGNESIUM: CPT

## 2024-08-29 PROCEDURE — 6370000000 HC RX 637 (ALT 250 FOR IP): Performed by: INTERNAL MEDICINE

## 2024-08-29 PROCEDURE — 83880 ASSAY OF NATRIURETIC PEPTIDE: CPT

## 2024-08-29 PROCEDURE — 36415 COLL VENOUS BLD VENIPUNCTURE: CPT

## 2024-08-29 PROCEDURE — 2060000000 HC ICU INTERMEDIATE R&B

## 2024-08-29 PROCEDURE — 6360000002 HC RX W HCPCS: Performed by: INTERNAL MEDICINE

## 2024-08-29 PROCEDURE — 80048 BASIC METABOLIC PNL TOTAL CA: CPT

## 2024-08-29 PROCEDURE — 84145 PROCALCITONIN (PCT): CPT

## 2024-08-29 PROCEDURE — 2580000003 HC RX 258: Performed by: HOSPITALIST

## 2024-08-29 PROCEDURE — 97530 THERAPEUTIC ACTIVITIES: CPT | Performed by: PHYSICAL THERAPIST

## 2024-08-29 PROCEDURE — 6370000000 HC RX 637 (ALT 250 FOR IP): Performed by: STUDENT IN AN ORGANIZED HEALTH CARE EDUCATION/TRAINING PROGRAM

## 2024-08-29 PROCEDURE — 97535 SELF CARE MNGMENT TRAINING: CPT

## 2024-08-29 PROCEDURE — 94760 N-INVAS EAR/PLS OXIMETRY 1: CPT

## 2024-08-29 PROCEDURE — 6370000000 HC RX 637 (ALT 250 FOR IP): Performed by: FAMILY MEDICINE

## 2024-08-29 PROCEDURE — 94640 AIRWAY INHALATION TREATMENT: CPT

## 2024-08-29 PROCEDURE — 6360000002 HC RX W HCPCS: Performed by: NURSE PRACTITIONER

## 2024-08-29 PROCEDURE — 6370000000 HC RX 637 (ALT 250 FOR IP): Performed by: HOSPITALIST

## 2024-08-29 PROCEDURE — 6360000002 HC RX W HCPCS: Performed by: HOSPITALIST

## 2024-08-29 PROCEDURE — 85027 COMPLETE CBC AUTOMATED: CPT

## 2024-08-29 RX ORDER — HYDRALAZINE HYDROCHLORIDE 20 MG/ML
10 INJECTION INTRAMUSCULAR; INTRAVENOUS ONCE
Status: COMPLETED | OUTPATIENT
Start: 2024-08-29 | End: 2024-08-29

## 2024-08-29 RX ORDER — POTASSIUM CHLORIDE 1500 MG/1
20 TABLET, EXTENDED RELEASE ORAL ONCE
Status: COMPLETED | OUTPATIENT
Start: 2024-08-29 | End: 2024-08-29

## 2024-08-29 RX ADMIN — SODIUM CHLORIDE, PRESERVATIVE FREE 10 ML: 5 INJECTION INTRAVENOUS at 08:47

## 2024-08-29 RX ADMIN — PREDNISONE 20 MG: 20 TABLET ORAL at 08:47

## 2024-08-29 RX ADMIN — ROFLUMILAST 250 MCG: 500 TABLET ORAL at 08:49

## 2024-08-29 RX ADMIN — SODIUM CHLORIDE, PRESERVATIVE FREE 10 ML: 5 INJECTION INTRAVENOUS at 21:05

## 2024-08-29 RX ADMIN — BUDESONIDE AND FORMOTEROL FUMARATE DIHYDRATE 2 PUFF: 160; 4.5 AEROSOL RESPIRATORY (INHALATION) at 08:26

## 2024-08-29 RX ADMIN — HYDRALAZINE HYDROCHLORIDE 10 MG: 20 INJECTION, SOLUTION INTRAMUSCULAR; INTRAVENOUS at 23:27

## 2024-08-29 RX ADMIN — ALBUTEROL SULFATE 2 PUFF: 90 AEROSOL, METERED RESPIRATORY (INHALATION) at 20:47

## 2024-08-29 RX ADMIN — POTASSIUM CHLORIDE 20 MEQ: 1500 TABLET, EXTENDED RELEASE ORAL at 11:34

## 2024-08-29 RX ADMIN — CARVEDILOL 6.25 MG: 6.25 TABLET, FILM COATED ORAL at 17:50

## 2024-08-29 RX ADMIN — CEFEPIME 1000 MG: 1 INJECTION, POWDER, FOR SOLUTION INTRAMUSCULAR; INTRAVENOUS at 08:45

## 2024-08-29 RX ADMIN — BUDESONIDE AND FORMOTEROL FUMARATE DIHYDRATE 2 PUFF: 160; 4.5 AEROSOL RESPIRATORY (INHALATION) at 20:47

## 2024-08-29 RX ADMIN — MONTELUKAST 10 MG: 10 TABLET, FILM COATED ORAL at 21:05

## 2024-08-29 RX ADMIN — CARVEDILOL 6.25 MG: 6.25 TABLET, FILM COATED ORAL at 08:47

## 2024-08-29 RX ADMIN — ALBUTEROL SULFATE 2 PUFF: 90 AEROSOL, METERED RESPIRATORY (INHALATION) at 08:25

## 2024-08-29 RX ADMIN — CEFEPIME 2000 MG: 2 INJECTION, POWDER, FOR SOLUTION INTRAVENOUS at 21:10

## 2024-08-29 RX ADMIN — ASPIRIN 81 MG: 81 TABLET, COATED ORAL at 08:47

## 2024-08-29 NOTE — CARE COORDINATION
DISCHARGE PLANNING:    DC plan to return home with family support.  Referral placed to [facility] for SN/PT/OT.  Will follow up with agency.    The Plan for Transition of Care is related to the following treatment goals: home care    The Patient was provided with a choice of provider and agrees   with the discharge plan. [x] Yes [] No    Freedom of choice list was provided with basic dialogue that supports the patient's individualized plan of care/goals, treatment preferences and shares the quality data associated with the providers. [x] Yes [] No    Electronically signed by Katie Traylor RN on 8/29/2024 at 2:54 PM

## 2024-08-29 NOTE — PLAN OF CARE
Problem: Discharge Planning  Goal: Discharge to home or other facility with appropriate resources  Outcome: Progressing  Flowsheets (Taken 8/29/2024 0345)  Discharge to home or other facility with appropriate resources: Identify barriers to discharge with patient and caregiver     Problem: Safety - Adult  Goal: Free from fall injury  Outcome: Progressing  Flowsheets (Taken 8/29/2024 0345)  Free From Fall Injury: Instruct family/caregiver on patient safety     Problem: Cardiovascular - Adult  Goal: Maintains optimal cardiac output and hemodynamic stability  Outcome: Progressing  Flowsheets (Taken 8/29/2024 0345)  Maintains optimal cardiac output and hemodynamic stability: Monitor blood pressure and heart rate     Problem: Cardiovascular - Adult  Goal: Absence of cardiac dysrhythmias or at baseline  Outcome: Progressing  Flowsheets (Taken 8/29/2024 0345)  Absence of cardiac dysrhythmias or at baseline: Monitor cardiac rate and rhythm     Problem: Musculoskeletal - Adult  Goal: Return mobility to safest level of function  Outcome: Progressing  Flowsheets (Taken 8/29/2024 0345)  Return Mobility to Safest Level of Function: Assess patient stability and activity tolerance for standing, transferring and ambulating with or without assistive devices     Problem: Respiratory - Adult  Goal: Achieves optimal ventilation and oxygenation  Outcome: Progressing  Flowsheets (Taken 8/29/2024 0345)  Achieves optimal ventilation and oxygenation:   Assess for changes in respiratory status   Position to facilitate oxygenation and minimize respiratory effort     Problem: Metabolic/Fluid and Electrolytes - Adult  Goal: Electrolytes maintained within normal limits  Outcome: Progressing  Flowsheets (Taken 8/29/2024 0345)  Electrolytes maintained within normal limits: Monitor labs and assess patient for signs and symptoms of electrolyte imbalances     Problem: Hematologic - Adult  Goal: Maintains hematologic stability  Outcome:

## 2024-08-29 NOTE — PLAN OF CARE
Problem: Discharge Planning  Goal: Discharge to home or other facility with appropriate resources  8/29/2024 1050 by Alina Storey RN  Outcome: Progressing     Problem: Cardiovascular - Adult  Goal: Maintains optimal cardiac output and hemodynamic stability  8/29/2024 1050 by Alina Storey RN  Outcome: Progressing     Problem: Cardiovascular - Adult  Goal: Absence of cardiac dysrhythmias or at baseline  8/29/2024 1050 by Alina Storey RN  Outcome: Progressing     Problem: Chronic Conditions and Co-morbidities  Goal: Patient's chronic conditions and co-morbidity symptoms are monitored and maintained or improved  8/29/2024 1050 by Alina Storey RN  Outcome: Progressing

## 2024-08-29 NOTE — PROGRESS NOTES
12/18/2023).    Assessment  Assessment: pt is a 74 yo female who was adm to hosp with sepsis, generalized weakness, nausea/vomiting and a cough.  Pt at baseline is Ind without an AD.  Pt making progress however continues to feel SOB with activity and needs to rest between tasks; pt will be safe to return home with PRN assist and home PT  Therapy Prognosis: Good  Requires PT Follow-Up: Yes  Activity Tolerance  Activity Tolerance: Patient tolerated treatment well;Patient limited by endurance    Plan  Physical Therapy Plan  General Plan: 3-5 times per week  Current Treatment Recommendations: Functional mobility training, Transfer training, Balance training, Strengthening, Gait training, Endurance training, Therapeutic activities  Safety Devices  Type of Devices: Call light within reach, Gait belt, Bed alarm in place, Left in bed  Restraints  Restraints Initially in Place: No    Restrictions  Restrictions/Precautions  Restrictions/Precautions: Fall Risk  Position Activity Restriction  Other position/activity restrictions: IV     Subjective  General  Chart Reviewed: Yes  Patient assessed for rehabilitation services?: Yes  Additional Pertinent Hx: per MD note: \"Fidelia Torres is a 73 y.o. female who presents to the emergency department for generalized weakness fatigue and last weeks is progressively worsening with worsening cough congestion nausea decreased appetite.\"  Response To Previous Treatment: Patient with no complaints from previous session.  Family / Caregiver Present: No  Referring Practitioner: Jm Dahl MD  Referral Date : 08/25/24  Diagnosis: Severe sepsis  Follows Commands: Within Functional Limits  Subjective  Subjective: pt very pleasant and agreeable to working with therapy         Social/Functional History  Social/Functional History  Lives With: Alone  Type of Home: House  Home Layout: One level, Laundry in basement  Home Access: Stairs to enter with rails  Entrance Stairs - Number of Steps: 2 +  2; flight of steps to basement  Bathroom Shower/Tub: Tub/Shower unit  Bathroom Toilet: Standard  Home Equipment: Cane, Walker - 4-Wheeled  Has the patient had two or more falls in the past year or any fall with injury in the past year?: No  ADL Assistance: Independent  Homemaking Assistance: Independent  Ambulation Assistance: Independent (no AD)  Transfer Assistance: Independent  Active : Yes  Vision/Hearing  Vision  Vision: Impaired  Vision Exceptions: Wears glasses for reading  Hearing  Hearing: Within functional limits    Cognition   Orientation  Overall Orientation Status: Within Functional Limits  Cognition  Overall Cognitive Status: WFL    Objective     Bed Mobility Training  Bed Mobility Training: Yes  Overall Level of Assistance: Independent  Supine to Sit: Independent  Scooting: Independent  Balance  Sitting: Intact  Standing: Intact  Transfer Training  Transfer Training: Yes  Overall Level of Assistance: Independent  Sit to Stand: Independent  Stand to Sit: Independent  Bed to Chair: Independent  Gait  Gait Training: Yes (no overt LOB; no noted SOB or reports of light headedness/dizziness)  Overall Level of Assistance: Supervision  Distance (ft): 15 Feet (15ft, 25ft)  Bed mobility  Supine to Sit: Modified independent  Sit to Supine: Modified independent  Transfers  Sit to Stand: Stand by assistance  Stand to Sit: Stand by assistance  Ambulation  Surface: Level tile  Device: No Device  Assistance: Stand by assistance  Quality of Gait: slow pace but no LOB  Gait Deviations: Slow Corinne  Distance: 10' x2, 60'x2 and 120'  Comments: pt used commode Ind without assist     Balance  Sitting - Static: Good  Standing - Static: Fair;+;Good;-  Standing - Dynamic: Fair;+;Good;-      Pt opted to return to bed at end of session; all needs in reach      AM-PAC - Mobility    AM-PAC Basic Mobility - Inpatient   How much help is needed turning from your back to your side while in a flat bed without using bedrails?:

## 2024-08-29 NOTE — PROGRESS NOTES
is needed for bathing (which includes washing, rinsing, drying)?: A Little  How much help is needed for toileting (which includes using toilet, bedpan, or urinal)?: A Little  How much help is needed for putting on and taking off regular upper body clothing?: None  How much help is needed for taking care of personal grooming?: None  How much help for eating meals?: None  AM-PAC Inpatient Daily Activity Raw Score: 21  AM-PAC Inpatient ADL T-Scale Score : 44.27  ADL Inpatient CMS 0-100% Score: 32.79  ADL Inpatient CMS G-Code Modifier : CJ    Tinneti Score       Goals  Short Term Goals  Time Frame for Short Term Goals: Prior to DC: goals ongoing 8/29  Short Term Goal 1: Pt will complete ADL transfers/mobility with supervision- goal met 8/29  Short Term Goal 2: Pt will tolerate standing > 5 min for functional task with supervision  Short Term Goal 3: Pt will complete UE exercises in all planes to inc strength/endurance for ADLs  Short Term Goal 4: Pt will complete LB Dressing with supervision- goal met 8/29  Patient Goals   Patient goals : to return home      Therapy Time   Individual Concurrent Group Co-treatment   Time In 0717         Time Out 0800         Minutes 43         Timed Code Treatment Minutes: 43 Minutes       FALLON Mckinney/PAOLA

## 2024-08-29 NOTE — PROGRESS NOTES
V2.0    Patient:  Fidelia Torres 73 y.o. female MRN: [id number]     Date of Service: 8/29/2024    Patient with history of COPD, coronary artery disease, hypertension presented with possible sepsis with acute organ dysfunction.     Sepsis present on admission.  Resolving.  Continue with current antibiotics  Lactic acid is improving.  PCT 0.17, repeat ordered as pt has worsening leukocytosis     Elevated troponin/NSTEMI.  Cardiology on board.  Echo reviewed, coronary CT versus angiogram as OP.      Hypertension.  -adjusting meds.   Stop amlodipine, keep on coreg. C/w same     History of VIOLA on CKD, improving     History of congestive heart failure.  Does not look in acute exacerbation.  -Will monitor for now.     Reactive airway disease.  -Continue with steroids/monitor.        Diet ADULT DIET; Regular; 3 carb choices (45 gm/meal); Low Fat/Low Chol/High Fiber/2 gm Na   DVT Prophylaxis [] Lovenox, []  Heparin, [x] SCDs, [] Ambulation,  [] Eliquis, [] Xarelto   Code Status Full Code   Disposition From: Home  Expected Disposition: Home/ ECF to be decided  Estimated Date of Discharge: TBD     Surrogate Decision Maker/ POA             Barrier for Discharge:      Heparin drip    Cardizem drip/Amiodarone drip    Pressors gtt    Protonix gtt    IV Lasix/Bumex    Pending Consult Eval    Pending Imaging/CT/MRI/Nuclear scan/Stress Test    IV Abx      Pending Cultures    Requiring BIPAP    Intubated/Mechanical Ventilation    Pending Placement    Pending Safe discharge plan    Pending Ongoing evaluation and improvement    Pending Clearance by Consult Service for discharge           Subjective:     Patient seen and examined this morning worsening leukocytosis, no acute overnight events, feels okay    Objective:     Intake/Output Summary (Last 24 hours) at 8/29/2024 1343  Last data filed at 8/29/2024 1252  Gross per 24 hour   Intake 664.2 ml   Output 1720 ml   Net -1055.8 ml      Vitals:   Vitals:    08/29/24 0530 08/29/24 0724  the uterus.  There questionable small inguinal hernias bilaterally with no bowel loops in the area unchanged.  No adenopathy or ascites is seen.  The mesentery is unremarkable. Peritoneum/Retroperitoneum:   There is a 2.8 cm abdominal umbilical hernia with a small loop of colon partially extending into the orifice of the hernia.  The colon is normal caliber with no wall thickening or inflammation and no bowel dilatation.  The aorta is normal caliber with no aneurysm and no retroperitoneal mass or adenopathy is seen. Bones/Soft Tissues:   The bones are intact.  No aggressive osseous lesion is seen.     Motion artifact throughout the exam which limits the study.  Within the limitations of the exam, no obvious acute abnormality is seen No hydronephrosis or renal stones and no urinary obstruction. 2.9 cm umbilical hernia with a small loop of colon partially extending into the orifice of the hernia which is normal caliber.  Recommend clinical follow-up the hernia. Atrophic uterus with and exophytic rounded solid appearing mass along the left side of the body of the uterus measuring 3 cm which could represent an exophytic fibroid or less likely an adnexal mass.  Recommend nonemergent pelvic ultrasound for follow-up. Normal appendix. Small pericardial effusion.       CBC:   Recent Labs     08/28/24  0711 08/29/24  1000   WBC 14.9* 16.7*   HGB 10.3* 11.6*    220

## 2024-08-30 VITALS
TEMPERATURE: 98.4 F | HEART RATE: 87 BPM | OXYGEN SATURATION: 97 % | SYSTOLIC BLOOD PRESSURE: 145 MMHG | HEIGHT: 64 IN | WEIGHT: 196.65 LBS | BODY MASS INDEX: 33.57 KG/M2 | DIASTOLIC BLOOD PRESSURE: 81 MMHG | RESPIRATION RATE: 16 BRPM

## 2024-08-30 LAB
ANION GAP SERPL CALCULATED.3IONS-SCNC: 12 MMOL/L (ref 3–16)
BUN SERPL-MCNC: 21 MG/DL (ref 7–20)
CALCIUM SERPL-MCNC: 9.2 MG/DL (ref 8.3–10.6)
CHLORIDE SERPL-SCNC: 106 MMOL/L (ref 99–110)
CO2 SERPL-SCNC: 19 MMOL/L (ref 21–32)
CREAT SERPL-MCNC: 1.2 MG/DL (ref 0.6–1.2)
GFR SERPLBLD CREATININE-BSD FMLA CKD-EPI: 48 ML/MIN/{1.73_M2}
GLUCOSE BLD-MCNC: 123 MG/DL (ref 70–99)
GLUCOSE SERPL-MCNC: 102 MG/DL (ref 70–99)
MAGNESIUM SERPL-MCNC: 1.87 MG/DL (ref 1.8–2.4)
PERFORMED ON: ABNORMAL
POTASSIUM SERPL-SCNC: 3.3 MMOL/L (ref 3.5–5.1)
SODIUM SERPL-SCNC: 137 MMOL/L (ref 136–145)

## 2024-08-30 PROCEDURE — 2580000003 HC RX 258: Performed by: HOSPITALIST

## 2024-08-30 PROCEDURE — 94760 N-INVAS EAR/PLS OXIMETRY 1: CPT

## 2024-08-30 PROCEDURE — 97110 THERAPEUTIC EXERCISES: CPT

## 2024-08-30 PROCEDURE — 6370000000 HC RX 637 (ALT 250 FOR IP): Performed by: FAMILY MEDICINE

## 2024-08-30 PROCEDURE — 6370000000 HC RX 637 (ALT 250 FOR IP): Performed by: STUDENT IN AN ORGANIZED HEALTH CARE EDUCATION/TRAINING PROGRAM

## 2024-08-30 PROCEDURE — 94640 AIRWAY INHALATION TREATMENT: CPT

## 2024-08-30 PROCEDURE — 97535 SELF CARE MNGMENT TRAINING: CPT

## 2024-08-30 PROCEDURE — 6370000000 HC RX 637 (ALT 250 FOR IP): Performed by: INTERNAL MEDICINE

## 2024-08-30 PROCEDURE — 83735 ASSAY OF MAGNESIUM: CPT

## 2024-08-30 PROCEDURE — 2580000003 HC RX 258: Performed by: INTERNAL MEDICINE

## 2024-08-30 PROCEDURE — 6370000000 HC RX 637 (ALT 250 FOR IP): Performed by: HOSPITALIST

## 2024-08-30 PROCEDURE — 6360000002 HC RX W HCPCS: Performed by: INTERNAL MEDICINE

## 2024-08-30 PROCEDURE — 36415 COLL VENOUS BLD VENIPUNCTURE: CPT

## 2024-08-30 PROCEDURE — 80048 BASIC METABOLIC PNL TOTAL CA: CPT

## 2024-08-30 RX ORDER — ASPIRIN 81 MG/1
81 TABLET ORAL DAILY
Qty: 30 TABLET | Refills: 3 | Status: SHIPPED | OUTPATIENT
Start: 2024-08-31

## 2024-08-30 RX ORDER — AMLODIPINE BESYLATE 5 MG/1
5 TABLET ORAL DAILY
Status: DISCONTINUED | OUTPATIENT
Start: 2024-08-30 | End: 2024-08-30 | Stop reason: HOSPADM

## 2024-08-30 RX ADMIN — ALBUTEROL SULFATE 2 PUFF: 90 AEROSOL, METERED RESPIRATORY (INHALATION) at 08:55

## 2024-08-30 RX ADMIN — SODIUM CHLORIDE, PRESERVATIVE FREE 10 ML: 5 INJECTION INTRAVENOUS at 07:54

## 2024-08-30 RX ADMIN — ASPIRIN 81 MG: 81 TABLET, COATED ORAL at 07:52

## 2024-08-30 RX ADMIN — PREDNISONE 20 MG: 20 TABLET ORAL at 07:52

## 2024-08-30 RX ADMIN — ROFLUMILAST 250 MCG: 500 TABLET ORAL at 07:52

## 2024-08-30 RX ADMIN — CEFEPIME 2000 MG: 2 INJECTION, POWDER, FOR SOLUTION INTRAVENOUS at 07:54

## 2024-08-30 RX ADMIN — CARVEDILOL 6.25 MG: 6.25 TABLET, FILM COATED ORAL at 07:52

## 2024-08-30 RX ADMIN — BUDESONIDE AND FORMOTEROL FUMARATE DIHYDRATE 2 PUFF: 160; 4.5 AEROSOL RESPIRATORY (INHALATION) at 08:54

## 2024-08-30 RX ADMIN — AMLODIPINE BESYLATE 5 MG: 5 TABLET ORAL at 11:35

## 2024-08-30 NOTE — PROGRESS NOTES
Vitals:    08/30/24 0416   BP: (!) 168/103   Pulse: 72   Resp: 16   Temp: 98.5 °F (36.9 °C)   SpO2: 96%     MD aware of BP. Pt still asymptomatic, no new orders at this time.

## 2024-08-30 NOTE — PROGRESS NOTES
CLINICAL PHARMACY NOTE: MEDS TO BEDS    Total # of Prescriptions Filled: 1   The following medications were delivered to the patient:  Current Discharge Medication List        START taking these medications    Details   aspirin 81 MG EC tablet Take 1 tablet by mouth daily  Qty: 30 tablet, Refills: 3               Additional Documentation: Tubed to Alina

## 2024-08-30 NOTE — PROGRESS NOTES
Pt is awake alert and oriented resting in bed. Respirations easy even no distress. Assessment completed. Pt denies pain. Pt reminded to call for assistance prior to getting out of bed. Pt verbalized understanding. Denies needs at this time. Call light within reach. Will monitor.

## 2024-08-30 NOTE — PROGRESS NOTES
Occupational Therapy  Facility/Department: [address] PROGRESSIVE CARE  Occupational Therapy Treatment Note    Name: Fidelia Torres  : 1950  MRN: [id number]  Date of Service: 2024    Discharge Recommendations:  Home with assist PRN, Home with Home health OT        Fidelia Torres scored a 20/24 on the AM-PAC ADL Inpatient form. Current research shows that an AM-PAC score of 18 or greater is typically associated with a discharge to the patient's home setting. Based on the patient's AM-PAC score, and their current ADL deficits, it is recommended that the patient have 2-3 sessions per week of Occupational Therapy at d/c to increase the patient's independence.  At this time, this patient demonstrates the endurance and safety to discharge home with HHOT (home vs OP services) and a follow up treatment frequency of 2-3x/wk.   Please see assessment section for further patient specific details.    If patient discharges prior to next session this note will serve as a discharge summary.  Please see below for the latest assessment towards goals.     Patient Diagnosis(es): The primary encounter diagnosis was Severe sepsis (HCC). Diagnoses of NSTEMI (non-ST elevated myocardial infarction) (HCC), VIOLA (acute kidney injury) (HCC), Prolonged Q-T interval on ECG, and Shortness of breath were also pertinent to this visit.  Past Medical History:  has a past medical history of Allergic rhinitis, Asthma, COPD (chronic obstructive pulmonary disease) (HCC), Diabetes mellitus (HCC), Hepatitis, Hyperlipidemia, Hypertension, Influenza A, and Osteoporosis.  Past Surgical History:  has a past surgical history that includes Colonoscopy; Breast biopsy (10/2005); liver biopsy (2006 ); Colonoscopy (13); Colonoscopy (2016); Cataract removal (Bilateral); Colonoscopy (2021); Upper gastrointestinal endoscopy (N/A, 2022); and Breast surgery (Right, 2023).         Assessment  Performance deficits / Impairments:

## 2024-08-30 NOTE — NURSE NAVIGATOR
Discharge order noted there is a hospital follow appointment scheduled with   9/4/2024 10:30 AM Latoya Oliva, ROSINA - CNP [facility]   There are CHF instructions on the CRISTAL and AVS. [facility] will call to arrange time for visit with patient.  Dc wt  196 lbs standing

## 2024-08-30 NOTE — PROGRESS NOTES
Vitals:    08/29/24 2309   BP: (!) 167/109   Pulse: 74   Resp: 16   Temp: 98.5 °F (36.9 °C)   SpO2: 97%     NP made aware of BP. Pt asymptomatic, See new orders

## 2024-08-30 NOTE — DISCHARGE SUMMARY
V2.0  Discharge Summary    Name:  Fidelia Torres /Age/Sex: 1950 (73 y.o. female)   Admit Date: 2024  Discharge Date: 24    MRN & CSN:  [id number] & [id number] Encounter Date and Time 24 10:35 AM EDT    Attending:  Angel Duarte MD Discharging Provider: Angel Duarte MD       Hospital Course:   Patient with history of COPD, coronary artery disease, hypertension presented with possible sepsis with endorgan dysfunction present on admission now resolved, unsure of the source, patient completed antibiotics, procalcitonin has been reassuring, patient feels back to baseline, cardiology saw the patient as well for elevated troponin/NSTEMI, echocardiogram within normal limits, will follow-up with cardiology as an outpatient, medications adjusted for hypertension, will continue with amlodipine, beta-blocker, will hold lisinopril, check blood pressure at home and will follow-up with PCP, kidney numbers have improved as well, not in exacerbation of heart failure, patient is back to baseline and is ready to be discharged home with home health.    The patient expressed appropriate understanding of, and agreement with the discharge recommendations, medications, and plan.     Consults this admission:  PHARMACY TO DOSE VANCOMYCIN  IP CONSULT TO CARDIOLOGY  IP CONSULT TO CARDIOLOGY  IP CONSULT TO HEART FAILURE NURSE/COORDINATOR    Discharge Diagnosis:   Severe sepsis with acute organ dysfunction (HCC)        Discharge Instruction:   Follow up appointments: pcp  Primary care physician: Albina Valdes, ROSINA - CNP within 2 weeks  Diet: cardiac diet   Activity: activity as tolerated  Disposition: Discharged to:   []Home, [][facility], []SNF, []Acute Rehab, []Hospice   Condition on discharge: Stable  Labs and Tests to be Followed up as an outpatient by PCP or Specialist:     Discharge Medications:        Medication List        START taking these medications      aspirin 81 MG EC tablet  Take 1 tablet by mouth  administration of intravenous contrast. Multiplanar reformatted images are provided for review. Automated exposure control, iterative reconstruction, and/or weight based adjustment of the mA/kV was utilized to reduce the radiation dose to as low as reasonably achievable. COMPARISON: None. HISTORY: ORDERING SYSTEM PROVIDED HISTORY: nause decreased appetitie. TECHNOLOGIST PROVIDED HISTORY: Additional Contrast?->None Reason for exam:->nause decreased appetitie. Decision Support Exception - unselect if not a suspected or confirmed emergency medical condition->Emergency Medical Condition (MA) Reason for Exam: nause decreased appetitie. FINDINGS: There is motion artifact throughout the exam. Lower Chest:   The lung bases are clear.  There is a small pericardial effusion. Organs: There is mild hypertrophy of the caudate and left lobes of the liver. No focal lesion is seen.  The gallbladder, pancreas, and bile ducts are normal.  The spleen is unremarkable.  The adrenals are normal.  The kidneys are normal size and no hydronephrosis, renal stone, or mass is seen.  The ureters are normal caliber. GI/Bowel:   The appendix is normal.  The bowel gas pattern is unremarkable. The mesentery is unremarkable. Pelvis:   The bladder is unremarkable.  There is mild fat density along the inguinal regions which is unchanged with no bowel loops in the area.  The uterus is atrophic and there is a 3 cm rounded solid-appearing mass along the left side of the body of the uterus.  There questionable small inguinal hernias bilaterally with no bowel loops in the area unchanged.  No adenopathy or ascites is seen.  The mesentery is unremarkable. Peritoneum/Retroperitoneum:   There is a 2.8 cm abdominal umbilical hernia with a small loop of colon partially extending into the orifice of the hernia.  The colon is normal caliber with no wall thickening or inflammation and no bowel dilatation.  The aorta is normal caliber with no aneurysm and no

## 2024-08-30 NOTE — PROGRESS NOTES
Discharge orders acknowledged by RN . Discharge teaching completed with pt and family. AVS reviewed and all questions answered. Medication regimen reviewed and pt understands schedule. Follow up appointments also reviewed with pt and resources given for discharge. Pt was sent electronic scripts to be filled and understands schedule. IV removed. Bedside monitor removed from pt. Pt vitals WDL. Pt discharged with all belongings to home. Pt transported off of unit via wheelchair. No complications.

## 2024-08-30 NOTE — CARE COORDINATION
Case Management Discharge Note          Date / Time of Note: 8/30/2024 10:50 AM                  Patient Name: Fidelia Torres   YOB: 1950  Diagnosis: Prolonged Q-T interval on ECG [R94.31]  NSTEMI (non-ST elevated myocardial infarction) (formerly [facility]) [I21.4]  VIOLA (acute kidney injury) (formerly [facility]) [N17.9]  Severe sepsis (HCC) [A41.9, R65.20]  Severe sepsis with acute organ dysfunction (HCC) [A41.9, R65.20]   Date / Time: 8/24/2024  6:25 PM    Financial:  Payor: [employer] MEDICARE / Plan: [employer] MEDICARE-ADVANTAGE PPO / Product Type: Medicare /      Pharmacy:    [id number] [address] - P [phone] - F [phone]  [address]  [place] [address]  Phone: [phone] Fax: [phone]    [id number] [place], [place] - [address] - P [phone] - F [phone]  [address]  [place] [address]  Phone: [phone] Fax: [phone]      Assistance purchasing medications?: Potential Assistance Purchasing Medications: No  Assistance provided by Case Management: None at this time    DISCHARGE Disposition: Home with Home Health Care    Home Care:  Home Care ordered at discharge: Yes  Home Care Agency: [facility]  Phone: [phone]  Fax: [phone]  Orders faxed: can pull from [facility]    Transportation:  Transportation PLAN for discharge: family   Mode of Transport: Private Car  Time of Transport: Will call brother for a ride when ready    IMM Completed:   Yes, Case management has presented and reviewed IMM letter #2.       IMM Letter date given:: 08/29/24  IMM Letter time given:: 1403.   Patient and/or family/POA verbalized understanding of their medicare rights and appeal process if needed. Patient and/or family/POA has signed, initialed and placed the date and time on IMM letter #2 on the the appropriate lines. Copy of letter offered and they are aware that the original copy of IMM letter #2 is available prior to discharge from

## 2024-09-03 ENCOUNTER — TELEPHONE (OUTPATIENT)
Dept: FAMILY MEDICINE CLINIC | Age: 74
End: 2024-09-03

## 2024-09-03 ENCOUNTER — CARE COORDINATION (OUTPATIENT)
Dept: CARE COORDINATION | Age: 74
End: 2024-09-03

## 2024-09-03 NOTE — PROGRESS NOTES
[facility]     Outpatient Follow Up Note    CHIEF COMPLAINT / HPI: Hospital Follow Up secondary to asthma, COPD, diabetes, hyperlipidemia, hypertension    Hospital record has been reviewed  Hospital Course progressed as follows:     Hospital Course:   Patient with history of COPD, coronary artery disease, hypertension presented with possible sepsis with endorgan dysfunction present on admission now resolved, unsure of the source, patient completed antibiotics, procalcitonin has been reassuring, patient feels back to baseline, cardiology saw the patient as well for elevated troponin/NSTEMI, echocardiogram within normal limits, will follow-up with cardiology as an outpatient, medications adjusted for hypertension, will continue with amlodipine, beta-blocker, will hold lisinopril, check blood pressure at home and will follow-up with PCP, kidney numbers have improved as well, not in exacerbation of heart failure, patient is back to baseline and is ready to be discharged home with home health.     The patient expressed appropriate understanding of, and agreement with the discharge recommendations, medications, and plan.       Fidelia Torres is 73 y.o. female who presents today for a routine follow up after a recent hospitalization related to the above mentioned issues.  Subjective:   Since the time of discharge, the patient admits their symptoms have improved.       At today's office visit patient denies any chest pain, palpitations, SOB, dyspnea with exertion, dizziness or edema.   With regard to medication therapy the patient has been compliant with prescribed regimen. They have tolerated therapy to date.     Past Medical History:   Diagnosis Date    Allergic rhinitis     Asthma     COPD (chronic obstructive pulmonary disease) (HCC)     pt denies    Diabetes mellitus (HCC)     Hepatitis     autoimmune    Hyperlipidemia     Hypertension     Influenza A 01/10/2015    Osteoporosis      Social History:

## 2024-09-03 NOTE — CARE COORDINATION
Care Transitions Note    Initial Call - Call within 2 business days of discharge: Yes    Attempted to reach patient for transitions of care follow up. Unable to reach patient.    Outreach Attempts:   Unable to leave message.     Patient: Fidelia Torres    Patient : 1950   MRN: [id number]    Reason for Admission: sepsis  Discharge Date: 24  RURS: Readmission Risk Score: 11.1    Last Discharge Facility       Date Complaint Diagnosis Description Type Department Provider    24 Fatigue Severe sepsis (HCC) ... ED to Hosp-Admission (Discharged) (ADMITTED) DE[facility] 5W Angel Duarte MD; Lino Yuen...            Was this an external facility discharge? No    Follow Up Appointment:   Patient has hospital follow up appointment scheduled within 7 days of discharge.    Future Appointments         Provider Specialty Dept Phone    2024 10:30 AM Latoya Oliva, APRN - CNP Cardiology [phone]    2024 10:15 AM Machelle Delaney RD Endocrinology [phone]    10/8/2024 9:40 AM Albina Valdes, APRN - CNP Family Medicine [phone]    2024 11:30 AM Rafiq Galindo MD Nephrology [phone]            Plan for follow-up call in 2-5 days     Shira Motta

## 2024-09-03 NOTE — TELEPHONE ENCOUNTER
Care Transitions Initial Follow Up Call    Call within 2 business days of discharge: Yes     Patient: Fidelia Torres Patient : 1950 MRN: [id number]        RARS: Readmission Risk Score: 11.1       Spoke with: Fidelia Torres    Discharge department/facility: [facility]    Non-face-to-face services provided:  Scheduled appointment with PCP-     Follow Up  Future Appointments   Date Time Provider Department Center   2024 10:30 AM Latoya Oliva APRN - CNP [facility]   2024 11:20 AM Albina Valdes APRN - CNP [facility]   2024 10:15 AM Machelle Delaney RD [facility]   10/8/2024  9:40 AM Albina Valdes APRN - CNP [facility]   2024 11:30 AM Rafiq Galindo MD [facility]       Crystal Deluca

## 2024-09-04 ENCOUNTER — TELEPHONE (OUTPATIENT)
Dept: CARDIOLOGY CLINIC | Age: 74
End: 2024-09-04

## 2024-09-04 ENCOUNTER — TELEPHONE (OUTPATIENT)
Dept: FAMILY MEDICINE CLINIC | Age: 74
End: 2024-09-04

## 2024-09-04 ENCOUNTER — OFFICE VISIT (OUTPATIENT)
Dept: CARDIOLOGY CLINIC | Age: 74
End: 2024-09-04
Payer: MEDICARE

## 2024-09-04 ENCOUNTER — CARE COORDINATION (OUTPATIENT)
Dept: CASE MANAGEMENT | Age: 74
End: 2024-09-04

## 2024-09-04 VITALS
HEIGHT: 64 IN | DIASTOLIC BLOOD PRESSURE: 80 MMHG | WEIGHT: 190 LBS | BODY MASS INDEX: 32.44 KG/M2 | HEART RATE: 85 BPM | OXYGEN SATURATION: 97 % | SYSTOLIC BLOOD PRESSURE: 134 MMHG

## 2024-09-04 DIAGNOSIS — E78.00 PURE HYPERCHOLESTEROLEMIA: ICD-10-CM

## 2024-09-04 DIAGNOSIS — N18.9 ACUTE KIDNEY INJURY SUPERIMPOSED ON CHRONIC KIDNEY DISEASE (HCC): ICD-10-CM

## 2024-09-04 DIAGNOSIS — N17.9 ACUTE KIDNEY INJURY SUPERIMPOSED ON CHRONIC KIDNEY DISEASE (HCC): ICD-10-CM

## 2024-09-04 DIAGNOSIS — R79.89 ELEVATED TROPONIN I LEVEL: ICD-10-CM

## 2024-09-04 DIAGNOSIS — I10 ESSENTIAL HYPERTENSION, BENIGN: ICD-10-CM

## 2024-09-04 DIAGNOSIS — Z09 HOSPITAL DISCHARGE FOLLOW-UP: Primary | ICD-10-CM

## 2024-09-04 DIAGNOSIS — R19.00 PELVIC MASS IN FEMALE: Primary | ICD-10-CM

## 2024-09-04 PROCEDURE — 1111F DSCHRG MED/CURRENT MED MERGE: CPT | Performed by: NURSE PRACTITIONER

## 2024-09-04 PROCEDURE — 3075F SYST BP GE 130 - 139MM HG: CPT | Performed by: NURSE PRACTITIONER

## 2024-09-04 PROCEDURE — 1123F ACP DISCUSS/DSCN MKR DOCD: CPT | Performed by: NURSE PRACTITIONER

## 2024-09-04 PROCEDURE — 99214 OFFICE O/P EST MOD 30 MIN: CPT | Performed by: NURSE PRACTITIONER

## 2024-09-04 PROCEDURE — 3079F DIAST BP 80-89 MM HG: CPT | Performed by: NURSE PRACTITIONER

## 2024-09-04 NOTE — TELEPHONE ENCOUNTER
Attempted to call pt to relay information regarding US order. Unable to connect with pt nor LVM as mailbox is full.

## 2024-09-04 NOTE — CARE COORDINATION
Care Transitions Note    Initial Call - Call within 2 business days of discharge: Yes    Attempted to reach patient for transitions of care follow up. Unable to reach patient.    No answer - voicemail is full.     Call placed to [facility]. Left message for Lynn with the intake team. Contact info provided. Requested return call to [facility]. Attempting to see if referral was received.    Outreach Attempts:   Unable to leave message.     Patient: Fidelia DELGADILLO Torres    Patient : 1950   MRN: [id number]    Reason for Admission: fatigue  Discharge Date: 24  RURS: Readmission Risk Score: 11.1    Last Discharge Facility       Date Complaint Diagnosis Description Type Department Provider    24 Fatigue Severe sepsis (HCC) ... ED to Hosp-Admission (Discharged) (ADMITTED) [facility] BacaW Angel Duarte MD; Lino Yuen...            Was this an external facility discharge? No    Follow Up Appointment:   Patient does not have a follow up appointment scheduled at time of call. Fidelia had a HFU appt with [facility] today . She has an OV scheduled with the PCP tomorrow (2024). Writer will send message to the office pool to see if that can be changed to a HFU appt.  Future Appointments         Provider Specialty Dept Phone    2024 11:20 AM Albina Valdes APRN - [facility] Family Medicine [phone]    2024 10:15 AM Machelle Delaney RD Endocrinology [phone]    10/8/2024 9:40 AM Albina Valdes APRN - [facility] Family Medicine [phone]    2024 11:30 AM Rafiq Galindo MD Nephrology [phone]          Will follow up on [facility].    Ayse Steele RN BSN  Care Transition Nurse  [phone]

## 2024-09-04 NOTE — TELEPHONE ENCOUNTER
We have been unable to reach patient by phone to schedule LHC.  We have attempted to leave messages, but mailbox is full and attempted to call home phone with no answer.

## 2024-09-05 ENCOUNTER — OFFICE VISIT (OUTPATIENT)
Dept: FAMILY MEDICINE CLINIC | Age: 74
End: 2024-09-05

## 2024-09-05 ENCOUNTER — CARE COORDINATION (OUTPATIENT)
Dept: CASE MANAGEMENT | Age: 74
End: 2024-09-05

## 2024-09-05 VITALS
HEIGHT: 64 IN | SYSTOLIC BLOOD PRESSURE: 150 MMHG | DIASTOLIC BLOOD PRESSURE: 82 MMHG | WEIGHT: 187 LBS | TEMPERATURE: 96.9 F | HEART RATE: 94 BPM | BODY MASS INDEX: 31.92 KG/M2 | OXYGEN SATURATION: 95 %

## 2024-09-05 DIAGNOSIS — N18.32 STAGE 3B CHRONIC KIDNEY DISEASE (HCC): Primary | ICD-10-CM

## 2024-09-05 DIAGNOSIS — Z09 HOSPITAL DISCHARGE FOLLOW-UP: ICD-10-CM

## 2024-09-05 PROBLEM — R65.20 SEVERE SEPSIS WITH ACUTE ORGAN DYSFUNCTION (HCC): Status: RESOLVED | Noted: 2024-08-24 | Resolved: 2024-09-05

## 2024-09-05 PROBLEM — R79.89 ELEVATED TROPONIN I LEVEL: Status: RESOLVED | Noted: 2024-08-25 | Resolved: 2024-09-05

## 2024-09-05 PROBLEM — N18.9 ACUTE KIDNEY INJURY SUPERIMPOSED ON CHRONIC KIDNEY DISEASE (HCC): Status: RESOLVED | Noted: 2024-08-25 | Resolved: 2024-09-05

## 2024-09-05 PROBLEM — E66.01 MORBIDLY OBESE (HCC): Status: RESOLVED | Noted: 2020-08-11 | Resolved: 2024-09-05

## 2024-09-05 PROBLEM — A41.9 SEVERE SEPSIS WITH ACUTE ORGAN DYSFUNCTION (HCC): Status: RESOLVED | Noted: 2024-08-24 | Resolved: 2024-09-05

## 2024-09-05 PROBLEM — N17.9 ACUTE KIDNEY INJURY SUPERIMPOSED ON CHRONIC KIDNEY DISEASE (HCC): Status: RESOLVED | Noted: 2024-08-25 | Resolved: 2024-09-05

## 2024-09-05 ASSESSMENT — ENCOUNTER SYMPTOMS
BACK PAIN: 0
STRIDOR: 0
EYE PAIN: 0
SINUS PRESSURE: 0
TROUBLE SWALLOWING: 0
CONSTIPATION: 0
ABDOMINAL PAIN: 0
EYE ITCHING: 0
NAUSEA: 0
RHINORRHEA: 0
EYE DISCHARGE: 0
EYE REDNESS: 0
COUGH: 0
SHORTNESS OF BREATH: 0
SINUS PAIN: 0
ABDOMINAL DISTENTION: 0
VOMITING: 0
WHEEZING: 0
DIARRHEA: 0
CHEST TIGHTNESS: 0

## 2024-09-05 NOTE — PROGRESS NOTES
TWICE A DAY AND AS NEEDED FOR IRREGULAR BLOOD SUGAR SYMPTOMS     montelukast 10 MG tablet  Commonly known as: SINGULAIR  TAKE 1 TABLET BY MOUTH EVERY DAY EVERY NIGHT     MULTIVITAMIN PO     pioglitazone 15 MG tablet  Commonly known as: ACTOS  TAKE 1 TABLET BY MOUTH EVERY DAY     predniSONE 10 MG tablet  Commonly known as: DELTASONE  TAKE 1 TABLET BY MOUTH EVERY DAY     Roflumilast 250 MCG tablet  Commonly known as: DALIRESP  Take 1 tablet by mouth daily               Medications marked \"taking\" at this time  Outpatient Medications Marked as Taking for the 9/5/24 encounter (Office Visit) with Albina Valdes, ROSINA - CNP   Medication Sig Dispense Refill    aspirin 81 MG EC tablet Take 1 tablet by mouth daily 30 tablet 3    amLODIPine (NORVASC) 5 MG tablet TAKE 1 TABLET BY MOUTH TWICE A  tablet 1    Roflumilast (DALIRESP) 250 MCG tablet Take 1 tablet by mouth daily 28 tablet 3    pioglitazone (ACTOS) 15 MG tablet TAKE 1 TABLET BY MOUTH EVERY DAY 90 tablet 1    atorvastatin (LIPITOR) 40 MG tablet TAKE 1 TABLET BY MOUTH EVERY DAY 90 tablet 1    predniSONE (DELTASONE) 10 MG tablet TAKE 1 TABLET BY MOUTH EVERY DAY 90 tablet 2    carvedilol (COREG) 6.25 MG tablet TAKE 1 TABLET BY MOUTH TWICE A  tablet 1    FREESTYLE LITE strip TEST TWICE A DAY AND AS NEEDED FOR IRREGULAR BLOOD SUGAR SYMPTOMS 100 strip 3    albuterol sulfate HFA (PROVENTIL;VENTOLIN;PROAIR) 108 (90 Base) MCG/ACT inhaler INHALE 2 PUFFS INTO THE LUNGS EVERY 6 HOURS AS NEEDED. 6.7 each 2    montelukast (SINGULAIR) 10 MG tablet TAKE 1 TABLET BY MOUTH EVERY DAY EVERY NIGHT 90 tablet 3    budesonide-formoterol (SYMBICORT) 160-4.5 MCG/ACT AERO TAKE 2 PUFFS BY MOUTH TWICE A DAY 30.6 each 1    fluticasone (FLONASE) 50 MCG/ACT nasal spray SPRAY 1 SPRAY INTO EACH NOSTRIL EVERY DAY (Patient taking differently: PRN) 1 Bottle 2    calcium carbonate (OSCAL) 500 MG TABS tablet Take 1 tablet by mouth daily      Multiple Vitamin (MULTIVITAMIN PO) Take 1 tablet

## 2024-09-05 NOTE — CARE COORDINATION
Spoke with Joanne from the intake team at [facility]. She states they do have the patient referral. No additional CT outreach at this time.    Ayse Steele RN BSN  Care Transition Nurse  [phone]

## 2024-09-09 PROBLEM — R07.9 CHEST PAIN: Status: ACTIVE | Noted: 2024-08-28

## 2024-09-13 ENCOUNTER — HOSPITAL ENCOUNTER (OUTPATIENT)
Dept: ULTRASOUND IMAGING | Age: 74
Discharge: HOME | End: 2024-09-13
Payer: MEDICARE

## 2024-09-13 DIAGNOSIS — R19.00 PELVIC MASS IN FEMALE: ICD-10-CM

## 2024-09-13 PROCEDURE — 76830 TRANSVAGINAL US NON-OB: CPT

## 2024-09-13 PROCEDURE — 76856 US EXAM PELVIC COMPLETE: CPT

## 2024-09-16 ENCOUNTER — TELEPHONE (OUTPATIENT)
Dept: CARDIOLOGY CLINIC | Age: 74
End: 2024-09-16

## 2024-09-16 DIAGNOSIS — R79.89 ELEVATED TROPONIN I LEVEL: ICD-10-CM

## 2024-09-16 DIAGNOSIS — N94.89 ADNEXAL MASS: Primary | ICD-10-CM

## 2024-09-16 LAB
ANION GAP SERPL CALCULATED.3IONS-SCNC: 13 MMOL/L (ref 3–16)
BUN SERPL-MCNC: 18 MG/DL (ref 7–20)
CALCIUM SERPL-MCNC: 10 MG/DL (ref 8.3–10.6)
CHLORIDE SERPL-SCNC: 101 MMOL/L (ref 99–110)
CO2 SERPL-SCNC: 26 MMOL/L (ref 21–32)
CREAT SERPL-MCNC: 1.3 MG/DL (ref 0.6–1.2)
DEPRECATED RDW RBC AUTO: 14.9 % (ref 12.4–15.4)
GFR SERPLBLD CREATININE-BSD FMLA CKD-EPI: 44 ML/MIN/{1.73_M2}
GLUCOSE SERPL-MCNC: 140 MG/DL (ref 70–99)
HCT VFR BLD AUTO: 38.3 % (ref 36–48)
HGB BLD-MCNC: 12.3 G/DL (ref 12–16)
MCH RBC QN AUTO: 29.5 PG (ref 26–34)
MCHC RBC AUTO-ENTMCNC: 32 G/DL (ref 31–36)
MCV RBC AUTO: 92.1 FL (ref 80–100)
PLATELET # BLD AUTO: 386 K/UL (ref 135–450)
PMV BLD AUTO: 7.4 FL (ref 5–10.5)
POTASSIUM SERPL-SCNC: 3.8 MMOL/L (ref 3.5–5.1)
RBC # BLD AUTO: 4.16 M/UL (ref 4–5.2)
SODIUM SERPL-SCNC: 140 MMOL/L (ref 136–145)
WBC # BLD AUTO: 12.9 K/UL (ref 4–11)

## 2024-09-17 ENCOUNTER — HOSPITAL ENCOUNTER (OUTPATIENT)
Age: 74
Setting detail: OUTPATIENT SURGERY
Discharge: HOME | End: 2024-09-17
Attending: STUDENT IN AN ORGANIZED HEALTH CARE EDUCATION/TRAINING PROGRAM | Admitting: STUDENT IN AN ORGANIZED HEALTH CARE EDUCATION/TRAINING PROGRAM
Payer: MEDICARE

## 2024-09-17 ENCOUNTER — TELEPHONE (OUTPATIENT)
Dept: FAMILY MEDICINE CLINIC | Age: 74
End: 2024-09-17

## 2024-09-17 VITALS
HEART RATE: 88 BPM | BODY MASS INDEX: 31.58 KG/M2 | WEIGHT: 185 LBS | DIASTOLIC BLOOD PRESSURE: 78 MMHG | TEMPERATURE: 97 F | OXYGEN SATURATION: 96 % | HEIGHT: 64 IN | SYSTOLIC BLOOD PRESSURE: 151 MMHG | RESPIRATION RATE: 23 BRPM

## 2024-09-17 DIAGNOSIS — R07.9 CHEST PAIN: ICD-10-CM

## 2024-09-17 LAB — ECHO BSA: 1.95 M2

## 2024-09-17 PROCEDURE — 2580000003 HC RX 258: Performed by: STUDENT IN AN ORGANIZED HEALTH CARE EDUCATION/TRAINING PROGRAM

## 2024-09-17 PROCEDURE — 99152 MOD SED SAME PHYS/QHP 5/>YRS: CPT | Performed by: STUDENT IN AN ORGANIZED HEALTH CARE EDUCATION/TRAINING PROGRAM

## 2024-09-17 PROCEDURE — 6360000004 HC RX CONTRAST MEDICATION: Performed by: STUDENT IN AN ORGANIZED HEALTH CARE EDUCATION/TRAINING PROGRAM

## 2024-09-17 PROCEDURE — C1894 INTRO/SHEATH, NON-LASER: HCPCS | Performed by: STUDENT IN AN ORGANIZED HEALTH CARE EDUCATION/TRAINING PROGRAM

## 2024-09-17 PROCEDURE — 2709999900 HC NON-CHARGEABLE SUPPLY: Performed by: STUDENT IN AN ORGANIZED HEALTH CARE EDUCATION/TRAINING PROGRAM

## 2024-09-17 PROCEDURE — 7100000010 HC PHASE II RECOVERY - FIRST 15 MIN: Performed by: STUDENT IN AN ORGANIZED HEALTH CARE EDUCATION/TRAINING PROGRAM

## 2024-09-17 PROCEDURE — 93458 L HRT ARTERY/VENTRICLE ANGIO: CPT | Performed by: STUDENT IN AN ORGANIZED HEALTH CARE EDUCATION/TRAINING PROGRAM

## 2024-09-17 PROCEDURE — 7100000011 HC PHASE II RECOVERY - ADDTL 15 MIN: Performed by: STUDENT IN AN ORGANIZED HEALTH CARE EDUCATION/TRAINING PROGRAM

## 2024-09-17 PROCEDURE — 2500000003 HC RX 250 WO HCPCS: Performed by: STUDENT IN AN ORGANIZED HEALTH CARE EDUCATION/TRAINING PROGRAM

## 2024-09-17 PROCEDURE — 6360000002 HC RX W HCPCS: Performed by: STUDENT IN AN ORGANIZED HEALTH CARE EDUCATION/TRAINING PROGRAM

## 2024-09-17 PROCEDURE — C1769 GUIDE WIRE: HCPCS | Performed by: STUDENT IN AN ORGANIZED HEALTH CARE EDUCATION/TRAINING PROGRAM

## 2024-09-17 RX ORDER — METHYLPREDNISOLONE SODIUM SUCCINATE 125 MG/2ML
125 INJECTION, POWDER, LYOPHILIZED, FOR SOLUTION INTRAMUSCULAR; INTRAVENOUS ONCE
Status: COMPLETED | OUTPATIENT
Start: 2024-09-17 | End: 2024-09-17

## 2024-09-17 RX ORDER — DIPHENHYDRAMINE HYDROCHLORIDE 50 MG/ML
INJECTION INTRAMUSCULAR; INTRAVENOUS PRN
Status: DISCONTINUED | OUTPATIENT
Start: 2024-09-17 | End: 2024-09-17 | Stop reason: HOSPADM

## 2024-09-17 RX ORDER — MIDAZOLAM HYDROCHLORIDE 1 MG/ML
INJECTION INTRAMUSCULAR; INTRAVENOUS PRN
Status: DISCONTINUED | OUTPATIENT
Start: 2024-09-17 | End: 2024-09-17 | Stop reason: HOSPADM

## 2024-09-17 RX ORDER — SODIUM CHLORIDE 9 MG/ML
INJECTION, SOLUTION INTRAVENOUS PRN
Status: DISCONTINUED | OUTPATIENT
Start: 2024-09-17 | End: 2024-09-17 | Stop reason: HOSPADM

## 2024-09-17 RX ORDER — SODIUM CHLORIDE 0.9 % (FLUSH) 0.9 %
5-40 SYRINGE (ML) INJECTION EVERY 12 HOURS SCHEDULED
Status: DISCONTINUED | OUTPATIENT
Start: 2024-09-17 | End: 2024-09-17 | Stop reason: HOSPADM

## 2024-09-17 RX ORDER — ACETAMINOPHEN 325 MG/1
650 TABLET ORAL EVERY 4 HOURS PRN
Status: DISCONTINUED | OUTPATIENT
Start: 2024-09-17 | End: 2024-09-17 | Stop reason: HOSPADM

## 2024-09-17 RX ORDER — FENTANYL CITRATE 50 UG/ML
INJECTION, SOLUTION INTRAMUSCULAR; INTRAVENOUS PRN
Status: DISCONTINUED | OUTPATIENT
Start: 2024-09-17 | End: 2024-09-17 | Stop reason: HOSPADM

## 2024-09-17 RX ORDER — ASPIRIN 325 MG
325 TABLET ORAL ONCE
Status: DISCONTINUED | OUTPATIENT
Start: 2024-09-17 | End: 2024-09-17 | Stop reason: HOSPADM

## 2024-09-17 RX ORDER — IOPAMIDOL 755 MG/ML
INJECTION, SOLUTION INTRAVASCULAR PRN
Status: DISCONTINUED | OUTPATIENT
Start: 2024-09-17 | End: 2024-09-17 | Stop reason: HOSPADM

## 2024-09-17 RX ORDER — SODIUM CHLORIDE 0.9 % (FLUSH) 0.9 %
5-40 SYRINGE (ML) INJECTION PRN
Status: DISCONTINUED | OUTPATIENT
Start: 2024-09-17 | End: 2024-09-17 | Stop reason: HOSPADM

## 2024-09-17 RX ORDER — DIPHENHYDRAMINE HYDROCHLORIDE 50 MG/ML
25 INJECTION INTRAMUSCULAR; INTRAVENOUS ONCE
Status: COMPLETED | OUTPATIENT
Start: 2024-09-17 | End: 2024-09-17

## 2024-09-17 RX ADMIN — SODIUM CHLORIDE: 9 INJECTION, SOLUTION INTRAVENOUS at 09:19

## 2024-09-17 RX ADMIN — METHYLPREDNISOLONE SODIUM SUCCINATE 125 MG: 125 INJECTION INTRAMUSCULAR; INTRAVENOUS at 09:20

## 2024-09-17 RX ADMIN — DIPHENHYDRAMINE HYDROCHLORIDE 25 MG: 50 INJECTION INTRAMUSCULAR; INTRAVENOUS at 09:20

## 2024-09-17 RX ADMIN — Medication 10 ML: at 09:19

## 2024-09-17 NOTE — TELEPHONE ENCOUNTER
Lianne from [facility] is calling because the patient was recently diagnosed with heart failure and was prescribed PIOGLITAZONE which is not recommended for patients with HF. Please confirm that patient should be on this medication. Call Lianne directly at [phone], thanks.

## 2024-09-19 ENCOUNTER — OFFICE VISIT (OUTPATIENT)
Dept: ENDOCRINOLOGY | Age: 74
End: 2024-09-19
Payer: MEDICARE

## 2024-09-19 DIAGNOSIS — E11.22 TYPE 2 DIABETES MELLITUS WITH STAGE 3 CHRONIC KIDNEY DISEASE, WITHOUT LONG-TERM CURRENT USE OF INSULIN, UNSPECIFIED WHETHER STAGE 3A OR 3B CKD (HCC): Primary | ICD-10-CM

## 2024-09-19 DIAGNOSIS — N18.30 TYPE 2 DIABETES MELLITUS WITH STAGE 3 CHRONIC KIDNEY DISEASE, WITHOUT LONG-TERM CURRENT USE OF INSULIN, UNSPECIFIED WHETHER STAGE 3A OR 3B CKD (HCC): Primary | ICD-10-CM

## 2024-09-19 PROCEDURE — 3052F HG A1C>EQUAL 8.0%<EQUAL 9.0%: CPT

## 2024-09-19 PROCEDURE — 97803 MED NUTRITION INDIV SUBSEQ: CPT

## 2024-09-24 ENCOUNTER — OFFICE VISIT (OUTPATIENT)
Dept: GYNECOLOGY | Age: 74
End: 2024-09-24
Payer: MEDICARE

## 2024-09-24 VITALS
SYSTOLIC BLOOD PRESSURE: 132 MMHG | DIASTOLIC BLOOD PRESSURE: 82 MMHG | OXYGEN SATURATION: 97 % | BODY MASS INDEX: 30.62 KG/M2 | WEIGHT: 178.4 LBS | HEART RATE: 97 BPM

## 2024-09-24 DIAGNOSIS — R93.89 ENDOMETRIAL THICKENING ON ULTRASOUND: ICD-10-CM

## 2024-09-24 DIAGNOSIS — Z01.419 WELL WOMAN EXAM WITH ROUTINE GYNECOLOGICAL EXAM: Primary | ICD-10-CM

## 2024-09-24 PROCEDURE — G0101 CA SCREEN;PELVIC/BREAST EXAM: HCPCS | Performed by: OBSTETRICS & GYNECOLOGY

## 2024-09-27 ENCOUNTER — PROCEDURE VISIT (OUTPATIENT)
Dept: GYNECOLOGY | Age: 74
End: 2024-09-27

## 2024-09-27 VITALS
OXYGEN SATURATION: 98 % | WEIGHT: 178 LBS | HEART RATE: 91 BPM | BODY MASS INDEX: 30.55 KG/M2 | DIASTOLIC BLOOD PRESSURE: 70 MMHG | SYSTOLIC BLOOD PRESSURE: 112 MMHG

## 2024-09-27 DIAGNOSIS — N88.2 STENOTIC CERVICAL OS: ICD-10-CM

## 2024-09-27 DIAGNOSIS — R93.89 ENDOMETRIAL THICKENING ON ULTRASOUND: Primary | ICD-10-CM

## 2024-10-01 ENCOUNTER — TELEPHONE (OUTPATIENT)
Dept: FAMILY MEDICINE CLINIC | Age: 74
End: 2024-10-01

## 2024-10-01 NOTE — TELEPHONE ENCOUNTER
Christa from [facility] is discharging pt this Thursday for Home care,because she is leaving for [place] next week

## 2024-10-03 DIAGNOSIS — N18.32 STAGE 3B CHRONIC KIDNEY DISEASE (HCC): ICD-10-CM

## 2024-10-03 LAB
ANION GAP SERPL CALCULATED.3IONS-SCNC: 15 MMOL/L (ref 3–16)
BASOPHILS # BLD: 0.1 K/UL (ref 0–0.2)
BASOPHILS NFR BLD: 0.9 %
BUN SERPL-MCNC: 9 MG/DL (ref 7–20)
CALCIUM SERPL-MCNC: 10.7 MG/DL (ref 8.3–10.6)
CHLORIDE SERPL-SCNC: 106 MMOL/L (ref 99–110)
CO2 SERPL-SCNC: 22 MMOL/L (ref 21–32)
CREAT SERPL-MCNC: 1.4 MG/DL (ref 0.6–1.2)
DEPRECATED RDW RBC AUTO: 15.1 % (ref 12.4–15.4)
EOSINOPHIL # BLD: 0.3 K/UL (ref 0–0.6)
EOSINOPHIL NFR BLD: 3.5 %
GFR SERPLBLD CREATININE-BSD FMLA CKD-EPI: 40 ML/MIN/{1.73_M2}
GLUCOSE SERPL-MCNC: 133 MG/DL (ref 70–99)
HCT VFR BLD AUTO: 39.3 % (ref 36–48)
HGB BLD-MCNC: 12.9 G/DL (ref 12–16)
LYMPHOCYTES # BLD: 1.9 K/UL (ref 1–5.1)
LYMPHOCYTES NFR BLD: 19.2 %
MCH RBC QN AUTO: 30.4 PG (ref 26–34)
MCHC RBC AUTO-ENTMCNC: 32.9 G/DL (ref 31–36)
MCV RBC AUTO: 92.3 FL (ref 80–100)
MONOCYTES # BLD: 0.8 K/UL (ref 0–1.3)
MONOCYTES NFR BLD: 8.6 %
NEUTROPHILS # BLD: 6.6 K/UL (ref 1.7–7.7)
NEUTROPHILS NFR BLD: 67.8 %
PLATELET # BLD AUTO: 387 K/UL (ref 135–450)
PMV BLD AUTO: 7.6 FL (ref 5–10.5)
POTASSIUM SERPL-SCNC: 3.9 MMOL/L (ref 3.5–5.1)
RBC # BLD AUTO: 4.26 M/UL (ref 4–5.2)
SODIUM SERPL-SCNC: 143 MMOL/L (ref 136–145)
WBC # BLD AUTO: 9.7 K/UL (ref 4–11)

## 2024-10-03 RX ORDER — PREDNISONE 10 MG/1
10 TABLET ORAL DAILY
Qty: 90 TABLET | Refills: 2 | OUTPATIENT
Start: 2024-10-03

## 2024-10-03 NOTE — TELEPHONE ENCOUNTER
Requested Prescriptions     Pending Prescriptions Disp Refills    predniSONE (DELTASONE) 10 MG tablet 90 tablet 2     Sig: Take 1 tablet by mouth daily          Last Office Visit: 9/5/2024     Next Office Visit: 10/8/2024

## 2024-10-07 DIAGNOSIS — E83.52 HYPERCALCEMIA: Primary | ICD-10-CM

## 2024-10-08 ENCOUNTER — OFFICE VISIT (OUTPATIENT)
Dept: FAMILY MEDICINE CLINIC | Age: 74
End: 2024-10-08
Payer: MEDICARE

## 2024-10-08 VITALS
HEART RATE: 111 BPM | BODY MASS INDEX: 30.05 KG/M2 | WEIGHT: 176 LBS | HEIGHT: 64 IN | SYSTOLIC BLOOD PRESSURE: 124 MMHG | OXYGEN SATURATION: 93 % | TEMPERATURE: 96.9 F | DIASTOLIC BLOOD PRESSURE: 80 MMHG

## 2024-10-08 DIAGNOSIS — K75.4 AUTOIMMUNE HEPATITIS (HCC): ICD-10-CM

## 2024-10-08 DIAGNOSIS — Z71.84 TRAVEL ADVICE ENCOUNTER: ICD-10-CM

## 2024-10-08 DIAGNOSIS — E11.22 TYPE 2 DIABETES MELLITUS WITH STAGE 3A CHRONIC KIDNEY DISEASE, WITHOUT LONG-TERM CURRENT USE OF INSULIN (HCC): Primary | ICD-10-CM

## 2024-10-08 DIAGNOSIS — Z23 IMMUNIZATION DUE: ICD-10-CM

## 2024-10-08 DIAGNOSIS — N18.31 TYPE 2 DIABETES MELLITUS WITH STAGE 3A CHRONIC KIDNEY DISEASE, WITHOUT LONG-TERM CURRENT USE OF INSULIN (HCC): Primary | ICD-10-CM

## 2024-10-08 DIAGNOSIS — Z00.00 MEDICARE ANNUAL WELLNESS VISIT, SUBSEQUENT: ICD-10-CM

## 2024-10-08 LAB — HBA1C MFR BLD: 6.5 %

## 2024-10-08 PROCEDURE — G0439 PPPS, SUBSEQ VISIT: HCPCS | Performed by: NURSE PRACTITIONER

## 2024-10-08 PROCEDURE — 1123F ACP DISCUSS/DSCN MKR DOCD: CPT | Performed by: NURSE PRACTITIONER

## 2024-10-08 PROCEDURE — G0008 ADMIN INFLUENZA VIRUS VAC: HCPCS | Performed by: NURSE PRACTITIONER

## 2024-10-08 PROCEDURE — 3079F DIAST BP 80-89 MM HG: CPT | Performed by: NURSE PRACTITIONER

## 2024-10-08 PROCEDURE — 3044F HG A1C LEVEL LT 7.0%: CPT | Performed by: NURSE PRACTITIONER

## 2024-10-08 PROCEDURE — 83036 HEMOGLOBIN GLYCOSYLATED A1C: CPT | Performed by: NURSE PRACTITIONER

## 2024-10-08 PROCEDURE — 99214 OFFICE O/P EST MOD 30 MIN: CPT | Performed by: NURSE PRACTITIONER

## 2024-10-08 PROCEDURE — 90653 IIV ADJUVANT VACCINE IM: CPT | Performed by: NURSE PRACTITIONER

## 2024-10-08 PROCEDURE — 3074F SYST BP LT 130 MM HG: CPT | Performed by: NURSE PRACTITIONER

## 2024-10-08 RX ORDER — SCOLOPAMINE TRANSDERMAL SYSTEM 1 MG/1
1 PATCH, EXTENDED RELEASE TRANSDERMAL
Qty: 10 PATCH | Refills: 0 | Status: SHIPPED | OUTPATIENT
Start: 2024-10-08

## 2024-10-08 RX ORDER — PREDNISONE 10 MG/1
10 TABLET ORAL DAILY
Qty: 90 TABLET | Refills: 2 | Status: SHIPPED | OUTPATIENT
Start: 2024-10-08

## 2024-10-08 ASSESSMENT — PATIENT HEALTH QUESTIONNAIRE - PHQ9
SUM OF ALL RESPONSES TO PHQ QUESTIONS 1-9: 0
SUM OF ALL RESPONSES TO PHQ9 QUESTIONS 1 & 2: 0
SUM OF ALL RESPONSES TO PHQ QUESTIONS 1-9: 0
1. LITTLE INTEREST OR PLEASURE IN DOING THINGS: NOT AT ALL
2. FEELING DOWN, DEPRESSED OR HOPELESS: NOT AT ALL

## 2024-10-08 NOTE — PROGRESS NOTES
Abnormal  Interventions:  Patient declines any further evaluation or treatment             Advanced Directives:  Do you have a Living Will?: (!) No    Intervention:  has NO advanced directive - information provided                     Objective   Vitals:    10/08/24 0949   BP: 124/80   Pulse: (!) 111   Temp: 96.9 °F (36.1 °C)   SpO2: 93%   Weight: 79.8 kg (176 lb)   Height: 1.626 m (5' 4\")      Body mass index is 30.21 kg/m².             Allergies   Allergen Reactions    Iodine Shortness Of Breath    Shellfish-Derived Products Rash     Shortness of breath    Other      Steroid (injection) taken for hepatitis elevated liver enzymes, pt. Uncertain of name     Prior to Visit Medications    Medication Sig Taking? Authorizing Provider   predniSONE (DELTASONE) 10 MG tablet Take 1 tablet by mouth daily Yes Albina Valdes APRN - CNP   scopolamine (TRANSDERM-SCOP) transdermal patch Place 1 patch onto the skin every 72 hours Yes Albina Valdes APRN - CNP   aspirin 81 MG EC tablet Take 1 tablet by mouth daily Yes Angel Duarte MD   amLODIPine (NORVASC) 5 MG tablet TAKE 1 TABLET BY MOUTH TWICE A DAY Yes Albina Valdes APRN - CNP   Roflumilast (DALIRESP) 250 MCG tablet Take 1 tablet by mouth daily Yes Albina Valdes APRN - CNP   pioglitazone (ACTOS) 15 MG tablet TAKE 1 TABLET BY MOUTH EVERY DAY Yes Alma Roman APRN - CNP   atorvastatin (LIPITOR) 40 MG tablet TAKE 1 TABLET BY MOUTH EVERY DAY Yes Albina Valdes APRN - CNP   carvedilol (COREG) 6.25 MG tablet TAKE 1 TABLET BY MOUTH TWICE A DAY Yes Rafiq Galindo MD   FREESTYLE LITE strip TEST TWICE A DAY AND AS NEEDED FOR IRREGULAR BLOOD SUGAR SYMPTOMS Yes Albina Valdes APRN - CNP   albuterol sulfate HFA (PROVENTIL;VENTOLIN;PROAIR) 108 (90 Base) MCG/ACT inhaler INHALE 2 PUFFS INTO THE LUNGS EVERY 6 HOURS AS NEEDED. Yes Tessa Gavin APRN - CNP   montelukast (SINGULAIR) 10 MG tablet TAKE 1 TABLET BY MOUTH EVERY DAY EVERY NIGHT Yes

## 2024-10-08 NOTE — PATIENT INSTRUCTIONS
Learning About Being Active as an Older Adult  Why is being active important as you get older?     Being active is one of the best things you can do for your health. And it's never too late to start. Being active--or getting active, if you aren't already--has definite benefits. It can:  Give you more energy,  Keep your mind sharp.  Improve balance to reduce your risk of falls.  Help you manage chronic illness with fewer medicines.  No matter how old you are, how fit you are, or what health problems you have, there is a form of activity that will work for you. And the more physical activity you can do, the better your overall health will be.  What kinds of activity can help you stay healthy?  Being more active will make your daily activities easier. Physical activity includes planned exercise and things you do in daily life. There are four types of activity:  Aerobic.  Doing aerobic activity makes your heart and lungs strong.  Includes walking, dancing, and gardening.  Aim for at least 2½ hours spread throughout the week.  It improves your energy and can help you sleep better.  Muscle-strengthening.  This type of activity can help maintain muscle and strengthen bones.  Includes climbing stairs, using resistance bands, and lifting or carrying heavy loads.  Aim for at least twice a week.  It can help protect the knees and other joints.  Stretching.  Stretching gives you better range of motion in joints and muscles.  Includes upper arm stretches, calf stretches, and gentle yoga.  Aim for at least twice a week, preferably after your muscles are warmed up from other activities.  It can help you function better in daily life.  Balancing.  This helps you stay coordinated and have good posture.  Includes heel-to-toe walking, rhea chi, and certain types of yoga.  Aim for at least 3 days a week.  It can reduce your risk of falling.  Even if you have a hard time meeting the recommendations, it's better to be more active

## 2024-10-09 ENCOUNTER — TELEPHONE (OUTPATIENT)
Dept: ADMINISTRATIVE | Age: 74
End: 2024-10-09

## 2024-10-09 NOTE — TELEPHONE ENCOUNTER
Submitted PA for Scopolamine 1MG/3DAYS 72 hr patches   Via CM Key: [id number]  STATUS: PENDING.    Follow up done daily; if no decision with in three days we will refax.  If another three days goes by with no decision will call the insurance for status.

## 2024-10-10 NOTE — TELEPHONE ENCOUNTER
The medication is APPROVED.  1-124/ 12-31/24   If this requires a response please respond to the pool ( P MHCX PSC MEDICATION PRE-AUTH).      Thank you please advise patient.

## 2024-10-28 RX ORDER — MONTELUKAST SODIUM 10 MG/1
TABLET ORAL
Qty: 90 TABLET | Refills: 3 | Status: SHIPPED | OUTPATIENT
Start: 2024-10-28

## 2024-11-11 ENCOUNTER — OFFICE VISIT (OUTPATIENT)
Dept: FAMILY MEDICINE CLINIC | Age: 74
End: 2024-11-11

## 2024-11-11 VITALS
WEIGHT: 166 LBS | HEART RATE: 111 BPM | DIASTOLIC BLOOD PRESSURE: 78 MMHG | OXYGEN SATURATION: 93 % | BODY MASS INDEX: 28.34 KG/M2 | TEMPERATURE: 96.9 F | HEIGHT: 64 IN | SYSTOLIC BLOOD PRESSURE: 114 MMHG

## 2024-11-11 DIAGNOSIS — J18.9 PNEUMONIA OF BOTH LOWER LOBES DUE TO INFECTIOUS ORGANISM: Primary | ICD-10-CM

## 2024-11-11 RX ORDER — AZITHROMYCIN 250 MG/1
TABLET, FILM COATED ORAL
Qty: 6 TABLET | Refills: 0 | Status: SHIPPED | OUTPATIENT
Start: 2024-11-11 | End: 2024-11-21

## 2024-11-11 RX ORDER — DEXTROMETHORPHAN HYDROBROMIDE AND PROMETHAZINE HYDROCHLORIDE 15; 6.25 MG/5ML; MG/5ML
2.5 SYRUP ORAL 4 TIMES DAILY PRN
Qty: 118 ML | Refills: 0 | Status: SHIPPED | OUTPATIENT
Start: 2024-11-11 | End: 2024-11-18

## 2024-11-11 ASSESSMENT — ENCOUNTER SYMPTOMS
WHEEZING: 1
SHORTNESS OF BREATH: 1
COUGH: 1

## 2024-11-11 NOTE — PROGRESS NOTES
Fidelia Torres (:  1950) is a 74 y.o. female,Established patient, here for evaluation of the following chief complaint(s):  Cough and Breast Problem         Assessment & Plan  Pneumonia of both lower lobes due to infectious organism   New, uncertain prognosis, changes made today: low index of suspicion for PE d/t sx preceding travel and improvement with initail COPD treatment.  As no abx provided suspect viral URI has now become bacterial with COPD exacerbation. She is to follow up by MyChart of phone call in 3 days with update.  Sooner for any worsening.    Orders:    amoxicillin-clavulanate (AUGMENTIN) 875-125 MG per tablet; Take 1 tablet by mouth 2 times daily for 7 days    azithromycin (ZITHROMAX) 250 MG tablet; 500mg on day 1 followed by 250mg on days 2 - 5      No follow-ups on file.       Subjective   Cough started 3-4 weeks ago prior to departing for cruise out of [place]. Seen in ED on ship and in [facility] and tx with prednisone, albuterol.  Records reviewed  Initially improved with meds however worsened upon returning home.  No CP, no tachycardia, no SANDOVAL  Continues to cough and overall feels worse  No fever  Cough is productive of clear foamy sputum  + wheezing and SOB      Cough  This is a new problem. The current episode started 1 to 4 weeks ago. The problem has been gradually worsening. The problem occurs hourly. The cough is Productive of sputum. Associated symptoms include shortness of breath and wheezing. The symptoms are aggravated by lying down. Risk factors for lung disease include travel. The treatment provided no relief. Her past medical history is significant for bronchitis.   Breast Problem    Allergies   Allergen Reactions    Iodine Shortness Of Breath    Shellfish-Derived Products Rash     Shortness of breath    Other      Steroid (injection) taken for hepatitis elevated liver enzymes, pt. Uncertain of name       Current Outpatient Medications   Medication Sig Dispense Refill

## 2024-11-11 NOTE — TELEPHONE ENCOUNTER
----- Message from [employer] [address] sent at 1/4/2022 12:57 PM EST -----  Subject: Message to Provider    Pt symptoms (cough, yellow phlegm, and fatigue) began last Monday. Office informed her she needed to be COVID tested. Test from clinic came back negative. Pt would like an antibiotic to help. Please call pt back to advise.   ---------------------------------------------------------------------------    OK to leave message on voicemail  Preferred Call Back Phone Number?  [phone] Dr. Novak notified that patient is not interested in drinking PO fluids and has not voided.

## 2024-11-19 ENCOUNTER — OFFICE VISIT (OUTPATIENT)
Dept: FAMILY MEDICINE CLINIC | Age: 74
End: 2024-11-19

## 2024-11-19 VITALS
WEIGHT: 162 LBS | SYSTOLIC BLOOD PRESSURE: 124 MMHG | HEART RATE: 131 BPM | OXYGEN SATURATION: 96 % | TEMPERATURE: 96.9 F | HEIGHT: 64 IN | DIASTOLIC BLOOD PRESSURE: 84 MMHG | BODY MASS INDEX: 27.66 KG/M2

## 2024-11-19 DIAGNOSIS — J18.9 PNEUMONIA OF BOTH LOWER LOBES DUE TO INFECTIOUS ORGANISM: Primary | ICD-10-CM

## 2024-11-19 ASSESSMENT — ENCOUNTER SYMPTOMS
SHORTNESS OF BREATH: 1
ABDOMINAL PAIN: 0
BACK PAIN: 0
VOMITING: 1
DIARRHEA: 1
EYES NEGATIVE: 1
CHEST TIGHTNESS: 1
COUGH: 1
SORE THROAT: 0
RHINORRHEA: 0

## 2024-11-19 NOTE — PROGRESS NOTES
albuterol sulfate HFA (PROVENTIL;VENTOLIN;PROAIR) 108 (90 Base) MCG/ACT inhaler INHALE 2 PUFFS INTO THE LUNGS EVERY 6 HOURS AS NEEDED. 6.7 each 2    budesonide-formoterol (SYMBICORT) 160-4.5 MCG/ACT AERO TAKE 2 PUFFS BY MOUTH TWICE A DAY 30.6 each 1    fluticasone (FLONASE) 50 MCG/ACT nasal spray SPRAY 1 SPRAY INTO EACH NOSTRIL EVERY DAY (Patient taking differently: PRN) 1 Bottle 2    calcium carbonate (OSCAL) 500 MG TABS tablet Take 1 tablet by mouth daily      Multiple Vitamin (MULTIVITAMIN PO) Take 1 tablet by mouth daily      acetaminophen (TYLENOL) 500 MG tablet Take 1 tablet by mouth 4 times daily as needed for Pain 28 tablet 0     No current facility-administered medications for this visit.       Review of Systems   Constitutional:  Negative for fatigue and fever.   HENT:  Negative for congestion, rhinorrhea and sore throat.    Eyes: Negative.    Respiratory:  Positive for cough, chest tightness and shortness of breath.    Cardiovascular:  Negative for chest pain.   Gastrointestinal:  Positive for diarrhea and vomiting. Negative for abdominal pain.        2 occasions of vomit 15 minutes after taking abx   Genitourinary:  Negative for difficulty urinating and dysuria.   Musculoskeletal:  Negative for arthralgias, back pain and myalgias.   Skin:  Negative for rash.   Neurological:  Positive for headaches. Negative for dizziness and weakness.   Psychiatric/Behavioral: Negative.            Objective   Vitals:    11/19/24 1411   BP: 124/84   Pulse: (!) 131   Temp: 96.9 °F (36.1 °C)   SpO2: 96%   Weight: 73.5 kg (162 lb)   Height: 1.626 m (5' 4\")       Physical Exam  Constitutional:       Appearance: She is normal weight.   HENT:      Right Ear: Tympanic membrane and ear canal normal.      Left Ear: Tympanic membrane and ear canal normal.      Mouth/Throat:      Mouth: Mucous membranes are moist.      Pharynx: Oropharynx is clear.   Cardiovascular:      Rate and Rhythm: Normal rate and regular rhythm.      Pulses:

## 2024-11-24 DIAGNOSIS — E78.00 PURE HYPERCHOLESTEROLEMIA: ICD-10-CM

## 2024-11-25 NOTE — TELEPHONE ENCOUNTER
Requested Prescriptions     Pending Prescriptions Disp Refills    atorvastatin (LIPITOR) 40 MG tablet [Pharmacy Med Name: ATORVASTATIN 40 MG TABLET] 90 tablet 1     Sig: TAKE 1 TABLET BY MOUTH EVERY DAY          Last Office Visit: 11/19/2024     Next Office Visit: 2/10/2025

## 2024-11-26 RX ORDER — ATORVASTATIN CALCIUM 40 MG/1
TABLET, FILM COATED ORAL
Qty: 90 TABLET | Refills: 1 | Status: SHIPPED | OUTPATIENT
Start: 2024-11-26

## 2024-12-05 DIAGNOSIS — J41.0 SIMPLE CHRONIC BRONCHITIS (HCC): ICD-10-CM

## 2024-12-05 RX ORDER — ROFLUMILAST 250 UG/1
250 TABLET ORAL DAILY
Qty: 28 TABLET | Refills: 3 | Status: SHIPPED | OUTPATIENT
Start: 2024-12-05

## 2024-12-05 NOTE — TELEPHONE ENCOUNTER
Requested Prescriptions     Pending Prescriptions Disp Refills    Roflumilast (DALIRESP) 250 MCG tablet [Pharmacy Med Name: ROFLUMILAST 250 MCG TABLET] 28 tablet 3     Sig: TAKE 1 TABLET BY MOUTH EVERY DAY          Last Office Visit: 11/19/2024     Next Office Visit: 2/10/2025

## 2025-03-01 DIAGNOSIS — E11.22 TYPE 2 DIABETES MELLITUS WITH STAGE 3 CHRONIC KIDNEY DISEASE, WITHOUT LONG-TERM CURRENT USE OF INSULIN, UNSPECIFIED WHETHER STAGE 3A OR 3B CKD (HCC): ICD-10-CM

## 2025-03-01 DIAGNOSIS — N18.30 TYPE 2 DIABETES MELLITUS WITH STAGE 3 CHRONIC KIDNEY DISEASE, WITHOUT LONG-TERM CURRENT USE OF INSULIN, UNSPECIFIED WHETHER STAGE 3A OR 3B CKD (HCC): ICD-10-CM

## 2025-03-03 RX ORDER — PIOGLITAZONE 15 MG/1
15 TABLET ORAL DAILY
Qty: 90 TABLET | Refills: 1 | Status: SHIPPED | OUTPATIENT
Start: 2025-03-03

## 2025-03-11 NOTE — PROGRESS NOTES
[facility]      Cardiology Consult    Fidelia Torres  1950 March 11, 2025    Referring Physician: Albina Valdes, APRN - CNP  Reason for Referral: elevated troponin     CC: \"I am doing good\"    HPI:  The patient is 74 y.o. female with a past medical history significant for asthma, COPD, DM, hyperlipidemia, hypertension, osteoporosis.  Patient came to ER with complaints of fatigue and shortness of breath.  She was also being treated for sepsis.  I am here to check up after the heart Cath.  Blood pressure at home is 130's/80's.  I am not seeing 150's at home.  Check blood pressure in AM after urination.  My appetite has been getting better.      Past Medical History:   Diagnosis Date    Allergic rhinitis     Asthma     COPD (chronic obstructive pulmonary disease) (HCC)     pt denies    Diabetes mellitus (HCC)     Hepatitis     autoimmune    Hyperlipidemia     Hypertension     Influenza A 01/10/2015    Osteoporosis      Past Surgical History:   Procedure Laterality Date    BREAST BIOPSY  10/2005    Dr. Cameron reid , benign     BREAST SURGERY Right 12/18/2023    EXCISION OF RIGHT AXILLARY MASS performed by Lillie Sandoval MD at [facility] OR    CARDIAC PROCEDURE N/A 9/17/2024    Left heart cath / coronary angiography performed by Stephen Flores MD at [facility] CARDIAC CATH LAB    CATARACT REMOVAL Bilateral     COLONOSCOPY      Dr. Mcleod  - WNL,repeat 10 yrs     COLONOSCOPY  07/08/13    Dr. Mcleod- internal hemorrhoids, adenomatous and hyperplastic polyps. Repeat in 3 yrs    COLONOSCOPY  07/17/2016    Dr. Mcleod, No evidence of any colorectal neoplasia ~5 years    COLONOSCOPY  6/21/2021    COLONOSCOPY POLYPECTOMY SNARE/COLD BIOPSY performed by Chance SPRAGUE MD at [facility] ENDOSCOPY    LIVER BIOPSY  8/2006     autoimmune hepatitis     UPPER GASTROINTESTINAL ENDOSCOPY N/A 7/11/2022    ESOPHAGOGASTRODUODENOSCOPY performed by Chance SPRAGUE MD at [facility] ENDOSCOPY

## 2025-03-17 ENCOUNTER — OFFICE VISIT (OUTPATIENT)
Dept: CARDIOLOGY CLINIC | Age: 75
End: 2025-03-17
Payer: MEDICARE

## 2025-03-17 VITALS
HEIGHT: 64 IN | DIASTOLIC BLOOD PRESSURE: 92 MMHG | SYSTOLIC BLOOD PRESSURE: 154 MMHG | BODY MASS INDEX: 23.39 KG/M2 | WEIGHT: 137 LBS | HEART RATE: 92 BPM | OXYGEN SATURATION: 95 %

## 2025-03-17 DIAGNOSIS — E11.22 TYPE 2 DIABETES MELLITUS WITH STAGE 3A CHRONIC KIDNEY DISEASE, WITHOUT LONG-TERM CURRENT USE OF INSULIN (HCC): ICD-10-CM

## 2025-03-17 DIAGNOSIS — I10 ESSENTIAL HYPERTENSION, BENIGN: Primary | ICD-10-CM

## 2025-03-17 DIAGNOSIS — N17.9 ACUTE KIDNEY INJURY SUPERIMPOSED ON CHRONIC KIDNEY DISEASE: ICD-10-CM

## 2025-03-17 DIAGNOSIS — E78.00 PURE HYPERCHOLESTEROLEMIA: ICD-10-CM

## 2025-03-17 DIAGNOSIS — N18.31 TYPE 2 DIABETES MELLITUS WITH STAGE 3A CHRONIC KIDNEY DISEASE, WITHOUT LONG-TERM CURRENT USE OF INSULIN (HCC): ICD-10-CM

## 2025-03-17 DIAGNOSIS — N18.9 ACUTE KIDNEY INJURY SUPERIMPOSED ON CHRONIC KIDNEY DISEASE: ICD-10-CM

## 2025-03-17 PROCEDURE — 3044F HG A1C LEVEL LT 7.0%: CPT | Performed by: STUDENT IN AN ORGANIZED HEALTH CARE EDUCATION/TRAINING PROGRAM

## 2025-03-17 PROCEDURE — 3080F DIAST BP >= 90 MM HG: CPT | Performed by: STUDENT IN AN ORGANIZED HEALTH CARE EDUCATION/TRAINING PROGRAM

## 2025-03-17 PROCEDURE — 1123F ACP DISCUSS/DSCN MKR DOCD: CPT | Performed by: STUDENT IN AN ORGANIZED HEALTH CARE EDUCATION/TRAINING PROGRAM

## 2025-03-17 PROCEDURE — G2211 COMPLEX E/M VISIT ADD ON: HCPCS | Performed by: STUDENT IN AN ORGANIZED HEALTH CARE EDUCATION/TRAINING PROGRAM

## 2025-03-17 PROCEDURE — 99215 OFFICE O/P EST HI 40 MIN: CPT | Performed by: STUDENT IN AN ORGANIZED HEALTH CARE EDUCATION/TRAINING PROGRAM

## 2025-03-17 PROCEDURE — 3077F SYST BP >= 140 MM HG: CPT | Performed by: STUDENT IN AN ORGANIZED HEALTH CARE EDUCATION/TRAINING PROGRAM

## 2025-03-18 LAB
EST. AVERAGE GLUCOSE BLD GHB EST-MCNC: 105.4 MG/DL
HBA1C MFR BLD: 5.3 %

## 2025-05-24 ENCOUNTER — APPOINTMENT (OUTPATIENT)
Dept: CT IMAGING | Age: 75
DRG: 683 | End: 2025-05-24
Payer: MEDICARE

## 2025-05-24 ENCOUNTER — APPOINTMENT (OUTPATIENT)
Dept: GENERAL RADIOLOGY | Age: 75
DRG: 683 | End: 2025-05-24
Payer: MEDICARE

## 2025-05-24 ENCOUNTER — HOSPITAL ENCOUNTER (INPATIENT)
Age: 75
LOS: 3 days | Discharge: NURSING, REHAB OR LONG-TERM CARE | DRG: 683 | End: 2025-05-29
Attending: HOSPITALIST | Admitting: STUDENT IN AN ORGANIZED HEALTH CARE EDUCATION/TRAINING PROGRAM
Payer: MEDICARE

## 2025-05-24 DIAGNOSIS — S32.10XA CLOSED COMPRESSION FRACTURE OF SACRUM, INITIAL ENCOUNTER (HCC): Primary | ICD-10-CM
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
## 2025-05-24 DIAGNOSIS — R53.1 GENERAL WEAKNESS: ICD-10-CM

## 2025-05-24 DIAGNOSIS — S32.10XA CLOSED FRACTURE OF SACRUM, UNSPECIFIED FRACTURE MORPHOLOGY, INITIAL ENCOUNTER (HCC): ICD-10-CM

## 2025-05-24 DIAGNOSIS — R26.2 UNABLE TO AMBULATE: ICD-10-CM

## 2025-05-24 LAB
ALBUMIN SERPL-MCNC: 4.2 G/DL (ref 3.4–5)
ALBUMIN/GLOB SERPL: 1.4 {RATIO} (ref 1.1–2.2)
ALP SERPL-CCNC: 59 U/L (ref 40–129)
ALT SERPL-CCNC: 6 U/L (ref 10–40)
ANION GAP SERPL CALCULATED.3IONS-SCNC: 22 MMOL/L (ref 3–16)
AST SERPL-CCNC: 17 U/L (ref 15–37)
BASE EXCESS BLDV CALC-SCNC: -3 MMOL/L
BASOPHILS # BLD: 0.1 K/UL (ref 0–0.2)
BASOPHILS NFR BLD: 0.8 %
BILIRUB SERPL-MCNC: 0.8 MG/DL (ref 0–1)
BUN SERPL-MCNC: 21 MG/DL (ref 7–20)
CALCIUM SERPL-MCNC: 9.7 MG/DL (ref 8.3–10.6)
CHLORIDE SERPL-SCNC: 98 MMOL/L (ref 99–110)
CK SERPL-CCNC: 70 U/L (ref 26–192)
CO2 BLDV-SCNC: 24 MMOL/L
CO2 SERPL-SCNC: 18 MMOL/L (ref 21–32)
COHGB MFR BLDV: 1.3 %
CREAT SERPL-MCNC: 1.9 MG/DL (ref 0.6–1.2)
DEPRECATED RDW RBC AUTO: 15.3 % (ref 12.4–15.4)
EOSINOPHIL # BLD: 0 K/UL (ref 0–0.6)
EOSINOPHIL NFR BLD: 0.4 %
GFR SERPLBLD CREATININE-BSD FMLA CKD-EPI: 28 ML/MIN/{1.73_M2}
GLUCOSE SERPL-MCNC: 139 MG/DL (ref 70–99)
HCO3 BLDV-SCNC: 22 MMOL/L (ref 23–29)
HCT VFR BLD AUTO: 35.8 % (ref 36–48)
HGB BLD-MCNC: 12 G/DL (ref 12–16)
LYMPHOCYTES # BLD: 1 K/UL (ref 1–5.1)
LYMPHOCYTES NFR BLD: 12.5 %
MCH RBC QN AUTO: 29.8 PG (ref 26–34)
MCHC RBC AUTO-ENTMCNC: 33.5 G/DL (ref 31–36)
MCV RBC AUTO: 88.8 FL (ref 80–100)
METHGB MFR BLDV: 0.2 %
MONOCYTES # BLD: 0.7 K/UL (ref 0–1.3)
MONOCYTES NFR BLD: 8.4 %
NEUTROPHILS # BLD: 6.2 K/UL (ref 1.7–7.7)
NEUTROPHILS NFR BLD: 77.9 %
O2 THERAPY: ABNORMAL
PCO2 BLDV: 40.5 MMHG (ref 40–50)
PH BLDV: 7.35 [PH] (ref 7.35–7.45)
PLATELET # BLD AUTO: 387 K/UL (ref 135–450)
PMV BLD AUTO: 7.8 FL (ref 5–10.5)
PO2 BLDV: <30 MMHG
POTASSIUM SERPL-SCNC: 4 MMOL/L (ref 3.5–5.1)
PROT SERPL-MCNC: 7.3 G/DL (ref 6.4–8.2)
RBC # BLD AUTO: 4.02 M/UL (ref 4–5.2)
SAO2 % BLDV: 21 %
SODIUM SERPL-SCNC: 138 MMOL/L (ref 136–145)
TROPONIN, HIGH SENSITIVITY: 59 NG/L (ref 0–14)
WBC # BLD AUTO: 8 K/UL (ref 4–11)

## 2025-05-24 PROCEDURE — 72192 CT PELVIS W/O DYE: CPT

## 2025-05-24 PROCEDURE — 93005 ELECTROCARDIOGRAM TRACING: CPT | Performed by: NURSE PRACTITIONER

## 2025-05-24 PROCEDURE — 72125 CT NECK SPINE W/O DYE: CPT

## 2025-05-24 PROCEDURE — 36415 COLL VENOUS BLD VENIPUNCTURE: CPT

## 2025-05-24 PROCEDURE — 70450 CT HEAD/BRAIN W/O DYE: CPT

## 2025-05-24 PROCEDURE — 99285 EMERGENCY DEPT VISIT HI MDM: CPT

## 2025-05-24 PROCEDURE — 71045 X-RAY EXAM CHEST 1 VIEW: CPT

## 2025-05-24 PROCEDURE — 84484 ASSAY OF TROPONIN QUANT: CPT

## 2025-05-24 PROCEDURE — 80053 COMPREHEN METABOLIC PANEL: CPT

## 2025-05-24 PROCEDURE — 6370000000 HC RX 637 (ALT 250 FOR IP): Performed by: NURSE PRACTITIONER

## 2025-05-24 PROCEDURE — 73521 X-RAY EXAM HIPS BI 2 VIEWS: CPT

## 2025-05-24 PROCEDURE — 82550 ASSAY OF CK (CPK): CPT

## 2025-05-24 PROCEDURE — 85025 COMPLETE CBC W/AUTO DIFF WBC: CPT

## 2025-05-24 PROCEDURE — 82803 BLOOD GASES ANY COMBINATION: CPT

## 2025-05-24 RX ORDER — CARVEDILOL 12.5 MG/1
12.5 TABLET ORAL ONCE
Status: COMPLETED | OUTPATIENT
Start: 2025-05-24 | End: 2025-05-24

## 2025-05-24 RX ORDER — AMLODIPINE BESYLATE 5 MG/1
5 TABLET ORAL ONCE
Status: COMPLETED | OUTPATIENT
Start: 2025-05-24 | End: 2025-05-24

## 2025-05-24 RX ORDER — 0.9 % SODIUM CHLORIDE 0.9 %
1000 INTRAVENOUS SOLUTION INTRAVENOUS ONCE
Status: COMPLETED | OUTPATIENT
Start: 2025-05-24 | End: 2025-05-25

## 2025-05-24 RX ADMIN — AMLODIPINE BESYLATE 5 MG: 5 TABLET ORAL at 21:47

## 2025-05-24 RX ADMIN — CARVEDILOL 12.5 MG: 12.5 TABLET, FILM COATED ORAL at 21:47

## 2025-05-24 ASSESSMENT — PAIN - FUNCTIONAL ASSESSMENT: PAIN_FUNCTIONAL_ASSESSMENT: 0-10

## 2025-05-24 ASSESSMENT — PAIN SCALES - GENERAL: PAINLEVEL_OUTOF10: 7

## 2025-05-24 ASSESSMENT — PAIN DESCRIPTION - LOCATION: LOCATION: HEAD

## 2025-05-25 PROBLEM — S32.10XA CLOSED COMPRESSION FRACTURE OF SACRUM, INITIAL ENCOUNTER (HCC): Status: ACTIVE | Noted: 2025-05-25

## 2025-05-25 PROBLEM — S32.10XA CLOSED COMPRESSION FRACTURE OF SACRUM, INITIAL ENCOUNTER (HCC): Status: RESOLVED | Noted: 2025-05-25 | Resolved: 2025-05-25

## 2025-05-25 PROBLEM — R53.1 GENERAL WEAKNESS: Status: ACTIVE | Noted: 2025-05-25
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
## 2025-05-25 PROBLEM — S32.10XA SACRAL FRACTURE, CLOSED (HCC): Status: ACTIVE | Noted: 2025-05-25

## 2025-05-25 PROBLEM — J44.9 COPD WITHOUT EXACERBATION (HCC): Status: ACTIVE | Noted: 2025-05-25

## 2025-05-25 LAB
EKG ATRIAL RATE: 101 BPM
EKG DIAGNOSIS: NORMAL
EKG P AXIS: 78 DEGREES
EKG P-R INTERVAL: 116 MS
EKG Q-T INTERVAL: 310 MS
EKG QRS DURATION: 74 MS
EKG QTC CALCULATION (BAZETT): 401 MS
EKG R AXIS: 51 DEGREES
EKG T AXIS: 85 DEGREES
EKG VENTRICULAR RATE: 101 BPM
GLUCOSE BLD-MCNC: 101 MG/DL (ref 70–99)
GLUCOSE BLD-MCNC: 107 MG/DL (ref 70–99)
GLUCOSE BLD-MCNC: 127 MG/DL (ref 70–99)
GLUCOSE BLD-MCNC: 93 MG/DL (ref 70–99)
PERFORMED ON: ABNORMAL
PERFORMED ON: NORMAL
TROPONIN, HIGH SENSITIVITY: 56 NG/L (ref 0–14)

## 2025-05-25 PROCEDURE — 6370000000 HC RX 637 (ALT 250 FOR IP): Performed by: STUDENT IN AN ORGANIZED HEALTH CARE EDUCATION/TRAINING PROGRAM

## 2025-05-25 PROCEDURE — 9990000010 HC NO CHARGE VISIT

## 2025-05-25 PROCEDURE — 2580000003 HC RX 258: Performed by: STUDENT IN AN ORGANIZED HEALTH CARE EDUCATION/TRAINING PROGRAM

## 2025-05-25 PROCEDURE — 93010 ELECTROCARDIOGRAM REPORT: CPT | Performed by: INTERNAL MEDICINE

## 2025-05-25 PROCEDURE — G0378 HOSPITAL OBSERVATION PER HR: HCPCS

## 2025-05-25 PROCEDURE — 2580000003 HC RX 258: Performed by: NURSE PRACTITIONER

## 2025-05-25 PROCEDURE — 99223 1ST HOSP IP/OBS HIGH 75: CPT | Performed by: ORTHOPAEDIC SURGERY

## 2025-05-25 PROCEDURE — 6360000002 HC RX W HCPCS: Performed by: HOSPITALIST

## 2025-05-25 PROCEDURE — 2580000003 HC RX 258: Performed by: HOSPITALIST

## 2025-05-25 PROCEDURE — 94640 AIRWAY INHALATION TREATMENT: CPT

## 2025-05-25 PROCEDURE — 94760 N-INVAS EAR/PLS OXIMETRY 1: CPT

## 2025-05-25 RX ORDER — SODIUM CHLORIDE 9 MG/ML
INJECTION, SOLUTION INTRAVENOUS PRN
Status: DISCONTINUED | OUTPATIENT
Start: 2025-05-25 | End: 2025-05-29 | Stop reason: HOSPADM

## 2025-05-25 RX ORDER — ATORVASTATIN CALCIUM 40 MG/1
40 TABLET, FILM COATED ORAL DAILY
Status: DISCONTINUED | OUTPATIENT
Start: 2025-05-25 | End: 2025-05-29 | Stop reason: HOSPADM

## 2025-05-25 RX ORDER — SODIUM CHLORIDE 0.9 % (FLUSH) 0.9 %
5-40 SYRINGE (ML) INJECTION PRN
Status: DISCONTINUED | OUTPATIENT
Start: 2025-05-25 | End: 2025-05-29 | Stop reason: HOSPADM

## 2025-05-25 RX ORDER — CARVEDILOL 6.25 MG/1
6.25 TABLET ORAL 2 TIMES DAILY WITH MEALS
Status: DISCONTINUED | OUTPATIENT
Start: 2025-05-25 | End: 2025-05-29 | Stop reason: HOSPADM

## 2025-05-25 RX ORDER — BUDESONIDE AND FORMOTEROL FUMARATE DIHYDRATE 160; 4.5 UG/1; UG/1
2 AEROSOL RESPIRATORY (INHALATION)
Status: DISCONTINUED | OUTPATIENT
Start: 2025-05-25 | End: 2025-05-29 | Stop reason: HOSPADM

## 2025-05-25 RX ORDER — ROFLUMILAST 500 UG/1
250 TABLET ORAL DAILY
Status: DISCONTINUED | OUTPATIENT
Start: 2025-05-25 | End: 2025-05-29 | Stop reason: HOSPADM

## 2025-05-25 RX ORDER — LABETALOL HYDROCHLORIDE 5 MG/ML
10 INJECTION, SOLUTION INTRAVENOUS EVERY 4 HOURS PRN
Status: DISCONTINUED | OUTPATIENT
Start: 2025-05-25 | End: 2025-05-29 | Stop reason: HOSPADM

## 2025-05-25 RX ORDER — PREDNISONE 5 MG/1
10 TABLET ORAL DAILY
Status: DISCONTINUED | OUTPATIENT
Start: 2025-05-25 | End: 2025-05-29 | Stop reason: HOSPADM

## 2025-05-25 RX ORDER — ACETAMINOPHEN 325 MG/1
650 TABLET ORAL EVERY 6 HOURS PRN
Status: DISCONTINUED | OUTPATIENT
Start: 2025-05-25 | End: 2025-05-29 | Stop reason: HOSPADM

## 2025-05-25 RX ORDER — CALCIUM CARBONATE 500(1250)
500 TABLET ORAL DAILY
Status: DISCONTINUED | OUTPATIENT
Start: 2025-05-25 | End: 2025-05-25

## 2025-05-25 RX ORDER — SODIUM CHLORIDE, SODIUM LACTATE, POTASSIUM CHLORIDE, CALCIUM CHLORIDE 600; 310; 30; 20 MG/100ML; MG/100ML; MG/100ML; MG/100ML
INJECTION, SOLUTION INTRAVENOUS CONTINUOUS
Status: ACTIVE | OUTPATIENT
Start: 2025-05-25 | End: 2025-05-25

## 2025-05-25 RX ORDER — MONTELUKAST SODIUM 10 MG/1
10 TABLET ORAL NIGHTLY
Status: DISCONTINUED | OUTPATIENT
Start: 2025-05-25 | End: 2025-05-29 | Stop reason: HOSPADM

## 2025-05-25 RX ORDER — SODIUM CHLORIDE 0.9 % (FLUSH) 0.9 %
5-40 SYRINGE (ML) INJECTION EVERY 12 HOURS SCHEDULED
Status: DISCONTINUED | OUTPATIENT
Start: 2025-05-25 | End: 2025-05-29 | Stop reason: HOSPADM

## 2025-05-25 RX ORDER — OXYCODONE AND ACETAMINOPHEN 5; 325 MG/1; MG/1
2 TABLET ORAL EVERY 4 HOURS PRN
Status: DISCONTINUED | OUTPATIENT
Start: 2025-05-25 | End: 2025-05-29 | Stop reason: HOSPADM

## 2025-05-25 RX ORDER — AMLODIPINE BESYLATE 5 MG/1
5 TABLET ORAL 2 TIMES DAILY
Status: DISCONTINUED | OUTPATIENT
Start: 2025-05-25 | End: 2025-05-25 | Stop reason: DRUGHIGH

## 2025-05-25 RX ORDER — POLYETHYLENE GLYCOL 3350 17 G/17G
17 POWDER, FOR SOLUTION ORAL DAILY PRN
Status: DISCONTINUED | OUTPATIENT
Start: 2025-05-25 | End: 2025-05-29 | Stop reason: HOSPADM

## 2025-05-25 RX ORDER — DEXTROSE MONOHYDRATE 100 MG/ML
INJECTION, SOLUTION INTRAVENOUS CONTINUOUS PRN
Status: DISCONTINUED | OUTPATIENT
Start: 2025-05-25 | End: 2025-05-29 | Stop reason: HOSPADM

## 2025-05-25 RX ORDER — ASPIRIN 81 MG/1
81 TABLET ORAL DAILY
Status: DISCONTINUED | OUTPATIENT
Start: 2025-05-25 | End: 2025-05-29 | Stop reason: HOSPADM

## 2025-05-25 RX ORDER — ALBUTEROL SULFATE 0.83 MG/ML
2.5 SOLUTION RESPIRATORY (INHALATION)
Status: DISCONTINUED | OUTPATIENT
Start: 2025-05-25 | End: 2025-05-25

## 2025-05-25 RX ORDER — ENOXAPARIN SODIUM 100 MG/ML
30 INJECTION SUBCUTANEOUS DAILY
Status: DISCONTINUED | OUTPATIENT
Start: 2025-05-25 | End: 2025-05-29

## 2025-05-25 RX ORDER — ONDANSETRON 4 MG/1
4 TABLET, ORALLY DISINTEGRATING ORAL EVERY 8 HOURS PRN
Status: DISCONTINUED | OUTPATIENT
Start: 2025-05-25 | End: 2025-05-29 | Stop reason: HOSPADM

## 2025-05-25 RX ORDER — SODIUM CHLORIDE 9 MG/ML
INJECTION, SOLUTION INTRAVENOUS CONTINUOUS
Status: DISCONTINUED | OUTPATIENT
Start: 2025-05-25 | End: 2025-05-25

## 2025-05-25 RX ORDER — OXYCODONE AND ACETAMINOPHEN 5; 325 MG/1; MG/1
1 TABLET ORAL EVERY 4 HOURS PRN
Status: DISCONTINUED | OUTPATIENT
Start: 2025-05-25 | End: 2025-05-29 | Stop reason: HOSPADM

## 2025-05-25 RX ORDER — ACETAMINOPHEN 650 MG/1
650 SUPPOSITORY RECTAL EVERY 6 HOURS PRN
Status: DISCONTINUED | OUTPATIENT
Start: 2025-05-25 | End: 2025-05-29 | Stop reason: HOSPADM

## 2025-05-25 RX ORDER — ONDANSETRON 2 MG/ML
4 INJECTION INTRAMUSCULAR; INTRAVENOUS EVERY 6 HOURS PRN
Status: DISCONTINUED | OUTPATIENT
Start: 2025-05-25 | End: 2025-05-29 | Stop reason: HOSPADM

## 2025-05-25 RX ORDER — AMLODIPINE BESYLATE 5 MG/1
5 TABLET ORAL DAILY
Status: DISCONTINUED | OUTPATIENT
Start: 2025-05-25 | End: 2025-05-29

## 2025-05-25 RX ORDER — GLUCAGON 1 MG/ML
1 KIT INJECTION PRN
Status: DISCONTINUED | OUTPATIENT
Start: 2025-05-25 | End: 2025-05-29 | Stop reason: HOSPADM

## 2025-05-25 RX ORDER — FLUTICASONE PROPIONATE 50 MCG
1 SPRAY, SUSPENSION (ML) NASAL DAILY PRN
Status: DISCONTINUED | OUTPATIENT
Start: 2025-05-25 | End: 2025-05-29 | Stop reason: HOSPADM

## 2025-05-25 RX ADMIN — AMLODIPINE BESYLATE 5 MG: 5 TABLET ORAL at 13:11

## 2025-05-25 RX ADMIN — ATORVASTATIN CALCIUM 40 MG: 40 TABLET, FILM COATED ORAL at 11:42

## 2025-05-25 RX ADMIN — ROFLUMILAST 250 MCG: 500 TABLET ORAL at 13:09

## 2025-05-25 RX ADMIN — SODIUM CHLORIDE 1000 ML: 0.9 INJECTION, SOLUTION INTRAVENOUS at 00:28

## 2025-05-25 RX ADMIN — MONTELUKAST 10 MG: 10 TABLET, FILM COATED ORAL at 20:56

## 2025-05-25 RX ADMIN — CARVEDILOL 6.25 MG: 6.25 TABLET, FILM COATED ORAL at 18:08

## 2025-05-25 RX ADMIN — BUDESONIDE AND FORMOTEROL FUMARATE DIHYDRATE 2 PUFF: 160; 4.5 AEROSOL RESPIRATORY (INHALATION) at 13:17

## 2025-05-25 RX ADMIN — ENOXAPARIN SODIUM 30 MG: 100 INJECTION SUBCUTANEOUS at 10:00

## 2025-05-25 RX ADMIN — ASPIRIN 81 MG: 81 TABLET, COATED ORAL at 11:42

## 2025-05-25 RX ADMIN — SODIUM CHLORIDE: 0.9 INJECTION, SOLUTION INTRAVENOUS at 05:02

## 2025-05-25 RX ADMIN — SODIUM CHLORIDE, SODIUM LACTATE, POTASSIUM CHLORIDE, AND CALCIUM CHLORIDE: .6; .31; .03; .02 INJECTION, SOLUTION INTRAVENOUS at 15:48

## 2025-05-25 RX ADMIN — PREDNISONE 10 MG: 5 TABLET ORAL at 11:42

## 2025-05-25 ASSESSMENT — PAIN DESCRIPTION - LOCATION: LOCATION: HEAD

## 2025-05-25 ASSESSMENT — PAIN SCALES - GENERAL
PAINLEVEL_OUTOF10: 0
PAINLEVEL_OUTOF10: 5

## 2025-05-25 ASSESSMENT — PAIN - FUNCTIONAL ASSESSMENT: PAIN_FUNCTIONAL_ASSESSMENT: PREVENTS OR INTERFERES SOME ACTIVE ACTIVITIES AND ADLS

## 2025-05-25 ASSESSMENT — PAIN DESCRIPTION - DESCRIPTORS: DESCRIPTORS: ACHING;DISCOMFORT

## 2025-05-25 ASSESSMENT — PAIN DESCRIPTION - PAIN TYPE: TYPE: ACUTE PAIN

## 2025-05-25 ASSESSMENT — PAIN SCALES - WONG BAKER: WONGBAKER_NUMERICALRESPONSE: NO HURT

## 2025-05-25 ASSESSMENT — PAIN DESCRIPTION - FREQUENCY: FREQUENCY: INTERMITTENT

## 2025-05-25 ASSESSMENT — PAIN DESCRIPTION - ORIENTATION: ORIENTATION: ANTERIOR

## 2025-05-25 ASSESSMENT — PAIN DESCRIPTION - ONSET: ONSET: ON-GOING

## 2025-05-25 NOTE — SUBJECTIVE & OBJECTIVE
Subjective:     ***    Objective:     Vitals:    05/25/25 0100 05/25/25 0115 05/25/25 0130 05/25/25 0145   BP: 127/78 124/87 125/71    Pulse: 76 78 81 81   Resp: 19 17 18 18   Temp:       TempSrc:       SpO2: 96% 97% 97% 98%   Weight:            Intake andOutput:  Current Shift: No intake/output data recorded.  Last three shifts: No intake/output data recorded.     Physical Exam      Medications:  No current facility-administered medications for this encounter.     Current Outpatient Medications   Medication Sig   • Roflumilast (DALIRESP) 250 MCG tablet TAKE 1 TABLET BY MOUTH EVERY DAY   • atorvastatin (LIPITOR) 40 MG tablet TAKE 1 TABLET BY MOUTH EVERY DAY   • montelukast (SINGULAIR) 10 MG tablet TAKE 1 TABLET BY MOUTH EVERY DAY AT NIGHT   • carvedilol (COREG) 6.25 MG tablet TAKE 1 TABLET BY MOUTH TWICE A DAY   • predniSONE (DELTASONE) 10 MG tablet Take 1 tablet by mouth daily   • scopolamine (TRANSDERM-SCOP) transdermal patch Place 1 patch onto the skin every 72 hours (Patient not taking: Reported on 3/17/2025)   • aspirin 81 MG EC tablet Take 1 tablet by mouth daily   • amLODIPine (NORVASC) 5 MG tablet TAKE 1 TABLET BY MOUTH TWICE A DAY   • FREESTYLE LITE strip TEST TWICE A DAY AND AS NEEDED FOR IRREGULAR BLOOD SUGAR SYMPTOMS   • albuterol sulfate HFA (PROVENTIL;VENTOLIN;PROAIR) 108 (90 Base) MCG/ACT inhaler INHALE 2 PUFFS INTO THE LUNGS EVERY 6 HOURS AS NEEDED.   • acetaminophen (TYLENOL) 500 MG tablet Take 1 tablet by mouth 4 times daily as needed for Pain   • budesonide-formoterol (SYMBICORT) 160-4.5 MCG/ACT AERO TAKE 2 PUFFS BY MOUTH TWICE A DAY   • fluticasone (FLONASE) 50 MCG/ACT nasal spray SPRAY 1 SPRAY INTO EACH NOSTRIL EVERY DAY   • calcium carbonate (OSCAL) 500 MG TABS tablet Take 1 tablet by mouth daily   • Multiple Vitamin (MULTIVITAMIN PO) Take 1 tablet by mouth daily        Medications Reviewed    :

## 2025-05-25 NOTE — H&P
V2.0  History and Physical      Name:  Fidelia Torres /Age/Sex: 1950  (74 y.o. female)   MRN & CSN:  [id number] & [id number] Encounter Date/Time: 2025 3:58 AM EDT   Location:   PCP: Albina Valdes APRN - CNP       Hospital Day: 2    Assessment and Plan:   Fidelia Torres is a 74 y.o. female with a pmh of COPD and hypertension who presents with Closed compression fracture of sacrum, initial encounter ([facility])    Hospital Problems           Last Modified POA    * (Principal) Sacral fracture, closed ([facility]) 2025 Yes    Type 2 diabetes mellitus with stage 3a chronic kidney disease, without long-term current use of insulin ([facility]) 2024 Yes    Overview Signed 2023 11:53 AM by Albina Valdes APRN - CNP   Last Assessment & Plan: Formatting of this note might be different from the original.  Well-controlled, continue current medications         VIOLA (acute kidney injury) 2025 Yes    Fall 2025 Yes    General weakness 2025 Yes    COPD without exacerbation ([facility]) 2025 Yes       Plan:  PT and OT to work with patient.  As needed Percocet for pain  COPD not in exacerbation-as needed albuterol ordered per  Blood pressure is not within goal.  Resume home blood pressure medication when verified.  As needed labetalol ordered.  Diabetes mellitus type 2 without use of home insulin-blood glucose is stable.  Diabetic diet.  Trend BMP.  Hypoglycemic protocol in place.      Advance care planning:  Advanced care planning was discussed with patient for a total of  16 minutes.  Full code, DNR CC, DNR CCA, power of  and living will were discussed. Patient elected to be DNR CCA.  Disposition:   Current Living situation: Home  Expected Disposition: Likely SNF  Estimated D/C: 2 DAYS    Diet Regular deit   DVT Prophylaxis [x] Lovenox, []  Heparin, [] SCDs, [] Ambulation,  [] Eliquis, [] Xarelto, [] Coumadin   Code Status DNR-CCA   Surrogate Decision Maker/ POA Niece, Corman and nephew,

## 2025-05-25 NOTE — ED PROVIDER NOTES
[facility] EMERGENCY DEPARTMENT  EMERGENCY DEPARTMENT ENCOUNTER        Pt Name: Fidelia Torres  MRN: [id number]  Birthdate 1950  Date of evaluation: 5/24/2025  Provider: ROSINA Barrios CNP  PCP: Albina Valdes APRN - CNP  Note Started: 8:43 PM EDT 5/24/25      RIVERA. I have evaluated this patient.        CHIEF COMPLAINT       Chief Complaint   Patient presents with    Fall    Fatigue     Patient presents with generalized weakness and recent falls at home. She states she slipped and fell on the driveway and hit her head during the fall and has some head pain as well. She was brought in by private car per her nephew that found her on the floor and she was there all night. She also report decreased appetite and oral intake.        HISTORY OF PRESENT ILLNESS: 1 or more Elements     History From: Patient  Limitations to history : None    Fidelia Torres is a 74 y.o. female patient of care for HTN, HLD, DM, and asthma who presents to the emergency department today complaining of weakness and falls.  Patient reports that 5 days ago she was walking to her house from her car, and she slipped and the rain.  She denies any injuries from this fall.  She reports her neighbor helped get her back into the house, and she has not had any pain since that fall.  She then reports that last night her feet got away from her and she fell and hit the back of her head.  She denies loss of consciousness.  She denies neck pain, but has mild pain in the low back.  She reports she laid in the floor all night so her nephew came and helped her up today around 1100.  She is alert and oriented to person place and situation.    Nursing Notes were all reviewed and agreed with or any disagreements were addressed in the HPI.    REVIEW OF SYSTEMS :      Review of Systems   Constitutional:  Negative for chills, diaphoresis and fever.   HENT: Negative.     Eyes:  Negative for visual disturbance.   Respiratory:  Negative for

## 2025-05-25 NOTE — ED NOTES
ED TO INPATIENT SBAR HANDOFF    Patient Name: Fidelia Torres   Preferred Name: Fidelia  : 1950  74 y.o.   Family/Caregiver Present: no   Code Status Order: Prior  PO Status: NPO:No  Telemetry Order:   C-SSRS: Risk of Suicide: No Risk  Sitter no   Restraints:     Sepsis Risk Score      Situation  Chief Complaint   Patient presents with    Fall    Fatigue     Patient presents with generalized weakness and recent falls at home. She states she slipped and fell on the driveway and hit her head during the fall and has some head pain as well. She was brought in by private car per her nephew that found her on the floor and she was there all night. She also report decreased appetite and oral intake.      Brief Description of Patient's Condition: Fidelia Torres is a 74 y.o. female patient of care for HTN, HLD, DM, and asthma who presents to the emergency department today complaining of weakness and falls.  Patient reports that 5 days ago she was walking to her house from her car, and she slipped and the rain.  She denies any injuries from this fall.  She reports her neighbor helped get her back into the house, and she has not had any pain since that fall.  She then reports that last night her feet got away from her and she fell and hit the back of her head.  She denies loss of consciousness.  She denies neck pain, but has mild pain in the low back.  She reports she laid in the floor all night so her nephew came and helped her up today around 1100.  She is alert and oriented to person place and situation.   Mental Status: oriented, alert, coherent, logical, thought processes intact, and able to concentrate and follow conversation  Arrived from:Home  Imaging:   CT PELVIS WO CONTRAST Additional Contrast? None   Preliminary Result   1. No acute fracture of the bilateral hips.   2. Impacted fracture involving S5 and the coccyx, age indeterminate but new   since 2024.         XR HIP BILATERAL W AP PELVIS (2 VIEWS)

## 2025-05-25 NOTE — CONSULTS
PATIENT NAME:                     Fidelia Torres  YOB: 1950   MEDICAL RECORD#         [id number]  SURGEON:                 Tyler Treviño MD      CHIEF COMPLAINT: right hip pain.     HISTORY OF PRESENT ILLNESS: Ms. Fidelia Torres is a  74 y.o. female who reportedly fell in her driveway earlier this week.  The patient has generalized weakness and has not been eating well according to the nephew.  The patient denies any numbness or tingling.  She is complaining of low back pain and no other complaints.        PAST MEDICAL HISTORY:   Past Medical History:   Diagnosis Date    Allergic rhinitis     Asthma     COPD (chronic obstructive pulmonary disease) (HCC)     pt denies    COPD without exacerbation (HCC) 5/25/2025    Diabetes mellitus (HCC)     Hepatitis     autoimmune    Hyperlipidemia     Hypertension     Influenza A 01/10/2015    Osteoporosis         MEDICATIONS: The patient's medication reconciliation form was extensively reviewed and noted.     ALLERGIES:   Allergies   Allergen Reactions    Iodine Shortness Of Breath    Shellfish-Derived Products Rash     Shortness of breath    Other/Food      Steroid (injection) taken for hepatitis elevated liver enzymes, pt. Uncertain of name        SOCIAL HISTORY:   Social History     Socioeconomic History    Marital status:      Spouse name: Kayla    Number of children: 0    Years of education: 14    Highest education level: Not on file   Occupational History    Occupation: /   Tobacco Use    Smoking status: Never    Smokeless tobacco: Never   Vaping Use    Vaping status: Never Used   Substance and Sexual Activity    Alcohol use: Not Currently     Comment: rare - few times a year    Drug use: No    Sexual activity: Defer   Other Topics Concern    Not on file   Social History Narrative    Not on file     Social Drivers of Health     Financial Resource Strain: Low Risk  (5/1/2024)    Overall Financial Resource Strain (CARDIA)

## 2025-05-25 NOTE — ED TRIAGE NOTES
Patient presents with generalized weakness and recent falls at home. She states she slipped and fell on the driveway and hit her head during the fall and has some head pain as well. She was brought in by private car per her nephew that found her on the floor and she was there all night. She also report decreased appetite and oral intake.

## 2025-05-25 NOTE — ED NOTES
Attempted to ambulate pt, however pt was took weak to stand.   Electronically signed by Phil Mcguire RN on 5/25/2025 at 2:03 AM

## 2025-05-26 LAB
ANION GAP SERPL CALCULATED.3IONS-SCNC: 11 MMOL/L (ref 3–16)
BACTERIA URNS QL MICRO: ABNORMAL /HPF
BASOPHILS # BLD: 0 K/UL (ref 0–0.2)
BASOPHILS NFR BLD: 0.5 %
BILIRUB UR QL STRIP.AUTO: NEGATIVE
BUN SERPL-MCNC: 24 MG/DL (ref 7–20)
CALCIUM SERPL-MCNC: 8.6 MG/DL (ref 8.3–10.6)
CHARACTER UR: ABNORMAL
CHLORIDE SERPL-SCNC: 103 MMOL/L (ref 99–110)
CHLORIDE UR-SCNC: <20 MMOL/L
CK SERPL-CCNC: 54 U/L (ref 26–192)
CLARITY UR: ABNORMAL
CO2 SERPL-SCNC: 21 MMOL/L (ref 21–32)
COLOR UR: YELLOW
CREAT SERPL-MCNC: 2.1 MG/DL (ref 0.6–1.2)
CREAT UR-MCNC: 267 MG/DL (ref 28–259)
DEPRECATED RDW RBC AUTO: 15.1 % (ref 12.4–15.4)
EOSINOPHIL # BLD: 0 K/UL (ref 0–0.6)
EOSINOPHIL NFR BLD: 0.1 %
EPI CELLS #/AREA URNS HPF: ABNORMAL /HPF (ref 0–5)
EST. AVERAGE GLUCOSE BLD GHB EST-MCNC: 102.5 MG/DL
GFR SERPLBLD CREATININE-BSD FMLA CKD-EPI: 24 ML/MIN/{1.73_M2}
GLUCOSE BLD-MCNC: 116 MG/DL (ref 70–99)
GLUCOSE BLD-MCNC: 117 MG/DL (ref 70–99)
GLUCOSE BLD-MCNC: 175 MG/DL (ref 70–99)
GLUCOSE BLD-MCNC: 179 MG/DL (ref 70–99)
GLUCOSE SERPL-MCNC: 139 MG/DL (ref 70–99)
GLUCOSE UR STRIP.AUTO-MCNC: NEGATIVE MG/DL
HBA1C MFR BLD: 5.2 %
HCT VFR BLD AUTO: 28.6 % (ref 36–48)
HGB BLD-MCNC: 9.6 G/DL (ref 12–16)
HGB UR QL STRIP.AUTO: NEGATIVE
HYALINE CASTS #/AREA URNS LPF: ABNORMAL /LPF (ref 0–2)
KETONES UR STRIP.AUTO-MCNC: ABNORMAL MG/DL
LEUKOCYTE ESTERASE UR QL STRIP.AUTO: ABNORMAL
LYMPHOCYTES # BLD: 1.3 K/UL (ref 1–5.1)
LYMPHOCYTES NFR BLD: 17.4 %
MCH RBC QN AUTO: 30.1 PG (ref 26–34)
MCHC RBC AUTO-ENTMCNC: 33.7 G/DL (ref 31–36)
MCV RBC AUTO: 89.3 FL (ref 80–100)
MONOCYTES # BLD: 0.6 K/UL (ref 0–1.3)
MONOCYTES NFR BLD: 8.1 %
MUCOUS THREADS #/AREA URNS LPF: ABNORMAL /LPF
NEUTROPHILS # BLD: 5.4 K/UL (ref 1.7–7.7)
NEUTROPHILS NFR BLD: 73.9 %
NITRITE UR QL STRIP.AUTO: NEGATIVE
PERFORMED ON: ABNORMAL
PH UR STRIP.AUTO: 5.5 [PH] (ref 5–8)
PLATELET # BLD AUTO: 360 K/UL (ref 135–450)
PMV BLD AUTO: 7.8 FL (ref 5–10.5)
POTASSIUM SERPL-SCNC: 3.7 MMOL/L (ref 3.5–5.1)
POTASSIUM UR-SCNC: 33.3 MMOL/L
PROT UR STRIP.AUTO-MCNC: 30 MG/DL
RBC # BLD AUTO: 3.2 M/UL (ref 4–5.2)
RBC #/AREA URNS HPF: ABNORMAL /HPF (ref 0–4)
SODIUM SERPL-SCNC: 135 MMOL/L (ref 136–145)
SODIUM UR-SCNC: 27 MMOL/L
SODIUM UR-SCNC: 28 MMOL/L
SP GR UR STRIP.AUTO: 1.02 (ref 1–1.03)
UA COMPLETE W REFLEX CULTURE PNL UR: YES
UA DIPSTICK W REFLEX MICRO PNL UR: YES
URN SPEC COLLECT METH UR: ABNORMAL
UROBILINOGEN UR STRIP-ACNC: 1 E.U./DL
WBC # BLD AUTO: 7.3 K/UL (ref 4–11)
WBC #/AREA URNS HPF: ABNORMAL /HPF (ref 0–5)

## 2025-05-26 PROCEDURE — 6370000000 HC RX 637 (ALT 250 FOR IP): Performed by: STUDENT IN AN ORGANIZED HEALTH CARE EDUCATION/TRAINING PROGRAM

## 2025-05-26 PROCEDURE — 87086 URINE CULTURE/COLONY COUNT: CPT

## 2025-05-26 PROCEDURE — G0378 HOSPITAL OBSERVATION PER HR: HCPCS

## 2025-05-26 PROCEDURE — 97161 PT EVAL LOW COMPLEX 20 MIN: CPT

## 2025-05-26 PROCEDURE — 81001 URINALYSIS AUTO W/SCOPE: CPT

## 2025-05-26 PROCEDURE — 82550 ASSAY OF CK (CPK): CPT

## 2025-05-26 PROCEDURE — 82570 ASSAY OF URINE CREATININE: CPT

## 2025-05-26 PROCEDURE — 97116 GAIT TRAINING THERAPY: CPT

## 2025-05-26 PROCEDURE — 80048 BASIC METABOLIC PNL TOTAL CA: CPT

## 2025-05-26 PROCEDURE — 85025 COMPLETE CBC W/AUTO DIFF WBC: CPT

## 2025-05-26 PROCEDURE — 83036 HEMOGLOBIN GLYCOSYLATED A1C: CPT

## 2025-05-26 PROCEDURE — 84133 ASSAY OF URINE POTASSIUM: CPT

## 2025-05-26 PROCEDURE — 6360000002 HC RX W HCPCS: Performed by: STUDENT IN AN ORGANIZED HEALTH CARE EDUCATION/TRAINING PROGRAM

## 2025-05-26 PROCEDURE — 2500000003 HC RX 250 WO HCPCS: Performed by: STUDENT IN AN ORGANIZED HEALTH CARE EDUCATION/TRAINING PROGRAM

## 2025-05-26 PROCEDURE — 97535 SELF CARE MNGMENT TRAINING: CPT

## 2025-05-26 PROCEDURE — 36415 COLL VENOUS BLD VENIPUNCTURE: CPT

## 2025-05-26 PROCEDURE — 97530 THERAPEUTIC ACTIVITIES: CPT

## 2025-05-26 PROCEDURE — 84300 ASSAY OF URINE SODIUM: CPT

## 2025-05-26 PROCEDURE — 6360000002 HC RX W HCPCS: Performed by: HOSPITALIST

## 2025-05-26 PROCEDURE — 94640 AIRWAY INHALATION TREATMENT: CPT

## 2025-05-26 PROCEDURE — 94760 N-INVAS EAR/PLS OXIMETRY 1: CPT

## 2025-05-26 PROCEDURE — 97165 OT EVAL LOW COMPLEX 30 MIN: CPT

## 2025-05-26 PROCEDURE — 2580000003 HC RX 258: Performed by: INTERNAL MEDICINE

## 2025-05-26 PROCEDURE — 82436 ASSAY OF URINE CHLORIDE: CPT

## 2025-05-26 RX ORDER — SODIUM CHLORIDE 9 MG/ML
INJECTION, SOLUTION INTRAVENOUS CONTINUOUS
Status: DISCONTINUED | OUTPATIENT
Start: 2025-05-26 | End: 2025-05-28

## 2025-05-26 RX ADMIN — SODIUM CHLORIDE: 0.9 INJECTION, SOLUTION INTRAVENOUS at 14:37

## 2025-05-26 RX ADMIN — ASPIRIN 81 MG: 81 TABLET, COATED ORAL at 09:03

## 2025-05-26 RX ADMIN — ROFLUMILAST 250 MCG: 500 TABLET ORAL at 09:06

## 2025-05-26 RX ADMIN — AMLODIPINE BESYLATE 5 MG: 5 TABLET ORAL at 09:03

## 2025-05-26 RX ADMIN — MONTELUKAST 10 MG: 10 TABLET, FILM COATED ORAL at 21:04

## 2025-05-26 RX ADMIN — PREDNISONE 10 MG: 5 TABLET ORAL at 09:03

## 2025-05-26 RX ADMIN — WATER 1000 MG: 1 INJECTION INTRAMUSCULAR; INTRAVENOUS; SUBCUTANEOUS at 14:37

## 2025-05-26 RX ADMIN — ATORVASTATIN CALCIUM 40 MG: 40 TABLET, FILM COATED ORAL at 09:03

## 2025-05-26 RX ADMIN — CARVEDILOL 6.25 MG: 6.25 TABLET, FILM COATED ORAL at 18:27

## 2025-05-26 RX ADMIN — BUDESONIDE AND FORMOTEROL FUMARATE DIHYDRATE 2 PUFF: 160; 4.5 AEROSOL RESPIRATORY (INHALATION) at 08:47

## 2025-05-26 RX ADMIN — ENOXAPARIN SODIUM 30 MG: 100 INJECTION SUBCUTANEOUS at 09:04

## 2025-05-26 RX ADMIN — CARVEDILOL 6.25 MG: 6.25 TABLET, FILM COATED ORAL at 09:04

## 2025-05-26 ASSESSMENT — PAIN SCALES - GENERAL: PAINLEVEL_OUTOF10: 0

## 2025-05-26 NOTE — CONSULTS
[facility]  Phone: [phone]  Fax: [phone]  [employer]         Reason for Consult: VIOLA on CKD 3  Requesting Physician:  Dr. Weiss    History Obtained From:  patient, electronic medical record    History of Present Ilness: 74-year-old -American female with history of CKD stage III follows with Dr. Galindo, baseline creatinine of 1.3 to 1.5 mg  Has history of hypertension, diabetes mellitus, autoimmune hepatitis  History of VIOLA in August 2024  Admitted after a fall in her driveway  She fell backwards and hit her bottom and her head  Denies loss of consciousness  Her neighbor helped her up and and moved her in the house where she was laying for a long time because she was too weak to get up  She had not been eating or drinking at all  Denies urinary symptoms, decrease in urine output  No vomiting diarrhea  Denies taking nonsteroidal anti-inflammatory drugs  CT scan showed nondisplaced fracture of the sacrococcygeal area  Noted to have creatinine of 1.9 mg increased to 2.1 mg today    Past Medical History:        Diagnosis Date    Allergic rhinitis     Asthma     COPD (chronic obstructive pulmonary disease) (HCC)     pt denies    COPD without exacerbation (HCC) 5/25/2025    Diabetes mellitus (HCC)     Hepatitis     autoimmune    Hyperlipidemia     Hypertension     Influenza A 01/10/2015    Osteoporosis        Past Surgical History:        Procedure Laterality Date    BREAST BIOPSY  10/2005    Dr. Cameron reid , benign     BREAST SURGERY Right 12/18/2023    EXCISION OF RIGHT AXILLARY MASS performed by Lillie Sandoval MD at [facility] OR    CARDIAC PROCEDURE N/A 9/17/2024    Left heart cath / coronary angiography performed by Stephen Flores MD at [facility] CARDIAC CATH LAB    CATARACT REMOVAL Bilateral     COLONOSCOPY      Dr. Mcleod  - WNL,repeat 10 yrs     COLONOSCOPY  07/08/13    Dr. Mcleod- internal hemorrhoids, adenomatous and hyperplastic polyps. Repeat in 3 yrs

## 2025-05-26 NOTE — CARE COORDINATION
05/26/25 1343   IMM Letter   Observation Status Letter date given: 05/26/25   Observation Status Letter time given: 1325   Observation Status Letter given to Patient/Family/Significant other/Guardian/POA/by: RAKESH letter and Code 44 letter given to patient  hs

## 2025-05-27 PROBLEM — N17.9 ACUTE KIDNEY INJURY: Status: ACTIVE | Noted: 2025-05-27

## 2025-05-27 LAB
ALBUMIN SERPL-MCNC: 3.2 G/DL (ref 3.4–5)
ANION GAP SERPL CALCULATED.3IONS-SCNC: 11 MMOL/L (ref 3–16)
BACTERIA UR CULT: NORMAL
BUN SERPL-MCNC: 23 MG/DL (ref 7–20)
CALCIUM SERPL-MCNC: 7.9 MG/DL (ref 8.3–10.6)
CHLORIDE SERPL-SCNC: 108 MMOL/L (ref 99–110)
CO2 SERPL-SCNC: 19 MMOL/L (ref 21–32)
CREAT SERPL-MCNC: 1.8 MG/DL (ref 0.6–1.2)
GFR SERPLBLD CREATININE-BSD FMLA CKD-EPI: 29 ML/MIN/{1.73_M2}
GLUCOSE BLD-MCNC: 100 MG/DL (ref 70–99)
GLUCOSE BLD-MCNC: 130 MG/DL (ref 70–99)
GLUCOSE BLD-MCNC: 98 MG/DL (ref 70–99)
GLUCOSE SERPL-MCNC: 119 MG/DL (ref 70–99)
PERFORMED ON: ABNORMAL
PERFORMED ON: ABNORMAL
PERFORMED ON: NORMAL
PHOSPHATE SERPL-MCNC: 2.2 MG/DL (ref 2.5–4.9)
POTASSIUM SERPL-SCNC: 3.4 MMOL/L (ref 3.5–5.1)
SODIUM SERPL-SCNC: 138 MMOL/L (ref 136–145)

## 2025-05-27 PROCEDURE — 6370000000 HC RX 637 (ALT 250 FOR IP): Performed by: STUDENT IN AN ORGANIZED HEALTH CARE EDUCATION/TRAINING PROGRAM

## 2025-05-27 PROCEDURE — 6370000000 HC RX 637 (ALT 250 FOR IP): Performed by: HOSPITALIST

## 2025-05-27 PROCEDURE — 2500000003 HC RX 250 WO HCPCS: Performed by: STUDENT IN AN ORGANIZED HEALTH CARE EDUCATION/TRAINING PROGRAM

## 2025-05-27 PROCEDURE — 6360000002 HC RX W HCPCS: Performed by: HOSPITALIST

## 2025-05-27 PROCEDURE — 2580000003 HC RX 258: Performed by: INTERNAL MEDICINE

## 2025-05-27 PROCEDURE — G0378 HOSPITAL OBSERVATION PER HR: HCPCS

## 2025-05-27 PROCEDURE — 2500000003 HC RX 250 WO HCPCS: Performed by: HOSPITALIST

## 2025-05-27 PROCEDURE — 6360000002 HC RX W HCPCS: Performed by: STUDENT IN AN ORGANIZED HEALTH CARE EDUCATION/TRAINING PROGRAM

## 2025-05-27 PROCEDURE — 1200000000 HC SEMI PRIVATE

## 2025-05-27 PROCEDURE — 36415 COLL VENOUS BLD VENIPUNCTURE: CPT

## 2025-05-27 PROCEDURE — 97535 SELF CARE MNGMENT TRAINING: CPT

## 2025-05-27 PROCEDURE — 80069 RENAL FUNCTION PANEL: CPT

## 2025-05-27 RX ORDER — POTASSIUM CHLORIDE 1500 MG/1
40 TABLET, EXTENDED RELEASE ORAL ONCE
Status: COMPLETED | OUTPATIENT
Start: 2025-05-27 | End: 2025-05-27

## 2025-05-27 RX ORDER — OXYCODONE HYDROCHLORIDE 5 MG/1
5-10 TABLET ORAL EVERY 6 HOURS PRN
Qty: 40 TABLET | Refills: 0 | Status: SHIPPED | OUTPATIENT
Start: 2025-05-27 | End: 2025-06-01

## 2025-05-27 RX ADMIN — SODIUM CHLORIDE, PRESERVATIVE FREE 10 ML: 5 INJECTION INTRAVENOUS at 09:29

## 2025-05-27 RX ADMIN — ATORVASTATIN CALCIUM 40 MG: 40 TABLET, FILM COATED ORAL at 09:25

## 2025-05-27 RX ADMIN — SODIUM CHLORIDE: 0.9 INJECTION, SOLUTION INTRAVENOUS at 20:36

## 2025-05-27 RX ADMIN — ROFLUMILAST 250 MCG: 500 TABLET ORAL at 09:25

## 2025-05-27 RX ADMIN — SODIUM CHLORIDE: 0.9 INJECTION, SOLUTION INTRAVENOUS at 09:44

## 2025-05-27 RX ADMIN — CARVEDILOL 6.25 MG: 6.25 TABLET, FILM COATED ORAL at 09:24

## 2025-05-27 RX ADMIN — OXYCODONE AND ACETAMINOPHEN 1 TABLET: 5; 325 TABLET ORAL at 04:05

## 2025-05-27 RX ADMIN — WATER 1000 MG: 1 INJECTION INTRAMUSCULAR; INTRAVENOUS; SUBCUTANEOUS at 16:33

## 2025-05-27 RX ADMIN — SODIUM CHLORIDE: 0.9 INJECTION, SOLUTION INTRAVENOUS at 00:36

## 2025-05-27 RX ADMIN — POTASSIUM CHLORIDE 40 MEQ: 1500 TABLET, EXTENDED RELEASE ORAL at 09:26

## 2025-05-27 RX ADMIN — MONTELUKAST 10 MG: 10 TABLET, FILM COATED ORAL at 20:37

## 2025-05-27 RX ADMIN — PREDNISONE 10 MG: 5 TABLET ORAL at 09:25

## 2025-05-27 RX ADMIN — AMLODIPINE BESYLATE 5 MG: 5 TABLET ORAL at 09:25

## 2025-05-27 RX ADMIN — BUDESONIDE AND FORMOTEROL FUMARATE DIHYDRATE 2 PUFF: 160; 4.5 AEROSOL RESPIRATORY (INHALATION) at 11:47

## 2025-05-27 RX ADMIN — ASPIRIN 81 MG: 81 TABLET, COATED ORAL at 09:24

## 2025-05-27 RX ADMIN — CARVEDILOL 6.25 MG: 6.25 TABLET, FILM COATED ORAL at 17:52

## 2025-05-27 RX ADMIN — ENOXAPARIN SODIUM 30 MG: 100 INJECTION SUBCUTANEOUS at 09:26

## 2025-05-27 ASSESSMENT — PAIN SCALES - GENERAL
PAINLEVEL_OUTOF10: 0
PAINLEVEL_OUTOF10: 8
PAINLEVEL_OUTOF10: 0

## 2025-05-27 ASSESSMENT — PAIN DESCRIPTION - PAIN TYPE: TYPE: ACUTE PAIN

## 2025-05-27 ASSESSMENT — PAIN - FUNCTIONAL ASSESSMENT: PAIN_FUNCTIONAL_ASSESSMENT: PREVENTS OR INTERFERES SOME ACTIVE ACTIVITIES AND ADLS

## 2025-05-27 ASSESSMENT — PAIN DESCRIPTION - FREQUENCY: FREQUENCY: INTERMITTENT

## 2025-05-27 ASSESSMENT — PAIN DESCRIPTION - DESCRIPTORS: DESCRIPTORS: ACHING

## 2025-05-27 ASSESSMENT — PAIN DESCRIPTION - ONSET: ONSET: GRADUAL

## 2025-05-27 NOTE — CARE COORDINATION
05/27/25 1257   Service Assessment   Patient Orientation Alert and Oriented;Person;Place;Situation   Cognition Alert   History Provided By Patient   Primary Caregiver Self   Accompanied By/Relationship no one   Support Systems Family Members   Patient's Healthcare Decision Maker is: Legal Next of Kin   PCP Verified by CM Yes   Last Visit to PCP Within last 6 months   Prior Functional Level Independent in ADLs/IADLs   Current Functional Level Assistance with the following:;Mobility;Bathing;Dressing;Toileting;Cooking;Shopping;Housework   Can patient return to prior living arrangement No   Ability to make needs known: Good   Family able to assist with home care needs: No   Would you like for me to discuss the discharge plan with any other family members/significant others, and if so, who? Yes  (brother Ravi)   Financial Resources Medicare  ([employer] medicare)   Community Resources None   CM/SW Referral Other (see comment)  (dc needs)   Discharge Planning   Type of Residence House   Living Arrangements Alone   Current Services Prior To Admission Durable Medical Equipment   Current DME Prior to Arrival Shower Chair;Cane;Walker  (Grab bars in shower-Alert button-rollator)   Potential Assistance Needed Skilled Nursing Facility   DME Ordered? No   Potential Assistance Purchasing Medications No   Type of Home Care Services None   Patient expects to be discharged to: Skilled nursing facility   Services At/After Discharge   [place] Resource Information Provided? No   Mode of Transport at Discharge BLS   Condition of Participation: Discharge Planning   The Plan for Transition of Care is related to the following treatment goals: skilled facility   The Patient and/or Patient Representative was provided with a Choice of Provider? Patient   The Patient and/Or Patient Representative agree with the Discharge Plan? Yes   Freedom of Choice list was provided with basic dialogue that supports the patient's individualized plan of

## 2025-05-27 NOTE — DISCHARGE INSTR - COC
[facility] Continuity of Care Form    Patient Name:  Fidelia Torres  : 1950    MRN:  [id number]    Admit date:  2025  Discharge date:  25    Code Status Order: DNR-CCA  Advance Directives: No    Admitting Physician: Jm Dahl MD  PCP: Albina Valdes, APRN - CNP    Discharging Nurse: WILDER Man  Discharging Hospital Unit/Room#: X0A-2032/3131-01  Discharging Unit Phone Number: [phone]    Emergency Contact:        Past Surgical History:  Past Surgical History:   Procedure Laterality Date    BREAST BIOPSY  10/2005    Dr. Cameron reid , benign     BREAST SURGERY Right 2023    EXCISION OF RIGHT AXILLARY MASS performed by Lillie Sandoval MD at [facility] OR    CARDIAC PROCEDURE N/A 2024    Left heart cath / coronary angiography performed by Stephen Flores MD at [facility] CARDIAC CATH LAB    CATARACT REMOVAL Bilateral     COLONOSCOPY      Dr. Mcleod  - WNL,repeat 10 yrs     COLONOSCOPY  13    Dr. Mcleod- internal hemorrhoids, adenomatous and hyperplastic polyps. Repeat in 3 yrs    COLONOSCOPY  2016    Dr. Mcleod, No evidence of any colorectal neoplasia ~5 years    COLONOSCOPY  2021    COLONOSCOPY POLYPECTOMY SNARE/COLD BIOPSY performed by Chance SPRAGUE MD at [facility] ENDOSCOPY    LIVER BIOPSY  2006     autoimmune hepatitis     UPPER GASTROINTESTINAL ENDOSCOPY N/A 2022    ESOPHAGOGASTRODUODENOSCOPY performed by Chance SPRAGUE MD at [facility] ENDOSCOPY       Immunization History:   Immunization History   Administered Date(s) Administered    COVID-19, MODERNA BLUE border, Primary or Immunocompromised, (age 12y+), IM, 100 mcg/0.5mL 2021, 2021, 2021, 2022    COVID-19, MODERNA Booster BLUE border, (age 18y+), IM, 50mcg/0.25mL 2021, 2022    COVID-19, MODERNA, , (age 12y+), IM, 50mcg/0.5mL 10/23/2023    Influenza 10/25/2012, 10/01/2013    Influenza A (D8D7-10) Vaccine PF IM 2009    Influenza

## 2025-05-27 NOTE — ACP (ADVANCE CARE PLANNING)
Advance Care Planning   Healthcare Decision Maker:    Primary Decision Maker: Jack Cruz - Brother/Sister - [phone]    Secondary Decision Maker: Devika Pérez - Niece/Nephew - [phone]     Electronically signed by Ayse Fowler on 5/27/2025 at 1:24 PM  #[phone]

## 2025-05-28 LAB
ALBUMIN SERPL-MCNC: 2.9 G/DL (ref 3.4–5)
ANION GAP SERPL CALCULATED.3IONS-SCNC: 9 MMOL/L (ref 3–16)
BUN SERPL-MCNC: 18 MG/DL (ref 7–20)
CALCIUM SERPL-MCNC: 7.5 MG/DL (ref 8.3–10.6)
CHLORIDE SERPL-SCNC: 113 MMOL/L (ref 99–110)
CO2 SERPL-SCNC: 18 MMOL/L (ref 21–32)
CREAT SERPL-MCNC: 1.4 MG/DL (ref 0.6–1.2)
GFR SERPLBLD CREATININE-BSD FMLA CKD-EPI: 40 ML/MIN/{1.73_M2}
GLUCOSE BLD-MCNC: 101 MG/DL (ref 70–99)
GLUCOSE BLD-MCNC: 142 MG/DL (ref 70–99)
GLUCOSE BLD-MCNC: 89 MG/DL (ref 70–99)
GLUCOSE SERPL-MCNC: 103 MG/DL (ref 70–99)
PERFORMED ON: ABNORMAL
PERFORMED ON: ABNORMAL
PERFORMED ON: NORMAL
PHOSPHATE SERPL-MCNC: 2 MG/DL (ref 2.5–4.9)
POTASSIUM SERPL-SCNC: 3.7 MMOL/L (ref 3.5–5.1)
SODIUM SERPL-SCNC: 140 MMOL/L (ref 136–145)

## 2025-05-28 PROCEDURE — 6370000000 HC RX 637 (ALT 250 FOR IP): Performed by: STUDENT IN AN ORGANIZED HEALTH CARE EDUCATION/TRAINING PROGRAM

## 2025-05-28 PROCEDURE — 97110 THERAPEUTIC EXERCISES: CPT

## 2025-05-28 PROCEDURE — 6360000002 HC RX W HCPCS: Performed by: STUDENT IN AN ORGANIZED HEALTH CARE EDUCATION/TRAINING PROGRAM

## 2025-05-28 PROCEDURE — 97530 THERAPEUTIC ACTIVITIES: CPT

## 2025-05-28 PROCEDURE — 2580000003 HC RX 258: Performed by: INTERNAL MEDICINE

## 2025-05-28 PROCEDURE — 2500000003 HC RX 250 WO HCPCS: Performed by: HOSPITALIST

## 2025-05-28 PROCEDURE — 94760 N-INVAS EAR/PLS OXIMETRY 1: CPT

## 2025-05-28 PROCEDURE — 2500000003 HC RX 250 WO HCPCS: Performed by: STUDENT IN AN ORGANIZED HEALTH CARE EDUCATION/TRAINING PROGRAM

## 2025-05-28 PROCEDURE — 36415 COLL VENOUS BLD VENIPUNCTURE: CPT

## 2025-05-28 PROCEDURE — 80069 RENAL FUNCTION PANEL: CPT

## 2025-05-28 PROCEDURE — 6370000000 HC RX 637 (ALT 250 FOR IP): Performed by: HOSPITALIST

## 2025-05-28 PROCEDURE — 1200000000 HC SEMI PRIVATE

## 2025-05-28 PROCEDURE — 97535 SELF CARE MNGMENT TRAINING: CPT

## 2025-05-28 PROCEDURE — 6360000002 HC RX W HCPCS: Performed by: HOSPITALIST

## 2025-05-28 PROCEDURE — 94640 AIRWAY INHALATION TREATMENT: CPT

## 2025-05-28 RX ADMIN — ENOXAPARIN SODIUM 30 MG: 100 INJECTION SUBCUTANEOUS at 08:54

## 2025-05-28 RX ADMIN — ASPIRIN 81 MG: 81 TABLET, COATED ORAL at 08:55

## 2025-05-28 RX ADMIN — CARVEDILOL 6.25 MG: 6.25 TABLET, FILM COATED ORAL at 08:56

## 2025-05-28 RX ADMIN — AMLODIPINE BESYLATE 5 MG: 5 TABLET ORAL at 08:56

## 2025-05-28 RX ADMIN — SODIUM CHLORIDE: 0.9 INJECTION, SOLUTION INTRAVENOUS at 08:57

## 2025-05-28 RX ADMIN — MONTELUKAST 10 MG: 10 TABLET, FILM COATED ORAL at 20:07

## 2025-05-28 RX ADMIN — ROFLUMILAST 250 MCG: 500 TABLET ORAL at 08:55

## 2025-05-28 RX ADMIN — CARVEDILOL 6.25 MG: 6.25 TABLET, FILM COATED ORAL at 17:15

## 2025-05-28 RX ADMIN — WATER 1000 MG: 1 INJECTION INTRAMUSCULAR; INTRAVENOUS; SUBCUTANEOUS at 17:15

## 2025-05-28 RX ADMIN — BUDESONIDE AND FORMOTEROL FUMARATE DIHYDRATE 2 PUFF: 160; 4.5 AEROSOL RESPIRATORY (INHALATION) at 08:24

## 2025-05-28 RX ADMIN — OXYCODONE AND ACETAMINOPHEN 1 TABLET: 5; 325 TABLET ORAL at 08:54

## 2025-05-28 RX ADMIN — PREDNISONE 10 MG: 5 TABLET ORAL at 08:56

## 2025-05-28 RX ADMIN — ATORVASTATIN CALCIUM 40 MG: 40 TABLET, FILM COATED ORAL at 08:56

## 2025-05-28 RX ADMIN — SODIUM CHLORIDE, PRESERVATIVE FREE 10 ML: 5 INJECTION INTRAVENOUS at 08:56

## 2025-05-28 ASSESSMENT — PAIN DESCRIPTION - LOCATION: LOCATION: COCCYX

## 2025-05-28 ASSESSMENT — PAIN DESCRIPTION - PAIN TYPE: TYPE: ACUTE PAIN

## 2025-05-28 ASSESSMENT — PAIN SCALES - GENERAL
PAINLEVEL_OUTOF10: 6
PAINLEVEL_OUTOF10: 2

## 2025-05-28 ASSESSMENT — PAIN DESCRIPTION - ORIENTATION: ORIENTATION: MID;POSTERIOR

## 2025-05-28 ASSESSMENT — PAIN - FUNCTIONAL ASSESSMENT: PAIN_FUNCTIONAL_ASSESSMENT: PREVENTS OR INTERFERES SOME ACTIVE ACTIVITIES AND ADLS

## 2025-05-28 ASSESSMENT — PAIN DESCRIPTION - FREQUENCY: FREQUENCY: CONTINUOUS

## 2025-05-28 ASSESSMENT — PAIN DESCRIPTION - ONSET: ONSET: ON-GOING

## 2025-05-28 ASSESSMENT — PAIN DESCRIPTION - DESCRIPTORS: DESCRIPTORS: ACHING

## 2025-05-28 NOTE — DISCHARGE SUMMARY
V2.0  Discharge Summary    Name:  Fidelia Torres /Age/Sex: 1950 (74 y.o. female)   Admit Date: 2025  Discharge Date: 25    MRN & CSN:  [id number] & [id number] Encounter Date and Time 25 2:19 PM EDT    Attending:  Yariel Matthew MD Discharging Provider: Yariel Matthew MD       Hospital Course:     Brief HPI: Fidelia Torres is a 74 y.o. female who presented with fall    Brief Problem Based Course:   Mechanical fall  S5 and coccyx fractures:  - Ativan with fall at home.  Was found to have sacral and coccygeal fractures.  Ortho was consulted and recommended nonoperative treatment.  PT OT recommended SNF placement at discharge  VIOLA on CKD3: Due to volume depletion.  Nephrology was consulted and put patient on IV fluids.  Kidney function improved with fluid resuscitation.  Patient was stopped off IV fluids and encouraged to orally hydrate  Delay in discharge: Patient will remain in-house pending precertification for SNF      The patient expressed appropriate understanding of, and agreement with the discharge recommendations, medications, and plan.     Consults this admission:  IP CONSULT TO ORTHOPEDIC SURGERY  IP CONSULT TO NEPHROLOGY    Discharge Diagnosis:   Sacral fracture, closed (HCC)  Coccyx fracture  VIOLA on CKD3    Discharge Instruction:   Follow up appointments:   Primary care physician: Albina Vlades APRN - CNP within 2 weeks  Diet: regular diet   Activity: activity as tolerated  Disposition: Discharged to:   []Home, [][facility], [x]SNF, []Acute Rehab, []Hospice   Condition on discharge: Stable  Labs and Tests to be Followed up as an outpatient by PCP or Specialist:    Discharge Medications:        Medication List        START taking these medications      oxyCODONE 5 MG immediate release tablet  Commonly known as: Roxicodone  Take 1-2 tablets by mouth every 6 hours as needed for Pain for up to 5 days. Max Daily Amount: 40 mg            CONTINUE taking these medications      acetaminophen

## 2025-05-28 NOTE — CARE COORDINATION
DC order noted- spoke w/ patient to inform her of accepting SNF's ([facility] and [facility])- she would like to discuss w/ her niece Beto. I called Beto again #[phone] to discuss- again no answer - left message requesting return call.'  Electronically signed by Ayse Fowler on 5/28/2025 at 11:24 AM  #[phone]    Patient has discussed options with her niece- they choose [facility]  Will request precert     Electronically signed by Ayse Fowler on 5/28/2025 at 2:05 PM  #[phone]

## 2025-05-28 NOTE — CARE COORDINATION
[facility] - [employer] PRE-CERT REQUEST SUBMITTED VIA [employer] PORTAL    REQUESTED FOR FACILITY:  [facility]    ANTICIPATED ADMIT DATE TO [facility]:  05/28/2025      [id number]     Electronically signed by Zoe Martinez, Case Management Assistant Zoe Martinez on 5/28/2025 at 2:46 PM

## 2025-05-29 VITALS
DIASTOLIC BLOOD PRESSURE: 87 MMHG | OXYGEN SATURATION: 97 % | HEIGHT: 64 IN | HEART RATE: 78 BPM | TEMPERATURE: 98.2 F | SYSTOLIC BLOOD PRESSURE: 160 MMHG | RESPIRATION RATE: 16 BRPM | WEIGHT: 135.36 LBS | BODY MASS INDEX: 23.11 KG/M2

## 2025-05-29 LAB
ALBUMIN SERPL-MCNC: 3 G/DL (ref 3.4–5)
ANION GAP SERPL CALCULATED.3IONS-SCNC: 9 MMOL/L (ref 3–16)
BUN SERPL-MCNC: 16 MG/DL (ref 7–20)
CALCIUM SERPL-MCNC: 8.1 MG/DL (ref 8.3–10.6)
CHLORIDE SERPL-SCNC: 109 MMOL/L (ref 99–110)
CO2 SERPL-SCNC: 19 MMOL/L (ref 21–32)
CREAT SERPL-MCNC: 1.1 MG/DL (ref 0.6–1.2)
GFR SERPLBLD CREATININE-BSD FMLA CKD-EPI: 53 ML/MIN/{1.73_M2}
GLUCOSE BLD-MCNC: 124 MG/DL (ref 70–99)
GLUCOSE BLD-MCNC: 152 MG/DL (ref 70–99)
GLUCOSE BLD-MCNC: 88 MG/DL (ref 70–99)
GLUCOSE SERPL-MCNC: 95 MG/DL (ref 70–99)
PERFORMED ON: ABNORMAL
PERFORMED ON: ABNORMAL
PERFORMED ON: NORMAL
PHOSPHATE SERPL-MCNC: 2.3 MG/DL (ref 2.5–4.9)
POTASSIUM SERPL-SCNC: 3.4 MMOL/L (ref 3.5–5.1)
SODIUM SERPL-SCNC: 137 MMOL/L (ref 136–145)

## 2025-05-29 PROCEDURE — 36415 COLL VENOUS BLD VENIPUNCTURE: CPT

## 2025-05-29 PROCEDURE — 94640 AIRWAY INHALATION TREATMENT: CPT

## 2025-05-29 PROCEDURE — 6370000000 HC RX 637 (ALT 250 FOR IP): Performed by: STUDENT IN AN ORGANIZED HEALTH CARE EDUCATION/TRAINING PROGRAM

## 2025-05-29 PROCEDURE — 2500000003 HC RX 250 WO HCPCS: Performed by: STUDENT IN AN ORGANIZED HEALTH CARE EDUCATION/TRAINING PROGRAM

## 2025-05-29 PROCEDURE — 94761 N-INVAS EAR/PLS OXIMETRY MLT: CPT

## 2025-05-29 PROCEDURE — 80069 RENAL FUNCTION PANEL: CPT

## 2025-05-29 PROCEDURE — 6360000002 HC RX W HCPCS: Performed by: HOSPITALIST

## 2025-05-29 PROCEDURE — 2500000003 HC RX 250 WO HCPCS: Performed by: HOSPITALIST

## 2025-05-29 PROCEDURE — 6360000002 HC RX W HCPCS: Performed by: STUDENT IN AN ORGANIZED HEALTH CARE EDUCATION/TRAINING PROGRAM

## 2025-05-29 RX ORDER — AMLODIPINE BESYLATE 10 MG/1
10 TABLET ORAL DAILY
Status: DISCONTINUED | OUTPATIENT
Start: 2025-05-30 | End: 2025-05-29 | Stop reason: HOSPADM

## 2025-05-29 RX ORDER — ENOXAPARIN SODIUM 100 MG/ML
40 INJECTION SUBCUTANEOUS DAILY
Status: DISCONTINUED | OUTPATIENT
Start: 2025-05-30 | End: 2025-05-29 | Stop reason: HOSPADM

## 2025-05-29 RX ADMIN — CARVEDILOL 6.25 MG: 6.25 TABLET, FILM COATED ORAL at 09:18

## 2025-05-29 RX ADMIN — PREDNISONE 10 MG: 5 TABLET ORAL at 09:18

## 2025-05-29 RX ADMIN — ENOXAPARIN SODIUM 30 MG: 100 INJECTION SUBCUTANEOUS at 09:19

## 2025-05-29 RX ADMIN — WATER 1000 MG: 1 INJECTION INTRAMUSCULAR; INTRAVENOUS; SUBCUTANEOUS at 15:25

## 2025-05-29 RX ADMIN — AMLODIPINE BESYLATE 5 MG: 5 TABLET ORAL at 09:19

## 2025-05-29 RX ADMIN — ASPIRIN 81 MG: 81 TABLET, COATED ORAL at 09:18

## 2025-05-29 RX ADMIN — ATORVASTATIN CALCIUM 40 MG: 40 TABLET, FILM COATED ORAL at 09:18

## 2025-05-29 RX ADMIN — BUDESONIDE AND FORMOTEROL FUMARATE DIHYDRATE 2 PUFF: 160; 4.5 AEROSOL RESPIRATORY (INHALATION) at 11:55

## 2025-05-29 RX ADMIN — ROFLUMILAST 250 MCG: 500 TABLET ORAL at 09:19

## 2025-05-29 RX ADMIN — SODIUM CHLORIDE, PRESERVATIVE FREE 10 ML: 5 INJECTION INTRAVENOUS at 09:19

## 2025-05-29 ASSESSMENT — PAIN SCALES - GENERAL: PAINLEVEL_OUTOF10: 0

## 2025-05-29 NOTE — CARE COORDINATION
[employer] precert still pending. Left message for Sherlyn at [facility] to see if she has any notice about the snf precert.    Electronically signed by WANDA Good, DAVIS, Case Management on 5/29/2025 at 9:29 AM  [employer] [phone]     11:49 AM  Received call back from Sherlyn at [facility]--no precert as of yet.    Electronically signed by WANDA Good, DAVIS, Case Management on 5/29/2025 at 11:49 AM  [employer] [phone]     Recently seen by her PCP yesterday diagnosed with viral gastroenteritis.  Abdominal exam benign denies any pain  Patient continues to have watery loose stools, nausea, vomiting with poor p.o. intake  Obtain C. difficile and stool enteric panel  Resolved  Continue regular diet as tolerated  Patient did not have any further nausea or diarrhea and unable to obtain stool samples.

## 2025-05-29 NOTE — PLAN OF CARE
Problem: Discharge Planning  Goal: Discharge to home or other facility with appropriate resources  5/25/2025 2116 by Kaye Neil RN  Outcome: Progressing  5/25/2025 1025 by Zoe Mccoy RN  Outcome: Progressing  Flowsheets (Taken 5/25/2025 1025)  Discharge to home or other facility with appropriate resources:   Identify barriers to discharge with patient and caregiver   Arrange for needed discharge resources and transportation as appropriate   Identify discharge learning needs (meds, wound care, etc)   Arrange for interpreters to assist at discharge as needed   Refer to discharge planning if patient needs post-hospital services based on physician order or complex needs related to functional status, cognitive ability or social support system     Problem: Pain  Goal: Verbalizes/displays adequate comfort level or baseline comfort level  5/25/2025 2116 by Kaye Neil RN  Outcome: Progressing  5/25/2025 1025 by Zoe Mccoy RN  Outcome: Progressing  Flowsheets (Taken 5/25/2025 1025)  Verbalizes/displays adequate comfort level or baseline comfort level:   Encourage patient to monitor pain and request assistance   Assess pain using appropriate pain scale   Administer analgesics based on type and severity of pain and evaluate response   Implement non-pharmacological measures as appropriate and evaluate response   Consider cultural and social influences on pain and pain management   Notify Licensed Independent Practitioner if interventions unsuccessful or patient reports new pain     Problem: Skin/Tissue Integrity  Goal: Skin integrity remains intact  Description: 1.  Monitor for areas of redness and/or skin breakdown2.  Assess vascular access sites hourly3.  Every 4-6 hours minimum:  Change oxygen saturation probe site4.  Every 4-6 hours:  If on nasal continuous positive airway pressure, respiratory therapy assess nares and determine need for appliance change or resting period  5/25/2025 2116 by 
  Problem: Discharge Planning  Goal: Discharge to home or other facility with appropriate resources  Outcome: Progressing     Problem: Pain  Goal: Verbalizes/displays adequate comfort level or baseline comfort level  Outcome: Progressing     
  Problem: Safety - Adult  Goal: Free from fall injury  5/25/2025 1025 by Zoe Mccoy RN  Outcome: Progressing  Flowsheets (Taken 5/25/2025 1025)  Free From Fall Injury:   Instruct family/caregiver on patient safety   Based on caregiver fall risk screen, instruct family/caregiver to ask for assistance with transferring infant if caregiver noted to have fall risk factors  5/25/2025 0532 by Deena Barfield RN  Outcome: Progressing  Flowsheets (Taken 5/25/2025 0532)  Free From Fall Injury: Instruct family/caregiver on patient safety     Problem: Chronic Conditions and Co-morbidities  Goal: Patient's chronic conditions and co-morbidity symptoms are monitored and maintained or improved  5/25/2025 1025 by Zoe Mccoy RN  Outcome: Progressing  Flowsheets (Taken 5/25/2025 1025)  Care Plan - Patient's Chronic Conditions and Co-Morbidity Symptoms are Monitored and Maintained or Improved:   Monitor and assess patient's chronic conditions and comorbid symptoms for stability, deterioration, or improvement   Collaborate with multidisciplinary team to address chronic and comorbid conditions and prevent exacerbation or deterioration   Update acute care plan with appropriate goals if chronic or comorbid symptoms are exacerbated and prevent overall improvement and discharge  5/25/2025 0532 by Deena Barfield RN  Outcome: Progressing  Flowsheets (Taken 5/25/2025 0532)  Care Plan - Patient's Chronic Conditions and Co-Morbidity Symptoms are Monitored and Maintained or Improved: Monitor and assess patient's chronic conditions and comorbid symptoms for stability, deterioration, or improvement     Problem: Discharge Planning  Goal: Discharge to home or other facility with appropriate resources  5/25/2025 1025 by Zoe Mccoy, RN  Outcome: Progressing  Flowsheets (Taken 5/25/2025 1025)  Discharge to home or other facility with appropriate resources:   Identify barriers to discharge with patient and caregiver   Arrange for 
  Problem: Safety - Adult  Goal: Free from fall injury  5/27/2025 0758 by Keara Felton RN  Outcome: Progressing  Flowsheets (Taken 5/26/2025 0907 by Zoe Mccoy, RN)  Free From Fall Injury:   Instruct family/caregiver on patient safety   Based on caregiver fall risk screen, instruct family/caregiver to ask for assistance with transferring infant if caregiver noted to have fall risk factors  5/27/2025 0023 by Isa Rahman RN  Outcome: Progressing     Problem: Chronic Conditions and Co-morbidities  Goal: Patient's chronic conditions and co-morbidity symptoms are monitored and maintained or improved  5/27/2025 0758 by Keara Felton RN  Outcome: Progressing  Flowsheets (Taken 5/27/2025 0758)  Care Plan - Patient's Chronic Conditions and Co-Morbidity Symptoms are Monitored and Maintained or Improved:   Collaborate with multidisciplinary team to address chronic and comorbid conditions and prevent exacerbation or deterioration   Monitor and assess patient's chronic conditions and comorbid symptoms for stability, deterioration, or improvement   Update acute care plan with appropriate goals if chronic or comorbid symptoms are exacerbated and prevent overall improvement and discharge  5/27/2025 0023 by Isa Rahman RN  Outcome: Progressing  Flowsheets (Taken 5/26/2025 2059)  Care Plan - Patient's Chronic Conditions and Co-Morbidity Symptoms are Monitored and Maintained or Improved: Monitor and assess patient's chronic conditions and comorbid symptoms for stability, deterioration, or improvement     Problem: Discharge Planning  Goal: Discharge to home or other facility with appropriate resources  5/27/2025 0758 by Keara Felton RN  Outcome: Progressing  Flowsheets (Taken 5/27/2025 0758)  Discharge to home or other facility with appropriate resources:   Identify barriers to discharge with patient and caregiver   Identify discharge learning needs (meds, wound care, etc)   
  Problem: Safety - Adult  Goal: Free from fall injury  5/29/2025 0935 by Magda Dorado RN  Outcome: Progressing  Flowsheets (Taken 5/29/2025 0935)  Free From Fall Injury: Instruct family/caregiver on patient safety  5/28/2025 2000 by Marcelina Hill RN  Outcome: Progressing  Flowsheets (Taken 5/28/2025 1137 by Dee Jiang RN)  Free From Fall Injury: Instruct family/caregiver on patient safety     Problem: Chronic Conditions and Co-morbidities  Goal: Patient's chronic conditions and co-morbidity symptoms are monitored and maintained or improved  5/29/2025 0935 by Magda Dorado RN  Outcome: Progressing  Flowsheets (Taken 5/29/2025 0935)  Care Plan - Patient's Chronic Conditions and Co-Morbidity Symptoms are Monitored and Maintained or Improved:   Monitor and assess patient's chronic conditions and comorbid symptoms for stability, deterioration, or improvement   Collaborate with multidisciplinary team to address chronic and comorbid conditions and prevent exacerbation or deterioration   Update acute care plan with appropriate goals if chronic or comorbid symptoms are exacerbated and prevent overall improvement and discharge  5/28/2025 2000 by Marcelina Hill RN  Outcome: Progressing  Flowsheets (Taken 5/28/2025 1137 by Dee Jiang RN)  Care Plan - Patient's Chronic Conditions and Co-Morbidity Symptoms are Monitored and Maintained or Improved:   Monitor and assess patient's chronic conditions and comorbid symptoms for stability, deterioration, or improvement   Collaborate with multidisciplinary team to address chronic and comorbid conditions and prevent exacerbation or deterioration   Update acute care plan with appropriate goals if chronic or comorbid symptoms are exacerbated and prevent overall improvement and discharge     Problem: Discharge Planning  Goal: Discharge to home or other facility with appropriate resources  5/29/2025 0935 by Magda Dorado RN  Outcome: Progressing  Flowsheets (Taken 
  Problem: Safety - Adult  Goal: Free from fall injury  Outcome: Progressing     Problem: Chronic Conditions and Co-morbidities  Goal: Patient's chronic conditions and co-morbidity symptoms are monitored and maintained or improved  Outcome: Progressing  Flowsheets (Taken 5/26/2025 2059)  Care Plan - Patient's Chronic Conditions and Co-Morbidity Symptoms are Monitored and Maintained or Improved: Monitor and assess patient's chronic conditions and comorbid symptoms for stability, deterioration, or improvement     Problem: Discharge Planning  Goal: Discharge to home or other facility with appropriate resources  Outcome: Progressing  Flowsheets (Taken 5/26/2025 2059)  Discharge to home or other facility with appropriate resources:   Identify barriers to discharge with patient and caregiver   Arrange for needed discharge resources and transportation as appropriate     Problem: Pain  Goal: Verbalizes/displays adequate comfort level or baseline comfort level  Outcome: Progressing  Flowsheets (Taken 5/26/2025 2059)  Verbalizes/displays adequate comfort level or baseline comfort level:   Encourage patient to monitor pain and request assistance   Assess pain using appropriate pain scale   Administer analgesics based on type and severity of pain and evaluate response   Implement non-pharmacological measures as appropriate and evaluate response     Problem: Skin/Tissue Integrity  Goal: Skin integrity remains intact  Description: 1.  Monitor for areas of redness and/or skin breakdown2.  Assess vascular access sites hourly3.  Every 4-6 hours minimum:  Change oxygen saturation probe site4.  Every 4-6 hours:  If on nasal continuous positive airway pressure, respiratory therapy assess nares and determine need for appliance change or resting period  Outcome: Progressing  Flowsheets (Taken 5/26/2025 2059)  Skin Integrity Remains Intact:   Monitor for areas of redness and/or skin breakdown   Turn and reposition as indicated   Monitor 
  Problem: Safety - Adult  Goal: Free from fall injury  Outcome: Progressing  Flowsheets  Taken 5/26/2025 0907 by Zoe Mccoy RN  Free From Fall Injury:   Instruct family/caregiver on patient safety   Based on caregiver fall risk screen, instruct family/caregiver to ask for assistance with transferring infant if caregiver noted to have fall risk factors  Taken 5/25/2025 2044 by Kaye Neil, RN  Free From Fall Injury: Instruct family/caregiver on patient safety     Problem: Chronic Conditions and Co-morbidities  Goal: Patient's chronic conditions and co-morbidity symptoms are monitored and maintained or improved  Outcome: Progressing  Flowsheets (Taken 5/26/2025 0907)  Care Plan - Patient's Chronic Conditions and Co-Morbidity Symptoms are Monitored and Maintained or Improved:   Monitor and assess patient's chronic conditions and comorbid symptoms for stability, deterioration, or improvement   Collaborate with multidisciplinary team to address chronic and comorbid conditions and prevent exacerbation or deterioration   Update acute care plan with appropriate goals if chronic or comorbid symptoms are exacerbated and prevent overall improvement and discharge     Problem: Discharge Planning  Goal: Discharge to home or other facility with appropriate resources  5/26/2025 0907 by Zoe Mccoy, RN  Outcome: Progressing  Flowsheets (Taken 5/26/2025 0907)  Discharge to home or other facility with appropriate resources:   Identify barriers to discharge with patient and caregiver   Arrange for needed discharge resources and transportation as appropriate   Identify discharge learning needs (meds, wound care, etc)   Arrange for interpreters to assist at discharge as needed   Refer to discharge planning if patient needs post-hospital services based on physician order or complex needs related to functional status, cognitive ability or social support system  5/25/2025 2116 by Kaye Neil, RN  Outcome: 
  Problem: Safety - Adult  Goal: Free from fall injury  Outcome: Progressing  Flowsheets  Taken 5/28/2025 1137 by Dee Jiang RN  Free From Fall Injury: Instruct family/caregiver on patient safety  Taken 5/27/2025 2327 by Kaye Neil RN  Free From Fall Injury: Instruct family/caregiver on patient safety     Problem: Chronic Conditions and Co-morbidities  Goal: Patient's chronic conditions and co-morbidity symptoms are monitored and maintained or improved  Outcome: Progressing  Flowsheets (Taken 5/28/2025 1137)  Care Plan - Patient's Chronic Conditions and Co-Morbidity Symptoms are Monitored and Maintained or Improved:   Monitor and assess patient's chronic conditions and comorbid symptoms for stability, deterioration, or improvement   Collaborate with multidisciplinary team to address chronic and comorbid conditions and prevent exacerbation or deterioration   Update acute care plan with appropriate goals if chronic or comorbid symptoms are exacerbated and prevent overall improvement and discharge     Problem: Discharge Planning  Goal: Discharge to home or other facility with appropriate resources  5/28/2025 1137 by Dee Jiang RN  Outcome: Progressing  Flowsheets (Taken 5/28/2025 1137)  Discharge to home or other facility with appropriate resources:   Identify barriers to discharge with patient and caregiver   Identify discharge learning needs (meds, wound care, etc)   Refer to discharge planning if patient needs post-hospital services based on physician order or complex needs related to functional status, cognitive ability or social support system   Arrange for needed discharge resources and transportation as appropriate  5/27/2025 2327 by Kaye Neil, RN  Outcome: Progressing     Problem: Pain  Goal: Verbalizes/displays adequate comfort level or baseline comfort level  5/28/2025 1137 by Dee Jiang RN  Outcome: Progressing  Flowsheets (Taken 5/28/2025 1137)  Verbalizes/displays adequate 
  Problem: Safety - Adult  Goal: Free from fall injury  Outcome: Progressing  Flowsheets (Taken 5/25/2025 0532)  Free From Fall Injury: Instruct family/caregiver on patient safety     Problem: Chronic Conditions and Co-morbidities  Goal: Patient's chronic conditions and co-morbidity symptoms are monitored and maintained or improved  Outcome: Progressing  Flowsheets (Taken 5/25/2025 0532)  Care Plan - Patient's Chronic Conditions and Co-Morbidity Symptoms are Monitored and Maintained or Improved: Monitor and assess patient's chronic conditions and comorbid symptoms for stability, deterioration, or improvement     Problem: Discharge Planning  Goal: Discharge to home or other facility with appropriate resources  Outcome: Progressing  Flowsheets (Taken 5/25/2025 0532)  Discharge to home or other facility with appropriate resources: Identify barriers to discharge with patient and caregiver     Problem: Pain  Goal: Verbalizes/displays adequate comfort level or baseline comfort level  Outcome: Progressing  Flowsheets (Taken 5/25/2025 0532)  Verbalizes/displays adequate comfort level or baseline comfort level: Encourage patient to monitor pain and request assistance     
patient to monitor pain and request assistance   Administer analgesics based on type and severity of pain and evaluate response   Consider cultural and social influences on pain and pain management   Assess pain using appropriate pain scale   Implement non-pharmacological measures as appropriate and evaluate response     Problem: Skin/Tissue Integrity  Goal: Skin integrity remains intact  Description: 1.  Monitor for areas of redness and/or skin breakdown2.  Assess vascular access sites hourly3.  Every 4-6 hours minimum:  Change oxygen saturation probe site4.  Every 4-6 hours:  If on nasal continuous positive airway pressure, respiratory therapy assess nares and determine need for appliance change or resting period  5/28/2025 2000 by Marcelina Hill RN  Outcome: Progressing  Flowsheets (Taken 5/28/2025 1137 by Dee Jiang RN)  Skin Integrity Remains Intact:   Monitor for areas of redness and/or skin breakdown   Assess vascular access sites hourly   Assess need for specialty bed   Monitor skin under medical devices   Turn and reposition as indicated  5/28/2025 1137 by Dee Jiang RN  Outcome: Progressing  Flowsheets  Taken 5/28/2025 1137 by Dee Jiang RN  Skin Integrity Remains Intact:   Monitor for areas of redness and/or skin breakdown   Assess vascular access sites hourly   Assess need for specialty bed   Monitor skin under medical devices   Turn and reposition as indicated  Taken 5/27/2025 2327 by Kaye Neil RN  Skin Integrity Remains Intact: Monitor for areas of redness and/or skin breakdown     Problem: ABCDS Injury Assessment  Goal: Absence of physical injury  5/28/2025 2000 by Marcelina Hill RN  Outcome: Progressing  Flowsheets (Taken 5/28/2025 1137 by Dee Jiang RN)  Absence of Physical Injury: Implement safety measures based on patient assessment  5/28/2025 1137 by Dee Jiang RN  Outcome: Progressing  Flowsheets  Taken 5/28/2025 1137 by Dee Jiang RN  Absence of

## 2025-05-29 NOTE — PROGRESS NOTES
V2.0    [facility] [place]      Name:  Fidelia Torres /Age/Sex: 1950  (74 y.o. female)   MRN & CSN:  [id number] & [id number] Encounter Date/Time: 2025 9:37 AM EDT   Location:  [id number]-6066/3131-01 PCP: Albina Valdes, ROSINA - CNP     Attending:Rosas Weiss DO       Hospital Day: 3  Brief HPI  Fidelia Torres is a 74 y.o. female with pertinent PMHx of COPD, T2DM, HTN, HLD who presented complaining of a fall.  She was also complaining of some pain in her lower back.  Imaging in the ED showed impacted S5 and coccyx fractures.  Assessment & Plan     Mechanical fall  Impacted S5 and coccyx fracture  - Orthopedic surgery consulted, recommended nonoperative management with outpatient follow-up  - Pain control  - PT and OT evaluation pending  - Discussed that SNF versus home therapy may be recommended, patient is agreeable if that is the case    VIOLA on CKD 3a  - Creatinine slightly worse today  - S/p IV fluid resuscitation  - Check urinalysis, urine electrolytes, urine creatinine  - Nephrology consulted    Essential hypertension  - Continue amlodipine, carvedilol    Autoimmune hepatitis  - Continue prednisone      Diet ADULT DIET; Regular; 5 carb choices (75 gm/meal)   DVT Prophylaxis [x] Lovenox, []  Heparin, [] SCDs, [] Ambulation,  [] Eliquis, [] Xarelto  [] Coumadin   Code Status DNR-CCA   Disposition From: Home  Expected Disposition: Home  Estimated Date of Discharge: -           Subjective:     Chief Complaint: Fall    Patient was seen and examined today sitting up in chair in room  Says that her pain is doing okay, actually feels better today, was able to get up from bed to the chair  Denies any dysuria or issues with urination    Review of Systems:      Pertinent positives and negatives discussed in HPI    Objective:     Intake/Output Summary (Last 24 hours) at 2025 09  Last data filed at 2025  Gross per 24 hour   Intake 240 ml   Output --   Net 240 ml      Vitals: 
    V2.0    [place] Progress Note      Name:  Fidelia Torres /Age/Sex: 1950  (74 y.o. female)   MRN & CSN:  [id number] & [id number] Encounter Date/Time: 2025 9:37 AM EDT   Location:  [id number]-[id number]/3131-01 PCP: Albina Valdes, APRN - CNP     Attending:Rosas Weiss DO       Hospital Day: 2  Brief HPI  Fidelia Torres is a 74 y.o. female with pertinent PMHx of COPD, T2DM, HTN, HLD who presented complaining of a fall.  She was also complaining of some pain in her lower back.  Imaging in the ED showed impacted S5 and coccyx fractures.  Assessment & Plan     Mechanical fall  Impacted S5 and coccyx fracture  - Orthopedic surgery consulted, recommended nonoperative management  - Pain control  - PT and OT evaluation    VIOLA on CKD 3  - IV fluid resuscitation  - Recheck BMP tomorrow    Essential hypertension  - Continue amlodipine, carvedilol    Autoimmune hepatitis  - Continue prednisone      Diet ADULT DIET; Regular; 3 carb choices (45 gm/meal)   DVT Prophylaxis [x] Lovenox, []  Heparin, [] SCDs, [] Ambulation,  [] Eliquis, [] Xarelto  [] Coumadin   Code Status DNR-CCA   Disposition From: Home  Expected Disposition: Home  Estimated Date of Discharge:            Subjective:     Chief Complaint: Fall    Patient was seen and examined today lying in bed  Complaining of some lower back pain, which is mostly positional, she lay still does not cause her any issues  Has not been up and moving around much yet today      Review of Systems:      Pertinent positives and negatives discussed in HPI    Objective:     Intake/Output Summary (Last 24 hours) at 2025 0937  Last data filed at 2025 0532  Gross per 24 hour   Intake 240 ml   Output --   Net 240 ml      Vitals:   Vitals:    25 0415 25 0430 25 0456 25 0701   BP: 124/79 115/75 (!) 114/91 123/77   Pulse: 75 73 80 71   Resp: 15 18 16 16   Temp:   98.6 °F (37 °C)    TempSrc:   Oral Oral   SpO2: 91% 93% 98% 98%   Weight:   51.6 kg (113 lb 
    V2.0  [facility] [place] Hospitalist Progress Note      Name:  Fidelia Torres /Age/Sex: 1950  (74 y.o. female)   MRN & CSN:  [id number] & [id number] Encounter Date/Time: 2025 7:51 AM EDT    Location:  [id number]-2712/3131-01 PCP: Albina Valdes, ROSINA - CNP       Hospital Day: 6    Assessment and Plan:   Fidelia Torres is a 74 y.o. female p/w fall    Plan:  Mechanical fall  S5 and coccyx fracutres  -Non-operative treatment recommended  -PT/OT recommended SNF placement  VIOLA on CKD3  -On IV fludis  -Improved 2.1->1.8->1.4->1.1  -Urine Cx mixed jefry  -Discussed with Nephrology note : continue on IV fluids.    Hypertension  -Under adequate control  -Continue amlodipine, carvedilol    Ppx: Lovenox  Dispo: SNF, pending precert    Subjective:     Chief Complaint: Fall and Fatigue (Patient presents with generalized weakness and recent falls at home. She states she slipped and fell on the driveway and hit her head during the fall and has some head pain as well. She was brought in by private car per her nephew that found her on the floor and she was there all night. She also report decreased appetite and oral intake. )     Interval Hx:  Pt feeling relatively well. Pain is improving; noticed was affecting her less when transitioning from bed to chair today. Hydrating well orally. Denies fever/chills, nausea, abdominal pain, dysuria. Pt requires continued follow-up after discharge to monitor for continued improvement of kidney function after cessation of IV hydration.      Review of Systems:    Review of Systems    10 point ROS negative except as stated above in \"subjective\" section    Objective:     Intake/Output Summary (Last 24 hours) at 2025 0803  Last data filed at 2025 0510  Gross per 24 hour   Intake 1150 ml   Output 400 ml   Net 750 ml        Vitals:   Vitals:    25 1919   BP: (!) 148/91   Pulse: 75   Resp: 18   Temp: 98.6 °F (37 °C)   SpO2: 100%       Physical Exam:     General: NAD  Cardiovascular: 
  [facility] - Physical Therapy   Phone: [phone]    Physical Therapy  Facility/Department:[address] ORTHOPEDICS    [] Initial Evaluation            [x] Daily Treatment Note         [] Discharge Summary      Patient: Fidelia Torres   : 1950   MRN: [id number]   Date of Service:  2025  Staff Mobility Recommendation: RW w/ Min A and belt. WBAT.    AM-PAC score: 1624  Discharge Recommendations: SNF  Fidelia Torres scored a 16/ on the AM-PAC short mobility form. Current research shows that an AM-PAC score of 17 or less is typically not associated with a discharge to the patient's home setting. Based on the patient's AM-PAC score and their current functional mobility deficits, it is recommended that the patient have 5 sessions per week of Physical Therapy at d/c to increase the patient's independence.  Please see assessment section for further patient specific details.    If patient discharges prior to next session this note will serve as a discharge summary.  Please see below for the latest assessment towards goals.      Admitting Diagnosis: Sacral fracture, closed (HCC)  Ordering Physician: Dr Dahl  Current Admission Summary: 73 y/o female admit 2025 with 2 falls in past wk. Pt reports she slipped and fell on driveway and hit her head and bottom. Her neighbor found her and helped her up--she had been there for a long time because she was too weak to get up. CT Pelvis : Impact Fx S5 and Coccyx, age indeterminate but new since 2024. Ortho consult (Dr Treviño): WBAT.  PMH as noted including CKD, COPD, Autoimmune Hepatitis, Osteoporosis.  Active hospital problems include hypertension per Internal Medicine Note 25.    Past Medical History:  has a past medical history of Allergic rhinitis, Asthma, COPD (chronic obstructive pulmonary disease) (HCC), COPD without exacerbation (HCC), Diabetes mellitus (HCC), Hepatitis, Hyperlipidemia, Hypertension, Influenza A, and Osteoporosis.  Past 
  Physician Progress Note      PATIENT:               AGAPITO HOLLIDAY  CSN #:                  [id number]  :                       1950  ADMIT DATE:       2025 8:05 PM  DISCH DATE:  RESPONDING  PROVIDER #:        DARYL Mehta CNP          QUERY TEXT:    Please clarify whether the S5 and Coccyx fracture documented  is related to a   traumatic or non-traumatic cause:    The clinical indicators include:  74 y.o. female with hx of HTN, HLD, DM, and asthma who presented to the E.D.   complaining of weakness and falls.    Xray report : \"The bones are osteopenic\"  CT Pelvis: Impacted fracture involving S5 and the coccyx    Progress note : \" S5 fracture, Coccyx fracture, Both impacted with minimal   displacement  Chronic steroid use with osteopenia  Plan WBAT with PT OT  Pain med as needed.\"      Treated with Serial labs, Xrays, CT Pelvis, Ortho consult, PT, OT, Percocet   for pain  Options provided:  -- Pathological fracture related to osteopenia after a fall which wouldn't   fracture a normal healthy bone  -- Traumatic fracture  -- Other - I will add my own diagnosis  -- Disagree - Not applicable / Not valid  -- Disagree - Clinically unable to determine / Unknown  -- Refer to Clinical Documentation Reviewer    PROVIDER RESPONSE TEXT:    Pathological fracture related to osteopenia after a fall which wouldn't   fracture a normal healthy bone    Query created by: Jeanie العراقي on 2025 11:57 AM      Electronically signed by:  DARYL Mehta CNP 2025 12:00   PM          
  [facility]  Phone: [phone]  Fax: [phone]  [place]         Reason for Consult: VIOLA on CKD 3  Requesting Physician:  Dr. Weiss    History Obtained From:  patient, electronic medical record    History of Present Ilness: 74-year-old -American female with history of CKD stage III follows with Dr. Galindo, baseline creatinine of 1.3 to 1.5 mg  Has history of hypertension, diabetes mellitus, autoimmune hepatitis  History of VIOLA in August 2024  Admitted after a fall in her driveway  She fell backwards and hit her bottom and her head  Denies loss of consciousness  Her neighbor helped her up and and moved her in the house where she was laying for a long time because she was too weak to get up  She had not been eating or drinking at all  Denies urinary symptoms, decrease in urine output  No vomiting diarrhea  Denies taking nonsteroidal anti-inflammatory drugs  CT scan showed nondisplaced fracture of the sacrococcygeal area  Noted to have creatinine of 1.9 mg increased to 2.1 mg today    Review of Systems:   No complaints Feels GOOD    .    Physical exam:   Constitutional:  VITALS:  BP (!) 160/87   Pulse 78   Temp 98.2 °F (36.8 °C) (Oral)   Resp 16   Ht 1.626 m (5' 4\")   Wt 61.4 kg (135 lb 5.8 oz)   LMP 12/07/2005   SpO2 97%   BMI 23.23 kg/m²   Gen: alert, awake, nad  Skin: no rash, turgor wnl  Heent:  eomi, mmm  Neck: no bruits or jvd noted, thyroid normal  Cardiovascular:  S1, S2 without m/r/g  Respiratory: CTA   Abdomen:  +bs, soft, nt  Ext: no lower extremity edema    Data/  CBC:   Lab Results   Component Value Date/Time    WBC 7.3 05/26/2025 04:39 AM    RBC 3.20 05/26/2025 04:39 AM    HGB 9.6 05/26/2025 04:39 AM    HCT 28.6 05/26/2025 04:39 AM    MCV 89.3 05/26/2025 04:39 AM    MCH 30.1 05/26/2025 04:39 AM    MCHC 33.7 05/26/2025 04:39 AM    RDW 15.1 05/26/2025 04:39 AM     05/26/2025 04:39 AM    MPV 7.8 05/26/2025 04:39 AM     BMP:    Lab Results   Component 
  [facility]  Phone: [phone]  Fax: [phone]  [place]         Reason for Consult: VIOLA on CKD 3  Requesting Physician:  Dr. Weiss    History Obtained From:  patient, electronic medical record    History of Present Ilness: 74-year-old -American female with history of CKD stage III follows with Dr. Galindo, baseline creatinine of 1.3 to 1.5 mg  Has history of hypertension, diabetes mellitus, autoimmune hepatitis  History of VIOLA in August 2024  Admitted after a fall in her driveway  She fell backwards and hit her bottom and her head  Denies loss of consciousness  Her neighbor helped her up and and moved her in the house where she was laying for a long time because she was too weak to get up  She had not been eating or drinking at all  Denies urinary symptoms, decrease in urine output  No vomiting diarrhea  Denies taking nonsteroidal anti-inflammatory drugs  CT scan showed nondisplaced fracture of the sacrococcygeal area  Noted to have creatinine of 1.9 mg increased to 2.1 mg today    Review of Systems:   Feels better today   Pain controlled  UOP not recorded  .    Physical exam:   Constitutional:  VITALS:  BP (!) 142/85   Pulse 65   Temp 98.2 °F (36.8 °C)   Resp 18   Ht 1.626 m (5' 4\")   Wt 56.1 kg (123 lb 10.9 oz)   LMP 12/07/2005   SpO2 99%   BMI 21.23 kg/m²   Gen: alert, awake, nad  Skin: no rash, turgor wnl  Heent:  eomi, mmm  Neck: no bruits or jvd noted, thyroid normal  Cardiovascular:  S1, S2 without m/r/g  Respiratory: CTA B without w/r/r; respiratory effort normal  Abdomen:  +bs, soft, nt  Ext: no lower extremity edema    Data/  CBC:   Lab Results   Component Value Date/Time    WBC 7.3 05/26/2025 04:39 AM    RBC 3.20 05/26/2025 04:39 AM    HGB 9.6 05/26/2025 04:39 AM    HCT 28.6 05/26/2025 04:39 AM    MCV 89.3 05/26/2025 04:39 AM    MCH 30.1 05/26/2025 04:39 AM    MCHC 33.7 05/26/2025 04:39 AM    RDW 15.1 05/26/2025 04:39 AM     05/26/2025 04:39 AM    
 Patient weight increased from 53.5kg yesterday to 61.4 kg today. Notified Princess Cortés MD notified via perfect serve. No new orders at this time.   
4 Eyes Skin Assessment     NAME:  Fidelia Torres  YOB: 1950  MEDICAL RECORD NUMBER:  [id number]    The patient is being assessed for  Admission    I agree that at least one RN has performed a thorough Head to Toe Skin Assessment on the patient. ALL assessment sites listed below have been assessed.      Areas assessed by both nurses:    Head, Face, Ears, Shoulders, Back, Chest, Arms, Elbows, Hands, Sacrum. Buttock, Coccyx, Ischium, Legs. Feet and Heels, and Under Medical Devices         Does the Patient have a Wound? No noted wound(s)       Mike Prevention initiated by RN: Yes  Wound Care Orders initiated by RN: No    Pressure Injury (Stage 3,4, Unstageable, DTI, NWPT, and Complex wounds) if present, place Wound referral order by RN under : No    New Ostomies, if present place, Ostomy referral order under : No     Nurse 1 eSignature: Electronically signed by Deena Barfield RN on 5/25/25 at 5:35 AM EDT    **SHARE this note so that the co-signing nurse can place an eSignature**    Nurse 2 eSignature: {Esignature:[id number]}   
Bedside introduction complete. Patient denies any needs at this time. Updated whiteboard with RN and PCA name and number. Patient able to make needs known, using call light appropriately. Will continue to monitor and assess for needs and comfort.     
Clinical Pharmacy Note  Subcutaneous Anticoagulant Adjustment     Enoxaparin has been adjusted to 40mg daily based on [facility] policy.    Recent Labs     05/28/25  0533 05/29/25  0505   CREATININE 1.4* 1.1     No results for input(s): \"HGB\", \"HCT\", \"PLT\", \"INR\" in the last 72 hours.  Estimated Creatinine Clearance: 40 mL/min (based on SCr of 1.1 mg/dL).    Pharmacist Review of Appropriate Use and Automatic Dose Adjustment of Subcutaneous Anticoagulants (Adult)    The guidance below is to provide initial recommendations for dosing. If recommended dose does not align well with patient's current clinical picture, communications with the care team will occur to determine most appropriate medication and dose.    TABLE 1.  ENOXAPARIN ROUTINE PROPHYLAXIS DOSING (Medically ill, routine surgery)   Patient Weight (kg)     50.9 and below 51 - 100.9 101 - 150.9 151 - 174.9 175 or greater         Estimated CrCl  (ml/min) 30 or greater   30 mg SUBQ daily   40 mg SUBQ daily 30 mg SUBQ BID  40 mg SUBQ BID 60mg SUBQ BID      15-29 UFH 5000 units SUBQ BID   30 mg SUBQ daily 30 mg SUBQ daily 40 mg SUBQ daily   60 mg SUBQ daily      Less than 15 or Dialysis UFH 5000 units SUBQ BID   UFH 5000 units SUBQ TID UFH 7500 units SUBQ TID           Isaias Ovalle [facility] 5/29/2025 12:45 PM    
Discharge:    IV discontinued. Discharge information, medications, and follow-up instructions copied and sent to SNF.  Report called to WILDER Umanzor at [facility]. Pt left unit and is being transported with belongings to SNF via private car.    
Medication Reconciliation    List of medications patient is currently taking is complete.     Source of information: 1. Conversation with patient at bedside                                      2. EPIC records     Notes regarding home medications:   1. Removed scopalamine and calcium carb  2. Updated amlodipine, Symbicort, Flonase, Singulair, and Daliresp  3. Fill hx suggests non-compliance    Breanna Becker RPH, PharmD 5/25/2025 11:34 AM    
Occupational Therapy    [facility] - Occupational Therapy   Phone: [phone]    Occupational Therapy  Facility/Department:[address] ORTHOPEDICS    [] Initial Evaluation            [x] Daily Treatment Note         [] Discharge Summary      Patient: Fidelia Torres   : 1950   MRN: [id number]   Date of Service:  2025    Staff Mobility Recommendation: RW x1 with belt. BLE WBAT    AM-PAC Score:   Discharge Recommendations: Skilled OT 3-5x/week *needs to be at a Mod I level prior to returning home     Admitting Diagnosis:  Sacral fracture, closed (HCC)  Ordering Physician: Jm Dahl MD   Current Admission Summary: 73 yo female admitted with a fall with VIOLA and Nondisplaced sacrococcygeal fracture now WBAT treated non-operatively. PMH: COPD, DM, HTN, Hepatitis, CKD    Past Medical History:  has a past medical history of Allergic rhinitis, Asthma, COPD (chronic obstructive pulmonary disease) (HCC), COPD without exacerbation (HCC), Diabetes mellitus (HCC), Hepatitis, Hyperlipidemia, Hypertension, Influenza A, and Osteoporosis.  Past Surgical History:  has a past surgical history that includes Colonoscopy; Breast biopsy (10/2005); liver biopsy (2006 ); Colonoscopy (13); Colonoscopy (2016); Cataract removal (Bilateral); Colonoscopy (2021); Upper gastrointestinal endoscopy (N/A, 2022); Breast surgery (Right, 2023); and Cardiac procedure (N/A, 2024).    Assessment  Activity Tolerance: Good  Fair  Impairments Requiring Therapeutic Intervention: decreased functional mobility, decreased ADL status, decreased strength, decreased endurance, decreased balance, decreased IADL, increased pain, decreased posture  Prognosis: good    Clinical Assessment: PTA, pt lives alone and is IND with ADL, IADL and fxl mobility with no AD. \"For awhile now\" limited ability to complete stairs and IADL. Today, pt functioning below baseline limited by decreased endurance, balance and L 
Occupational Therapy    [facility] - Occupational Therapy   Phone: [phone]    Occupational Therapy  Facility/Department:[address] ORTHOPEDICS    [x] Initial Evaluation            [] Daily Treatment Note         [] Discharge Summary      Patient: Fidelia Torres   : 1950   MRN: [id number]   Date of Service:  2025    Staff Mobility Recommendation: RW x1 with belt. BLE WBAT    AM-PAC Score:   Discharge Recommendations: Skilled OT 3-5x/week    Admitting Diagnosis:  Sacral fracture, closed (HCC)  Ordering Physician: Jm Dahl MD   Current Admission Summary: 75 yo female admitted with a fall with VIOLA and Nondisplaced sacrococcygeal fracture now WBAT treated non-operatively. PMH: COPD, DM, HTN, Hepatitis, CKD    Past Medical History:  has a past medical history of Allergic rhinitis, Asthma, COPD (chronic obstructive pulmonary disease) (HCC), COPD without exacerbation (HCC), Diabetes mellitus (HCC), Hepatitis, Hyperlipidemia, Hypertension, Influenza A, and Osteoporosis.  Past Surgical History:  has a past surgical history that includes Colonoscopy; Breast biopsy (10/2005); liver biopsy (2006 ); Colonoscopy (13); Colonoscopy (2016); Cataract removal (Bilateral); Colonoscopy (2021); Upper gastrointestinal endoscopy (N/A, 2022); Breast surgery (Right, 2023); and Cardiac procedure (N/A, 2024).    Assessment  Activity Tolerance: Good  Fair  Impairments Requiring Therapeutic Intervention: decreased functional mobility, decreased ADL status, decreased strength, decreased endurance, decreased balance, decreased IADL, increased pain, decreased posture  Prognosis: good    Clinical Assessment: PTA, pt lives alone and is IND with ADL, IADL and fxl mobility with no AD. \"For awhile now\" limited ability to complete stairs and IADL. Today, pt functioning below baseline limited by decreased endurance, balance and L buttock pain.. Pt required up to Min A fxl tx and mobility 
Occupational Therapy    [facility] - Occupational Therapy   Phone: [phone]    Occupational Therapy  Facility/Department:[address] ORTHOPEDICS    [] Initial Evaluation            [x] Daily Treatment Note         [] Discharge Summary      Patient: Fidelia Torres   : 1950   MRN: [id number]   Date of Service:  2025    Staff Mobility Recommendation: RW x1 with belt. BLE WBAT    AM-PAC Score:   Discharge Recommendations: Skilled OT 3-5x/week *needs to be at a Mod I level prior to returning home     Admitting Diagnosis:  Sacral fracture, closed (HCC)  Ordering Physician: Jm Dahl MD   Current Admission Summary: 73 yo female admitted with a fall with VIOLA and Nondisplaced sacrococcygeal fracture now WBAT treated non-operatively. PMH: COPD, DM, HTN, Hepatitis, CKD    Past Medical History:  has a past medical history of Allergic rhinitis, Asthma, COPD (chronic obstructive pulmonary disease) (HCC), COPD without exacerbation (HCC), Diabetes mellitus (HCC), Hepatitis, Hyperlipidemia, Hypertension, Influenza A, and Osteoporosis.  Past Surgical History:  has a past surgical history that includes Colonoscopy; Breast biopsy (10/2005); liver biopsy (2006 ); Colonoscopy (13); Colonoscopy (2016); Cataract removal (Bilateral); Colonoscopy (2021); Upper gastrointestinal endoscopy (N/A, 2022); Breast surgery (Right, 2023); and Cardiac procedure (N/A, 2024).    Assessment  Activity Tolerance: Good  Fair  Impairments Requiring Therapeutic Intervention: decreased functional mobility, decreased ADL status, decreased strength, decreased endurance, decreased balance, decreased IADL, increased pain, decreased posture  Prognosis: good    Clinical Assessment: PTA, pt lives alone and is IND with ADL, IADL and fxl mobility with no AD. \"For awhile now\" limited ability to complete stairs and IADL. Today, pt functioning below baseline limited by decreased endurance, balance and L 
Paged MD with left hip xray, asking if orthopedics needs to be consulted. See new orders.   
Patient arrived to room 3131 from [facility]. Patient is A/Ox4. Oriented patient to room and call light. Instructed patient to call for help out of bed, patient verbalized understanding. Patient denies any further needs at this time.   
Patient in bed, A&O X4. VSS. PIV flushed, dressing CDI, infusing continuous fluids per orders at this time. Patient reports pain of a 6/10 in her sacral/coccyx area. Pain medication administered as ordered. All other AM medications taken whole without complaint (see eMAR).  Patient tolerating PO intake and appetite adequate. No other needs verbalized at this time. Standard safety precautions in place and call light within reach.   
Patient resting in bed, A&O X4. VSS. PIV flushed, dressing CDI, Infusing at this time.no complaints of pain at this time. All AM medications taken whole without complaint (see eMAR).  Patient tolerating PO intake and appetite adequate. No other needs verbalized at this time. Standard safety precautions in place and call light within reach.   
Physical Therapy  PT referral received and chart reviewed.  Spoke with nursing.  Pt admit CT :  S5 & Coccyx Fx (age indeterminate).  X-Ray bilat Hip : Probable Impact Fx L Hip Subcapital.  Ortho now consulted.  Will follow-up after Ortho consult/recs.  Sheree Bourgeois, PT    
Pt A/O x4, VSS. Pt sat up chair throughout day, worked with PT/OT. IV fluids infusing. Fall precautions in place. Call light and personal belongings within reach.  
Pt A/O x4, delayed responses with communication with staff. IV fluids infusing. Fall precautions in place, bed in lowest position, call light and cell phone within reach.  
Pt AAO x4.  No c/o pain.  States pain is only with movement or sitting.  She declines wanting any pain meds at this time.  Assessment completed.  Ambulated to the BR with CGA and walker.  Gait slow.  Urine sample sent to lab.  Denies any needs.  Call light in reach.  Bed alarm reactivated.    
Pt sitting up in chair eating breakfast this a.m. She denies having any pain, nausea, numbness, or tingling. Pulses palpable, skin warm to touch. She has moderate  strength in BUE and moderate flexion in BLE. Pt ambulating with walker and SBA, tolerating well. Fall precautions in place, belongings within reach. Continue care.  
The patients OARRS report has been reviewed online and any prescribing of pain related medications is based on our findings.The patient has been issued narcotics to safely reduce postoperative pain and promote tolerance with physical therapies and ADL's. Reduction in dosing will be addressed with the next narcotic refill request. Dosing is adjusted for patients with history of chronic pain disorders.    
  Component Value Date/Time     05/28/2025 05:33 AM    K 3.7 05/28/2025 05:33 AM    K 3.7 05/26/2025 04:38 AM     05/28/2025 05:33 AM    CO2 18 05/28/2025 05:33 AM    BUN 18 05/28/2025 05:33 AM    CREATININE 1.4 05/28/2025 05:33 AM    CALCIUM 7.5 05/28/2025 05:33 AM    GFRAA 36 09/13/2022 10:21 AM    GFRAA 59 04/03/2013 09:43 AM    LABGLOM 40 05/28/2025 05:33 AM    LABGLOM 48 11/02/2023 09:17 AM    GLUCOSE 103 05/28/2025 05:33 AM         Assessment/Plan       1-VIOLA on CKD stage III suspect volume depletion in the setting of decreased oral intake rule out rhabdomyolysis urinalysis shows trace protein large leukocyte Estrace negative blood Cr improving to 1.4 mg today Decrease IVF's to KVO   2-history of hypertension BP elevated Decrease IVF rate  Continue amlodipine and Coreg  3-diabetes mellitus diet controlled  4 status post fall with sacrococcygeal fracture  5 COPD        Thank you for the consultation.  Please do not hesitate to call with questions.    John Irwin MD, FACP              
(A) Negative mg/dL    Urobilinogen, Urine 1.0 <2.0 E.U./dL    Nitrite, Urine Negative Negative    Leukocyte Esterase, Urine LARGE (A) Negative    Microscopic Examination YES     Urine Type NotGiven     Urine Reflex to Culture Yes    Microscopic Urinalysis    Collection Time: 05/26/25  9:06 AM   Result Value Ref Range    Hyaline Casts, UA 3-5 (A) 0 - 2 /LPF    Mucus, UA 1+ (A) None Seen /LPF    WBC, UA  (A) 0 - 5 /HPF    RBC, UA 0-2 0 - 4 /HPF    Epithelial Cells, UA 11-20 (A) 0 - 5 /HPF    Bacteria, UA 3+ (A) None Seen /HPF    Urinalysis Comments see below    POCT Glucose    Collection Time: 05/26/25 11:20 AM   Result Value Ref Range    POC Glucose 117 (H) 70 - 99 mg/dl    Performed on ACCU-CHEK    POCT Glucose    Collection Time: 05/26/25  4:06 PM   Result Value Ref Range    POC Glucose 175 (H) 70 - 99 mg/dl    Performed on ACCU-CHEK    POCT Glucose    Collection Time: 05/26/25  8:07 PM   Result Value Ref Range    POC Glucose 179 (H) 70 - 99 mg/dl    Performed on ACCU-CHEK    Sodium, Urine, Random    Collection Time: 05/26/25  9:20 PM   Result Value Ref Range    Sodium, Ur 28 Not Established mmol/L   Renal Function Panel    Collection Time: 05/27/25  5:23 AM   Result Value Ref Range    Sodium 138 136 - 145 mmol/L    Potassium 3.4 (L) 3.5 - 5.1 mmol/L    Chloride 108 99 - 110 mmol/L    CO2 19 (L) 21 - 32 mmol/L    Anion Gap 11 3 - 16    Glucose 119 (H) 70 - 99 mg/dL    BUN 23 (H) 7 - 20 mg/dL    Creatinine 1.8 (H) 0.6 - 1.2 mg/dL    Est, Glom Filt Rate 29 (A) >60    Calcium 7.9 (L) 8.3 - 10.6 mg/dL    Phosphorus 2.2 (L) 2.5 - 4.9 mg/dL    Albumin 3.2 (L) 3.4 - 5.0 g/dL   POCT Glucose    Collection Time: 05/27/25  7:25 AM   Result Value Ref Range    POC Glucose 98 70 - 99 mg/dl    Performed on ACCU-K[facility]         Imaging/Diagnostics Last 24 Hours   No results found.    Electronically signed by Yariel Matthew MD on 5/27/2025 at 7:51 AM    
per tablet 1 tablet, 1 tablet, Oral, Q4H PRN **OR** oxyCODONE-acetaminophen (PERCOCET) 5-325 MG per tablet 2 tablet, 2 tablet, Oral, Q4H PRN  labetalol (NORMODYNE;TRANDATE) injection 10 mg, 10 mg, IntraVENous, Q4H PRN  glucose chewable tablet 16 g, 4 tablet, Oral, PRN  dextrose bolus 10% 125 mL, 125 mL, IntraVENous, PRN **OR** dextrose bolus 10% 250 mL, 250 mL, IntraVENous, PRN  glucagon injection 1 mg, 1 mg, SubCUTAneous, PRN  dextrose 10 % infusion, , IntraVENous, Continuous PRN  carvedilol (COREG) tablet 6.25 mg, 6.25 mg, Oral, BID with meals  aspirin EC tablet 81 mg, 81 mg, Oral, Daily  atorvastatin (LIPITOR) tablet 40 mg, 40 mg, Oral, Daily  budesonide-formoterol (SYMBICORT) 160-4.5 MCG/ACT inhaler 2 puff, 2 puff, Inhalation, Daily RT  fluticasone (FLONASE) 50 MCG/ACT nasal spray 1 spray, 1 spray, Each Nostril, Daily PRN  montelukast (SINGULAIR) tablet 10 mg, 10 mg, Oral, Nightly  predniSONE (DELTASONE) tablet 10 mg, 10 mg, Oral, Daily  Roflumilast (DALIRESP) tablet 250 mcg, 250 mcg, Oral, Daily  amLODIPine (NORVASC) tablet 5 mg, 5 mg, Oral, Daily    ASSESSMENT AND PLAN    Fall  Tailbone pain  S5 fracture, Coccyx fracture, Both impacted with minimal displacement  Chronic steroid use with osteopenia  Plan WBAT with PT OT  Pain med as needed.   Followup Dr Treviño in office in 3-4 weeks  Disposition per hospitalist.       Bhavya Boyd, ROSINA - CNP  5/27/2025  10:17 AM   
Time      Individual Group Co-treatment   Time In  928       Time Out  1009       Minutes  41           Timed Code Treatment Minutes:   26 min/15 min eval.  Total Treatment Minutes:  41 minutes    Minutes per charge:  Low complexity eval: 15 minutes  Therapeutic activity: 14 minutes  Gait trainin minutes      Electronically signed by Sheree Bourgeois PT on 2025 at 10:30 AM

## 2025-05-29 NOTE — CARE COORDINATION
DISCHARGE SUMMARY     DATE OF DISCHARGE: 5/29/2025    DISCHARGE DESTINATION: skilled unit    FACILITY:   [facility]  [address] [place] [address]  Phone: [phone]  Fax: [phone] (Wkdy)          [phone] (Wknd)    INSURANCE PRECERT OBTAINED: yes    SOHA/PARKER COMPLETED: yes    TRANSPORTATION:   Family will transport by car before 7pm    COMMENTS: Spoke with patient regarding receiving precert for [facility] and transport. Explained that [facility] could set up transport but  insurance does not usually cover transport if patient is able to ambulate.   Patient reported that her family will transport her by car before 7pm.   Informed patient to take the discharge packet to the facility and to go straight to facility from hospital. She acknowledged her understanding.  Informed Sherlyn at [facility] regarding transport time.   Rn aware. Report number  [phone].    Electronically signed by Camille Spring, WANDA, LISW, Case Management on 5/29/2025 at 3:52 PM  [facility] [phone]

## 2025-05-29 NOTE — CARE COORDINATION
Received call from Sherlyn at [facility] ([phone]). She gave approval for skilled level of care.    Reference #[id number]     Approval dates: 5/29/2025 - 6/4/2025    Fax updated clinicals by 6/4/2025 to [phone].    Informed Sherlyn at [facility] regarding above--they can accept patient today.     Electronically signed by Camille Spring, WANDA, LISW, Case Management on 5/29/2025 at 3:04 PM  [facility] [phone]

## 2025-05-31 DIAGNOSIS — E78.00 PURE HYPERCHOLESTEROLEMIA: ICD-10-CM

## 2025-06-02 RX ORDER — ATORVASTATIN CALCIUM 40 MG/1
40 TABLET, FILM COATED ORAL DAILY
Qty: 90 TABLET | Refills: 1 | Status: SHIPPED | OUTPATIENT
Start: 2025-06-02

## 2025-06-16 ENCOUNTER — OFFICE VISIT (OUTPATIENT)
Dept: FAMILY MEDICINE CLINIC | Age: 75
End: 2025-06-16
Payer: MEDICARE

## 2025-06-16 VITALS
WEIGHT: 126 LBS | HEIGHT: 64 IN | HEART RATE: 89 BPM | BODY MASS INDEX: 21.51 KG/M2 | OXYGEN SATURATION: 96 % | TEMPERATURE: 97.1 F | SYSTOLIC BLOOD PRESSURE: 130 MMHG | DIASTOLIC BLOOD PRESSURE: 86 MMHG

## 2025-06-16 DIAGNOSIS — S32.130D CLOSED NONDISPLACED ZONE III FRACTURE OF SACRUM WITH ROUTINE HEALING, SUBSEQUENT ENCOUNTER: ICD-10-CM

## 2025-06-16 DIAGNOSIS — I50.32 CHRONIC DIASTOLIC CONGESTIVE HEART FAILURE (HCC): Primary | ICD-10-CM

## 2025-06-16 DIAGNOSIS — I85.00 ESOPHAGEAL VARICES WITHOUT BLEEDING, UNSPECIFIED ESOPHAGEAL VARICES TYPE (HCC): ICD-10-CM

## 2025-06-16 DIAGNOSIS — N18.32 STAGE 3B CHRONIC KIDNEY DISEASE (HCC): ICD-10-CM

## 2025-06-16 DIAGNOSIS — J45.20 MILD INTERMITTENT ASTHMA WITHOUT COMPLICATION: ICD-10-CM

## 2025-06-16 DIAGNOSIS — I10 ESSENTIAL HYPERTENSION, BENIGN: Chronic | ICD-10-CM

## 2025-06-16 PROCEDURE — 1123F ACP DISCUSS/DSCN MKR DOCD: CPT | Performed by: NURSE PRACTITIONER

## 2025-06-16 PROCEDURE — G2211 COMPLEX E/M VISIT ADD ON: HCPCS | Performed by: NURSE PRACTITIONER

## 2025-06-16 PROCEDURE — 3075F SYST BP GE 130 - 139MM HG: CPT | Performed by: NURSE PRACTITIONER

## 2025-06-16 PROCEDURE — 3079F DIAST BP 80-89 MM HG: CPT | Performed by: NURSE PRACTITIONER

## 2025-06-16 PROCEDURE — 1159F MED LIST DOCD IN RCRD: CPT | Performed by: NURSE PRACTITIONER

## 2025-06-16 PROCEDURE — 99214 OFFICE O/P EST MOD 30 MIN: CPT | Performed by: NURSE PRACTITIONER

## 2025-06-16 RX ORDER — ALBUTEROL SULFATE 90 UG/1
INHALANT RESPIRATORY (INHALATION)
Qty: 6.7 EACH | Refills: 2 | Status: SHIPPED | OUTPATIENT
Start: 2025-06-16

## 2025-06-16 RX ORDER — AMLODIPINE BESYLATE 5 MG/1
5 TABLET ORAL DAILY
Qty: 90 TABLET | Refills: 2 | Status: SHIPPED | OUTPATIENT
Start: 2025-06-16

## 2025-06-16 RX ORDER — BUDESONIDE AND FORMOTEROL FUMARATE DIHYDRATE 160; 4.5 UG/1; UG/1
AEROSOL RESPIRATORY (INHALATION)
Qty: 30.6 EACH | Refills: 1 | Status: SHIPPED | OUTPATIENT
Start: 2025-06-16

## 2025-06-16 ASSESSMENT — PATIENT HEALTH QUESTIONNAIRE - PHQ9
2. FEELING DOWN, DEPRESSED OR HOPELESS: NOT AT ALL
SUM OF ALL RESPONSES TO PHQ QUESTIONS 1-9: 0
1. LITTLE INTEREST OR PLEASURE IN DOING THINGS: NOT AT ALL
DEPRESSION UNABLE TO ASSESS: FUNCTIONAL CAPACITY MOTIVATION LIMITS ACCURACY
SUM OF ALL RESPONSES TO PHQ QUESTIONS 1-9: 0

## 2025-06-16 NOTE — PROGRESS NOTES
Fidelia Torres (:  1950) is a 74 y.o. female,Established patient, here for evaluation of the following chief complaint(s):  Follow-up (Recently discharged from SNF . Pt had a fall. )      ASSESSMENT/PLAN:  Assessment & Plan  1. Fracture of S5 and coccyx.  - Fractured S5 and coccyx from a fall; hospitalized and spent 2 weeks in rehab.  - Increased pain and limited mobility; currently using a walker.  - Discussed expected prolonged awareness of the injury; plans to drive again once off the walker.  - Tylenol prescribed as needed for pain management.    2. Hypertension.  - Hypertension well managed with Norvasc (amlodipine) 5 mg once a day.  - Blood pressure remains stable.  - Reviewed medication list for accuracy.  - Refill for amlodipine provided.    3. Asthma.  - Asthma effectively controlled with Symbicort and Breo inhalers.  - Breathing under control; reported issues with medication administration during hospital stay.  - Discussed medication adherence and need for refills.  - Refills for Symbicort and albuterol inhalers provided.    4. Diabetes mellitus.  - Diabetes well controlled with an A1c of 5.2, within the nondiabetic range.  - Patient reports feeling good and well-controlled blood sugars.  - Labs to be rechecked today to ensure continued control.  - No changes in current diabetes management plan.    5. Esophageal varices.  - History of esophageal varices that were not bleeding.  - No recent endoscopy reported.  - Condition will continue to be monitored.  - No changes in current management plan.    6. Heart failure.  - History of heart failure.  - No new symptoms or changes reported.  - Reviewed current treatment plan.  - No changes in current management plan.    7. Medication management.  - Continues to take multivitamin daily, Flonase nasal spray as needed, Tylenol as needed for pain, Symbicort inhaler 2 puffs a day, albuterol inhaler as needed, amlodipine once a day, baby aspirin once a day,
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
## 2025-07-14 ENCOUNTER — TELEPHONE (OUTPATIENT)
Dept: PHARMACY | Facility: CLINIC | Age: 75
End: 2025-07-14

## 2025-07-14 NOTE — TELEPHONE ENCOUNTER
[facility] CLINICAL PHARMACY: ADHERENCE REVIEW  Identified care gap per [employer]: fills at [facility]: Statin adherence    Patient also appears to be prescribed: Diabetes per [employer] report (appears pioglitazone dc'd @3/17/25, A1c 5.2)    ASSESSMENT  STATIN ADHERENCE    Insurance Records claims through 25 (Prior Year PDC = 97% - PASSED; YTD PDC = FIRST FILL; Potential Fail Date: 25):   ATORVASTATIN TAB 40MG last filled on 3/2/25 for 90 day supply. Next refill due: 25    Prescribed si tablet/capsule daily    Per Reconcile Dispense History: 0 refills remain    Per chart, reordered 25; was in hospital  -  and discharged to SNF, appears discharged home from SNF @25    Per Insurer Portal: filled 25 & 25 - reversed / returned to stock?    Lab Results   Component Value Date    CHOL 169 2023    TRIG 102 2023    HDL 76 (H) 2023     Lab Results   Component Value Date    LDL 73 2023      ALT   Date Value Ref Range Status   2025 6 (L) 10 - 40 U/L Final     AST   Date Value Ref Range Status   2025 17 15 - 37 U/L Final     The ASCVD Risk score (Rory WELSH, et al., 2019) failed to calculate for the following reasons:    Risk score cannot be calculated because patient has a medical history suggesting prior/existing ASCVD     PLAN  The following are interventions that have been identified:   Patient OVERDUE refilling atorvastatin 40mg daily (may have had surplus from @2-3 weeks in hospital/SNF - should have rx on file at [facility]) and active on home medication list.     Attempting to reach patient to review - unable to leave message.    Per [facility]: will get  90 day supply ready to  since likely due.    [employer] message sent to patient (last login in March). Letter sent to patient.     Last Visit: 25  Next Visit: 25    Elisa Solano, PharmD, BCACP  Population Health Pharmacist  [facility] Clinical Pharmacy  Department, toll free: [phone],

## 2025-07-18 ENCOUNTER — TELEPHONE (OUTPATIENT)
Dept: FAMILY MEDICINE CLINIC | Age: 75
End: 2025-07-18

## 2025-07-18 DIAGNOSIS — N18.32 STAGE 3B CHRONIC KIDNEY DISEASE (HCC): ICD-10-CM

## 2025-07-18 DIAGNOSIS — K75.4 AUTOIMMUNE HEPATITIS (HCC): Primary | ICD-10-CM

## 2025-07-18 DIAGNOSIS — S32.10XA CLOSED COMPRESSION FRACTURE OF SACRUM, INITIAL ENCOUNTER (HCC): ICD-10-CM

## 2025-07-18 DIAGNOSIS — R53.1 GENERAL WEAKNESS: ICD-10-CM

## 2025-07-18 DIAGNOSIS — J44.9 COPD WITHOUT EXACERBATION (HCC): ICD-10-CM

## 2025-07-18 DIAGNOSIS — J41.0 SIMPLE CHRONIC BRONCHITIS (HCC): ICD-10-CM

## 2025-07-18 DIAGNOSIS — I10 ESSENTIAL HYPERTENSION, BENIGN: ICD-10-CM

## 2025-07-18 DIAGNOSIS — N18.31 TYPE 2 DIABETES MELLITUS WITH STAGE 3A CHRONIC KIDNEY DISEASE, WITHOUT LONG-TERM CURRENT USE OF INSULIN (HCC): ICD-10-CM

## 2025-07-18 DIAGNOSIS — I50.32 CHRONIC DIASTOLIC CONGESTIVE HEART FAILURE (HCC): ICD-10-CM

## 2025-07-18 DIAGNOSIS — N18.31 STAGE 3A CHRONIC KIDNEY DISEASE (HCC): ICD-10-CM

## 2025-07-18 DIAGNOSIS — I85.00 ESOPHAGEAL VARICES WITHOUT BLEEDING, UNSPECIFIED ESOPHAGEAL VARICES TYPE (HCC): ICD-10-CM

## 2025-07-18 DIAGNOSIS — E78.00 PURE HYPERCHOLESTEROLEMIA: ICD-10-CM

## 2025-07-18 DIAGNOSIS — E11.22 TYPE 2 DIABETES MELLITUS WITH STAGE 3A CHRONIC KIDNEY DISEASE, WITHOUT LONG-TERM CURRENT USE OF INSULIN (HCC): ICD-10-CM

## 2025-07-18 DIAGNOSIS — S32.130G: ICD-10-CM

## 2025-07-18 NOTE — TELEPHONE ENCOUNTER
Previous order for skill nursing has . Ashley would like to have a new order put in.    Ashley from [facility]. She has Epic so she can see the order in the system.    P [phone]

## 2025-07-22 ENCOUNTER — TELEPHONE (OUTPATIENT)
Dept: FAMILY MEDICINE CLINIC | Age: 75
End: 2025-07-22

## 2025-07-22 NOTE — TELEPHONE ENCOUNTER
Dashawn from [facility] called needing verbal orders for patient to have PT/OT for evaluation, and if any lab orders are needed,please call [phone]

## 2025-07-23 ENCOUNTER — TELEPHONE (OUTPATIENT)
Dept: FAMILY MEDICINE CLINIC | Age: 75
End: 2025-07-23

## 2025-07-23 NOTE — TELEPHONE ENCOUNTER
Spoke with Katie, informed her of providers orders. She stated she is unsure if anyone is scheduled to go back to the see the pt this week but she will speak with her manager and call us back

## 2025-07-23 NOTE — TELEPHONE ENCOUNTER
Katie from [facility] called saying she took patient's blood pressure, read 194/116,please call [phone].

## 2025-07-24 ENCOUNTER — TELEPHONE (OUTPATIENT)
Dept: FAMILY MEDICINE CLINIC | Age: 75
End: 2025-07-24

## 2025-07-24 ENCOUNTER — TELEPHONE (OUTPATIENT)
Dept: PHARMACY | Facility: CLINIC | Age: 75
End: 2025-07-24

## 2025-07-24 DIAGNOSIS — T14.8XXA FRACTURE: Primary | ICD-10-CM

## 2025-07-24 DIAGNOSIS — S32.2XXB: ICD-10-CM

## 2025-07-24 LAB
ALBUMIN: 3.8 G/DL (ref 3.5–5.7)
ANION GAP SERPL CALCULATED.3IONS-SCNC: 8 MMOL/L (ref 4–16)
BUN BLDV-MCNC: 17 MG/DL (ref 8–26)
CALCIUM SERPL-MCNC: 9.5 MG/DL (ref 8.5–10.4)
CHLORIDE BLD-SCNC: 106 MMOL/L (ref 98–111)
CO2: 26 MMOL/L (ref 21–31)
CREAT SERPL-MCNC: 1.27 MG/DL (ref 0.6–1.2)
EGFR (CKD-EPI): 45 ML/MIN/1.73 M2
GLUCOSE BLD-MCNC: 67 MG/DL (ref 70–99)
PHOSPHORUS: 4.3 MG/DL (ref 2.5–4.5)
POTASSIUM SERPL-SCNC: 4.1 MMOL/L (ref 3.6–5.1)
SODIUM BLD-SCNC: 140 MMOL/L (ref 135–145)

## 2025-07-24 NOTE — TELEPHONE ENCOUNTER
Sarahi with [facility] called to update Albina that pt's bp was 162/104 and she had just taking her medicine right before Sarahi arrived.     P [phone]

## 2025-07-24 NOTE — TELEPHONE ENCOUNTER
ARIEL SHEPARD, APRN - CNP, please see below - would patient benefit from updated DEXA due to recent fracture?  Fractures of S5 and coccyx following mechanical fall  Last DEXA 2022 - osteopenia  May give consideration to pharmacologic treatment of osteoporosis given vertebral fracture    If appropriate, please order and have your staff notify patient.    Thank you,  Elisa Solano, PharmD, [facility]  Population Health Pharmacist  [facility] Clinical Pharmacy  Department, toll free: [phone], option 1    ==================================================================  POPULATION HEALTH CLINICAL PHARMACY REVIEW: RECENT FRACTURE    Fidelia Torres is a 74 y.o. old Black /  female patient who recently had fractures of S5 and coccyx (5/24/25 Hospital Encounter, following mechanical ground level fall)    Lab Results   Component Value Date    VITD25 33 10/25/2011      Lab Results   Component Value Date    CALCIUM 8.1 (L) 05/29/2025    PHOS 2.3 (L) 05/29/2025     estimated creatinine clearance is 40 mL/min (based on SCr of 1.1 mg/dL).    DEXA 8/16/22:  Osteopenia    FRAX-calculated 10-year fracture probability:   Per WHO calculator: major Osteoporotic = 12% and Hip = 3.9%    Assessment:   - 74 y.o. female with recent fracture and may benefit from DEXA to assess current BMD  Last DEXA 2022 - osteopenia  - AACE recommends to initiate pharmacologic treatment in those with hip or vertebral fracture.    Considerations:  - Suggest obtaining DEXA  - May give consideration to pharmacologic treatment of osteoporosis given vertebral fracture

## 2025-08-01 NOTE — TELEPHONE ENCOUNTER
Noted, thank you for review and outreach    =======================================================   For Pharmacy Admin Tracking Only    Program: Pop Health  CPA in place:  No  Recommendation Provided To: Provider: 1 via Note to Provider  Intervention Detail: Lab(s) Ordered  Intervention Accepted By: Provider: 1  Gap Closed?: No   Time Spent (min): 15

## 2025-08-05 ENCOUNTER — TELEPHONE (OUTPATIENT)
Dept: FAMILY MEDICINE CLINIC | Age: 75
End: 2025-08-05

## 2025-09-04 ENCOUNTER — TELEPHONE (OUTPATIENT)
Dept: FAMILY MEDICINE CLINIC | Age: 75
End: 2025-09-04

## 2025-09-05 ENCOUNTER — OFFICE VISIT (OUTPATIENT)
Dept: FAMILY MEDICINE CLINIC | Age: 75
End: 2025-09-05

## 2025-09-05 VITALS
BODY MASS INDEX: 22.57 KG/M2 | WEIGHT: 132.2 LBS | HEART RATE: 69 BPM | TEMPERATURE: 98.5 F | DIASTOLIC BLOOD PRESSURE: 90 MMHG | OXYGEN SATURATION: 99 % | SYSTOLIC BLOOD PRESSURE: 130 MMHG | HEIGHT: 64 IN

## 2025-09-05 DIAGNOSIS — I10 ESSENTIAL HYPERTENSION, BENIGN: Primary | ICD-10-CM

## 2025-09-05 DIAGNOSIS — N18.31 STAGE 3A CHRONIC KIDNEY DISEASE (HCC): ICD-10-CM

## 2025-09-05 DIAGNOSIS — E78.00 PURE HYPERCHOLESTEROLEMIA: ICD-10-CM

## 2025-09-05 RX ORDER — CARVEDILOL 6.25 MG/1
6.25 TABLET ORAL 2 TIMES DAILY
Qty: 180 TABLET | Refills: 1 | Status: SHIPPED | OUTPATIENT
Start: 2025-09-05

## 2025-09-05 ASSESSMENT — ENCOUNTER SYMPTOMS
SHORTNESS OF BREATH: 0
BACK PAIN: 0
SINUS PRESSURE: 0
SINUS PAIN: 0
WHEEZING: 0
COLOR CHANGE: 0
DIARRHEA: 0
CONSTIPATION: 0
ABDOMINAL PAIN: 0
COUGH: 0

## (undated) DEVICE — TR BAND RADIAL ARTERY COMPRESSION DEVICE: Brand: TR BAND

## (undated) DEVICE — CANNULA SAMP CO2 AD GRN 7FT CO2 AND 7FT O2 TBNG UNIV CONN

## (undated) DEVICE — FORCEPS BX L240CM JAW DIA2.4MM ORNG L CAP W/ NDL DISP RAD

## (undated) DEVICE — CATHETER COR DIAG PIGTAILS PIG 145 CRV 5FR 110CM 6 SIDE H

## (undated) DEVICE — RADIFOCUS OPTITORQUE ANGIOGRAPHIC CATHETER: Brand: OPTITORQUE

## (undated) DEVICE — GUIDEWIRE VASC L260CM DIA0.035IN RAD 3MM J TIP L7CM PTFE

## (undated) DEVICE — GLIDESHEATH SLENDER NITINOL HYDROPHILIC COATED INTRODUCER SHEATH: Brand: GLIDESHEATH SLENDER